# Patient Record
Sex: MALE | Race: WHITE | Employment: OTHER | URBAN - METROPOLITAN AREA
[De-identification: names, ages, dates, MRNs, and addresses within clinical notes are randomized per-mention and may not be internally consistent; named-entity substitution may affect disease eponyms.]

---

## 2017-08-07 PROBLEM — I77.9 BILATERAL CAROTID ARTERY DISEASE (HCC): Status: ACTIVE | Noted: 2017-08-07

## 2017-08-07 PROBLEM — I47.29 NSVT (NONSUSTAINED VENTRICULAR TACHYCARDIA): Status: ACTIVE | Noted: 2017-08-07

## 2018-02-07 PROBLEM — I42.0 DILATED CARDIOMYOPATHY (HCC): Status: ACTIVE | Noted: 2018-02-07

## 2018-04-19 PROBLEM — Z79.01 LONG TERM (CURRENT) USE OF ANTICOAGULANTS: Status: ACTIVE | Noted: 2018-04-19

## 2018-08-17 ENCOUNTER — HOSPITAL ENCOUNTER (OUTPATIENT)
Dept: LAB | Age: 73
Discharge: HOME | End: 2018-08-17
Payer: MEDICARE

## 2018-08-17 DIAGNOSIS — I50.22 CHRONIC SYSTOLIC HEART FAILURE (HCC): ICD-10-CM

## 2018-08-17 LAB
ALBUMIN SERPL-MCNC: 4 G/DL (ref 3.2–4.6)
ALBUMIN/GLOB SERPL: 1.2 {RATIO}
ALP SERPL-CCNC: 69 U/L (ref 50–136)
ALT SERPL-CCNC: 30 U/L (ref 12–65)
ANION GAP SERPL CALC-SCNC: 10 MMOL/L
AST SERPL-CCNC: 30 U/L (ref 15–37)
BILIRUB SERPL-MCNC: 1 MG/DL (ref 0.2–1.1)
BNP SERPL-MCNC: 74 PG/ML
BUN SERPL-MCNC: 23 MG/DL (ref 8–23)
CALCIUM SERPL-MCNC: 9.5 MG/DL (ref 8.3–10.4)
CHLORIDE SERPL-SCNC: 102 MMOL/L (ref 98–107)
CO2 SERPL-SCNC: 26 MMOL/L (ref 21–32)
CREAT SERPL-MCNC: 1.1 MG/DL (ref 0.8–1.5)
GLOBULIN SER CALC-MCNC: 3.4 G/DL
GLUCOSE SERPL-MCNC: 208 MG/DL (ref 65–100)
MAGNESIUM SERPL-MCNC: 2 MG/DL (ref 1.8–2.4)
POTASSIUM SERPL-SCNC: 4.1 MMOL/L (ref 3.5–5.1)
PROT SERPL-MCNC: 7.4 G/DL (ref 6.3–8.2)
SODIUM SERPL-SCNC: 138 MMOL/L (ref 136–145)
TSH SERPL DL<=0.005 MIU/L-ACNC: 0.83 UIU/ML (ref 0.36–3.74)

## 2018-08-17 PROCEDURE — 83880 ASSAY OF NATRIURETIC PEPTIDE: CPT

## 2018-08-17 PROCEDURE — 83735 ASSAY OF MAGNESIUM: CPT

## 2018-08-17 PROCEDURE — 84443 ASSAY THYROID STIM HORMONE: CPT

## 2018-08-17 PROCEDURE — 80053 COMPREHEN METABOLIC PANEL: CPT

## 2018-08-17 PROCEDURE — 36415 COLL VENOUS BLD VENIPUNCTURE: CPT

## 2018-11-28 ENCOUNTER — HOSPITAL ENCOUNTER (OUTPATIENT)
Dept: LAB | Age: 73
Discharge: HOME | End: 2018-11-28
Payer: MEDICARE

## 2018-11-28 DIAGNOSIS — I50.22 CHRONIC SYSTOLIC HEART FAILURE (HCC): ICD-10-CM

## 2018-11-28 LAB
ANION GAP SERPL CALC-SCNC: 7 MMOL/L
BNP SERPL-MCNC: 92 PG/ML
BUN SERPL-MCNC: 18 MG/DL (ref 8–23)
CALCIUM SERPL-MCNC: 9.3 MG/DL (ref 8.3–10.4)
CHLORIDE SERPL-SCNC: 101 MMOL/L (ref 98–107)
CO2 SERPL-SCNC: 29 MMOL/L (ref 21–32)
CREAT SERPL-MCNC: 1 MG/DL (ref 0.8–1.5)
GLUCOSE SERPL-MCNC: 110 MG/DL (ref 65–100)
MAGNESIUM SERPL-MCNC: 2.1 MG/DL (ref 1.8–2.4)
POTASSIUM SERPL-SCNC: 4.2 MMOL/L (ref 3.5–5.1)
SODIUM SERPL-SCNC: 137 MMOL/L (ref 136–145)

## 2018-11-28 PROCEDURE — 36415 COLL VENOUS BLD VENIPUNCTURE: CPT

## 2018-11-28 PROCEDURE — 80048 BASIC METABOLIC PNL TOTAL CA: CPT

## 2018-11-28 PROCEDURE — 83880 ASSAY OF NATRIURETIC PEPTIDE: CPT

## 2018-11-28 PROCEDURE — 83735 ASSAY OF MAGNESIUM: CPT

## 2019-01-01 ENCOUNTER — PATIENT OUTREACH (OUTPATIENT)
Dept: CASE MANAGEMENT | Age: 74
End: 2019-01-01

## 2019-01-01 ENCOUNTER — HOSPITAL ENCOUNTER (OUTPATIENT)
Age: 74
Setting detail: OBSERVATION
Discharge: HOME | End: 2019-11-05
Attending: INTERNAL MEDICINE | Admitting: INTERNAL MEDICINE
Payer: MEDICARE

## 2019-01-01 ENCOUNTER — HOSPITAL ENCOUNTER (OUTPATIENT)
Dept: LAB | Age: 74
Discharge: HOME | End: 2019-10-30
Payer: MEDICARE

## 2019-01-01 ENCOUNTER — ANESTHESIA (OUTPATIENT)
Dept: SURGERY | Age: 74
End: 2019-01-01
Payer: MEDICARE

## 2019-01-01 ENCOUNTER — APPOINTMENT (OUTPATIENT)
Dept: CARDIAC CATH/INVASIVE PROCEDURES | Age: 74
End: 2019-01-01
Payer: MEDICARE

## 2019-01-01 ENCOUNTER — ANESTHESIA EVENT (OUTPATIENT)
Dept: SURGERY | Age: 74
End: 2019-01-01
Payer: MEDICARE

## 2019-01-01 ENCOUNTER — HOSPITAL ENCOUNTER (INPATIENT)
Age: 74
LOS: 1 days | Discharge: HOME | DRG: 292 | End: 2019-09-05
Attending: EMERGENCY MEDICINE | Admitting: INTERNAL MEDICINE
Payer: MEDICARE

## 2019-01-01 ENCOUNTER — APPOINTMENT (OUTPATIENT)
Dept: GENERAL RADIOLOGY | Age: 74
DRG: 292 | End: 2019-01-01
Attending: EMERGENCY MEDICINE
Payer: MEDICARE

## 2019-01-01 ENCOUNTER — HOSPITAL ENCOUNTER (OUTPATIENT)
Dept: LAB | Age: 74
Discharge: HOME | End: 2019-08-26
Attending: INTERNAL MEDICINE
Payer: MEDICARE

## 2019-01-01 ENCOUNTER — HOSPITAL ENCOUNTER (OUTPATIENT)
Dept: CARDIAC CATH/INVASIVE PROCEDURES | Age: 74
Discharge: HOME | End: 2019-11-04
Payer: MEDICARE

## 2019-01-01 ENCOUNTER — APPOINTMENT (OUTPATIENT)
Dept: GENERAL RADIOLOGY | Age: 74
End: 2019-01-01
Attending: INTERNAL MEDICINE
Payer: MEDICARE

## 2019-01-01 VITALS
OXYGEN SATURATION: 93 % | TEMPERATURE: 97.4 F | WEIGHT: 250 LBS | SYSTOLIC BLOOD PRESSURE: 116 MMHG | RESPIRATION RATE: 14 BRPM | BODY MASS INDEX: 32.98 KG/M2 | HEART RATE: 62 BPM | DIASTOLIC BLOOD PRESSURE: 56 MMHG

## 2019-01-01 VITALS
BODY MASS INDEX: 35.17 KG/M2 | WEIGHT: 265.4 LBS | SYSTOLIC BLOOD PRESSURE: 112 MMHG | OXYGEN SATURATION: 90 % | DIASTOLIC BLOOD PRESSURE: 76 MMHG | HEART RATE: 59 BPM | HEIGHT: 73 IN | TEMPERATURE: 98.7 F | RESPIRATION RATE: 18 BRPM

## 2019-01-01 VITALS
OXYGEN SATURATION: 95 % | HEIGHT: 73 IN | SYSTOLIC BLOOD PRESSURE: 126 MMHG | TEMPERATURE: 97.2 F | WEIGHT: 247.7 LBS | BODY MASS INDEX: 32.83 KG/M2 | RESPIRATION RATE: 18 BRPM | HEART RATE: 63 BPM | DIASTOLIC BLOOD PRESSURE: 60 MMHG

## 2019-01-01 VITALS
HEART RATE: 61 BPM | DIASTOLIC BLOOD PRESSURE: 62 MMHG | TEMPERATURE: 98.6 F | BODY MASS INDEX: 32.59 KG/M2 | SYSTOLIC BLOOD PRESSURE: 118 MMHG | OXYGEN SATURATION: 95 % | WEIGHT: 247 LBS | RESPIRATION RATE: 14 BRPM

## 2019-01-01 VITALS
DIASTOLIC BLOOD PRESSURE: 62 MMHG | WEIGHT: 255 LBS | SYSTOLIC BLOOD PRESSURE: 118 MMHG | HEART RATE: 62 BPM | OXYGEN SATURATION: 96 % | TEMPERATURE: 97.8 F | BODY MASS INDEX: 33.64 KG/M2 | RESPIRATION RATE: 14 BRPM

## 2019-01-01 VITALS
OXYGEN SATURATION: 95 % | WEIGHT: 249 LBS | SYSTOLIC BLOOD PRESSURE: 134 MMHG | HEART RATE: 61 BPM | RESPIRATION RATE: 14 BRPM | BODY MASS INDEX: 32.85 KG/M2 | TEMPERATURE: 98.5 F | DIASTOLIC BLOOD PRESSURE: 60 MMHG

## 2019-01-01 VITALS
WEIGHT: 236 LBS | HEART RATE: 68 BPM | BODY MASS INDEX: 31.14 KG/M2 | OXYGEN SATURATION: 96 % | TEMPERATURE: 99 F | SYSTOLIC BLOOD PRESSURE: 96 MMHG | RESPIRATION RATE: 16 BRPM | DIASTOLIC BLOOD PRESSURE: 68 MMHG

## 2019-01-01 VITALS
TEMPERATURE: 97.7 F | DIASTOLIC BLOOD PRESSURE: 64 MMHG | OXYGEN SATURATION: 95 % | SYSTOLIC BLOOD PRESSURE: 128 MMHG | WEIGHT: 254 LBS | HEART RATE: 63 BPM | BODY MASS INDEX: 33.51 KG/M2 | RESPIRATION RATE: 14 BRPM

## 2019-01-01 VITALS
WEIGHT: 245.4 LBS | TEMPERATURE: 98.5 F | DIASTOLIC BLOOD PRESSURE: 62 MMHG | SYSTOLIC BLOOD PRESSURE: 124 MMHG | OXYGEN SATURATION: 96 % | BODY MASS INDEX: 32.38 KG/M2 | RESPIRATION RATE: 16 BRPM | HEART RATE: 82 BPM

## 2019-01-01 VITALS
RESPIRATION RATE: 16 BRPM | TEMPERATURE: 97.8 F | DIASTOLIC BLOOD PRESSURE: 64 MMHG | HEART RATE: 60 BPM | BODY MASS INDEX: 31.53 KG/M2 | WEIGHT: 239 LBS | OXYGEN SATURATION: 95 % | SYSTOLIC BLOOD PRESSURE: 108 MMHG

## 2019-01-01 VITALS
WEIGHT: 244 LBS | RESPIRATION RATE: 18 BRPM | SYSTOLIC BLOOD PRESSURE: 118 MMHG | OXYGEN SATURATION: 97 % | HEART RATE: 61 BPM | DIASTOLIC BLOOD PRESSURE: 56 MMHG | BODY MASS INDEX: 32.19 KG/M2 | TEMPERATURE: 98 F

## 2019-01-01 DIAGNOSIS — I50.9 CONGESTIVE HEART FAILURE, UNSPECIFIED HF CHRONICITY, UNSPECIFIED HEART FAILURE TYPE (HCC): ICD-10-CM

## 2019-01-01 DIAGNOSIS — I50.22 CHRONIC SYSTOLIC HEART FAILURE (HCC): ICD-10-CM

## 2019-01-01 DIAGNOSIS — R09.02 HYPOXIA: Primary | ICD-10-CM

## 2019-01-01 DIAGNOSIS — Z95.810 ICD (IMPLANTABLE CARDIOVERTER-DEFIBRILLATOR) IN PLACE: Primary | ICD-10-CM

## 2019-01-01 LAB
ALBUMIN SERPL-MCNC: 3.5 G/DL (ref 3.2–4.6)
ALBUMIN SERPL-MCNC: 3.6 G/DL (ref 3.2–4.6)
ALBUMIN SERPL-MCNC: 4.1 G/DL (ref 3.2–4.6)
ALBUMIN/GLOB SERPL: 1.2 {RATIO} (ref 1.2–3.5)
ALP SERPL-CCNC: 40 U/L (ref 50–136)
ALP SERPL-CCNC: 66 U/L (ref 50–136)
ALP SERPL-CCNC: 74 U/L (ref 50–136)
ALT SERPL-CCNC: 22 U/L (ref 12–65)
ALT SERPL-CCNC: 23 U/L (ref 12–65)
ALT SERPL-CCNC: 32 U/L (ref 12–65)
ANION GAP SERPL CALC-SCNC: 6 MMOL/L (ref 7–16)
ANION GAP SERPL CALC-SCNC: 6 MMOL/L (ref 7–16)
ANION GAP SERPL CALC-SCNC: 7 MMOL/L (ref 7–16)
ANION GAP SERPL CALC-SCNC: 8 MMOL/L (ref 7–16)
ANION GAP SERPL CALC-SCNC: 8 MMOL/L (ref 7–16)
AST SERPL-CCNC: 23 U/L (ref 15–37)
AST SERPL-CCNC: 25 U/L (ref 15–37)
AST SERPL-CCNC: 26 U/L (ref 15–37)
ATRIAL RATE: 60 BPM
ATRIAL RATE: 70 BPM
ATRIAL RATE: 87 BPM
BASOPHILS # BLD: 0 K/UL (ref 0–0.2)
BASOPHILS # BLD: 0 K/UL (ref 0–0.2)
BASOPHILS # BLD: 0.1 K/UL (ref 0–0.2)
BASOPHILS NFR BLD: 1 % (ref 0–2)
BILIRUB DIRECT SERPL-MCNC: 0.3 MG/DL
BILIRUB SERPL-MCNC: 0.7 MG/DL (ref 0.2–1.1)
BILIRUB SERPL-MCNC: 1 MG/DL (ref 0.2–1.1)
BILIRUB SERPL-MCNC: 1.1 MG/DL (ref 0.2–1.1)
BNP SERPL-MCNC: 103 PG/ML
BNP SERPL-MCNC: 262 PG/ML
BUN SERPL-MCNC: 20 MG/DL (ref 8–23)
BUN SERPL-MCNC: 22 MG/DL (ref 8–23)
BUN SERPL-MCNC: 23 MG/DL (ref 8–23)
BUN SERPL-MCNC: 24 MG/DL (ref 8–23)
BUN SERPL-MCNC: 25 MG/DL (ref 8–23)
BUN SERPL-MCNC: 26 MG/DL (ref 8–23)
BUN SERPL-MCNC: 28 MG/DL (ref 8–23)
CALCIUM SERPL-MCNC: 10 MG/DL (ref 8.3–10.4)
CALCIUM SERPL-MCNC: 9 MG/DL (ref 8.3–10.4)
CALCIUM SERPL-MCNC: 9.1 MG/DL (ref 8.3–10.4)
CALCIUM SERPL-MCNC: 9.1 MG/DL (ref 8.3–10.4)
CALCIUM SERPL-MCNC: 9.2 MG/DL (ref 8.3–10.4)
CALCIUM SERPL-MCNC: 9.5 MG/DL (ref 8.3–10.4)
CALCIUM SERPL-MCNC: 9.6 MG/DL (ref 8.3–10.4)
CALCULATED P AXIS, ECG09: 68 DEGREES
CALCULATED P AXIS, ECG09: 75 DEGREES
CALCULATED R AXIS, ECG10: -38 DEGREES
CALCULATED R AXIS, ECG10: -61 DEGREES
CALCULATED R AXIS, ECG10: -86 DEGREES
CALCULATED T AXIS, ECG11: 123 DEGREES
CALCULATED T AXIS, ECG11: 80 DEGREES
CALCULATED T AXIS, ECG11: 95 DEGREES
CHLORIDE SERPL-SCNC: 102 MMOL/L (ref 98–107)
CHLORIDE SERPL-SCNC: 103 MMOL/L (ref 98–107)
CHLORIDE SERPL-SCNC: 104 MMOL/L (ref 98–107)
CHLORIDE SERPL-SCNC: 106 MMOL/L (ref 98–107)
CHLORIDE SERPL-SCNC: 107 MMOL/L (ref 98–107)
CO2 SERPL-SCNC: 27 MMOL/L (ref 21–32)
CO2 SERPL-SCNC: 28 MMOL/L (ref 21–32)
CO2 SERPL-SCNC: 29 MMOL/L (ref 21–32)
CO2 SERPL-SCNC: 29 MMOL/L (ref 21–32)
CO2 SERPL-SCNC: 31 MMOL/L (ref 21–32)
CREAT SERPL-MCNC: 0.82 MG/DL (ref 0.8–1.5)
CREAT SERPL-MCNC: 0.87 MG/DL (ref 0.8–1.5)
CREAT SERPL-MCNC: 0.89 MG/DL (ref 0.8–1.5)
CREAT SERPL-MCNC: 0.92 MG/DL (ref 0.8–1.5)
CREAT SERPL-MCNC: 1.09 MG/DL (ref 0.8–1.5)
CREAT SERPL-MCNC: 1.3 MG/DL (ref 0.8–1.5)
CREAT SERPL-MCNC: 1.3 MG/DL (ref 0.8–1.5)
DIAGNOSIS, 93000: NORMAL
DIFFERENTIAL METHOD BLD: ABNORMAL
EOSINOPHIL # BLD: 0.1 K/UL (ref 0–0.8)
EOSINOPHIL NFR BLD: 1 % (ref 0.5–7.8)
ERYTHROCYTE [DISTWIDTH] IN BLOOD BY AUTOMATED COUNT: 13.6 % (ref 11.9–14.6)
ERYTHROCYTE [DISTWIDTH] IN BLOOD BY AUTOMATED COUNT: 13.8 % (ref 11.9–14.6)
ERYTHROCYTE [DISTWIDTH] IN BLOOD BY AUTOMATED COUNT: 13.8 % (ref 11.9–14.6)
ERYTHROCYTE [DISTWIDTH] IN BLOOD BY AUTOMATED COUNT: 14.1 % (ref 11.9–14.6)
ERYTHROCYTE [DISTWIDTH] IN BLOOD BY AUTOMATED COUNT: 14.1 % (ref 11.9–14.6)
EST. AVERAGE GLUCOSE BLD GHB EST-MCNC: 154 MG/DL
GLOBULIN SER CALC-MCNC: 2.9 G/DL (ref 2.3–3.5)
GLOBULIN SER CALC-MCNC: 3.1 G/DL (ref 2.3–3.5)
GLOBULIN SER CALC-MCNC: 3.5 G/DL (ref 2.3–3.5)
GLUCOSE BLD STRIP.AUTO-MCNC: 116 MG/DL (ref 65–100)
GLUCOSE BLD STRIP.AUTO-MCNC: 124 MG/DL (ref 65–100)
GLUCOSE BLD STRIP.AUTO-MCNC: 133 MG/DL (ref 65–100)
GLUCOSE BLD STRIP.AUTO-MCNC: 171 MG/DL (ref 65–100)
GLUCOSE SERPL-MCNC: 114 MG/DL (ref 65–100)
GLUCOSE SERPL-MCNC: 124 MG/DL (ref 65–100)
GLUCOSE SERPL-MCNC: 125 MG/DL (ref 65–100)
GLUCOSE SERPL-MCNC: 181 MG/DL (ref 65–100)
GLUCOSE SERPL-MCNC: 182 MG/DL (ref 65–100)
GLUCOSE SERPL-MCNC: 195 MG/DL (ref 65–100)
GLUCOSE SERPL-MCNC: 242 MG/DL (ref 65–100)
HBA1C MFR BLD: 7 % (ref 4.8–6)
HCT VFR BLD AUTO: 40.1 % (ref 41.1–50.3)
HCT VFR BLD AUTO: 41.6 % (ref 41.1–50.3)
HCT VFR BLD AUTO: 42.9 % (ref 41.1–50.3)
HCT VFR BLD AUTO: 44.7 % (ref 41.1–50.3)
HCT VFR BLD AUTO: 50.2 % (ref 41.1–50.3)
HGB BLD-MCNC: 13.1 G/DL (ref 13.6–17.2)
HGB BLD-MCNC: 13.2 G/DL (ref 13.6–17.2)
HGB BLD-MCNC: 14 G/DL (ref 13.6–17.2)
HGB BLD-MCNC: 14.5 G/DL (ref 13.6–17.2)
HGB BLD-MCNC: 16.5 G/DL (ref 13.6–17.2)
IMM GRANULOCYTES # BLD AUTO: 0 K/UL (ref 0–0.5)
IMM GRANULOCYTES # BLD AUTO: 0.1 K/UL (ref 0–0.5)
IMM GRANULOCYTES # BLD AUTO: 0.1 K/UL (ref 0–0.5)
IMM GRANULOCYTES NFR BLD AUTO: 1 % (ref 0–5)
INR PPP: 1.3
INR PPP: 1.4
INR PPP: 2.6
INR PPP: 2.7
INR PPP: 3
LYMPHOCYTES # BLD: 1.4 K/UL (ref 0.5–4.6)
LYMPHOCYTES # BLD: 1.5 K/UL (ref 0.5–4.6)
LYMPHOCYTES # BLD: 1.7 K/UL (ref 0.5–4.6)
LYMPHOCYTES NFR BLD: 14 % (ref 13–44)
LYMPHOCYTES NFR BLD: 19 % (ref 13–44)
LYMPHOCYTES NFR BLD: 22 % (ref 13–44)
MAGNESIUM SERPL-MCNC: 1.8 MG/DL (ref 1.8–2.4)
MAGNESIUM SERPL-MCNC: 1.9 MG/DL (ref 1.8–2.4)
MAGNESIUM SERPL-MCNC: 2.1 MG/DL (ref 1.8–2.4)
MCH RBC QN AUTO: 28.4 PG (ref 26.1–32.9)
MCH RBC QN AUTO: 28.9 PG (ref 26.1–32.9)
MCH RBC QN AUTO: 29 PG (ref 26.1–32.9)
MCH RBC QN AUTO: 29.1 PG (ref 26.1–32.9)
MCH RBC QN AUTO: 29.2 PG (ref 26.1–32.9)
MCHC RBC AUTO-ENTMCNC: 31.7 G/DL (ref 31.4–35)
MCHC RBC AUTO-ENTMCNC: 32.4 G/DL (ref 31.4–35)
MCHC RBC AUTO-ENTMCNC: 32.6 G/DL (ref 31.4–35)
MCHC RBC AUTO-ENTMCNC: 32.7 G/DL (ref 31.4–35)
MCHC RBC AUTO-ENTMCNC: 32.9 G/DL (ref 31.4–35)
MCV RBC AUTO: 87.9 FL (ref 79.6–97.8)
MCV RBC AUTO: 88.9 FL (ref 79.6–97.8)
MCV RBC AUTO: 89.6 FL (ref 79.6–97.8)
MCV RBC AUTO: 89.7 FL (ref 79.6–97.8)
MCV RBC AUTO: 89.8 FL (ref 79.6–97.8)
MONOCYTES # BLD: 0.5 K/UL (ref 0.1–1.3)
MONOCYTES # BLD: 0.7 K/UL (ref 0.1–1.3)
MONOCYTES # BLD: 0.7 K/UL (ref 0.1–1.3)
MONOCYTES NFR BLD: 10 % (ref 4–12)
MONOCYTES NFR BLD: 7 % (ref 4–12)
MONOCYTES NFR BLD: 7 % (ref 4–12)
NEUTS SEG # BLD: 4.8 K/UL (ref 1.7–8.2)
NEUTS SEG # BLD: 5.3 K/UL (ref 1.7–8.2)
NEUTS SEG # BLD: 8.1 K/UL (ref 1.7–8.2)
NEUTS SEG NFR BLD: 68 % (ref 43–78)
NEUTS SEG NFR BLD: 69 % (ref 43–78)
NEUTS SEG NFR BLD: 77 % (ref 43–78)
NRBC # BLD: 0 K/UL (ref 0–0.2)
P-R INTERVAL, ECG05: 146 MS
P-R INTERVAL, ECG05: 222 MS
P-R INTERVAL, ECG05: 262 MS
PLATELET # BLD AUTO: 140 K/UL (ref 150–450)
PLATELET # BLD AUTO: 155 K/UL (ref 150–450)
PLATELET # BLD AUTO: 162 K/UL (ref 150–450)
PLATELET # BLD AUTO: 166 K/UL (ref 150–450)
PLATELET # BLD AUTO: 191 K/UL (ref 150–450)
PMV BLD AUTO: 12.2 FL (ref 9.4–12.3)
PMV BLD AUTO: 12.6 FL (ref 9.4–12.3)
PMV BLD AUTO: 12.6 FL (ref 9.4–12.3)
PMV BLD AUTO: 12.7 FL (ref 9.4–12.3)
PMV BLD AUTO: 13 FL (ref 9.4–12.3)
POTASSIUM SERPL-SCNC: 3.2 MMOL/L (ref 3.5–5.1)
POTASSIUM SERPL-SCNC: 3.3 MMOL/L (ref 3.5–5.1)
POTASSIUM SERPL-SCNC: 3.5 MMOL/L (ref 3.5–5.1)
POTASSIUM SERPL-SCNC: 3.6 MMOL/L (ref 3.5–5.1)
POTASSIUM SERPL-SCNC: 4 MMOL/L (ref 3.5–5.1)
POTASSIUM SERPL-SCNC: 4.1 MMOL/L (ref 3.5–5.1)
POTASSIUM SERPL-SCNC: 4.7 MMOL/L (ref 3.5–5.1)
PROT SERPL-MCNC: 6.4 G/DL (ref 6.3–8.2)
PROT SERPL-MCNC: 6.7 G/DL (ref 6.3–8.2)
PROT SERPL-MCNC: 7.6 G/DL (ref 6.3–8.2)
PROTHROMBIN TIME: 16.6 SEC (ref 11.7–14.5)
PROTHROMBIN TIME: 17.1 SEC (ref 11.7–14.5)
PROTHROMBIN TIME: 28.9 SEC (ref 11.7–14.5)
PROTHROMBIN TIME: 29.4 SEC (ref 11.7–14.5)
PROTHROMBIN TIME: 31.7 SEC (ref 11.7–14.5)
Q-T INTERVAL, ECG07: 398 MS
Q-T INTERVAL, ECG07: 456 MS
Q-T INTERVAL, ECG07: 500 MS
QRS DURATION, ECG06: 142 MS
QRS DURATION, ECG06: 148 MS
QRS DURATION, ECG06: 162 MS
QTC CALCULATION (BEZET), ECG08: 456 MS
QTC CALCULATION (BEZET), ECG08: 478 MS
QTC CALCULATION (BEZET), ECG08: 540 MS
RBC # BLD AUTO: 4.51 M/UL (ref 4.23–5.6)
RBC # BLD AUTO: 4.64 M/UL (ref 4.23–5.6)
RBC # BLD AUTO: 4.79 M/UL (ref 4.23–5.6)
RBC # BLD AUTO: 4.98 M/UL (ref 4.23–5.6)
RBC # BLD AUTO: 5.71 M/UL (ref 4.23–5.6)
SODIUM SERPL-SCNC: 138 MMOL/L (ref 136–145)
SODIUM SERPL-SCNC: 139 MMOL/L (ref 136–145)
SODIUM SERPL-SCNC: 140 MMOL/L (ref 136–145)
SODIUM SERPL-SCNC: 141 MMOL/L (ref 136–145)
SODIUM SERPL-SCNC: 141 MMOL/L (ref 136–145)
T4 FREE SERPL-MCNC: 1.2 NG/DL (ref 0.78–1.46)
TROPONIN I SERPL-MCNC: 0.02 NG/ML (ref 0.02–0.05)
TSH SERPL DL<=0.005 MIU/L-ACNC: 1.2 UIU/ML (ref 0.36–3.74)
TSH SERPL DL<=0.005 MIU/L-ACNC: 1.31 UIU/ML (ref 0.36–3.74)
VENTRICULAR RATE, ECG03: 60 BPM
VENTRICULAR RATE, ECG03: 70 BPM
VENTRICULAR RATE, ECG03: 87 BPM
WBC # BLD AUTO: 10.6 K/UL (ref 4.3–11.1)
WBC # BLD AUTO: 5.5 K/UL (ref 4.3–11.1)
WBC # BLD AUTO: 7 K/UL (ref 4.3–11.1)
WBC # BLD AUTO: 7.4 K/UL (ref 4.3–11.1)
WBC # BLD AUTO: 7.7 K/UL (ref 4.3–11.1)

## 2019-01-01 PROCEDURE — 74011250637 HC RX REV CODE- 250/637: Performed by: INTERNAL MEDICINE

## 2019-01-01 PROCEDURE — 83036 HEMOGLOBIN GLYCOSYLATED A1C: CPT

## 2019-01-01 PROCEDURE — 85610 PROTHROMBIN TIME: CPT

## 2019-01-01 PROCEDURE — 94640 AIRWAY INHALATION TREATMENT: CPT

## 2019-01-01 PROCEDURE — 83880 ASSAY OF NATRIURETIC PEPTIDE: CPT

## 2019-01-01 PROCEDURE — C1769 GUIDE WIRE: HCPCS

## 2019-01-01 PROCEDURE — A4565 SLINGS: HCPCS

## 2019-01-01 PROCEDURE — 71045 X-RAY EXAM CHEST 1 VIEW: CPT

## 2019-01-01 PROCEDURE — 65660000000 HC RM CCU STEPDOWN

## 2019-01-01 PROCEDURE — 74011000250 HC RX REV CODE- 250: Performed by: INTERNAL MEDICINE

## 2019-01-01 PROCEDURE — 74011250637 HC RX REV CODE- 250/637: Performed by: NURSE PRACTITIONER

## 2019-01-01 PROCEDURE — 80048 BASIC METABOLIC PNL TOTAL CA: CPT

## 2019-01-01 PROCEDURE — 33225 L VENTRIC PACING LEAD ADD-ON: CPT

## 2019-01-01 PROCEDURE — 77030011747 HC SEAL HEMSTAT TELE -C

## 2019-01-01 PROCEDURE — 99218 HC RM OBSERVATION: CPT

## 2019-01-01 PROCEDURE — 84439 ASSAY OF FREE THYROXINE: CPT

## 2019-01-01 PROCEDURE — C1882 AICD, OTHER THAN SING/DUAL: HCPCS

## 2019-01-01 PROCEDURE — 82962 GLUCOSE BLOOD TEST: CPT

## 2019-01-01 PROCEDURE — 83735 ASSAY OF MAGNESIUM: CPT

## 2019-01-01 PROCEDURE — 84443 ASSAY THYROID STIM HORMONE: CPT

## 2019-01-01 PROCEDURE — 74011250636 HC RX REV CODE- 250/636: Performed by: ANESTHESIOLOGY

## 2019-01-01 PROCEDURE — 80053 COMPREHEN METABOLIC PANEL: CPT

## 2019-01-01 PROCEDURE — 36415 COLL VENOUS BLD VENIPUNCTURE: CPT

## 2019-01-01 PROCEDURE — 77030037400 HC ADH TISS HI VISC EXOFIN CHMP -B

## 2019-01-01 PROCEDURE — 76060000037 HC ANESTHESIA 3 TO 3.5 HR: Performed by: INTERNAL MEDICINE

## 2019-01-01 PROCEDURE — 74011250636 HC RX REV CODE- 250/636: Performed by: INTERNAL MEDICINE

## 2019-01-01 PROCEDURE — 74011000250 HC RX REV CODE- 250: Performed by: NURSE ANESTHETIST, CERTIFIED REGISTERED

## 2019-01-01 PROCEDURE — 74011000272 HC RX REV CODE- 272: Performed by: INTERNAL MEDICINE

## 2019-01-01 PROCEDURE — 77030003028 HC SUT VCRL J&J -A

## 2019-01-01 PROCEDURE — C1887 CATHETER, GUIDING: HCPCS

## 2019-01-01 PROCEDURE — 74011250636 HC RX REV CODE- 250/636: Performed by: NURSE PRACTITIONER

## 2019-01-01 PROCEDURE — 85027 COMPLETE CBC AUTOMATED: CPT

## 2019-01-01 PROCEDURE — 77030018547 HC SUT ETHBND1 J&J -B

## 2019-01-01 PROCEDURE — 74011636320 HC RX REV CODE- 636/320: Performed by: INTERNAL MEDICINE

## 2019-01-01 PROCEDURE — 77030012935 HC DRSG AQUACEL BMS -B

## 2019-01-01 PROCEDURE — 93005 ELECTROCARDIOGRAM TRACING: CPT | Performed by: EMERGENCY MEDICINE

## 2019-01-01 PROCEDURE — C1892 INTRO/SHEATH,FIXED,PEEL-AWAY: HCPCS

## 2019-01-01 PROCEDURE — 93005 ELECTROCARDIOGRAM TRACING: CPT | Performed by: INTERNAL MEDICINE

## 2019-01-01 PROCEDURE — 77030028698 HC BLD TISS PLSM MEDT -D

## 2019-01-01 PROCEDURE — C1751 CATH, INF, PER/CENT/MIDLINE: HCPCS

## 2019-01-01 PROCEDURE — 96374 THER/PROPH/DIAG INJ IV PUSH: CPT | Performed by: EMERGENCY MEDICINE

## 2019-01-01 PROCEDURE — 85025 COMPLETE CBC W/AUTO DIFF WBC: CPT

## 2019-01-01 PROCEDURE — 74011636637 HC RX REV CODE- 636/637: Performed by: INTERNAL MEDICINE

## 2019-01-01 PROCEDURE — 99285 EMERGENCY DEPT VISIT HI MDM: CPT | Performed by: EMERGENCY MEDICINE

## 2019-01-01 PROCEDURE — 74011250636 HC RX REV CODE- 250/636: Performed by: NURSE ANESTHETIST, CERTIFIED REGISTERED

## 2019-01-01 PROCEDURE — 33241 REMOVE PULSE GENERATOR: CPT

## 2019-01-01 PROCEDURE — 94760 N-INVAS EAR/PLS OXIMETRY 1: CPT

## 2019-01-01 PROCEDURE — 77010033678 HC OXYGEN DAILY

## 2019-01-01 PROCEDURE — 84484 ASSAY OF TROPONIN QUANT: CPT

## 2019-01-01 PROCEDURE — C1894 INTRO/SHEATH, NON-LASER: HCPCS

## 2019-01-01 PROCEDURE — 77030033067 HC SUT PDO STRATFX SPIR J&J -B

## 2019-01-01 PROCEDURE — 80076 HEPATIC FUNCTION PANEL: CPT

## 2019-01-01 PROCEDURE — 74011250636 HC RX REV CODE- 250/636

## 2019-01-01 PROCEDURE — 74011000250 HC RX REV CODE- 250: Performed by: EMERGENCY MEDICINE

## 2019-01-01 PROCEDURE — 33249 INSJ/RPLCMT DEFIB W/LEAD(S): CPT

## 2019-01-01 PROCEDURE — C1900 LEAD, CORONARY VENOUS: HCPCS

## 2019-01-01 RX ORDER — ACETAMINOPHEN 325 MG/1
650 TABLET ORAL
Status: DISCONTINUED | OUTPATIENT
Start: 2019-01-01 | End: 2019-01-01 | Stop reason: HOSPADM

## 2019-01-01 RX ORDER — HYDROCODONE BITARTRATE AND ACETAMINOPHEN 5; 325 MG/1; MG/1
1 TABLET ORAL
Qty: 10 TAB | Refills: 0 | Status: SHIPPED | OUTPATIENT
Start: 2019-01-01 | End: 2019-01-01

## 2019-01-01 RX ORDER — BUPIVACAINE HYDROCHLORIDE AND EPINEPHRINE 5; 5 MG/ML; UG/ML
60 INJECTION, SOLUTION PERINEURAL ONCE
Status: COMPLETED | OUTPATIENT
Start: 2019-01-01 | End: 2019-01-01

## 2019-01-01 RX ORDER — CARVEDILOL 12.5 MG/1
12.5 TABLET ORAL 2 TIMES DAILY WITH MEALS
Status: DISCONTINUED | OUTPATIENT
Start: 2019-01-01 | End: 2019-01-01 | Stop reason: HOSPADM

## 2019-01-01 RX ORDER — ASPIRIN 81 MG/1
81 TABLET ORAL DAILY
Status: DISCONTINUED | OUTPATIENT
Start: 2019-01-01 | End: 2019-01-01 | Stop reason: HOSPADM

## 2019-01-01 RX ORDER — POTASSIUM CHLORIDE 20 MEQ/1
40 TABLET, EXTENDED RELEASE ORAL
Status: COMPLETED | OUTPATIENT
Start: 2019-01-01 | End: 2019-01-01

## 2019-01-01 RX ORDER — PROPOFOL 10 MG/ML
INJECTION, EMULSION INTRAVENOUS AS NEEDED
Status: DISCONTINUED | OUTPATIENT
Start: 2019-01-01 | End: 2019-01-01 | Stop reason: HOSPADM

## 2019-01-01 RX ORDER — AMIODARONE HYDROCHLORIDE 200 MG/1
200 TABLET ORAL DAILY
Status: DISCONTINUED | OUTPATIENT
Start: 2019-01-01 | End: 2019-01-01 | Stop reason: HOSPADM

## 2019-01-01 RX ORDER — MESALAMINE 0.38 G/1
0.38 CAPSULE, EXTENDED RELEASE ORAL DAILY
Status: DISCONTINUED | OUTPATIENT
Start: 2019-01-01 | End: 2019-01-01 | Stop reason: HOSPADM

## 2019-01-01 RX ORDER — DOXYCYCLINE 100 MG/1
100 CAPSULE ORAL 2 TIMES DAILY
Qty: 10 CAP | Refills: 0 | Status: SHIPPED | OUTPATIENT
Start: 2019-01-01 | End: 2019-01-01 | Stop reason: SDUPTHER

## 2019-01-01 RX ORDER — HYDROCODONE BITARTRATE AND ACETAMINOPHEN 5; 325 MG/1; MG/1
1 TABLET ORAL
Status: DISCONTINUED | OUTPATIENT
Start: 2019-01-01 | End: 2019-01-01 | Stop reason: HOSPADM

## 2019-01-01 RX ORDER — EPHEDRINE SULFATE/0.9% NACL/PF 50 MG/5 ML
SYRINGE (ML) INTRAVENOUS AS NEEDED
Status: DISCONTINUED | OUTPATIENT
Start: 2019-01-01 | End: 2019-01-01 | Stop reason: HOSPADM

## 2019-01-01 RX ORDER — ATORVASTATIN CALCIUM 40 MG/1
40 TABLET, FILM COATED ORAL
Status: DISCONTINUED | OUTPATIENT
Start: 2019-01-01 | End: 2019-01-01 | Stop reason: HOSPADM

## 2019-01-01 RX ORDER — SODIUM CHLORIDE, SODIUM LACTATE, POTASSIUM CHLORIDE, CALCIUM CHLORIDE 600; 310; 30; 20 MG/100ML; MG/100ML; MG/100ML; MG/100ML
100 INJECTION, SOLUTION INTRAVENOUS CONTINUOUS
Status: DISCONTINUED | OUTPATIENT
Start: 2019-01-01 | End: 2019-01-01

## 2019-01-01 RX ORDER — FENOFIBRATE 160 MG/1
160 TABLET ORAL DAILY
Status: DISCONTINUED | OUTPATIENT
Start: 2019-01-01 | End: 2019-01-01 | Stop reason: HOSPADM

## 2019-01-01 RX ORDER — SPIRONOLACTONE 25 MG/1
25 TABLET ORAL DAILY
Status: DISCONTINUED | OUTPATIENT
Start: 2019-01-01 | End: 2019-01-01 | Stop reason: HOSPADM

## 2019-01-01 RX ORDER — FENTANYL CITRATE 50 UG/ML
INJECTION, SOLUTION INTRAMUSCULAR; INTRAVENOUS AS NEEDED
Status: DISCONTINUED | OUTPATIENT
Start: 2019-01-01 | End: 2019-01-01 | Stop reason: HOSPADM

## 2019-01-01 RX ORDER — IPRATROPIUM BROMIDE AND ALBUTEROL SULFATE 2.5; .5 MG/3ML; MG/3ML
3 SOLUTION RESPIRATORY (INHALATION)
Status: COMPLETED | OUTPATIENT
Start: 2019-01-01 | End: 2019-01-01

## 2019-01-01 RX ORDER — CEFAZOLIN SODIUM/WATER 2 G/20 ML
2 SYRINGE (ML) INTRAVENOUS EVERY 12 HOURS
Status: DISCONTINUED | OUTPATIENT
Start: 2019-01-01 | End: 2019-01-01

## 2019-01-01 RX ORDER — CEFAZOLIN SODIUM/WATER 2 G/20 ML
2 SYRINGE (ML) INTRAVENOUS EVERY 8 HOURS
Status: DISCONTINUED | OUTPATIENT
Start: 2019-01-01 | End: 2019-01-01 | Stop reason: HOSPADM

## 2019-01-01 RX ORDER — CEFAZOLIN SODIUM/WATER 2 G/20 ML
2 SYRINGE (ML) INTRAVENOUS ONCE
Status: COMPLETED | OUTPATIENT
Start: 2019-01-01 | End: 2019-01-01

## 2019-01-01 RX ORDER — WARFARIN 1 MG/1
1.5 TABLET ORAL EVERY EVENING
Status: DISCONTINUED | OUTPATIENT
Start: 2019-01-01 | End: 2019-01-01 | Stop reason: HOSPADM

## 2019-01-01 RX ORDER — FUROSEMIDE 10 MG/ML
40 INJECTION INTRAMUSCULAR; INTRAVENOUS EVERY 12 HOURS
Status: DISCONTINUED | OUTPATIENT
Start: 2019-01-01 | End: 2019-01-01

## 2019-01-01 RX ORDER — SODIUM CHLORIDE 0.9 % (FLUSH) 0.9 %
5 SYRINGE (ML) INJECTION AS NEEDED
Status: DISCONTINUED | OUTPATIENT
Start: 2019-01-01 | End: 2019-01-01 | Stop reason: HOSPADM

## 2019-01-01 RX ORDER — LISINOPRIL 5 MG/1
2.5 TABLET ORAL DAILY
Status: DISCONTINUED | OUTPATIENT
Start: 2019-01-01 | End: 2019-01-01 | Stop reason: HOSPADM

## 2019-01-01 RX ORDER — WARFARIN 2.5 MG/1
2.5 TABLET ORAL EVERY EVENING
Status: DISCONTINUED | OUTPATIENT
Start: 2019-01-01 | End: 2019-01-01 | Stop reason: HOSPADM

## 2019-01-01 RX ORDER — MESALAMINE 0.38 G/1
0.38 CAPSULE, EXTENDED RELEASE ORAL DAILY
COMMUNITY
End: 2020-01-01

## 2019-01-01 RX ORDER — LISINOPRIL 2.5 MG/1
2.5 TABLET ORAL DAILY
Qty: 30 TAB | Refills: 11 | Status: ON HOLD | OUTPATIENT
Start: 2019-01-01 | End: 2020-01-01 | Stop reason: CLARIF

## 2019-01-01 RX ORDER — FUROSEMIDE 40 MG/1
40 TABLET ORAL 2 TIMES DAILY
Status: DISCONTINUED | OUTPATIENT
Start: 2019-01-01 | End: 2019-01-01 | Stop reason: HOSPADM

## 2019-01-01 RX ORDER — SODIUM CHLORIDE 0.9 % (FLUSH) 0.9 %
5-40 SYRINGE (ML) INJECTION EVERY 8 HOURS
Status: DISCONTINUED | OUTPATIENT
Start: 2019-01-01 | End: 2019-01-01 | Stop reason: HOSPADM

## 2019-01-01 RX ORDER — WARFARIN SODIUM 5 MG/1
5 TABLET ORAL
Status: DISCONTINUED | OUTPATIENT
Start: 2019-01-01 | End: 2019-01-01 | Stop reason: HOSPADM

## 2019-01-01 RX ORDER — SODIUM CHLORIDE 0.9 % (FLUSH) 0.9 %
5-40 SYRINGE (ML) INJECTION AS NEEDED
Status: DISCONTINUED | OUTPATIENT
Start: 2019-01-01 | End: 2019-01-01 | Stop reason: HOSPADM

## 2019-01-01 RX ORDER — NITROGLYCERIN 0.4 MG/1
0.4 TABLET SUBLINGUAL
Status: DISCONTINUED | OUTPATIENT
Start: 2019-01-01 | End: 2019-01-01 | Stop reason: HOSPADM

## 2019-01-01 RX ORDER — SODIUM CHLORIDE, SODIUM LACTATE, POTASSIUM CHLORIDE, CALCIUM CHLORIDE 600; 310; 30; 20 MG/100ML; MG/100ML; MG/100ML; MG/100ML
100 INJECTION, SOLUTION INTRAVENOUS CONTINUOUS
Status: DISCONTINUED | OUTPATIENT
Start: 2019-01-01 | End: 2019-01-01 | Stop reason: HOSPADM

## 2019-01-01 RX ORDER — DEXTROSE 40 %
15 GEL (GRAM) ORAL AS NEEDED
Status: DISCONTINUED | OUTPATIENT
Start: 2019-01-01 | End: 2019-01-01 | Stop reason: HOSPADM

## 2019-01-01 RX ORDER — DEXTROSE 50 % IN WATER (D50W) INTRAVENOUS SYRINGE
25-50 AS NEEDED
Status: DISCONTINUED | OUTPATIENT
Start: 2019-01-01 | End: 2019-01-01 | Stop reason: HOSPADM

## 2019-01-01 RX ORDER — FUROSEMIDE 10 MG/ML
40 INJECTION INTRAMUSCULAR; INTRAVENOUS 2 TIMES DAILY
Status: DISCONTINUED | OUTPATIENT
Start: 2019-01-01 | End: 2019-01-01

## 2019-01-01 RX ORDER — ATORVASTATIN CALCIUM 40 MG/1
40 TABLET, FILM COATED ORAL DAILY
Status: DISCONTINUED | OUTPATIENT
Start: 2019-01-01 | End: 2019-01-01 | Stop reason: HOSPADM

## 2019-01-01 RX ORDER — GABAPENTIN 300 MG/1
300 CAPSULE ORAL 2 TIMES DAILY
Status: DISCONTINUED | OUTPATIENT
Start: 2019-01-01 | End: 2019-01-01 | Stop reason: HOSPADM

## 2019-01-01 RX ORDER — DOCUSATE SODIUM 100 MG/1
100 CAPSULE, LIQUID FILLED ORAL
Status: DISCONTINUED | OUTPATIENT
Start: 2019-01-01 | End: 2019-01-01 | Stop reason: HOSPADM

## 2019-01-01 RX ORDER — MORPHINE SULFATE 2 MG/ML
2 INJECTION, SOLUTION INTRAMUSCULAR; INTRAVENOUS
Status: DISCONTINUED | OUTPATIENT
Start: 2019-01-01 | End: 2019-01-01 | Stop reason: HOSPADM

## 2019-01-01 RX ORDER — PROPOFOL 10 MG/ML
INJECTION, EMULSION INTRAVENOUS
Status: DISCONTINUED | OUTPATIENT
Start: 2019-01-01 | End: 2019-01-01 | Stop reason: HOSPADM

## 2019-01-01 RX ORDER — FUROSEMIDE 40 MG/1
40 TABLET ORAL 2 TIMES DAILY
Qty: 60 TAB | Refills: 6 | Status: SHIPPED | OUTPATIENT
Start: 2019-01-01 | End: 2020-01-01

## 2019-01-01 RX ORDER — DOXYCYCLINE 100 MG/1
100 CAPSULE ORAL 2 TIMES DAILY
Qty: 10 CAP | Refills: 0 | Status: SHIPPED | OUTPATIENT
Start: 2019-01-01 | End: 2019-01-01

## 2019-01-01 RX ORDER — ATORVASTATIN CALCIUM 80 MG/1
40 TABLET, FILM COATED ORAL DAILY
Status: DISCONTINUED | OUTPATIENT
Start: 2019-01-01 | End: 2019-01-01

## 2019-01-01 RX ORDER — ONDANSETRON 2 MG/ML
4 INJECTION INTRAMUSCULAR; INTRAVENOUS
Status: DISCONTINUED | OUTPATIENT
Start: 2019-01-01 | End: 2019-01-01 | Stop reason: HOSPADM

## 2019-01-01 RX ORDER — AMIODARONE HYDROCHLORIDE 200 MG/1
200 TABLET ORAL DAILY
Qty: 30 TAB | Refills: 11 | Status: SHIPPED | OUTPATIENT
Start: 2019-01-01 | End: 2019-01-01

## 2019-01-01 RX ORDER — INSULIN LISPRO 100 [IU]/ML
INJECTION, SOLUTION INTRAVENOUS; SUBCUTANEOUS
Status: DISCONTINUED | OUTPATIENT
Start: 2019-01-01 | End: 2019-01-01 | Stop reason: HOSPADM

## 2019-01-01 RX ORDER — ATORVASTATIN CALCIUM 40 MG/1
40 TABLET, FILM COATED ORAL DAILY
Status: DISCONTINUED | OUTPATIENT
Start: 2019-01-01 | End: 2019-01-01 | Stop reason: SDUPTHER

## 2019-01-01 RX ORDER — WARFARIN SODIUM 5 MG/1
5 TABLET ORAL EVERY EVENING
Status: DISCONTINUED | OUTPATIENT
Start: 2019-01-01 | End: 2019-01-01

## 2019-01-01 RX ADMIN — LISINOPRIL 2.5 MG: 5 TABLET ORAL at 08:58

## 2019-01-01 RX ADMIN — GABAPENTIN 300 MG: 300 CAPSULE ORAL at 09:00

## 2019-01-01 RX ADMIN — LISINOPRIL 2.5 MG: 5 TABLET ORAL at 11:49

## 2019-01-01 RX ADMIN — SODIUM CHLORIDE, SODIUM LACTATE, POTASSIUM CHLORIDE, AND CALCIUM CHLORIDE: 600; 310; 30; 20 INJECTION, SOLUTION INTRAVENOUS at 07:56

## 2019-01-01 RX ADMIN — POTASSIUM CHLORIDE 40 MEQ: 20 TABLET, EXTENDED RELEASE ORAL at 09:53

## 2019-01-01 RX ADMIN — ASPIRIN 81 MG: 81 TABLET ORAL at 08:19

## 2019-01-01 RX ADMIN — ASPIRIN 81 MG: 81 TABLET ORAL at 08:10

## 2019-01-01 RX ADMIN — FENTANYL CITRATE 25 MCG: 50 INJECTION INTRAMUSCULAR; INTRAVENOUS at 08:45

## 2019-01-01 RX ADMIN — BUPIVACAINE HYDROCHLORIDE AND EPINEPHRINE BITARTRATE 250 MG: 5; .005 INJECTION, SOLUTION PERINEURAL at 10:00

## 2019-01-01 RX ADMIN — IOPAMIDOL 65 ML: 755 INJECTION, SOLUTION INTRAVENOUS at 10:01

## 2019-01-01 RX ADMIN — Medication 2 G: at 08:50

## 2019-01-01 RX ADMIN — ATORVASTATIN CALCIUM 40 MG: 40 TABLET, FILM COATED ORAL at 08:19

## 2019-01-01 RX ADMIN — Medication 10 ML: at 13:58

## 2019-01-01 RX ADMIN — FUROSEMIDE 40 MG: 40 TABLET ORAL at 17:19

## 2019-01-01 RX ADMIN — CARVEDILOL 12.5 MG: 12.5 TABLET, FILM COATED ORAL at 17:20

## 2019-01-01 RX ADMIN — POTASSIUM CHLORIDE 40 MEQ: 20 TABLET, EXTENDED RELEASE ORAL at 05:48

## 2019-01-01 RX ADMIN — SPIRONOLACTONE 25 MG: 25 TABLET ORAL at 09:48

## 2019-01-01 RX ADMIN — Medication 2 G: at 08:16

## 2019-01-01 RX ADMIN — ASPIRIN 81 MG: 81 TABLET ORAL at 09:45

## 2019-01-01 RX ADMIN — GABAPENTIN 300 MG: 300 CAPSULE ORAL at 17:34

## 2019-01-01 RX ADMIN — Medication 5 MG: at 08:40

## 2019-01-01 RX ADMIN — Medication 10 ML: at 09:52

## 2019-01-01 RX ADMIN — GABAPENTIN 300 MG: 300 CAPSULE ORAL at 09:43

## 2019-01-01 RX ADMIN — FUROSEMIDE 40 MG: 40 TABLET ORAL at 17:16

## 2019-01-01 RX ADMIN — Medication 5 MG: at 08:32

## 2019-01-01 RX ADMIN — SPIRONOLACTONE 25 MG: 25 TABLET ORAL at 08:19

## 2019-01-01 RX ADMIN — FENTANYL CITRATE 25 MCG: 50 INJECTION INTRAMUSCULAR; INTRAVENOUS at 08:10

## 2019-01-01 RX ADMIN — Medication 5 MG: at 08:16

## 2019-01-01 RX ADMIN — GABAPENTIN 300 MG: 300 CAPSULE ORAL at 17:17

## 2019-01-01 RX ADMIN — FENOFIBRATE 160 MG: 160 TABLET ORAL at 08:18

## 2019-01-01 RX ADMIN — GABAPENTIN 300 MG: 300 CAPSULE ORAL at 08:11

## 2019-01-01 RX ADMIN — CARVEDILOL 12.5 MG: 12.5 TABLET, FILM COATED ORAL at 17:36

## 2019-01-01 RX ADMIN — CARVEDILOL 12.5 MG: 12.5 TABLET, FILM COATED ORAL at 17:17

## 2019-01-01 RX ADMIN — LISINOPRIL 2.5 MG: 5 TABLET ORAL at 09:45

## 2019-01-01 RX ADMIN — FUROSEMIDE 40 MG: 40 TABLET ORAL at 08:18

## 2019-01-01 RX ADMIN — AMIODARONE HYDROCHLORIDE 200 MG: 200 TABLET ORAL at 11:52

## 2019-01-01 RX ADMIN — ATORVASTATIN CALCIUM 40 MG: 40 TABLET, FILM COATED ORAL at 21:40

## 2019-01-01 RX ADMIN — WARFARIN SODIUM 5 MG: 5 TABLET ORAL at 17:37

## 2019-01-01 RX ADMIN — CARVEDILOL 12.5 MG: 12.5 TABLET, FILM COATED ORAL at 09:48

## 2019-01-01 RX ADMIN — ATORVASTATIN CALCIUM 40 MG: 40 TABLET, FILM COATED ORAL at 08:11

## 2019-01-01 RX ADMIN — CARVEDILOL 12.5 MG: 12.5 TABLET, FILM COATED ORAL at 08:11

## 2019-01-01 RX ADMIN — FUROSEMIDE 40 MG: 10 INJECTION, SOLUTION INTRAMUSCULAR; INTRAVENOUS at 06:02

## 2019-01-01 RX ADMIN — INSULIN LISPRO 2 UNITS: 100 INJECTION, SOLUTION INTRAVENOUS; SUBCUTANEOUS at 20:13

## 2019-01-01 RX ADMIN — Medication 5 ML: at 06:32

## 2019-01-01 RX ADMIN — GABAPENTIN 300 MG: 300 CAPSULE ORAL at 08:19

## 2019-01-01 RX ADMIN — POTASSIUM CHLORIDE 40 MEQ: 20 TABLET, EXTENDED RELEASE ORAL at 08:19

## 2019-01-01 RX ADMIN — SPIRONOLACTONE 25 MG: 25 TABLET ORAL at 09:07

## 2019-01-01 RX ADMIN — CARVEDILOL 12.5 MG: 12.5 TABLET, FILM COATED ORAL at 08:19

## 2019-01-01 RX ADMIN — FUROSEMIDE 40 MG: 10 INJECTION, SOLUTION INTRAMUSCULAR; INTRAVENOUS at 17:35

## 2019-01-01 RX ADMIN — Medication 5 ML: at 05:41

## 2019-01-01 RX ADMIN — ASPIRIN 81 MG: 81 TABLET ORAL at 08:58

## 2019-01-01 RX ADMIN — SPIRONOLACTONE 25 MG: 25 TABLET ORAL at 08:10

## 2019-01-01 RX ADMIN — Medication 5 MG: at 09:46

## 2019-01-01 RX ADMIN — Medication 10 ML: at 20:11

## 2019-01-01 RX ADMIN — Medication 10 ML: at 05:52

## 2019-01-01 RX ADMIN — PROPOFOL 30 MG: 10 INJECTION, EMULSION INTRAVENOUS at 07:59

## 2019-01-01 RX ADMIN — GABAPENTIN 300 MG: 300 CAPSULE ORAL at 17:20

## 2019-01-01 RX ADMIN — Medication 5 MG: at 09:54

## 2019-01-01 RX ADMIN — Medication 5 MG: at 08:04

## 2019-01-01 RX ADMIN — WARFARIN SODIUM 2.5 MG: 2.5 TABLET ORAL at 17:17

## 2019-01-01 RX ADMIN — Medication 5 ML: at 22:37

## 2019-01-01 RX ADMIN — FUROSEMIDE 40 MG: 40 TABLET ORAL at 09:45

## 2019-01-01 RX ADMIN — Medication 10 MG: at 09:57

## 2019-01-01 RX ADMIN — PROPOFOL 120 MCG/KG/MIN: 10 INJECTION, EMULSION INTRAVENOUS at 07:59

## 2019-01-01 RX ADMIN — WATER: 1 IRRIGANT IRRIGATION at 10:00

## 2019-01-01 RX ADMIN — CARVEDILOL 12.5 MG: 12.5 TABLET, FILM COATED ORAL at 09:06

## 2019-01-01 RX ADMIN — IPRATROPIUM BROMIDE AND ALBUTEROL SULFATE 3 ML: .5; 3 SOLUTION RESPIRATORY (INHALATION) at 04:32

## 2019-01-01 RX ADMIN — LISINOPRIL 2.5 MG: 5 TABLET ORAL at 08:17

## 2019-01-01 RX ADMIN — Medication 2 G: at 20:11

## 2019-01-01 RX ADMIN — GABAPENTIN 300 MG: 300 CAPSULE ORAL at 13:57

## 2019-01-01 RX ADMIN — WARFARIN SODIUM 2.5 MG: 2 TABLET ORAL at 17:19

## 2019-01-01 RX ADMIN — Medication 5 MG: at 08:26

## 2019-01-01 RX ADMIN — Medication 5 ML: at 13:31

## 2019-01-01 RX ADMIN — FUROSEMIDE 40 MG: 40 TABLET ORAL at 08:59

## 2019-01-01 RX ADMIN — Medication 5 ML: at 21:39

## 2019-02-19 ENCOUNTER — HOSPITAL ENCOUNTER (OUTPATIENT)
Dept: LAB | Age: 74
Discharge: HOME | End: 2019-02-19
Payer: MEDICARE

## 2019-02-19 DIAGNOSIS — I47.29 NSVT (NONSUSTAINED VENTRICULAR TACHYCARDIA): ICD-10-CM

## 2019-02-19 DIAGNOSIS — I50.22 CHRONIC SYSTOLIC HEART FAILURE (HCC): ICD-10-CM

## 2019-02-19 LAB
ANION GAP SERPL CALC-SCNC: 3 MMOL/L
BNP SERPL-MCNC: 139 PG/ML
BUN SERPL-MCNC: 19 MG/DL (ref 8–23)
CALCIUM SERPL-MCNC: 9 MG/DL (ref 8.3–10.4)
CHLORIDE SERPL-SCNC: 108 MMOL/L (ref 98–107)
CO2 SERPL-SCNC: 29 MMOL/L (ref 21–32)
CREAT SERPL-MCNC: 0.9 MG/DL (ref 0.8–1.5)
GLUCOSE SERPL-MCNC: 188 MG/DL (ref 65–100)
MAGNESIUM SERPL-MCNC: 2 MG/DL (ref 1.8–2.4)
POTASSIUM SERPL-SCNC: 4.2 MMOL/L (ref 3.5–5.1)
SODIUM SERPL-SCNC: 140 MMOL/L (ref 136–145)

## 2019-02-19 PROCEDURE — 83880 ASSAY OF NATRIURETIC PEPTIDE: CPT

## 2019-02-19 PROCEDURE — 36415 COLL VENOUS BLD VENIPUNCTURE: CPT

## 2019-02-19 PROCEDURE — 80048 BASIC METABOLIC PNL TOTAL CA: CPT

## 2019-02-19 PROCEDURE — 83735 ASSAY OF MAGNESIUM: CPT

## 2019-02-21 PROBLEM — I48.3 TYPICAL ATRIAL FLUTTER (HCC): Status: ACTIVE | Noted: 2019-02-21

## 2019-02-26 NOTE — PROGRESS NOTES
Patient pre-assessment complete for [facility] poss with Dr Efrain Paul scheduled for 19 at 9:30am, arrival time 7:30am. Patient verified using . Patient instructed to bring all home medications in labeled bottles on the day of procedure. NPO status reinforced. Patient informed to take a full dose aspirin 325mg  or 81 mg x 4 on the day of procedure. Patient instructed to HOLD coumadin (last dose 19) & HOLD metformin today & HOLD lasix & spironolactone in am, . Instructed they can take all other medications excluding vitamins & supplements. Patient verbalizes understanding of all instructions & denies any questions at this time.

## 2019-02-27 ENCOUNTER — HOSPITAL ENCOUNTER (OUTPATIENT)
Dept: CARDIAC CATH/INVASIVE PROCEDURES | Age: 74
Discharge: HOME | End: 2019-02-27
Attending: INTERNAL MEDICINE | Admitting: INTERNAL MEDICINE
Payer: MEDICARE

## 2019-02-27 VITALS
BODY MASS INDEX: 35.12 KG/M2 | TEMPERATURE: 97.8 F | WEIGHT: 265 LBS | HEART RATE: 56 BPM | SYSTOLIC BLOOD PRESSURE: 140 MMHG | HEIGHT: 73 IN | DIASTOLIC BLOOD PRESSURE: 75 MMHG | RESPIRATION RATE: 16 BRPM | OXYGEN SATURATION: 96 %

## 2019-02-27 LAB
ANION GAP SERPL CALC-SCNC: 5 MMOL/L (ref 7–16)
ATRIAL RATE: 72 BPM
BUN SERPL-MCNC: 16 MG/DL (ref 8–23)
CALCIUM SERPL-MCNC: 9.1 MG/DL (ref 8.3–10.4)
CALCULATED R AXIS, ECG10: -54 DEGREES
CALCULATED T AXIS, ECG11: 107 DEGREES
CHLORIDE SERPL-SCNC: 108 MMOL/L (ref 98–107)
CO2 SERPL-SCNC: 28 MMOL/L (ref 21–32)
CREAT SERPL-MCNC: 0.86 MG/DL (ref 0.8–1.5)
DIAGNOSIS, 93000: NORMAL
ERYTHROCYTE [DISTWIDTH] IN BLOOD BY AUTOMATED COUNT: 13.6 % (ref 11.9–14.6)
GLUCOSE SERPL-MCNC: 139 MG/DL (ref 65–100)
HCT VFR BLD AUTO: 44.3 % (ref 41.1–50.3)
HGB BLD-MCNC: 14.4 G/DL (ref 13.6–17.2)
INR PPP: 1.2
MAGNESIUM SERPL-MCNC: 2 MG/DL (ref 1.8–2.4)
MCH RBC QN AUTO: 29.3 PG (ref 26.1–32.9)
MCHC RBC AUTO-ENTMCNC: 32.5 G/DL (ref 31.4–35)
MCV RBC AUTO: 90 FL (ref 79.6–97.8)
NRBC # BLD: 0 K/UL (ref 0–0.2)
P-R INTERVAL, ECG05: 270 MS
PLATELET # BLD AUTO: 158 K/UL (ref 150–450)
PMV BLD AUTO: 12.9 FL (ref 9.4–12.3)
POTASSIUM SERPL-SCNC: 4.2 MMOL/L (ref 3.5–5.1)
PROTHROMBIN TIME: 15.3 SEC (ref 11.7–14.5)
Q-T INTERVAL, ECG07: 432 MS
QRS DURATION, ECG06: 140 MS
QTC CALCULATION (BEZET), ECG08: 473 MS
RBC # BLD AUTO: 4.92 M/UL (ref 4.23–5.6)
SODIUM SERPL-SCNC: 141 MMOL/L (ref 136–145)
VENTRICULAR RATE, ECG03: 72 BPM
WBC # BLD AUTO: 5.6 K/UL (ref 4.3–11.1)

## 2019-02-27 PROCEDURE — 83735 ASSAY OF MAGNESIUM: CPT

## 2019-02-27 PROCEDURE — 85610 PROTHROMBIN TIME: CPT

## 2019-02-27 PROCEDURE — 77030015766

## 2019-02-27 PROCEDURE — 93458 L HRT ARTERY/VENTRICLE ANGIO: CPT

## 2019-02-27 PROCEDURE — 74011250636 HC RX REV CODE- 250/636: Performed by: INTERNAL MEDICINE

## 2019-02-27 PROCEDURE — 99152 MOD SED SAME PHYS/QHP 5/>YRS: CPT

## 2019-02-27 PROCEDURE — 74011636320 HC RX REV CODE- 636/320: Performed by: INTERNAL MEDICINE

## 2019-02-27 PROCEDURE — 80048 BASIC METABOLIC PNL TOTAL CA: CPT

## 2019-02-27 PROCEDURE — 85027 COMPLETE CBC AUTOMATED: CPT

## 2019-02-27 PROCEDURE — 93005 ELECTROCARDIOGRAM TRACING: CPT | Performed by: INTERNAL MEDICINE

## 2019-02-27 PROCEDURE — 74011250636 HC RX REV CODE- 250/636

## 2019-02-27 PROCEDURE — 74011000250 HC RX REV CODE- 250: Performed by: INTERNAL MEDICINE

## 2019-02-27 PROCEDURE — C1894 INTRO/SHEATH, NON-LASER: HCPCS

## 2019-02-27 PROCEDURE — 77030004534 HC CATH ANGI DX INFN CARD -A

## 2019-02-27 PROCEDURE — 77030019569 HC BND COMPR RAD TERU -B

## 2019-02-27 PROCEDURE — C1769 GUIDE WIRE: HCPCS

## 2019-02-27 PROCEDURE — 99153 MOD SED SAME PHYS/QHP EA: CPT

## 2019-02-27 RX ORDER — SODIUM CHLORIDE 0.9 % (FLUSH) 0.9 %
5-40 SYRINGE (ML) INJECTION EVERY 8 HOURS
Status: DISCONTINUED | OUTPATIENT
Start: 2019-02-27 | End: 2019-02-27 | Stop reason: HOSPADM

## 2019-02-27 RX ORDER — FENTANYL CITRATE 50 UG/ML
25-100 INJECTION, SOLUTION INTRAMUSCULAR; INTRAVENOUS
Status: DISCONTINUED | OUTPATIENT
Start: 2019-02-27 | End: 2019-02-27 | Stop reason: HOSPADM

## 2019-02-27 RX ORDER — ONDANSETRON 2 MG/ML
4 INJECTION INTRAMUSCULAR; INTRAVENOUS
Status: DISCONTINUED | OUTPATIENT
Start: 2019-02-27 | End: 2019-02-27 | Stop reason: HOSPADM

## 2019-02-27 RX ORDER — GUAIFENESIN 100 MG/5ML
81 LIQUID (ML) ORAL ONCE
Status: DISCONTINUED | OUTPATIENT
Start: 2019-02-27 | End: 2019-02-27 | Stop reason: HOSPADM

## 2019-02-27 RX ORDER — SODIUM CHLORIDE 9 MG/ML
75 INJECTION, SOLUTION INTRAVENOUS CONTINUOUS
Status: DISCONTINUED | OUTPATIENT
Start: 2019-02-27 | End: 2019-02-27 | Stop reason: HOSPADM

## 2019-02-27 RX ORDER — ACETAMINOPHEN 325 MG/1
650 TABLET ORAL
Status: DISCONTINUED | OUTPATIENT
Start: 2019-02-27 | End: 2019-02-27 | Stop reason: HOSPADM

## 2019-02-27 RX ORDER — SODIUM CHLORIDE 0.9 % (FLUSH) 0.9 %
5-40 SYRINGE (ML) INJECTION AS NEEDED
Status: DISCONTINUED | OUTPATIENT
Start: 2019-02-27 | End: 2019-02-27 | Stop reason: HOSPADM

## 2019-02-27 RX ORDER — HYDROCODONE BITARTRATE AND ACETAMINOPHEN 5; 325 MG/1; MG/1
1 TABLET ORAL
Status: DISCONTINUED | OUTPATIENT
Start: 2019-02-27 | End: 2019-02-27 | Stop reason: HOSPADM

## 2019-02-27 RX ORDER — DIAZEPAM 5 MG/1
5 TABLET ORAL AS NEEDED
Status: DISCONTINUED | OUTPATIENT
Start: 2019-02-27 | End: 2019-02-27 | Stop reason: HOSPADM

## 2019-02-27 RX ORDER — LIDOCAINE HYDROCHLORIDE 10 MG/ML
1-20 INJECTION INFILTRATION; PERINEURAL ONCE
Status: COMPLETED | OUTPATIENT
Start: 2019-02-27 | End: 2019-02-27

## 2019-02-27 RX ORDER — HEPARIN SODIUM 200 [USP'U]/100ML
3 INJECTION, SOLUTION INTRAVENOUS CONTINUOUS
Status: DISCONTINUED | OUTPATIENT
Start: 2019-02-27 | End: 2019-02-27 | Stop reason: HOSPADM

## 2019-02-27 RX ORDER — MIDAZOLAM HYDROCHLORIDE 1 MG/ML
.5-5 INJECTION, SOLUTION INTRAMUSCULAR; INTRAVENOUS
Status: DISCONTINUED | OUTPATIENT
Start: 2019-02-27 | End: 2019-02-27 | Stop reason: HOSPADM

## 2019-02-27 RX ADMIN — HEPARIN SODIUM 3 ML/HR: 5000 INJECTION, SOLUTION INTRAVENOUS; SUBCUTANEOUS at 10:02

## 2019-02-27 RX ADMIN — LIDOCAINE HYDROCHLORIDE 3 ML: 10 INJECTION, SOLUTION INFILTRATION; PERINEURAL at 10:09

## 2019-02-27 RX ADMIN — IOPAMIDOL 86 ML: 755 INJECTION, SOLUTION INTRAVENOUS at 10:32

## 2019-02-27 RX ADMIN — FENTANYL CITRATE 25 MCG: 50 INJECTION, SOLUTION INTRAMUSCULAR; INTRAVENOUS at 10:08

## 2019-02-27 RX ADMIN — HEPARIN SODIUM 2 ML: 10000 INJECTION, SOLUTION INTRAVENOUS; SUBCUTANEOUS at 10:32

## 2019-02-27 RX ADMIN — MIDAZOLAM HYDROCHLORIDE 1 MG: 1 INJECTION, SOLUTION INTRAMUSCULAR; INTRAVENOUS at 10:07

## 2019-02-27 RX ADMIN — SODIUM CHLORIDE 75 ML/HR: 900 INJECTION, SOLUTION INTRAVENOUS at 08:25

## 2019-02-27 NOTE — PROGRESS NOTES
Terumo band completely deflated. 1255 Terumo band removed from right wrist using sterile technique. Sterile dressing applied. No signs and symptoms of bleeding, oozing or hematoma.

## 2019-02-27 NOTE — PROGRESS NOTES
Patient received to [address] [place] [address] room # 7  Ambulatory from [facility]. Patient scheduled for C today with Dr Phylis Dakins. Procedure reviewed & questions answered, voiced good understanding consent obtained & placed on chart. All medications and medical history reviewed. Will prep patient per orders. Patient & family updated on plan of care. The patient has a fraility score of 3-MANAGING WELL, based on reports recent SOB

## 2019-02-27 NOTE — PROGRESS NOTES
TRANSFER - OUT REPORT: 
 
Verbal report given to jeff rn(name) on [facility].  being transferred to cpru(unit) for routine progression of care Report consisted of patients Situation, Background, Assessment and  
Recommendations(SBAR). Information from the following report(s) SBAR was reviewed with the receiving nurse. Lines:    
 
Opportunity for questions and clarification was provided. Patient transported with: 
 [facility] Dr Crane Pall No intervention Right wrist tr band 13ml No bleeding or hematoma Versed 1mg Fentanyl 25mcg

## 2019-02-27 NOTE — PROGRESS NOTES
Patient up to bedside, vital signs and site stable. Patient ambulated to bathroom without difficulty. Patient voided without difficulty. Vascular site stable.  Discharge instructions and home medications reviewed with patient. Time allowed for questions and answers. 1310 Patient ambulated second time without difficulty. Site stable after ambulation. Peripheral IV sites dc'd without difficulty with tips intact. 1315 Patient discharged to home with family.

## 2019-02-27 NOTE — PROCEDURES
[address] [facility] 
CARDIAC CATH Name:  Adolfo Friedman 
MR#:  [id number] :  1945 ACCOUNT #:  [de-identified] DATE OF SERVICE:  2019 PROCEDURES PERFORMED:  Left heart catheterization, selective coronary arteriography, and left ventriculogram via the right radial approach. INDICATION:  A patient with known ischemic cardiomyopathy and known coronary artery disease. Recurrent episodes of nonsustained ventricular tachycardia, two events requiring paced termination. Concern of ischemic etiology. Cardiac catheterization recommended by Dr. Dave Euceda. SURGEON:  Garima Wooten MD 
 
TECHNIQUE AND FINDINGS:  After informed consent was obtained, the patient was brought to the cardiac catheterization lab. The right radial artery was prepped and draped in the usual sterile fashion. Utilizing modified Seldinger technique and micropuncture needle, the right radial artery was entered. A 6-Iraqi Terumo Slender sheath was placed without difficulty. A radial cocktail consisting of 2000 units of heparin, 2 mg of verapamil, and 200 mcg of nitroglycerin was administered. A 5-Iraqi Tiger 4.0 catheter was used to select and engage the ostium of the right coronary artery. The patient has a single coronary artery coming off of a common trunk of the right coronary cusp. Selective injection with visualization of the LAD, circumflex, and RCA was performed. A pigtail catheter was then advanced and used to cross the aortic valve into the left ventricle. Hemodynamic measurements and left ventriculogram were obtained. Left ventricular aortic pressure gradient was obtained by pullback technique. At the conclusion of the diagnostic procedure, the radial sheath was removed and a pneumatic band was placed with excellent hemostasis. No complications were encountered. CONTRAST:  Isovue 86 mL 
 
SEDATION:  The patient received 1 mg of Versed and 25 mcg of fentanyl. Sedation was administered by Preston Jacobson RN, under my supervision. Sedation start time was 10:07 with a procedure completion time of 10:32. HEMODYNAMICS: 
1. Aortic pressure 103/61. 2.  Left ventricular end-diastolic pressure 17. 
3.  There was no significant gradient across the aortic valve. ANGIOGRAPHIC RESULTS: 
1. There is a single coronary artery arising from the right coronary cusp. The main trunk is patent. It then trifurcates into an LAD, left circumflex, and RCA. 2.  LAD:  It is a medium-caliber vessel proximally. It contains a 20% stenosis. It then becomes small caliber in the mid to distal segment approximately 2 mm and is patent with 20% luminal irregularities. 3.  Left circumflex:  A medium-caliber vessel. It contains a stent in the proximal segment which contains 30% to 40% in-stent restenosis. Distal vessel is patent and gives rise to three small obtuse marginals which are all patent. 4.  Right coronary artery is a large-caliber dominant vessel. It contains 20% proximal stenosis and 30% mid-stenosis. .  There is a stent in the distal segment which is widely patent without significant in-stent restenosis. 5.  Right posterolateral branch: It is a medium-caliber vessel. It gives rise to two branches that supply the posterolateral wall. This artery and its branches are patent. 6.  Right PDA:  A large-caliber vessel. Angiographically normal.  It supplies the apex of the heart. 7.  Left ventriculogram performed in the BECKER projection shows a severely dilated left ventricle with severely reduced LV systolic function. EF appears to be 10% to 15%. Global hypokinesis. Mitral regurgitation is noted but difficult to assess the degree on LV gram.  I would recommend further evaluation with echo if indicated. CONCLUSIONS: 
1. A single coronary artery arising from the right coronary cusp as outlined above. 2.  Patent left circumflex and RCA stents. 3.  Nonobstructive disease noted in myocardial segments. 4.  Severely reduced LV systolic function with severely dilated left ventricle. PLAN:  Ongoing medical therapy. Irma Harding MD 
 
 
[id number]/ 
D:  02/27/2019 10:36 
T:  02/27/2019 11:16 
JOB #:  [id number] CC:  Kanwal Wilcox MD 
     [address] Cardiology

## 2019-02-27 NOTE — PROCEDURES
Brief Cardiac Procedure Note Patient: Genoveva Fisher MRN: [id number]  SSN: [id number] YOB: 1945  Age: 75 y.o. Sex: male Date of Procedure: 2/27/2019 Pre-procedure Diagnosis: NSVT Post-procedure Diagnosis: Coronary Artery Disease Procedure: Left Heart Catheterization Brief Description of Procedure: As above Performed By: Clay Flores MD  
 
Assistants: None Anesthesia: Moderate Sedation Estimated Blood Loss: Less than 10 mL Specimens: None Implants: None Findings: Stable CAD. Severe LV function Complications: None Recommendations: Continue medical therapy. Signed By: Clay Flores MD   
 February 27, 2019

## 2019-02-27 NOTE — DISCHARGE INSTRUCTIONS
DO NOT TAKE METFORMIN (GLUCOPHAGE) UNTIL Friday AFTERNOON. Resume Coumadin tonight. Have level checked in one week. HEART CATHETERIZATION/ANGIOGRAPHY DISCHARGE INSTRUCTIONS    1. Check puncture site frequently for swelling or bleeding. If there is any bleeding, lie down and apply pressure over the area with a clean towel or washcloth and call 911. Notify your doctor for any redness, swelling, drainage, or oozing from the puncture site. Notify your doctor for any fever or chills. 2. If the extremity becomes cold, numb, or painful call Dr. Trice Branham at [phone].  3. Activity should be limited for the next 48 hours. Avoid pushing, pulling and bending of affected wrist for 48 hours. No heavy lifting (anything over 5 pounds) for 3 days. No driving for 48 hours. 4. You may resume your usual diet. Drink more fluids than usual.  5. Have a responsible person drive you home and stay with you for at least 24 hours after your heart catheterization/angiography. 6. You may remove bandage from your right arm  in 24 hours. You may shower in 24 hours. No tub baths, hot tubs, or swimming for 1 week. Do not place any lotions, creams, powders, or ointments over puncture site for 1 week. You may place a clean band-aid over the puncture site each day for 5 days. Change daily. I have read the above instructions and have had the opportunity to ask questions.

## 2019-03-13 PROBLEM — E66.01 SEVERE OBESITY (HCC): Status: ACTIVE | Noted: 2019-03-13

## 2019-09-03 PROBLEM — R09.02 HYPOXIA: Status: ACTIVE | Noted: 2019-01-01

## 2019-09-03 NOTE — PROGRESS NOTES
Warfarin dosing per pharmacist    Bettina Arreguin. is a 75 y.o. male. Height: 6' 1\" (185.4 cm)    Weight: 122.5 kg (270 lb 1.6 oz)    Indication:  H/o CVA    Goal INR:  2-3    Home dose:  5 mg T and 2.5 mg AODs    Risk factors or significant drug interactions:  Atorvastatin    Other anticoagulants:  N/A    Daily Monitoring  Date  INR     Warfarin dose HGB              Notes  9/3  2.6  5 mg  14.5      Pharmacy consulted to continue warfarin dosing from home. INR 2.6 today. No major DDIs. Continue home regimen. Will follow daily INRs.       Thank you,    Renzo Choe, PharmD, [facility]  Clinical Pharmacist

## 2019-09-03 NOTE — PROGRESS NOTES
Bedside and Verbal shift change report given to Dario West RN (oncoming nurse) [facility] Report included the following information SBAR, Kardex, MAR and Recent Results.

## 2019-09-03 NOTE — ED PROVIDER NOTES
[address] Reid Ramires. is a 75 y.o. male seen on 9/3/2019 at 3:54 AM in the [facility] EMERGENCY DEPT in room ER02/02. Chief Complaint   Patient presents with    Chest Pain     HPI: 67-year-old male with a history of CAD, ICM, SHF, aortic aneurysm, ICD, CVA, history of PCI presenting to the emergency department for chest pain shortness of breath. He states that he has been having increasing shortness of breath for the last 5 days. His shortness of breath is particularly worse with exertion when walking upstairs. Tonight he was asleep and woke up with pain in his chest.  It is located sternally in his shoulder blades. He took a nitro and aspirin and his chest pain resolved. He denies any increased swelling in his legs. He does note that his shortness of breath is worse if he were to lay flat. He is taking Lasix 20 mg 5 days a week. Historian: Patient    REVIEW OF SYSTEMS     Review of Systems   Constitutional: Negative for fatigue and fever. HENT: Negative. Eyes: Negative. Respiratory: Positive for shortness of breath. Negative for cough, chest tightness and wheezing. Cardiovascular: Positive for chest pain. Gastrointestinal: Negative for abdominal distention, abdominal pain, diarrhea, nausea and vomiting. Genitourinary: Negative for dysuria, flank pain, frequency, genital sores and urgency. Musculoskeletal: Negative. Skin: Negative for rash. Neurological: Negative for dizziness, syncope and headaches. All other systems reviewed and are negative.       PAST MEDICAL HISTORY     Past Medical History:   Diagnosis Date    Acute myocardial infarction [facility]) 2000    MI PCI x 2    Aortic Aneurysm/Dilation of the Aorta 2/22/2016    Aortic ectasia, abdominal ([facility] [address] 5/9/2014    Arrhythmia     ASCAD-of the Native Vessel 2/22/2016    CAD (coronary artery disease) 2008    PCI with stents to RCA    Cerebrovascular accident (stroke) ([facility] [address] 1/30/2016  Chest pain 2016    Chronic pain     Chronic systolic heart failure (HCC) 2016    Congestive heart failure, unspecified     Coronary atherosclerosis of unspecified type of vessel, native or graft 2007    MI PCI x 1    GERD (gastroesophageal reflux disease)     History of agent Orange exposure     Hx of AICD-Implanted Cardiac Defibrillator 2016    Hypercholesterolemia     Hyperlipidemia 2016    Hypertension     Ill-defined condition     HLD    Ill-defined disease     ectatic aorta    Liver disease     partial liver resection.  Morbid obesity (HCC)     Myocardial infarct/Anterolateral age unspe. 2016    Stroke ([address] 2011     Past Surgical History:   Procedure Laterality Date    CARDIAC SURG PROCEDURE UNLIST      MULTIPLE HEART ANGIOPLASTY/STENT    HX GI      Liver resection    HX OTHER SURGICAL      partial liver resection.  HX PACEMAKER      ICD/Pacemaker     Social History     Socioeconomic History    Marital status:      Spouse name: Not on file    Number of children: Not on file    Years of education: Not on file    Highest education level: Not on file   Tobacco Use    Smoking status: Former Smoker     Packs/day: 1.00     Years: 28.00     Pack years: 28.00     Last attempt to quit: 1999     Years since quittin.6    Smokeless tobacco: Never Used   Substance and Sexual Activity    Alcohol use: No    Drug use: No     Prior to Admission Medications   Prescriptions Last Dose Informant Patient Reported? Taking? Aspirin, Buffered 81 mg tab   Yes No   Sig: Take 81 mg by mouth daily. Mesalamine (LIALDA) 1.2 gram delayed release tablet   Yes No   Sig: Take 2.4 g by mouth two (2) times a day. atorvastatin (LIPITOR) 40 mg tablet   Yes No   Sig: Take 40 mg by mouth daily. carvedilol (COREG) 12.5 mg tablet   Yes No   Sig: Take 12.5 mg by mouth two (2) times daily (with meals).    fenofibric acid (TRILIPIX ER) 135 mg capsule   Yes No   Sig: Take 135 mg by mouth daily. furosemide (LASIX) 40 mg tablet   No No   Sig: Take 1 Tab by mouth daily. Patient taking differently: Take 20 mg by mouth daily. m-f   gabapentin (NEURONTIN) 300 mg capsule   Yes No   Sig: Take 300 mg by mouth two (2) times a day. loratadine (CLARITIN) 10 mg tablet   Yes No   Sig: Take 10 mg by mouth daily. metFORMIN (GLUCOPHAGE) 500 mg tablet   Yes No   Sig: Take 500 mg by mouth daily. niacin (NIASPAN) 1,000 mg Tb24 tab   Yes No   Sig: Take 1,000 mg by mouth daily. nitroglycerin (NITROSTAT) 0.4 mg SL tablet   No No   Si Tab by SubLINGual route every five (5) minutes as needed for Chest Pain. spironolactone (ALDACTONE) 25 mg tablet   Yes No   Sig: Take 25 mg by mouth daily. warfarin (COUMADIN) 5 mg tablet   No No   Sig: Take 0.5-1 tab every day or as directed   Patient taking differently: Take 5 mg by mouth daily. Take 0.5-1 tab every day or as directed      Facility-Administered Medications: None     Allergies   Allergen Reactions    Sulfur Other (comments)     BREAK OUT        PHYSICAL EXAM       Vitals:    19 0321   BP: 142/78   Pulse: 86   Resp: 17   Temp: 97.9 °F (36.6 °C)   SpO2: 95%    Vital signs were reviewed. Physical Exam   Constitutional: He is oriented to person, place, and time. He appears well-developed and well-nourished. No distress. HENT:   Head: Normocephalic and atraumatic. Eyes: Pupils are equal, round, and reactive to light. EOM are normal.   Neck: Normal range of motion. Neck supple. No JVD present. Cardiovascular: Normal rate, regular rhythm, normal heart sounds and intact distal pulses. Exam reveals no gallop and no friction rub. No murmur heard. Pulmonary/Chest: Effort normal. No respiratory distress. He has wheezes (faint, expiratory). Diminished breath sounds throughout particularly at the bases bilaterally, faint crackles noted bilaterally   Abdominal: Soft.  Bowel sounds are normal. He exhibits no distension and no mass. There is no tenderness. There is no rebound and no guarding. Musculoskeletal: Normal range of motion. He exhibits no edema, tenderness or deformity. Neurological: He is alert and oriented to person, place, and time. No sensory deficit. Coordination normal.   No focal deficits   Skin: Skin is warm. Capillary refill takes less than 2 seconds. No rash noted. He is not diaphoretic. No erythema. Psychiatric: He has a normal mood and affect. His behavior is normal. Thought content normal.   Vitals reviewed. MEDICAL DECISION MAKING     Differential Diagnosis: CHF, COPD, ACS, pneumonia, bronchitis,    MDM  Number of Diagnoses or Management Options     Amount and/or Complexity of Data Reviewed  Clinical lab tests: ordered and reviewed  Tests in the radiology section of CPT®: ordered and reviewed  Review and summarize past medical records: yes (Reviewed previous records including cardiology visit. Of note his last EF cath was 20%)    Risk of Complications, Morbidity, and/or Mortality  Presenting problems: high  Diagnostic procedures: high  Management options: high      Procedures    ED Course: On arrival to the emergency department the patient was noted to be hypoxic. He was also hypoxic with EMS. 87% on room air. He is satting 95% on 5 L nasal cannula. Does not wear oxygen at home. 3:59 AM  CXR appears consistent with CHF exacerbation. 5:12 AM  Discussed we will admit to the hospital for further treatment and care. Disposition: Admission to the hospital  Diagnosis: hypoxia, congestive heart failure  ____________________________________________________________________  A portion of this note was generated using voice recognition dictation software. While the note has been reviewed for accuracy, please note certain words and phrases may not be transcribed as intended and some grammatical and/or typographical errors may be present.

## 2019-09-03 NOTE — H&P
[address] Cardiology History & Physical      Date of  Admission: 9/3/2019  3:15 AM     Primary Care Physician: Dr. Todd Workman  Primary Cardiologist: Dr. Haven Cuellar  Admitting Physician: Dr. Rosa M Bhatti    CC: chest pain, shortness of breath    HPI:  Linda Guallpa is a 75 y.o. male with past medical history of CAD with remote PCI in left circumflex and RCA, remote CVA, chronic systolic heart failure with ICD in place, dyslipidemia, and HTN who presented to the ER with complaints of chest pain and shortness of breath. Patient reports that he woke up early this morning with left shoulder pain that radiated to his left chest. He describes his pain as tightness in nature. He reports taking ASA 324mg and SL nitro x 2 with resolution of his pain. He has also be experiencing exertional shortness of breath for the pas several weeks along with orthopnea. He was seen in the office on 8/30, but it is not clear if any of this was told to Dr. Haven Cuellar. He reports that he has been taking 20mg Lasix Monday thru Friday. He does not weigh himself daily and does not track his oral liquid intake or sodium consumption daily. Due to his severity of shortness of breath and chest pain, his wife called EMS. Upon their arrival, patient was found to be hypoxic with O2 sats in the mid to upper 80s. He was placed on [facility] enroute to the facility. Uopn arrival to the ER, his O2 sat was 95% on 5 LNC. CXR shows mild pulmonary vascular congestion. He remains chest pain free at this time. While in the ER, he was given a duo-neb treatment with some improvement in his breathing as well.       Past Medical History:   Diagnosis Date    Acute myocardial infarction [facility]) 2000    MI PCI x 2    Aortic Aneurysm/Dilation of the Aorta 2/22/2016    Aortic ectasia, abdominal ([facility] [address] 5/9/2014    Arrhythmia     ASCAD-of the Native Vessel 2/22/2016    CAD (coronary artery disease) 2008    PCI with stents to RCA    Cerebrovascular accident (stroke) ([facility] [address] 1/30/2016  Chest pain 2016    Chronic pain     Chronic systolic heart failure (HCC) 2016    Congestive heart failure, unspecified     Coronary atherosclerosis of unspecified type of vessel, native or graft 2007    MI PCI x 1    GERD (gastroesophageal reflux disease)     History of agent Orange exposure     Hx of AICD-Implanted Cardiac Defibrillator 2016    Hypercholesterolemia     Hyperlipidemia 2016    Hypertension     Ill-defined condition     HLD    Ill-defined disease     ectatic aorta    Liver disease     partial liver resection.  Morbid obesity (HCC)     Myocardial infarct/Anterolateral age unspe. 2016    Stroke ([address] 2011      Past Surgical History:   Procedure Laterality Date    CARDIAC SURG PROCEDURE UNLIST      MULTIPLE HEART ANGIOPLASTY/STENT    HX GI      Liver resection    HX OTHER SURGICAL      partial liver resection.     HX PACEMAKER      ICD/Pacemaker       Allergies   Allergen Reactions    Sulfur Other (comments)     BREAK OUT      Social History     Socioeconomic History    Marital status:      Spouse name: Not on file    Number of children: Not on file    Years of education: Not on file    Highest education level: Not on file   Occupational History    Not on file   Social Needs    Financial resource strain: Not on file    Food insecurity:     Worry: Not on file     Inability: Not on file    Transportation needs:     Medical: Not on file     Non-medical: Not on file   Tobacco Use    Smoking status: Former Smoker     Packs/day: 1.00     Years: 28.00     Pack years: 28.00     Last attempt to quit: 1999     Years since quittin.6    Smokeless tobacco: Never Used   Substance and Sexual Activity    Alcohol use: No    Drug use: No    Sexual activity: Not on file   Lifestyle    Physical activity:     Days per week: Not on file     Minutes per session: Not on file    Stress: Not on file   Relationships    Social connections: Talks on phone: Not on file     Gets together: Not on file     Attends Buddhist service: Not on file     Active member of club or organization: Not on file     Attends meetings of clubs or organizations: Not on file     Relationship status: Not on file    Intimate partner violence:     Fear of current or ex partner: Not on file     Emotionally abused: Not on file     Physically abused: Not on file     Forced sexual activity: Not on file   Other Topics Concern    Not on file   Social History Narrative    Not on file     Family History   Problem Relation Age of Onset    Cancer Mother     Other Father         brain hemorrage    Heart Disease Maternal Grandfather     Heart Disease Paternal Grandfather         Current Facility-Administered Medications   Medication Dose Route Frequency    furosemide (LASIX) injection 40 mg  40 mg IntraVENous Q12H     Current Outpatient Medications   Medication Sig    warfarin (COUMADIN) 5 mg tablet Take 0.5-1 tab every day or as directed (Patient taking differently: Take 5 mg by mouth daily. Take 0.5-1 tab every day or as directed)    metFORMIN (GLUCOPHAGE) 500 mg tablet Take 500 mg by mouth daily.  furosemide (LASIX) 40 mg tablet Take 1 Tab by mouth daily. (Patient taking differently: Take 20 mg by mouth daily. m-f)    atorvastatin (LIPITOR) 40 mg tablet Take 40 mg by mouth daily.  gabapentin (NEURONTIN) 300 mg capsule Take 300 mg by mouth two (2) times a day.  fenofibric acid (TRILIPIX ER) 135 mg capsule Take 135 mg by mouth daily.  Mesalamine (LIALDA) 1.2 gram delayed release tablet Take 2.4 g by mouth two (2) times a day.  nitroglycerin (NITROSTAT) 0.4 mg SL tablet 1 Tab by SubLINGual route every five (5) minutes as needed for Chest Pain.  loratadine (CLARITIN) 10 mg tablet Take 10 mg by mouth daily.  niacin (NIASPAN) 1,000 mg Tb24 tab Take 1,000 mg by mouth daily.  spironolactone (ALDACTONE) 25 mg tablet Take 25 mg by mouth daily.     carvedilol (COREG) 12.5 mg tablet Take 12.5 mg by mouth two (2) times daily (with meals).  Aspirin, Buffered 81 mg tab Take 81 mg by mouth daily. Review of Systems    Review of Systems   Constitutional: Negative. HENT: Negative. Eyes: Negative. Respiratory: Positive for shortness of breath. Cardiovascular: Positive for chest pain and orthopnea. Gastrointestinal: Negative. Genitourinary: Negative. Musculoskeletal: Negative. Skin: Negative. Neurological: Negative. Endo/Heme/Allergies: Negative. Psychiatric/Behavioral: Negative. Subjective:     Visit Vitals  /71   Pulse 77   Temp 97.9 °F (36.6 °C)   Resp 21   Ht 6' 1\" (1.854 m)   Wt 122.5 kg (270 lb)   SpO2 95%   BMI 35.62 kg/m²     Physical Exam   Constitutional: He is oriented to person, place, and time and well-developed, well-nourished, and in no distress. Eyes: Pupils are equal, round, and reactive to light. Neck: JVD present. Cardiovascular: Normal rate and regular rhythm. Pulmonary/Chest: Effort normal.   Diminished posteriorly with faint bilateral crackles   Abdominal: Soft. Bowel sounds are normal.   Musculoskeletal: He exhibits no edema. Neurological: He is alert and oriented to person, place, and time. Skin: Skin is warm and dry.    Psychiatric: Affect normal.       Cardiographics  Telemetry: normal sinus rhythm  ECG: normal EKG, normal sinus rhythm  Echocardiogram: see echo report from 3/15/19 done in the office    Labs:   Recent Results (from the past 24 hour(s))   CBC WITH AUTOMATED DIFF    Collection Time: 09/03/19  3:25 AM   Result Value Ref Range    WBC 10.6 4.3 - 11.1 K/uL    RBC 4.98 4.23 - 5.6 M/uL    HGB 14.5 13.6 - 17.2 g/dL    HCT 44.7 41.1 - 50.3 %    MCV 89.8 79.6 - 97.8 FL    MCH 29.1 26.1 - 32.9 PG    MCHC 32.4 31.4 - 35.0 g/dL    RDW 14.1 11.9 - 14.6 %    PLATELET 031 950 - 279 K/uL    MPV 12.6 (H) 9.4 - 12.3 FL    ABSOLUTE NRBC 0.00 0.0 - 0.2 K/uL    DF AUTOMATED      NEUTROPHILS 77 43 - 78 %    LYMPHOCYTES 14 13 - 44 %    MONOCYTES 7 4.0 - 12.0 %    EOSINOPHILS 1 0.5 - 7.8 %    BASOPHILS 1 0.0 - 2.0 %    IMMATURE GRANULOCYTES 1 0.0 - 5.0 %    ABS. NEUTROPHILS 8.1 1.7 - 8.2 K/UL    ABS. LYMPHOCYTES 1.5 0.5 - 4.6 K/UL    ABS. MONOCYTES 0.7 0.1 - 1.3 K/UL    ABS. EOSINOPHILS 0.1 0.0 - 0.8 K/UL    ABS. BASOPHILS 0.1 0.0 - 0.2 K/UL    ABS. IMM. GRANS. 0.1 0.0 - 0.5 K/UL   METABOLIC PANEL, COMPREHENSIVE    Collection Time: 09/03/19  3:25 AM   Result Value Ref Range    Sodium 141 136 - 145 mmol/L    Potassium 4.1 3.5 - 5.1 mmol/L    Chloride 107 98 - 107 mmol/L    CO2 27 21 - 32 mmol/L    Anion gap 7 7 - 16 mmol/L    Glucose 195 (H) 65 - 100 mg/dL    BUN 20 8 - 23 MG/DL    Creatinine 0.92 0.8 - 1.5 MG/DL    GFR est AA >60 >60 ml/min/1.73m2    GFR est non-AA >60 >60 ml/min/1.73m2    Calcium 9.1 8.3 - 10.4 MG/DL    Bilirubin, total 1.1 0.2 - 1.1 MG/DL    ALT (SGPT) 23 12 - 65 U/L    AST (SGOT) 26 15 - 37 U/L    Alk. phosphatase 66 50 - 136 U/L    Protein, total 6.7 6.3 - 8.2 g/dL    Albumin 3.6 3.2 - 4.6 g/dL    Globulin 3.1 2.3 - 3.5 g/dL    A-G Ratio 1.2 1.2 - 3.5     TROPONIN I    Collection Time: 09/03/19  3:25 AM   Result Value Ref Range    Troponin-I, Qt. 0.02 0.02 - 0.05 NG/ML   BNP    Collection Time: 09/03/19  3:25 AM   Result Value Ref Range     (H) 0 pg/mL       Patient has been seen and examined by Dr. Sam Briggs and he agrees with the following assessment and plan:     Assessment/Plan:        Acute on chronic systolic heart failure -- admit to telemetry. EF by last echocardiogram in March was 26% with moderate MR. Start 40mg IV lasix q12. Monitor strict I/Os and daily weights. Continue Coreg. Not on ACEi/ARB due to low BPs in the past. Importance of daily weights and sodium/liquid monitoring at home discussed with patient and spouse. Hx of AICD-Implanted Cardiac Defibrillator -- being followed by EP for NSVT that was terminated with ATP.  Possible plan to upgrade ICD to BiV device. ASCAD-of the Native Vessel -- last C was in 2/2019 that showed patent previous placed stents in the left circumflex and RCA. Medical therapy was recommended. Continue ASA, BB, and statin. No ACEi/ARB due to low BPs in the past.       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 Hyperlipidemia -- continue statin therapy. Chest pain -- relieved after SL nitro. See above. Dilated cardiomyopathy -- see above. Hypoxia -- continue supplemental O2 for now. Wean as tolerated. Kristin Carmona NP  9/3/2019 5:57 AM    I have personally seen and examined patient and agree with above assessment. I agree and confirm with findings with additional details/exceptions as listed below:    Prior history of coronary disease (noted patent stents to LCx/RCA on last cath from 2/2019). Also severe LV dysfunction (EF ~25% from 3/2109). Has been seen by EP prior for consideration of BiV upgrade (underlying LBBB; QRS ~140ms; NYHA II-IIIC) but appears he deferred further procedures at that visit. Presented with worsening dyspnea/chest discomfort; also appears weight gain. Noted hypoxic on presentation. Appears weight gain of around 10 pounds per wife. States chest discomfort was transient lasting 3-4 minutes. Received lasix with good response. Improved dyspnea; continue lasix. Negative troponins. Recent cath from 2/2019 with no intervenable options. Add on ACEI with LV dysfunction (not an entresto candidate with low BP issues) and assess tolerability. Appears prior limited by lower BPs per records and reassess with low dose ACEI. On aldactone/coreg. Discussed BiV upgrade in the future with noted presentation; appears prior wanted to defer and agreeable to consider (mostly A- paced and no significant V-pacing). Discuss with EP once more euvolemic and at baseline. On coumadin with prior history of atrial flutter.  Further recommendations pending clinical course.       Louis Arizmendi MD  9/3/2019

## 2019-09-03 NOTE — PROGRESS NOTES
Care Management Interventions  PCP Verified by CM: Yes  Last Visit to PCP: 04/15/19  Mode of Transport at Discharge: Other (see comment)(Maria A Perdomo Spouse [phone] )  Transition of Care Consult (CM Consult): Discharge Planning  Discharge Durable Medical Equipment: No  Physical Therapy Consult: No  Occupational Therapy Consult: No  Speech Therapy Consult: No  Current Support Network: Lives with Spouse  Confirm Follow Up Transport: Family  Plan discussed with Pt/Family/Caregiver: Yes  Freedom of Choice Offered: Yes  Discharge Location  Discharge Placement: Home      Pt admitted to 3rd floor [facility] for HF. CM met with pt to discuss CM needs & DCP. Pt is A&Ox4. Pt is indep at home with all ADLS. Pt lives with spouse. Pt has no DME needs. Pt has no difficulty with obtaining medications or transport. DCP home with spouse. No further needs noted. CM to continue to monitor.

## 2019-09-03 NOTE — ED TRIAGE NOTES
Patient arrives via EMS from home with chest pain, exertional shortness of breath. EMS states patient was 88 on room air placed on 5L NC with improvement. Patient does not wear oxygen at home. EMS states chest pain started around 2 hours ago.

## 2019-09-03 NOTE — PROGRESS NOTES
TRANSFER - IN REPORT:    Verbal report received from [address] on [employer] being received from ER or routine progression of care. Report consisted of patients Situation, Background, Assessment and Recommendations(SBAR). Information from the following report(s) SBAR, Kardex and ED Summary was reviewed. Opportunity for questions and clarification was provided.

## 2019-09-03 NOTE — PROGRESS NOTES
Pt has EMS line. Per our policy line should be replaced in 24 hr of admission. Pt states he is a \"hard stick\" and does not want have new IV inserted if his line is working. Line is patent and flushed. Will monitor.

## 2019-09-03 NOTE — PROGRESS NOTES
Pt had a 6 beat run VT at 1134. Was not notified until 1430. Pt was not symptomatic. Notified Dianne Durbin NP. Pt with last measured EF of 26% in March. Will check Mag.  Labs will be rechecked in the AM.

## 2019-09-03 NOTE — PROGRESS NOTES
Bedside and Verbal shift change report given to self (oncoming nurse) by Juan Luis Flower RN (offgoing nurse). Report included the following information SBAR, Kardex, MAR, Recent Results and Cardiac Rhythm NSR.

## 2019-09-03 NOTE — PROGRESS NOTES
Problem: Heart Failure: Day 1  Goal: Off Pathway (Use only if patient is Off Pathway)  9/3/2019 1500 by Yun Parkinson  Outcome: Progressing Towards Goal  9/3/2019 1459 by Yun Parkinson  Outcome: Progressing Towards Goal  Goal: Activity/Safety  9/3/2019 1500 by Yun Parkinson  Outcome: Progressing Towards Goal  9/3/2019 1459 by Emogene Vences  Outcome: Progressing Towards Goal  Goal: Consults, if ordered  9/3/2019 1500 by Emogene Vences  Outcome: Progressing Towards Goal  9/3/2019 1459 by Emogene Vences  Outcome: Progressing Towards Goal  Goal: Diagnostic Test/Procedures  9/3/2019 1500 by Emogene Vences  Outcome: Progressing Towards Goal  9/3/2019 1459 by Emogene Vences  Outcome: Progressing Towards Goal  Goal: Nutrition/Diet  9/3/2019 1500 by Emogene Vences  Outcome: Progressing Towards Goal  9/3/2019 1459 by Emogene Vences  Outcome: Progressing Towards Goal  Goal: Discharge Planning  9/3/2019 1500 by Yun Parkinson  Outcome: Progressing Towards Goal  9/3/2019 1459 by Emogene Vences  Outcome: Progressing Towards Goal  Goal: Medications  9/3/2019 1500 by Emogene Vences  Outcome: Progressing Towards Goal  9/3/2019 1459 by Emogene Vences  Outcome: Progressing Towards Goal  Goal: Respiratory  9/3/2019 1500 by Emogene Vences  Outcome: Progressing Towards Goal  9/3/2019 1459 by Emogene Vences  Outcome: Progressing Towards Goal  Goal: Treatments/Interventions/Procedures  9/3/2019 1500 by Emogene Vences  Outcome: Progressing Towards Goal  9/3/2019 1459 by Emogene Vences  Outcome: Progressing Towards Goal  Goal: Psychosocial  9/3/2019 1500 by Emogene Vences  Outcome: Progressing Towards Goal  9/3/2019 1459 by Yun Parkinson  Outcome: Progressing Towards Goal  Goal: *Oxygen saturation within defined limits  9/3/2019 1500 by Yun Parkinson  Outcome: Progressing Towards Goal  9/3/2019 1459 by Morales Weber Yun  Outcome: Progressing Towards Goal  Goal: *Hemodynamically stable  9/3/2019 1500 by Yun Gutierrez  Outcome: Progressing Towards Goal  9/3/2019 1459 by Yun Gutierrez  Outcome: Progressing Towards Goal  Goal: *Optimal pain control at patient's stated goal  9/3/2019 1500 by Yun Gutierrez  Outcome: Progressing Towards Goal  9/3/2019 1459 by Yun Gutierrez  Outcome: Progressing Towards Goal  Goal: *Anxiety reduced or absent  9/3/2019 1500 by Yun Gutierrez  Outcome: Progressing Towards Goal  9/3/2019 1459 by Yun Gutierrez  Outcome: Progressing Towards Goal

## 2019-09-03 NOTE — PROGRESS NOTES
Pt's wife brought home medications. Pt admitted as observation status so medications taken to pharmacy to be labeled    Pt takes controlled substance gabapentin 300 mg BID. Counted 4 tablets with pt's wife, 2nd RN and pharmacist. Labeled and double locked in nurses station.

## 2019-09-03 NOTE — PROGRESS NOTES
Patient arrived to the floor via stretcher to room 328 . Patient oriented to the room and use of the call light. Assessment completed. Telemetry box placed and verified with monitor tech. Call light within reach. Dual admission skin assessment is as follows: Skin warm, dry, and intact. BLE trace edema and abrasions/scabs present. Posterior site covered by a bandage post cyst removal. Healed scar mid abdominal. Heels soft and intact. Sacrum pink and blanchable. No skin breakdown noted.

## 2019-09-03 NOTE — ED NOTES
TRANSFER - OUT REPORT:    Verbal report given to [facility] AT [facility] RN on [employer]  being transferred to  for routine progression of care       Report consisted of patients Situation, Background, Assessment and   Recommendations(SBAR). Information from the following report(s) SBAR, ED Summary, [facility] and Recent Results was reviewed with the receiving nurse. Lines:   Peripheral IV 09/03/19 Left Antecubital (Active)   Site Assessment Clean, dry, & intact 9/3/2019  6:22 AM   Phlebitis Assessment 0 9/3/2019  6:22 AM   Infiltration Assessment 0 9/3/2019  6:22 AM   Dressing Status Clean, dry, & intact 9/3/2019  6:22 AM   Dressing Type 4 X 4 9/3/2019  6:22 AM   Hub Color/Line Status Pink 9/3/2019  6:05 AM        Opportunity for questions and clarification was provided.       Patient transported with:   Monitor  O2 @ 4 liters  Registered Nurse

## 2019-09-03 NOTE — ROUTINE PROCESS
CHf teaching started post quick introduction. Planner @ BS and will follow. Emphasis to report any worsening dyspnea.  , will follow: 5mins      Cardiac diet/ FR  Palliative care: OBS

## 2019-09-04 NOTE — PROGRESS NOTES
Verbal bedside report given to ramesh Heath RN. Patient's situation, background, assessment and recommendations provided. Kardex, Mar, and recent results also reviewed. Opportunity for questions provided. Oncoming RN assumed care of patient.

## 2019-09-04 NOTE — PROGRESS NOTES
Warfarin dosing per pharmacist    Coral Ortega. is a 75 y.o. male. Height: 6' 1\" (185.4 cm)    Weight: 120.4 kg (265 lb 6.4 oz)    Indication:  H/o CVA    Goal INR:  2-3    Home dose:  5 mg T and 2.5 mg AODs    Risk factors or significant drug interactions:  Atorvastatin    Other anticoagulants:  N/A    Daily Monitoring  Date  INR     Warfarin dose HGB              Notes  9/3  2.6  5 mg  14.5   9/4  2.7  2.5 mg  13.2     Pharmacy consulted to continue warfarin dosing from home. INR 2.7 today. Note ~0.5 L diuresis overnight per cardiology. Continue home regimen. Will follow daily INRs.       Thank you,    Veronica Owen, PharmD, BCPS  Clinical Pharmacist

## 2019-09-04 NOTE — PROGRESS NOTES
Patient resting quietly in bed. Slept at long intervals throughout the shift, no distress noted during hourly bedside checks. No request or needs at present. Shift change, bedside report given to oncoming Primary Nurse Deborah Hinds RN.

## 2019-09-04 NOTE — ROUTINE PROCESS
CHF teaching completed, verbalize emphasis on monitoring self and report to MD:   If you gain 2 lbs in one day or 5 lbs in a week, and short of breath.  If you can not lay flat without developing short of breath or rapid breathing at night; or if it wakes you up. Develop a cough or wheezing.  If you notice swollen hands/feet/ankles or stomach with a bloated/ full feeling.  If you are  more confused or mentally fuzzy or dizzy.  If you notice a rapid or change in your heart rate.  If you become more exhausted all the time and unable to do the same level of activity without stopping to catch your breath. Drink no more than 8 cups a day in 8 oz. cups. Limit Cola Drinks. Your Heart can not handle any more. Stay away from salt (limit anything with salt or sodium in it). Limit to 250mg per serving. Exercise needs to be started with your Doctors approval.  Reduce stress; Call myself or Provider if assistance is needed. Pass post test via teach back, will make self available post DC ,if an questions arise. Diabetic teaching completed.  Planner/scale @ BS:  60 mins total      OBS

## 2019-09-04 NOTE — PROGRESS NOTES
[facility] CARDIOLOGY PROGRESS NOTE           9/4/2019 7:33 AM    Admit Date: 9/3/2019      Subjective:   Feeling much better with ~0.5 liter diuresis overnight, weight down 5 lbs overnight. Lying flat. Tolerating low dose lisinopril    ROS:  Cardiovascular:  As noted above    Objective:      Vitals:    09/03/19 2025 09/04/19 0058 09/04/19 0100 09/04/19 0610   BP: 90/52 (!) 86/55 94/59 100/60   Pulse: 70 69 60 64   Resp: 18 20 19 20   Temp: 98.7 °F (37.1 °C) 99.4 °F (37.4 °C)  97.9 °F (36.6 °C)   SpO2: 92% 94% 94% 95%   Weight:    265 lb 6.4 oz (120.4 kg)   Height:           Physical Exam:  General-No Acute Distress  Neck- supple, no JVD  CV- irregular, 2/6 systolic murmur llsb and apex  Lung- clear bilaterally  Abd- soft, nontender, nondistended  Ext- no edema bilaterally. Skin- warm and dry    Data Review:   Recent Labs     09/04/19  0444 09/03/19  1005 09/03/19  1004 09/03/19  0325     --   --  141   K 3.5  --   --  4.1   MG  --   --  1.8  --    BUN 22  --   --  20   CREA 0.89  --   --  0.92   *  --   --  195*   WBC 7.4  --   --  10.6   HGB 13.2*  --   --  14.5   HCT 41.6  --   --  44.7   *  --   --  155   INR 2.7 2.6  --   --    TROIQ  --   --   --  0.02       Assessment/Plan:     Principal Problem:    Acute on chronic systolic heart failure (HCC) (2/22/2016)        Active Problems:    Hx of AICD-Implanted Cardiac Defibrillator (2/22/2016)          ASCAD-of the Native Vessel (2/22/2016)          Hyperlipidemia (2/22/2016)          Chest pain (2/22/2016)          Dilated cardiomyopathy ([address] (2/7/2018)          Hypoxia (9/3/2019)        Improved overnight with IV lasix. Transition to PO, anticipate possible discharge tomorrow. Patient has decided that he does wish to pursue possible biventricular device upgrade, classic LBBB, QRS ~140 ms, with first degree AV block and Class III CHF. Will arrange outpatient EP evaluation.     Tolerating addition of low dose ACEi, BP will not allow more aggressive therapy at this time.            Raulito Maza MD  9/4/2019 7:33 AM

## 2019-09-04 NOTE — PROGRESS NOTES
Verbal bedside report received from Macho Phillips, offgoing RN. Patient's situation, background, assessment and recommendations were provided. Kardex, Mar, and recent results also reviewed. Opportunity for questions provided. Assumed care of patient.

## 2019-09-05 NOTE — DISCHARGE SUMMARY
[address] [facility] Cardiology Discharge Summary     Patient ID:  Bernice Andino  [id number]  75 y.o.  1945    Admit date: 9/3/2019    Discharge date:  09/05/2019    Admitting Physician: Chitra Diaz MD     Discharge Physician: Shakira Wyatt NP/Dr. Osvaldo Colbert    Admission Diagnoses: Acute on chronic systolic heart failure ([address] [I50.23]  Acute on chronic systolic heart failure ([address] [I50.23]    Discharge Diagnoses:    Diagnosis    Hypoxia    Severe obesity ([address]    Typical atrial flutter ([address]    Long term (current) use of anticoagulants    Dilated cardiomyopathy ([address]    Bilateral carotid artery disease (HCC)    NSVT [facility]nonsustained ventricular tachycardia) ([facility])    Hx of AICD-Implanted Cardiac Defibrillator    Aortic Aneurysm/Dilation of the Aorta    ASCAD-of the Native Vessel    Acute on chronic systolic heart failure (HCC)    Hyperlipidemia    Chest pain    C[facility]brovascular accident (stroke) [facility]    Aortic ectasia, abdominal [facility])       Cardiology Procedures this admission:  None  Consults: None    Hospital Course: Patient presented to the emergency department of [facility] with complaints of progressive shortness of breath and chest pain. The chest pain was described as tightness that started in left shoulder and radiated to left chest. He also noted shortness of breath and orthopnea for several weeks. He took ASA and SL nitro x 2 with resolution of chest pain. Upon arrival of EMS he was found to be hypoxic with sats in the 80s and was placed on [address] [facility] In the ED, CXR showed mild pulmonary congestion. Patient was evaluated and subsequently admitted for further cardiac evaluation and treatment. Patient was treated with IV lasix with improvement in symptoms and he was transitioned to PO. Patient will follow up with Dr. Orlando Yang as OP to discuss upgrade to [facility]. At time of discharge, patient was up feeling well without any complaints shortness of breath.   LE edema was much improved. Patient's labs were stable with creatinine of 0.89. Patient was seen and examined by Dr. Lela Antunez and determined stable and ready for discharge. Patient was instructed on the importance of medication compliance, low sodium diet, 2 liter per day fluid restriction and daily weights. For maximized medical therapy of congestive heart failure, patient will continue use of BB and ACE-I. The patient has been scheduled for 7-10 day transitional care follow up with [facility] Cardiology -- Dr. Carissa Flood. Patient will have BMP prior to follow up. DISPOSITION: The patient is being discharged home in stable condition on a low saturated fat, low cholesterol and low salt diet. The patient is instructed to advance activities as tolerated to the limit of fatigue or shortness of breath. The patient is informed to monitor daily weights and maintain a 2 liter per day fluid restriction. The patient is instructed to call the office for any shortness of breath, weight gain, or increased peripheral edema.         Discharge Exam:   Visit Vitals  /76   Pulse (!) 59   Temp 98.7 °F (37.1 °C)   Resp 18   Ht 6' 1\" (1.854 m)   Wt 120.4 kg (265 lb 6.4 oz)   SpO2 90%   BMI 35.02 kg/m²       Recent Results (from the past 24 hour(s))   METABOLIC PANEL, BASIC    Collection Time: 09/05/19  3:52 AM   Result Value Ref Range    Sodium 139 136 - 145 mmol/L    Potassium 3.3 (L) 3.5 - 5.1 mmol/L    Chloride 103 98 - 107 mmol/L    CO2 29 21 - 32 mmol/L    Anion gap 7 7 - 16 mmol/L    Glucose 182 (H) 65 - 100 mg/dL    BUN 25 (H) 8 - 23 MG/DL    Creatinine 0.82 0.8 - 1.5 MG/DL    GFR est AA >60 >60 ml/min/1.73m2    GFR est non-AA >60 >60 ml/min/1.73m2    Calcium 9.1 8.3 - 10.4 MG/DL   PROTHROMBIN TIME + INR    Collection Time: 09/05/19  3:52 AM   Result Value Ref Range    Prothrombin time 31.7 (H) 11.7 - 14.5 sec    INR 3.0           Patient Instructions:     Current Discharge Medication List      START taking these medications    Details lisinopril (PRINIVIL, ZESTRIL) 2.5 mg tablet Take 1 Tab by mouth daily. Qty: 30 Tab, Refills: 11         CONTINUE these medications which have CHANGED    Details   furosemide (LASIX) 40 mg tablet Take 1 Tab by mouth two (2) times a day. Qty: 60 Tab, Refills: 6         CONTINUE these medications which have NOT CHANGED    Details   mesalamine ER (APRISO) 0.375 gram 24 hour capsule Take 0.375 g by mouth daily. Indications: ulcerated colon      warfarin (COUMADIN) 5 mg tablet Take 0.5-1 tab every day or as directed  Qty: 30 Tab, Refills: 11      metFORMIN (GLUCOPHAGE) 500 mg tablet Take 500 mg by mouth daily. atorvastatin (LIPITOR) 40 mg tablet Take 40 mg by mouth daily. gabapentin (NEURONTIN) 300 mg capsule Take 300 mg by mouth two (2) times a day. fenofibric acid (TRILIPIX ER) 135 mg capsule Take 135 mg by mouth daily. nitroglycerin (NITROSTAT) 0.4 mg SL tablet 1 Tab by SubLINGual route every five (5) minutes as needed for Chest Pain. Qty: 25 Tab, Refills: 6      loratadine (CLARITIN) 10 mg tablet Take 10 mg by mouth daily. niacin (NIASPAN) 1,000 mg Tb24 tab Take 1,000 mg by mouth daily. spironolactone (ALDACTONE) 25 mg tablet Take 25 mg by mouth daily. carvedilol (COREG) 12.5 mg tablet Take 12.5 mg by mouth two (2) times daily (with meals). Aspirin, Buffered 81 mg tab Take 81 mg by mouth daily. Signed:  Mary Rudolph NP  9/5/2019 9:48 AM    I have personally seen and examined patient and agree with above assessment. Please see my progress note for more details.     Chandu Dale MD

## 2019-09-05 NOTE — PROGRESS NOTES
[facility] CARDIOLOGY PROGRESS NOTE           9/5/2019 10:01 AM    Admit Date: 9/3/2019      Subjective:     Improved symptoms. Wants to go home    ROS:  Cardiovascular:  As noted above    Objective:      Vitals:    09/04/19 2053 09/05/19 0124 09/05/19 0547 09/05/19 0835   BP: 104/50 119/55 99/63 112/76   Pulse: 65 64 62 (!) 59   Resp: 18 18 18 18   Temp: 97.4 °F (36.3 °C) 97.4 °F (36.3 °C) 97.5 °F (36.4 °C) 98.7 °F (37.1 °C)   SpO2: 90% 94% 92% 90%   Weight:   120.4 kg (265 lb 6.4 oz)    Height:           Physical Exam:  General-No Acute Distress  Neck- supple, no JVD  CV- regular rate and rhythm no MRG  Lung- clear bilaterally  Abd- soft, nontender, nondistended  Ext- no edema bilaterally. Skin- warm and dry    Data Review:   Recent Labs     09/05/19  0352 09/04/19  0444  09/03/19  1004 09/03/19  0325    140  --   --  141   K 3.3* 3.5  --   --  4.1   MG  --   --   --  1.8  --    BUN 25* 22  --   --  20   CREA 0.82 0.89  --   --  0.92   * 181*  --   --  195*   WBC  --  7.4  --   --  10.6   HGB  --  13.2*  --   --  14.5   HCT  --  41.6  --   --  44.7   PLT  --  140*  --   --  155   INR 3.0 2.7   < >  --   --    TROIQ  --   --   --   --  0.02    < > = values in this interval not displayed. Assessment/Plan:     Principal Problem:    Acute on chronic systolic heart failure ([facility] (2/22/2016)    Active Problems:    Hx of AICD-Implanted Cardiac Defibrillator (2/22/2016)      ASCAD-of the Native Vessel (2/22/2016)      Hyperlipidemia (2/22/2016)      Chest pain (2/22/2016)      Dilated cardiomyopathy ([address] (2/7/2018)      Hypoxia (9/3/2019)    Prior history of coronary disease (noted patent stents to LCx/RCA on last cath from 2/2019). Also severe LV dysfunction (EF ~25% from 3/2109). Has been seen by EP prior for consideration of BiV upgrade (underlying LBBB; QRS ~140ms; NYHA II-IIIC) but appears he deferred further procedures at that visit.  Presented with worsening dyspnea/chest discomfort; also appears weight gain. Noted hypoxic on presentation. Appears weight gain of around 10 pounds per wife. States chest discomfort was transient lasting 3-4 minutes. Received lasix with good response. Improved dyspnea; prior on lasix daily and for now, plan bid lasix and reassess dosing as outpt. BMP/Mg in a week and plan f/u in 1-2 weeks. Negative troponins. Recent cath from 2/2019 with no intervenable options. Added on low dose ACEI with LV dysfunction (not an entresto candidate with low BP issues); low BPs limit titration. Appears prior also limited by lower BPs per records. On aldactone/coreg; could consider changing coreg to toprol for more BP room in the future. Discussed BiV upgrade with noted presentation; appears prior wanted to defer and agreeable to procedure (mostly A- paced and no significant V-pacing). Plan follow up with EP on d/c (sees Dr Liseth Qureshi as outpt). On coumadin with prior history of atrial flutter.  [place] home today       Arlet Sands MD  9/5/2019 10:01 AM

## 2019-09-05 NOTE — PROGRESS NOTES
Problem: Falls - Risk of  Goal: *Absence of Falls  Description  Document Esaw Mildred Fall Risk and appropriate interventions in the flowsheet. Outcome: Progressing Towards Goal  Note:   Fall Risk Interventions:  Mobility Interventions: Patient to call before getting OOB    Medication Interventions: Patient to call before getting OOB, Teach patient to arise slowly      Problem: Heart Failure: Day 2  Goal: Off Pathway (Use only if patient is Off Pathway)  Outcome: Resolved/Met  Goal: Activity/Safety  Outcome: Resolved/Met  Goal: Consults, if ordered  Outcome: Resolved/Met  Goal: Diagnostic Test/Procedures  Outcome: Resolved/Met  Goal: Nutrition/Diet  Outcome: Resolved/Met  Goal: Discharge Planning  Outcome: Resolved/Met  Goal: Medications  Outcome: Resolved/Met  Goal: Respiratory  Outcome: Resolved/Met  Goal: Treatments/Interventions/Procedures  Outcome: Resolved/Met  Goal: Psychosocial  Outcome: Resolved/Met  Goal: *Oxygen saturation within defined limits  Outcome: Resolved/Met  Note:   Patient on room air. Goal: *Hemodynamically stable  Outcome: Resolved/Met  Note:   Visit Vitals  /50   Pulse 65   Temp 97.4 °F (36.3 °C)   Resp 18   Ht 6' 1\" (1.854 m)   Wt 120.4 kg (265 lb 6.4 oz)   SpO2 90%   BMI 35.02 kg/m²       Goal: *Optimal pain control at patient's stated goal  Outcome: Resolved/Met  Note:   Patient free of pain on a scale of 0-10.   Goal: *Anxiety reduced or absent  Outcome: Resolved/Met  Goal: *Demonstrates progressive activity  Outcome: Resolved/Met

## 2019-09-05 NOTE — PROGRESS NOTES
Bedside and Verbal shift change report given to Ginna Hernandez and Efren Payne RN (oncoming nurse) by Sarai Jose RN (offgoing nurse). Report included the following information SBAR, Kardex, Accordion and Recent Results.

## 2019-09-05 NOTE — PROGRESS NOTES
Discharge instructions reviewed with patient and wife. Prescriptions given for lisinopril and lasix and med info sheets provided for all new medications. Opportunity for questions provided. Patient and wife voiced understanding of all discharge instructions. IV and tele box removed by primary RN.     Home meds returned by primary RN

## 2019-09-05 NOTE — DISCHARGE INSTRUCTIONS
Patient Education        Heart-Healthy Diet: Care Instructions  Your Care Instructions    A heart-healthy diet has lots of vegetables, fruits, nuts, beans, and whole grains, and is low in salt. It limits foods that are high in saturated fat, such as meats, cheeses, and fried foods. It may be hard to change your diet, but even small changes can lower your risk of heart attack and heart disease. Follow-up care is a key part of your treatment and safety. Be sure to make and go to all appointments, and call your doctor if you are having problems. It's also a good idea to know your test results and keep a list of the medicines you take. How can you care for yourself at home? Watch your portions  · Learn what a serving is. A \"serving\" and a \"portion\" are not always the same thing. Make sure that you are not eating larger portions than are recommended. For example, a serving of pasta is ½ cup. A serving size of meat is 2 to 3 ounces. A 3-ounce serving is about the size of a deck of cards. Measure serving sizes until you are good at [place]" them. Keep in mind that restaurants often serve portions that are 2 or 3 times the size of one serving. · To keep your energy level up and keep you from feeling hungry, eat often but in smaller portions. · Eat only the number of calories you need to stay at a healthy weight. If you need to lose weight, eat fewer calories than your body burns (through exercise and other physical activity). Eat more fruits and vegetables  · Eat a variety of fruit and vegetables every day. Dark green, deep orange, red, or yellow fruits and vegetables are especially good for you. Examples include spinach, carrots, peaches, and berries. · Keep carrots, celery, and other veggies handy for snacks. Buy fruit that is in season and store it where you can see it so that you will be tempted to eat it. · Cook dishes that have a lot of veggies in them, such as stir-fries and soups.   Limit saturated and trans fat  · Read food labels, and try to avoid saturated and trans fats. They increase your risk of heart disease. Trans fat is found in many processed foods such as cookies and crackers. · Use olive or canola oil when you cook. Try cholesterol-lowering spreads, such as Benecol or Take Control. · Bake, broil, grill, or steam foods instead of frying them. · Choose lean meats instead of high-fat meats such as hot dogs and sausages. Cut off all visible fat when you prepare meat. · Eat fish, skinless poultry, and meat alternatives such as soy products instead of high-fat meats. Soy products, such as tofu, may be especially good for your heart. · Choose low-fat or fat-free milk and dairy products. Eat fish  · Eat at least two servings of fish a week. Certain fish, such as salmon and tuna, contain omega-3 fatty acids, which may help reduce your risk of heart attack. Eat foods high in fiber  · Eat a variety of grain products every day. Include whole-grain foods that have lots of fiber and nutrients. Examples of whole-grain foods include oats, whole wheat bread, and brown rice. · Buy whole-grain breads and cereals, instead of white bread or pastries. Limit salt and sodium  · Limit how much salt and sodium you eat to help lower your blood pressure. · Taste food before you salt it. Add only a little salt when you think you need it. With time, your taste buds will adjust to less salt. · Eat fewer snack items, fast foods, and other high-salt, processed foods. Check food labels for the amount of sodium in packaged foods. · Choose low-sodium versions of canned goods (such as soups, vegetables, and beans). Limit sugar  · Limit drinks and foods with added sugar. These include candy, desserts, and soda pop. Limit alcohol  · Limit alcohol to no more than 2 drinks a day for men and 1 drink a day for women. Too much alcohol can cause health problems. When should you call for help?   Watch closely for changes in your health, and be sure to contact your doctor if:    · You would like help planning heart-healthy meals. Where can you learn more? Go to http://daiana-lety.info/. Enter V137 in the search box to learn more about \"Heart-Healthy Diet: Care Instructions. \"  Current as of: July 22, 2018  Content Version: 12.1  © 0792-8805 [employer]. Care instructions adapted under license by [employer] (which disclaims liability or warranty for this information). If you have questions about a medical condition or this instruction, always ask your healthcare professional. [address] any warranty or liability for your use of this information. Patient Education        Avoiding Triggers With Heart Failure: Care Instructions  Your Care Instructions    Triggers are anything that make your heart failure flare up. A flare-up is also called \"sudden heart failure\" or \"acute heart failure. \" When you have a flare-up, fluid builds up in your lungs, and you have problems breathing. You might need to go to the hospital. By watching for changes in your condition and avoiding triggers, you can prevent heart failure flare-ups. Follow-up care is a key part of your treatment and safety. Be sure to make and go to all appointments, and call your doctor if you are having problems. It's also a good idea to know your test results and keep a list of the medicines you take. How can you care for yourself at home? Watch for changes in your weight and condition  · Weigh yourself without clothing at the same time each day. Record your weight. Call your doctor if you have sudden weight gain, such as more than 2 to 3 pounds in a day or 5 pounds in a week. (Your doctor may suggest a different range of weight gain.) A sudden weight gain may mean that your heart failure is getting worse. · Keep a daily record of your symptoms.  Write down any changes in how you feel, such as new shortness of breath, cough, or problems eating. Also record if your ankles are more swollen than usual and if you feel more tired than usual. Note anything that you ate or did that could have triggered these changes. Limit sodium  Sodium causes your body to hold on to extra water. This may cause your heart failure symptoms to get worse. People get most of their sodium from processed foods. Fast food and restaurant meals also tend to be very high in sodium. · Your doctor may suggest that you limit sodium to 2,000 milligrams (mg) a day or less. That is less than 1 teaspoon of salt a day, including all the salt you eat in cooking or in packaged foods. · Read food labels on cans and food packages. They tell you how much sodium you get in one serving. Check the serving size. If you eat more than one serving, you are getting more sodium. · Be aware that sodium can come in forms other than salt, including monosodium glutamate (MSG), sodium citrate, and sodium bicarbonate (baking soda). MSG is often added to Asian food. You can sometimes ask for food without MSG or salt. · Slowly reducing salt will help you adjust to the taste. Take the salt shaker off the table. · Flavor your food with garlic, lemon juice, onion, vinegar, herbs, and spices instead of salt. Do not use soy sauce, steak sauce, onion salt, garlic salt, mustard, or ketchup on your food, unless it is labeled \"low-sodium\" or \"low-salt. \"  · Make your own salad dressings, sauces, and ketchup without adding salt. · Use fresh or frozen ingredients, instead of canned ones, whenever you can. Choose low-sodium canned goods. · Eat less processed food and food from restaurants, including fast food. Exercise as directed  Moderate, regular exercise is very good for your heart. It improves your blood flow and helps control your weight. But too much exercise can stress your heart and cause a heart failure flare-up.   · Check with your doctor before you start an exercise program.  · Walking is an easy way to get exercise. Start out slowly. Gradually increase the length and pace of your walk. Swimming, riding a bike, and using a treadmill are also good forms of exercise. · When you exercise, watch for signs that your heart is working too hard. You are pushing yourself too hard if you cannot talk while you are exercising. If you become short of breath or dizzy or have chest pain, stop, sit down, and rest.  · Do not exercise when you do not feel well. Take medicines correctly  · Take your medicines exactly as prescribed. Call your doctor if you think you are having a problem with your medicine. · Make a list of all the medicines you take. Include those prescribed to you by other doctors and any over-the-counter medicines, vitamins, or supplements you take. Take this list with you when you go to any doctor. · Take your medicines at the same time every day. It may help you to post a list of all the medicines you take every day and what time of day you take them. · Make taking your medicine as simple as you can. Plan times to take your medicines when you are doing other things, such as eating a meal or getting ready for bed. This will make it easier to remember to take your medicines. · Get organized. Use helpful tools, such as daily or weekly pill containers. When should you call for help? Call 911 if you have symptoms of sudden heart failure such as:    · You have severe trouble breathing.     · You cough up pink, foamy mucus.     · You have a new irregular or rapid heartbeat.    Call your doctor now or seek immediate medical care if:    · You have new or increased shortness of breath.     · You are dizzy or lightheaded, or you feel like you may faint.     · You have sudden weight gain, such as more than 2 to 3 pounds in a day or 5 pounds in a week.  (Your doctor may suggest a different range of weight gain.)     · You have increased swelling in your legs, ankles, or feet.     · You are suddenly so tired or weak that you cannot do your usual activities.    Watch closely for changes in your health, and be sure to contact your doctor if you develop new symptoms. Where can you learn more? Go to http://daiana-lety.info/. Enter S301 in the search box to learn more about \"Avoiding Triggers With Heart Failure: Care Instructions. \"  Current as of: July 22, 2018  Content Version: 12.1  © 9008-6741 [employer]. Care instructions adapted under license by [employer] (which disclaims liability or warranty for this information). If you have questions about a medical condition or this instruction, always ask your healthcare professional. [address] any warranty or liability for your use of this information. Patient Education        Learning About Heart Failure  What is heart failure? Heart failure means that your heart muscle does not pump as much blood as your body needs. Failure does not mean that your heart has stopped. It means that your heart is not pumping as well as it should. Your body has an amazing ability to make up for heart failure. It may do such a good job that you don't know you have a disease. But at some point, your heart and body will no longer be able to keep up. Then fluid starts to build up in your lungs and other parts of your body. What can you expect when you have heart failure? Heart failure is a lifelong (chronic) disease. Treatment may be able to slow the disease and help you feel better. But heart failure tends to get worse over time. Despite this, there are many steps you can take to feel better and stay healthy longer. Early on, your symptoms may not be too bad. As heart failure gets worse, symptoms typically get worse, and you may need to limit your activities. Heart failure can also get worse suddenly. If this happens, you need emergency care.  Then, after treatment, your symptoms may go back to being stable (which means they stay the same) for a long time. Heart failure can lead to other health problems, such as heart rhythm problems. Over time, your treatment options may change, especially as your symptoms get worse. As heart failure gets worse, palliative care can help improve the quality of your life. You can do advance care planning to decide what kind of care you want at the end of your life. What are the symptoms? Symptoms of heart failure start to happen when your heart can't pump enough blood to the rest of your body. In the early stages of heart failure, you may:  · Feel tired easily. · Be short of breath when you exert yourself. · Feel like your heart is pounding or racing (palpitations). · Feel weak or dizzy. As heart failure gets worse, fluid starts to build up in your lungs and other parts of your body. This may cause you to:  · Feel short of breath even at rest.  · Have swelling (edema), especially in your legs, ankles, and feet. · Gain weight. This may happen over just a day or two, or more slowly. · Cough or wheeze, especially when you lie down. How is heart failure treated? · You'll probably take several medicines. · You might attend cardiac rehabilitation (rehab) to get education and support that help you make lifestyle changes and stay as healthy as possible. · You may get a heart device. A pacemaker helps your heart pump blood. An ICD can stop abnormal heart rhythms. How can you care for yourself? There are many steps you can take to feel better and stay healthy longer. These steps are an important part of treatment. They can help you stay active and enjoy life. · Take your medicine the right way. Avoid medicines that can make your symptoms worse. · Check your weight and symptoms every day. Know what to do if your symptoms get worse. · Limit sodium. This helps keep fluid from building up. It may help you feel better. · Be active.  Exercise regularly, but don't exercise too hard. · Be heart-healthy. Eat healthy foods, stay at a healthy weight, limit alcohol, and don't smoke. · Stay as healthy as possible. Avoid colds and flu, get help for depression and anxiety, and manage stress. Follow-up care is a key part of your treatment and safety. Be sure to make and go to all appointments, and call your doctor if you are having problems. It's also a good idea to know your test results and keep a list of the medicines you take. Where can you learn more? Go to http://daiana-lety.info/. Enter G428 in the search box to learn more about \"Learning About Heart Failure. \"  Current as of: July 22, 2018  Content Version: 12.1  © 4987-4889 [employer]. Care instructions adapted under license by [email or web address] (which disclaims liability or warranty for this information). If you have questions about a medical condition or this instruction, always ask your healthcare professional. [place] [address] any warranty or liability for your use of this information. Patient Education   Lisinopril (By mouth)   Lisinopril (lye-SIN-oh-pril)  Treats high blood pressure and heart failure. Also given to reduce the risk of death after a heart attack. This medicine is an ACE inhibitor. Brand Name(s): Prinivil, Qbrelis, Zestril   There may be other brand names for this medicine. When This Medicine Should Not Be Used: This medicine is not right for everyone. Do not use it if you had an allergic reaction to lisinopril or another ACE inhibitor, or if you are pregnant. How to Use This Medicine:   Liquid, Tablet  · Take your medicine as directed. Your dose may need to be changed several times to find what works best for you. · Oral liquid: Measure the oral liquid medicine with a marked measuring spoon, oral syringe, or medicine cup. · Missed dose: Take a dose as soon as you remember.  If it is almost time for your next dose, wait until then and take a regular dose. Do not take extra medicine to make up for a missed dose. · Store the medicine in a closed container at room temperature, away from heat, moisture, and direct light. Drugs and Foods to Avoid:   Ask your doctor or pharmacist before using any other medicine, including over-the-counter medicines, vitamins, and herbal products. · Do not use this medicine together with aliskiren if you have diabetes. · Some foods and medicines may affect how lisinopril works. Tell your doctor if you are using any of the following:   ¨ Aliskiren, everolimus, lithium, sirolimus, temsirolimus  ¨ Another blood pressure medicine, including an angiotensin receptor blocker (ARB)  ¨ Diuretic (water pill, including amiloride, spironolactone, triamterene)  ¨ Insulin or diabetes medicine  ¨ NSAID pain or arthritis medicine (including aspirin, celecoxib, diclofenac, ibuprofen, naproxen)  · Ask your doctor before you use any medicine, supplement, or salt substitute that contains potassium. Warnings While Using This Medicine:   · It is not safe to take this medicine during pregnancy. It could harm an unborn baby. Tell your doctor right away if you become pregnant. · Tell your doctor if you are breastfeeding, or if you have kidney disease, liver disease, diabetes, or heart or blood vessel disease. · This medicine may cause the following problems:  ¨ Angioedema (severe swelling)  ¨ Kidney problems  ¨ Serious liver problems  · This medicine could lower your blood pressure too much, especially when you first use it or if you are dehydrated. Stand or sit up slowly if you feel lightheaded or dizzy. · Do not stop using this medicine without asking your doctor, even if you feel well. This medicine will not cure your high blood pressure, but it will help keep it in a normal range. You may have to take blood pressure medicine for the rest of your life.   · Tell any doctor or dentist who treats you that you are using this medicine. · Your doctor will do lab tests at regular visits to check on the effects of this medicine. Keep all appointments. · Keep all medicine out of the reach of children. Never share your medicine with anyone. Possible Side Effects While Using This Medicine:   Call your doctor right away if you notice any of these side effects:  · Allergic reaction: Itching or hives, swelling in your face or hands, swelling or tingling in your mouth or throat, chest tightness, trouble breathing  · Blistering, peeling, or red skin rash  · Change in how much or how often you urinate  · Confusion, weakness, uneven heartbeat, trouble breathing, numbness or tingling in your hands, feet, or lips  · Dark urine or pale stools, nausea, vomiting, loss of appetite, stomach pain, yellow skin or eyes  · Fever, chills, sore throat, body aches  · Lightheadedness, dizziness, fainting  · Severe stomach pain (with or without nausea or vomiting)  If you notice these less serious side effects, talk with your doctor:   · Dry cough  If you notice other side effects that you think are caused by this medicine, tell your doctor. Call your doctor for medical advice about side effects. You may report side effects to FDA at 4-378-FDA-4836  © 2017 [facility] Information is for End User's use only and may not be sold, redistributed or otherwise used for commercial purposes. The above information is an  only. It is not intended as medical advice for individual conditions or treatments. Talk to your doctor, nurse or pharmacist before following any medical regimen to see if it is safe and effective for you.

## 2019-09-05 NOTE — PROGRESS NOTES
Warfarin dosing per pharmacist    Karolynarsh Veronica. is a 75 y.o. male. Height: 6' 1\" (185.4 cm)    Weight: 120.4 kg (265 lb 6.4 oz)    Indication:  H/o CVA    Goal INR:  2-3    Home dose:  5 mg T and 2.5 mg AODs    Risk factors or significant drug interactions:  Atorvastatin    Other anticoagulants:  N/A    Daily Monitoring  Date  INR     Warfarin dose HGB              Notes  9/3  2.6  5 mg  14.5   9/4  2.7  2.5 mg  13.2   9/5  3.0  1.5 mg  ---     Pharmacy consulted to continue warfarin dosing from home. INR 3.0 today. Transitioned from IV to PO furosemide yesterday following 0.5 L diuresis overnight. No edema documented per RN assessment. Reducing dose to 1.5 mg this evening. Anticipate stabilization of INR thereafter. Will follow daily INR.     Thank you,    Sandra Villasenor, PharmD, BCPS  Clinical Pharmacist

## 2019-09-05 NOTE — PROGRESS NOTES
Care Management Interventions  PCP Verified by CM: Yes  Last Visit to PCP: 04/15/19  Palliative Care Criteria Met (RRAT>21 & CHF Dx)?: No(RRAT- 12, CHF)  Mode of Transport at Discharge: Other (see comment)(Maria A Perdomo Spouse [phone] )  Transition of Care Consult (CM Consult): Discharge Planning  Discharge Durable Medical Equipment: No  Physical Therapy Consult: No  Occupational Therapy Consult: No  Speech Therapy Consult: No  Current Support Network: Lives with Spouse  Confirm Follow Up Transport: Family  Plan discussed with Pt/Family/Caregiver: Yes  Freedom of Choice Offered: Yes  Discharge Location  Discharge Placement: Home      Health  to follow.  [place] home

## 2019-09-06 NOTE — PROGRESS NOTES
Met with patient and spouse prior to discharge to introduce [address] . Patient agreed to the program. Will contact patient once discharged to schedule home visit.

## 2019-09-12 NOTE — PROGRESS NOTES
Home Visit # 1 Initial Visit  Health  arrived at residence to find the patient alert and oriented per the patients norms, in no acute distress and without complaint. The patient denies SOB, or increased DAVENPORT. Medicine reconciliation done, safety assessment, physical assessment, and education completed. Patient is eager to to make changes to avoid future problems. HC will follow with in home visit next week. Education:Reviewed via teach back the CHF action plan to include low sodium diet and fluid restrictions, medicine compliance, daily weights and early interventions. CHF Assessment:  Shortness of Breath 2  Edema   1  Abdomen  0  Stress   3  Daily activities  2  Sleeping  1  Energy    2  Weight trends  0  TOTAL= 11    BP: 118/62  lb    Weight: 255 lb    Medications: All medicines are on hand and the patient remains compliant. Edema: No notable edema to bilateral lower extremities and the abdomen is soft and non-tender. Lung Sounds:Clear and equal bilaterally. Follow Up Appointments:  9/10   1100    Dr Shasta Ruffin Cardiology  9/12   1130    Dr Nichelle Petty      Dermatology    Transportation: Self    DME: None    Safety Concerns: No Issues noted    Patient/Family Concerns: Diet    Tasks: Provided diet helps    Physical Assessment completed and noted on CHF assessment Form. Will follow up with pt in about one week. End of Visit.  Caryn Baker Paramedic Health

## 2019-09-24 NOTE — PROGRESS NOTES
This note will not be viewable in [address] Home Visit #2 Health  arrived at [facility] find the patient alert and oriented per the patients norms, in no acute distress and without complaint. Weight fluctuates 1-2 lb daily but is declining overall. Managing changes to diet and fluid restrictions well. Has been measuring his urine output and asks how long should this continue. Encouraged to seek advise from cardiology or nephrology. Denies any SOB and only mild DAVENPORT. Energy is improving. HC will visit in home next week. Education:Reviewed via teach back the CHF action plan to include low sodium diet and fluid restrictions, medicine compliance, daily weights and early interventions. CHF Assessment: 
Shortness of Breath 0 Edema   0 Abdomen  0 Stress   3 Daily activities  2 Sleeping  0 Energy    3 Weight trends  0 
TOTAL= 8 
 
BP: 116/56  LA Sitting Weight: 250 lb Medications: All medicines are on hand and the patient remains compliant. Edema: No notable edema to bilateral lower extremities and the abdomen is soft and non-tender. Lung Sounds:Clear and equal bilaterally. Follow Up Appointments: 
9/26  0800   [facility] 9/26  1030   Dermatology Transportation: Self DME: No Issues Safety Concerns: None Patient/Family Concerns: Urine collection Tasks: None Physical Assessment completed and noted on CHF assessment Form. Will follow up with pt in about one week. End of Visit. Pati Marsh, Paramedic Health

## 2019-09-30 NOTE — PROGRESS NOTES
This note will not be viewable in [address] Home Visit # 3 Health  arrived at residence to find the patient alert and oriented per the patients norms, in no acute distress and without complaint. Stopped measuring urine without speaking to cardiology but will ask at next appointment. Weight has been up and down by 3-4 pounds several days. Patient did not contact cardiology for advice. Discussed following the CHF action plan. Patient thought it may be related to his new scale. States he understands now and will follow. Patient denies all pertinent negatives. Is feeling better overall. Will visit either by phone or in home next week as availability allows. Education: Reviewed via teach back the CHF action plan to include low sodium diet and fluid restrictions, medicine compliance, daily weights and early interventions. CHF Assessment: 
Shortness of Breath 0 Edema   0 Abdomen  0 Stress   1 Daily activities  1 Sleeping  0 Energy    2 Weight trends  2 
TOTAL= 6 
 
BP: 128/64  RA Sitting Weight: 254 lb Medications: All medicines are on hand and the patient remains compliant. Edema: No notable edema to bilateral lower extremities and the abdomen is soft and non-tender. Lung Sounds:Clear and equal bilaterally. Follow Up Appointments: None Transportation: Self DME: No Issues Safety Concerns: None Patient/Family Concerns: None Tasks: None Physical Assessment completed and noted on CHF assessment Form. Will follow up with pt in about one week. End of Visit. Desire Sidhu, Paramedic Health

## 2019-10-03 NOTE — PROGRESS NOTES
Contacted the patient by phone for follow up. He states that he is doing well with his weight down to 251 lb. He states less SOB, DAVENPORT and edema. States that he is doing more around the house and yard and has improved energy. Advised the patient to continue to follow the CHF action plan and to contact Cardiology for any problems.

## 2019-10-07 PROBLEM — I44.7 LBBB (LEFT BUNDLE BRANCH BLOCK): Status: ACTIVE | Noted: 2019-01-01

## 2019-10-11 NOTE — PROGRESS NOTES
Home Visit # 4 Health  arrived at residence to find the patient alert and oriented per the patients norms, in no acute distress and without complaint. Weight is down. Patient happy to have met goal of less than 250 lb. Continues to be compliant in all areas. He denies all pertinent negatives to include SOB DAVENPORT or edema. Will be out of town next week and [facility] will follow via phone. Education:Reviewed via teach back the CHF action plan to include low sodium diet and fluid restrictions, medicine compliance, daily weights and early interventions. CHF Assessment: 
Shortness of Breath 0 Edema   0 Abdomen  0 Stress   1 Daily activities  1 Sleeping  0 Energy    1 Weight trends  0 
TOTAL= 3 
 
BP: 134/60 LA Sitting Weight: 249 lb Medications: All medicines are on hand and the patient remains compliant. Edema: No notable edema to bilateral lower extremities and the abdomen is soft and non-tender. Lung Sounds:Clear and equal bilaterally. Follow Up Appointments: 
 
Transportation: Self DME: No Issues Safety Concerns: None Patient/Family Concerns: None Tasks: None Physical Assessment completed and noted on CHF assessment Form. Will follow up with pt in about one week. End of Visit. Karen Pacheco, Paramedic Health

## 2019-10-17 NOTE — PROGRESS NOTES
Patient and wife are out of town for a few days missing this weeks visit. Spoke with Mrs. Anjali Iraheta who advised that Mr. Anjali Iraheta was doing well. She stated that he had no SOB, DAVENPORT or Edema and his weight remained constant. He is being very careful with diet and fluids. Will visit in home again next week.

## 2019-10-28 NOTE — PROGRESS NOTES
This note will not be viewable in [address] Home Visit # 5 Health  arrived at residence to find the patient alert and oriented per the patients norms, in no acute distress and without complaint. Having issue with elevated BGL. Monitoring. To have ICD lead revision on 11/4. No issues otherwise. Doing most everything he was prior to exacerbation. Will visit in home again next week. Education:Reviewed via teach back the CHF action plan to include low sodium diet and fluid restrictions, medicine compliance, daily weights and early interventions. CHF Assessment: 
Shortness of Breath 0 Edema   0 Abdomen  0 Stress   1 Daily activities  0 Sleeping  0 Energy    1 Weight trends  0 
TOTAL=  2 
 
BP: 118/62 LA Sitting Weight: 247 lb Medications: All medicines are on hand and the patient remains compliant. Edema: No notable edema to bilateral lower extremities and the abdomen is soft and non-tender. Lung Sounds:Clear and equal bilaterally. Follow Up Appointments: 
11/04   0800   [facility]  ICD Lead Revision Transportation: Self DME: No Issues Safety Concerns: None Patient/Family Concerns: None Tasks: None Physical Assessment completed and noted on CHF assessment Form. Will follow up with pt in about one week. End of Visit. Yifan Vincent Paramedic Health

## 2019-11-01 NOTE — PROGRESS NOTES
This note will not be viewable in [address] Home Visit # 6 Health  arrived at residence to find the patient alert and oriented per the patients norms, in no acute distress and without complaint. Weight continues to decline patient is doing more. Cautioned not to over exert. Denies SOB, has only mild DAVENPORT and daily edema resolves at night. Encouraged to elevate LE more often. BGL's are stable. To have ICD lead revision on 11/04. HC will visit in home again next week. Education:Reviewed via teach back the CHF action plan to include low sodium diet and fluid restrictions, medicine compliance, daily weights and early interventions. CHF Assessment: 
Shortness of Breath  0 Edema   0 Abdomen  0 Stress   1 Daily activities  0 Sleeping  0 Energy    1 Weight trends  0 
TOTAL=  2 
 
BP: 124/62  RA Sitting Weight: 245.4 lb Medications: All medicines are on hand and the patient remains compliant. Edema: No notable edema to bilateral lower extremities and the abdomen is soft and non-tender. Lung Sounds:Clear and equal bilaterally. Follow Up Appointments:  
11/01   Dr Nancy Lawler   To call and set time 11/04   [facility] Cath Lab   Dr Sondra Gotti   ICD lead Revision Transportation: Self DME: None Safety Concerns: None Patient/Family Concerns: None Tasks: None Physical Assessment completed and noted on CHF assessment Form. Will follow up with pt in about one week. End of Visit. Joycelyn Diaz, Paramedic Health

## 2019-11-01 NOTE — PROGRESS NOTES
Patient pre-assessment complete for BiV ICD upgrade with Dr Sun Brownlee  scheduled for 19 at 7:30am, arrival time 6am. Patient verified using . Patient instructed to bring all home medications in labeled bottles on the day of procedure. NPO status reinforced. Patient instructed to HOLD coumadin x 5 days (last dose 10/30/19. Instructed they can take all other medications excluding vitamins & supplements. Patient verbalizes understanding of all instructions & denies any questions at this time.

## 2019-11-04 PROBLEM — I42.9 CARDIOMYOPATHY (HCC): Status: ACTIVE | Noted: 2019-01-01

## 2019-11-04 NOTE — PROGRESS NOTES
Warfarin dosing per pharmacist 
 
Adrian Garcia. is a 75 y.o. male. Height: 6' 1\" (185.4 cm)    Weight: 110.7 kg (244 lb) Indication:  CVA prevention Goal INR:  2-3 Home dose:   
2.5 mg daily Total of 17.5 mg weekly dose Risk factors or significant drug interactions:  Surgical procedure on 11/4/19, Fenofibrate (was on prior to admission in addition to warfarin) Other anticoagulants:  N/A Daily Monitoring Date  INR     Warfarin dose HGB              Notes 11/4  1.3  2.5 mg  14.0 Pharmacy consulted to assist dosing warfarin in patient who presented with INR of 1.3 due to holding for 5 days before planned surgical procedure. Most recently recommended dose of warfarin per [address] [facility] [facility] on 10/30/19 was 2.5 mg daily. Will continue regular home dose of 2.5 mg this evening. Daily INR for now. Thank you, 
Jerald Avalos, PharmD Clinical Pharmacist 
[phone]

## 2019-11-04 NOTE — PROGRESS NOTES
Patient received to [address] [place] [address] room # 9  Ambulatory from [facility]. Patient scheduled for Bi V upgrade today with Dr Robert Crowley. Procedure reviewed & questions answered, voiced good understanding consent obtained & placed on chart. All medications and medical history reviewed. Will prep patient per orders. Patient & family updated on plan of care. The patient has a fraility score of 3-MANAGING WELL, based on ability to perform ADLs by self.

## 2019-11-04 NOTE — ANESTHESIA PREPROCEDURE EVALUATION
Relevant Problems No relevant active problems Anesthetic History No history of anesthetic complications Review of Systems / Medical History Pertinent labs reviewed Pulmonary Within defined limits Neuro/Psych  
 
 
CVA (2011): no residual symptoms Cardiovascular Hypertension CHF (EF 25%, well compensated) Dysrhythmias (NSVT) : atrial flutter Pacemaker (ICD), past MI (2000, 2007), CAD, PAD (bilateral carotid dz) and cardiac stents (last 2008) Exercise tolerance: >4 METS: Ambulates well without assistance GI/Hepatic/Renal 
  
GERD: well controlled Liver disease (s/p liver resection for liver infection) Endo/Other Diabetes: type 2 Obesity Other Findings Physical Exam 
 
Airway Mallampati: II 
TM Distance: < 4 cm Neck ROM: normal range of motion Mouth opening: Normal 
 
 Cardiovascular Regular rate and rhythm,  S1 and S2 normal,  no murmur, click, rub, or gallop Dental 
No notable dental hx Pulmonary Breath sounds clear to auscultation Abdominal 
GI exam deferred Other Findings Anesthetic Plan ASA: 4 Anesthesia type: total IV anesthesia Induction: Intravenous Anesthetic plan and risks discussed with: Patient

## 2019-11-04 NOTE — PROGRESS NOTES
TRANSFER - IN REPORT: 
 
Verbal report received from Ana Hernandez(name) on [employer]  being received from Cath Lab(unit) for routine post - op Report consisted of patients Situation, Background, Assessment and  
Recommendations(SBAR). Information from the following report(s) SBAR, Kardex, Procedure Summary, MAR, Recent Results, Med Rec Status and Cardiac Rhythm Paced was reviewed with the receiving nurse. Opportunity for questions and clarification was provided. Assessment completed upon patients arrival to unit and care assumed. Dual skin assessment completed with RN. Skin inspected, some blanchable redness to sacrum, no breakdown noted. Left subclavian site with pressure dressing and aquacel c/d/i. Left arm in sling to limit movement.

## 2019-11-04 NOTE — PROGRESS NOTES
Care Management Interventions PCP Verified by CM: Alexandrea Mckeon Mode of Transport at Discharge: Other (see comment)(spouse) Transition of Care Consult (CM Consult): Discharge Planning Discharge Durable Medical Equipment: No 
Physical Therapy Consult: No 
Occupational Therapy Consult: No 
Speech Therapy Consult: No 
Current Support Network: Own Home, Lives with Spouse Confirm Follow Up Transport: Family Plan discussed with Pt/Family/Caregiver: Yes Freedom of Choice Offered: Yes Discharge Location Discharge Placement: Home This CM met with pt and spouse at bedside this day to complete assessment. Pt verified his PCP, insurance, emergency contact, and home address. He reports no difficulty obtaining his medications in the community. He lives at home spouse with steps to enter. He reports no home DME. He confirms that at baseline prior to admission he is independent with his ADLs including bathing, dressing, cooking, and driving. No voiced discharge or CM needs during assessment. Anticipate pt to return home with spouse when stable. CM to continue to continue to follow.

## 2019-11-04 NOTE — ANESTHESIA POSTPROCEDURE EVALUATION
Procedure(s): BIV ICD UPGRADE. total IV anesthesia Anesthesia Post Evaluation Patient location during evaluation: PACU Patient participation: complete - patient participated Level of consciousness: awake Pain management: satisfactory to patient Airway patency: patent Anesthetic complications: no 
Cardiovascular status: hemodynamically stable Respiratory status: spontaneous ventilation Hydration status: euvolemic Post anesthesia nausea and vomiting:  none Vitals Value Taken Time /60 11/4/2019 11:39 AM  
Temp Pulse 71 11/4/2019 11:41 AM  
Resp 15 11/4/2019 11:41 AM  
SpO2 96 % 11/4/2019 11:41 AM  
Vitals shown include unvalidated device data.

## 2019-11-04 NOTE — PROCEDURES
: Augusta Espinal. Maynor Kaur MD 
  
REFERRING: Maureen Marin DO 
  
Pre-Procedure Diagnosis: 
1. Ischemic cardiomyopathy 2. NYHA class II Symptoms 3. EF 25-30% 4. Primary Prevention 5. LBBB of QRS duration 148 msec Procedure Performed: 1. Biventricular ICD upgrade. 2. Pocket revision. Anesthesia: MAC Estimated Blood Loss: Less than 10 mL Specimens: * No specimens in log * Complications: None Fluoroscopy Time: 17.2 minutes The procedure, indications, risks, benefits, and alternatives were discussed with the patient and family members, who desired to proceed after questions were answered and informed consent was documented. Procedure: After informed consent was obtained, the patient was brought to the Electrophysiology Laboratory in a fasting state and was prepped and draped in sterile fashion. Prophylactic antibiotic was administered 10 minutes prior to skin incision: refer to anesthesia procedural documentation for details. MAC was administered by the anesthesia team with continuous oxygen saturation measurement and blood pressure measurement. A venogram was performed of the LUE and demonstrated a patent axillary and subclavian vein system with mild to moderate obstructions proximally and distally. Local anesthetic (sensorcaine) was delivered to the left pectoral region and an incision was made parallel to the deltopectoral groove over the chronic scar. The chronic ICD was carefully dissected and hemostasis was achieved with [employer]ie [employer]rocaut[employer] (PlasmaBlade, Medtronic). The ICD was removed from the pocket. A partial capsulotomy was performed and the pocket was enlarged/revised in order to fit a larger biventricular ICD pulse generator.  Access x 1 was obtained under ultrasound guidance using a modified Seldinger technique with placement of a long wire down into the RA and advanced below the diaphragm followed by placement of a peel-away sheath over the guidewire. A [place] sheath was advanced over a J wire and dilator with use of a Micropuncture kit. The dilator was removed and the braided core was advanced into the RA and then RV. The CS was cannulated using the [place] system. An occlusive coronary sinus venogram was then performed and demonstrated a posterolateral target; although poor venous filling with the obtained views. An Amplatz wire was then advanced into the distal CS for added support. The renal and inner Merit sheaths were then advanced. The target vein was cannulated with the use of a Straight Trak wire. A venogram was performed within the target vessel demonstrating diminutive CS branches everywhere else. The left ventricular lead was then advanced with into the posterolatearl branch over a Straight Trak wire. Adequate thresholds were obtained with an adequate margin between phrenic stim and loss of capture. The delivery sheaths were then slit or peeled with fluoroscopy demonstrating stable position. The lead was then sutured to the underlying pectoralis fascia using 0-Ethibond sutures around the lead collar. The old device was disconnected and removed from the pocket and existing leads tested via the pacing system analyzer (PSA) demonstrating stable sensing, impedance and pacing thresholds. The lead pins were then cleaned with antibiotic soaked gauze, dried gently, and attached to a new biventricular ICD generator. Pins were directly observed to pass the tip electrode, and the ring hex wrench screws were secured, and leads tug tested. The device and leads were gently positioned within the pocket after the pocket was irrigated copiously with a saline antimicrobial solution and D-STAT solution was placed in the pocket to promote hemostasis. The wound was closed with multiple layers of 2-0 Quill suture followed by skin closure with 4-0 Vicryl and a retention suture was placed.  Exofin solution was placed over the wound, allowed to dry, and then a sterile Aquacel dressing was placed over the wound. Fluoroscopic images were interpreted by me, in multiple projections. Final device position was confirmed by stored fluoroscopic image from device pocket to lead tips in AP projection. DFT testing was not performed. Device and Lead Information Pulse Generator Model #  Serial # Location Implant/explant [id number] Tresa GRIJALVA [id number] Left Pectoral  11-04-19 [id number] Archie GRIJALVA [id number] Left Pectoral        11-19-07 Lead Model Number  Serial Number Lead position Implant RA [id number] [id number] RA 11-19-07 RV     [id number]        VALENTINE [id number] RV [place] 11-19-07 LV 
     [id number]       MDT [id number] Post lateral 11-04-19 Lead Sensitivity and Threshold Lead R or P sensing (mv) Threshold (V) Threshold PW (msec) Impedance (ohms) Final output Voltage (V) Final PW (msec) RA 3.1 .75 .4 342 1.5 .4 RV 9.3 .5 .4 589 
60/59 2.0 .4 LV 
LV3-LV4 - .5 .4 1007 3.5 .4 Bradycardia Settings Davon Mode LRL URL Pace AVD (ms) Sense AVD (ms) Rate Response Mode Switching Mode SW Rate DDDR 60 120 130 100 ON     ON  171 Tachycardia Settings Zone Type VT-1 VF  
ON/OFF/ 
MONITOR           On ON Zone Rate          182             222 ATP during charge 1st Therapy Type    Burst x 3 Shock Energy (J)               35  
2nd Therapy Type          shock Shock Energy (J)            35j 35J  
3rd Therapy Type           shock Shock Energy (J) 35J 35J  
4th Therapy Type shock Shock Energy (J) 35J 35J  
5th Therapy Type shock Shock Energy (J) 35J 35J  
6th Therapy Type shock Shock Energy (J) 35J 35J VT monitor  150bpm   
 
Defibrillation Threshold Testing - Not Peformed DFT# How induced Successful test? Shock Imped (ohms) Energy (J) Charge time (sec) Rescue needed?  Defib threshold (J)  
n/a         
         
 
 
 IMPRESSION: Successful upgrade of a DC ICD to a biventricular ICD. This device is NOT MRI conditional (different company leads). PLAN: 
-Overnight observation. 
-Routine CIED instructions. 
-Device clinic follow up in 1-2 weeks. -Abx at discharge. 
-Routine cardiac care per Dr. Keaton Foster. Rudy Cuevas. Flex Roa MD, MS Clinical Cardiac Electrophysiology [facility]

## 2019-11-04 NOTE — PROGRESS NOTES
TRANSFER - OUT REPORT: 
 
Verbal report given to [address] on Igor García.  [facility] Report consisted of patients Situation, Background, Assessment and  
Recommendations(SBAR). Information from the following report(s) SBAR was reviewed with the receiving nurse. Lines:  
Peripheral IV 11/04/19 Distal;Left (Active) Peripheral IV 11/04/19 Right Antecubital (Active) Opportunity for questions and clarification was provided.

## 2019-11-05 NOTE — PROGRESS NOTES
Bedside shift report received from Tara Sneed RN (offgoing nurse). Report given to Marilyn Em RN. Vital signs stable. No complaints present. Patient in stable condition.

## 2019-11-05 NOTE — PROGRESS NOTES
Pt to discharge home with spouse this day. No CM needs voiced at discharge. Milestones met. Care Management Interventions PCP Verified by CM: Faizan Jackson) Mode of Transport at Discharge: Other (see comment)(spouse) Transition of Care Consult (CM Consult): Discharge Planning Discharge Durable Medical Equipment: No 
Physical Therapy Consult: No 
Occupational Therapy Consult: No 
Speech Therapy Consult: No 
Current Support Network: Own Home, Lives with Spouse Confirm Follow Up Transport: Family Plan discussed with Pt/Family/Caregiver: Yes Freedom of Choice Offered: Yes Discharge Location Discharge Placement: Home

## 2019-11-05 NOTE — PROGRESS NOTES
Warfarin dosing per pharmacist 
 
Freedom Bryant. is a 75 y.o. male. Height: 6' 1\" (185.4 cm)    Weight: 112.4 kg (247 lb 11.2 oz) Indication:  CVA prevention Goal INR:  2-3 Home dose:  2.5 mg qhs 
 
Risk factors or significant drug interactions: none Other anticoagulants:  N/A Daily Monitoring Date  INR     Warfarin dose HGB              Notes 11/4  1.3  2.5 mg  14.0 
11/5  1.4  2.5 mg  13.1 Pharmacy consulted to assist dosing warfarin in patient who presented with INR of 1.3 due to holding for 5 days before planned surgical procedure. INR 1.4. Continue warfarin 2.5 mg qhs. Daily INR. Pharmacy will continue to follow. Please call with any questions. Thank you, Nimo Post, PharmD Clinical Pharmacist 
[phone]

## 2019-11-05 NOTE — PROGRESS NOTES
Bedside shift report given to Alisia Celis RN (oncoming nurse) by Joseline Weathers RN (offgoing nurse). Bedside shift report included the following information: SBAR, Kardex, ED Summary, MAR, and Recent Results.

## 2019-11-05 NOTE — PROGRESS NOTES
[facility] CARDIOLOGY PROGRESS NOTE 
      
 
11/5/2019 7:23 AM 
 
Admit Date: 11/4/2019 Admit Diagnosis: Chronic systolic congestive heart failure, NYHA class 4 ([facility] [I50.22]; Cardiomyopathy ([facility] [I42.9] Assessment:  
Active Problems: 
  Cardiomyopathy ([facility] (11/4/2019) S/p CRT-D upgrade Plan: 1. Cardiomyopathy - s/p CRT-D upgrade. Stable site, CXR and device interrogation. 2. PVCs - limiting effective CRT pacing, consider addition of amiodarone. 3. AFL - stable, on warfarin for stroke ppx. 4. Dispo - likely today. Thank you for allowing me to participate in the electrophysiologic care of this most pleasant patient. Please feel free to contact me if there are any questions or concerns. Zena Feng. Jessika Chavez MD, MS Clinical Cardiac Electrophysiology [facility] Subjective: No complaints this AM, no chest pain or shortness of breath. PVCs ~15-20%. Interval History: (History of pertinent interval events obtained from nursing staff) ROS: 
GEN:  No fever or chills Cardiovascular:  As noted above Pulmonary:  As noted above Neuro:  No new focal motor or sensory loss Objective:  
 
Vitals:  
 11/04/19 2225 11/05/19 0345 11/05/19 0354 11/05/19 6792 BP: 127/60 118/63  109/58 Pulse: 60 64  62 Resp: 18 18  18 Temp: 97.9 °F (36.6 °C) 97.2 °F (36.2 °C)  97.2 °F (36.2 °C) SpO2: 94% 94%  95% Weight:   247 lb 11.2 oz (112.4 kg) Height:   6' 1\" (1.854 m) Physical Exam: 
[employer], Well Nourished, No Acute Distress, Alert & Oriented x 3, appropriate mood. Neck- supple, no JVD 
CV- regular rate and rhythm no MRG, left sided CIED C/D/I. Lung- clear bilaterally Abd- soft, nontender, nondistended Ext- no edema bilaterally. Skin- warm and dry Current Facility-Administered Medications Medication Dose Route Frequency  potassium chloride (K-DUR, KLOR-CON) SR tablet 40 mEq  40 mEq Oral NOW  
  lactated Ringers infusion  100 mL/hr IntraVENous CONTINUOUS  
 saline peripheral flush soln 5 mL  5 mL InterCATHeter PRN  
 aspirin delayed-release tablet 81 mg  81 mg Oral DAILY  atorvastatin (LIPITOR) tablet 40 mg  40 mg Oral DAILY  carvedilol (COREG) tablet 12.5 mg  12.5 mg Oral BID WITH MEALS  fenofibrate (LOFIBRA) tablet 160 mg  160 mg Oral DAILY  furosemide (LASIX) tablet 40 mg  40 mg Oral BID  
 gabapentin (NEURONTIN) capsule 300 mg  300 mg Oral BID  lisinopril (PRINIVIL, ZESTRIL) tablet 2.5 mg  2.5 mg Oral DAILY  mesalamine ER (APRISO) 24 hour capsule 0.375 g (Patient Supplied)  0.375 g Oral DAILY  spironolactone (ALDACTONE) tablet 25 mg  25 mg Oral DAILY  warfarin (COUMADIN) tablet 2.5 mg  2.5 mg Oral QPM  
 sodium chloride (NS) flush 5-40 mL  5-40 mL IntraVENous Q8H  
 sodium chloride (NS) flush 5-40 mL  5-40 mL IntraVENous PRN  
 acetaminophen (TYLENOL) tablet 650 mg  650 mg Oral Q4H PRN  
 HYDROcodone-acetaminophen (NORCO) 5-325 mg per tablet 1 Tab  1 Tab Oral Q4H PRN  
 ondansetron (ZOFRAN) injection 4 mg  4 mg IntraVENous Q4H PRN  
 docusate sodium (COLACE) capsule 100 mg  100 mg Oral BID PRN  
 ceFAZolin (ANCEF) 2 g/20 mL in sterile water IV syringe  2 g IntraVENous Q12H  
 dextrose 40% (GLUTOSE) oral gel 1 Tube  15 g Oral PRN  
 glucagon (GLUCAGEN) injection 1 mg  1 mg IntraMUSCular PRN  
 dextrose (D50W) injection syrg 12.5-25 g  25-50 mL IntraVENous PRN  
 insulin lispro (HUMALOG) injection   SubCUTAneous AC&HS Data Review:  
Recent Results (from the past 24 hour(s)) EKG, 12 LEAD, INITIAL Collection Time: 11/04/19  7:32 AM  
Result Value Ref Range Ventricular Rate 60 BPM  
 Atrial Rate 60 BPM  
 P-R Interval 262 ms QRS Duration 148 ms Q-T Interval 456 ms  
 QTC Calculation (Bezet) 456 ms Calculated R Axis -38 degrees Calculated T Axis 123 degrees Diagnosis   Atrial-paced rhythm with prolonged AV conduction with occasional Premature  
ventricular complexes Left axis deviation Left bundle branch block Abnormal ECG When compared with ECG of 03-SEP-2019 03:23, 
Electronic atrial pacemaker has replaced Sinus rhythm Confirmed by Ben Cobb MD (), CHILANGO JOHNSON ([id number]) on 11/4/2019 8:51:19 AM 
  
EKG, 12 LEAD, INITIAL Collection Time: 11/04/19 11:43 AM  
Result Value Ref Range Ventricular Rate 70 BPM  
 Atrial Rate 70 BPM  
 P-R Interval 146 ms  
 QRS Duration 162 ms Q-T Interval 500 ms QTC Calculation (Bezet) 540 ms Calculated P Axis 68 degrees Calculated R Axis -86 degrees Calculated T Axis 80 degrees Diagnosis Atrial-sensed ventricular-paced rhythm Biventricular pacemaker detected Abnormal ECG When compared with ECG of 04-NOV-2019 07:32, Electronic ventricular pacemaker has replaced Electronic atrial pacemaker Confirmed by ST NOEL ZAMUDIO MD (), CASH SETH ([id number]) on 11/4/2019 12:18:06 PM 
  
GLUCOSE, POC Collection Time: 11/04/19  4:18 PM  
Result Value Ref Range Glucose (POC) 133 (H) 65 - 100 mg/dL GLUCOSE, POC Collection Time: 11/04/19  8:12 PM  
Result Value Ref Range Glucose (POC) 171 (H) 65 - 100 mg/dL CBC WITH AUTOMATED DIFF Collection Time: 11/05/19  3:46 AM  
Result Value Ref Range WBC 7.0 4.3 - 11.1 K/uL  
 RBC 4.51 4.23 - 5.6 M/uL  
 HGB 13.1 (L) 13.6 - 17.2 g/dL HCT 40.1 (L) 41.1 - 50.3 % MCV 88.9 79.6 - 97.8 FL  
 MCH 29.0 26.1 - 32.9 PG  
 MCHC 32.7 31.4 - 35.0 g/dL  
 RDW 13.8 11.9 - 14.6 % PLATELET 905 477 - 562 K/uL MPV 12.7 (H) 9.4 - 12.3 FL ABSOLUTE NRBC 0.00 0.0 - 0.2 K/uL  
 DF AUTOMATED NEUTROPHILS 69 43 - 78 % LYMPHOCYTES 19 13 - 44 % MONOCYTES 10 4.0 - 12.0 % EOSINOPHILS 1 0.5 - 7.8 % BASOPHILS 1 0.0 - 2.0 % IMMATURE GRANULOCYTES 1 0.0 - 5.0 %  
 ABS. NEUTROPHILS 4.8 1.7 - 8.2 K/UL  
 ABS. LYMPHOCYTES 1.4 0.5 - 4.6 K/UL  
 ABS. MONOCYTES 0.7 0.1 - 1.3 K/UL  
 ABS. EOSINOPHILS 0.1 0.0 - 0.8 K/UL ABS. BASOPHILS 0.0 0.0 - 0.2 K/UL  
 ABS. IMM. GRANS. 0.0 0.0 - 0.5 K/UL METABOLIC PANEL, BASIC Collection Time: 11/05/19  3:46 AM  
Result Value Ref Range Sodium 141 136 - 145 mmol/L Potassium 3.2 (L) 3.5 - 5.1 mmol/L Chloride 106 98 - 107 mmol/L  
 CO2 28 21 - 32 mmol/L Anion gap 7 7 - 16 mmol/L Glucose 114 (H) 65 - 100 mg/dL BUN 23 8 - 23 MG/DL Creatinine 0.87 0.8 - 1.5 MG/DL  
 GFR est AA >60 >60 ml/min/1.73m2 GFR est non-AA >60 >60 ml/min/1.73m2 Calcium 9.2 8.3 - 10.4 MG/DL MAGNESIUM Collection Time: 11/05/19  3:46 AM  
Result Value Ref Range Magnesium 2.1 1.8 - 2.4 mg/dL PROTHROMBIN TIME + INR Collection Time: 11/05/19  3:46 AM  
Result Value Ref Range Prothrombin time 17.1 (H) 11.7 - 14.5 sec INR 1.4 GLUCOSE, POC Collection Time: 11/05/19  5:50 AM  
Result Value Ref Range Glucose (POC) 124 (H) 65 - 100 mg/dL EKG:  (EKG has been independently visualized by me with interpretation below): Biventricular pacing.

## 2019-11-05 NOTE — DISCHARGE SUMMARY
[address] [place] [address] Cardiology Discharge Summary Patient ID: Ayse Salazar 
[id number] 
75 y.o. 
1945 Admit date: 11/4/2019 Discharge date:  11/5/2019 Admitting Physician: Roslyn Aragon MD  
 
Discharge Physician: Ericka Sweeney NP/Dr. Jayden Deleon Admission Diagnoses: Chronic systolic congestive heart failure, NYHA class 4 ([facility]) [I50.22] Cardiomyopathy ([facility] [I42.9] Discharge Diagnoses:  
 Diagnosis  Cardiomyopathy ([facility]  LBBB (left bundle branch block)  Hx of AICD-Implanted Cardiac Defibrillator  Aortic Aneurysm/Dilation of the Aorta  ASCAD-of the Native Vessel  Hyperlipidemia Cardiology Procedures this admission:  biventricular ICD upgrade, CXR Consults: None Hospital Course: Patient was seen at the office of [address] [place] [address] Cardiology by Dr. Jayden Deleon for management of chronic systolic heart failure with LBBB and was subsequently scheduled for an AM Admission ICD upgrade at [facility] on 11/4/19. Patient was taken to the EP lab and underwent successful implantation of [employer] biventricular ICD by Dr. Jayden Deleon. Patient tolerated the procedure well and was taken to the telemetry floor for recovery. Follow up chest xray showed no pneumothorax. The following morning patient was up feeling well without any complaints of chest pain, shortness of breath, or palpitations. Patient's left subclavian cath site was clean, dry and intact without hematoma. Patient's labs were WNL. Patient was seen and examined by Dr. Jayden Deleon and determined stable and ready for discharge. Patient was instructed on the importance of medication compliance and outpatient follow up. The patient had PVCs limiting effective CRT pacing so amiodarone 200 mg daily was started prior to d/c. The patient had TSH/FT4 and LFTs labs prior to d/c. The patient will resume coumadin for his A. Flutter stroke prevention. He has INR later this week. He was also given doxycycline x5 days with upgrade ICD. Patient has been scheduled for follow-up with [facility] Cardiology -- device clinic on 11/18 19 at 2pm in [place] office for device/site check. DISPOSITION: Patient has been instructed to keep affected arm below shoulder level for the next 4 weeks or until cleared by doctor. The arm sling should be worn while sleeping. The dressing will be removed at follow-up. The incision site must be kept clean and dry. The patient may shower in a few days. Lotions, powders, or creams should be avoided as these can increase the risk of infection. The affected arm should not be used for any pushing, pulling, or lifting until cleared by doctor. Driving is prohibited until cleared by doctor in a follow up appointment. Any signs of infection including increased redness, suspicious drainage, or unexplained fever should be reported to the doctor immediately by calling [phone]. Discharge Exam:  Patient has been seen by Dr. Jessika Chavez: see his progress note for exam details. Visit Vitals /60 (BP 1 Location: Right arm, BP Patient Position: Head of bed elevated (Comment degrees)) Pulse 60 Temp 97.9 °F (36.6 °C) Resp 18 Ht 6' 1\" (1.854 m) Wt 110.7 kg (244 lb) SpO2 94% BMI 32.19 kg/m² Recent Results (from the past 24 hour(s)) PROTHROMBIN TIME + INR Collection Time: 11/04/19  6:41 AM  
Result Value Ref Range Prothrombin time 16.6 (H) 11.7 - 14.5 sec INR 1.3    
CBC W/O DIFF Collection Time: 11/04/19  6:41 AM  
Result Value Ref Range WBC 5.5 4.3 - 11.1 K/uL  
 RBC 4.79 4.23 - 5.6 M/uL  
 HGB 14.0 13.6 - 17.2 g/dL HCT 42.9 41.1 - 50.3 % MCV 89.6 79.6 - 97.8 FL  
 MCH 29.2 26.1 - 32.9 PG  
 MCHC 32.6 31.4 - 35.0 g/dL  
 RDW 13.6 11.9 - 14.6 % PLATELET 482 670 - 421 K/uL MPV 13.0 (H) 9.4 - 12.3 FL ABSOLUTE NRBC 0.00 0.0 - 0.2 K/uL METABOLIC PANEL, BASIC Collection Time: 11/04/19  6:41 AM  
Result Value Ref Range  Sodium 138 136 - 145 mmol/L  
 Potassium 3.6 3.5 - 5.1 mmol/L Chloride 104 98 - 107 mmol/L  
 CO2 28 21 - 32 mmol/L Anion gap 6 (L) 7 - 16 mmol/L Glucose 124 (H) 65 - 100 mg/dL BUN 28 (H) 8 - 23 MG/DL Creatinine 1.09 0.8 - 1.5 MG/DL  
 GFR est AA >60 >60 ml/min/1.73m2 GFR est non-AA >60 >60 ml/min/1.73m2 Calcium 9.5 8.3 - 10.4 MG/DL MAGNESIUM Collection Time: 11/04/19  6:41 AM  
Result Value Ref Range Magnesium 1.9 1.8 - 2.4 mg/dL HEMOGLOBIN A1C WITH EAG Collection Time: 11/04/19  6:41 AM  
Result Value Ref Range Hemoglobin A1c 7.0 (H) 4.8 - 6.0 % Est. average glucose 154 mg/dL GLUCOSE, POC Collection Time: 11/04/19  7:15 AM  
Result Value Ref Range Glucose (POC) 116 (H) 65 - 100 mg/dL EKG, 12 LEAD, INITIAL Collection Time: 11/04/19  7:32 AM  
Result Value Ref Range Ventricular Rate 60 BPM  
 Atrial Rate 60 BPM  
 P-R Interval 262 ms QRS Duration 148 ms Q-T Interval 456 ms  
 QTC Calculation (Bezet) 456 ms Calculated R Axis -38 degrees Calculated T Axis 123 degrees Diagnosis Atrial-paced rhythm with prolonged AV conduction with occasional Premature  
ventricular complexes Left axis deviation Left bundle branch block Abnormal ECG When compared with ECG of 03-SEP-2019 03:23, 
Electronic atrial pacemaker has replaced Sinus rhythm Confirmed by Vicky Silva MD (), CHILANGO JOHNSON [id number]) on 11/4/2019 8:51:19 AM 
  
EKG, 12 LEAD, INITIAL Collection Time: 11/04/19 11:43 AM  
Result Value Ref Range Ventricular Rate 70 BPM  
 Atrial Rate 70 BPM  
 P-R Interval 146 ms  
 QRS Duration 162 ms Q-T Interval 500 ms QTC Calculation (Bezet) 540 ms Calculated P Axis 68 degrees Calculated R Axis -86 degrees Calculated T Axis 80 degrees Diagnosis Atrial-sensed ventricular-paced rhythm Biventricular pacemaker detected Abnormal ECG When compared with ECG of 04-NOV-2019 07:32, Electronic ventricular pacemaker has replaced Electronic atrial pacemaker Confirmed by ST NOEL ZAMUDIO MD (), CASH SETH ([id number]) on 11/4/2019 12:18:06 PM 
  
GLUCOSE, POC Collection Time: 11/04/19  4:18 PM  
Result Value Ref Range Glucose (POC) 133 (H) 65 - 100 mg/dL GLUCOSE, POC Collection Time: 11/04/19  8:12 PM  
Result Value Ref Range Glucose (POC) 171 (H) 65 - 100 mg/dL Patient Instructions:  
Current Discharge Medication List  
  
START taking these medications Details HYDROcodone-acetaminophen (NORCO) 5-325 mg per tablet Take 1 Tab by mouth every four (4) hours as needed for Pain for up to 3 days. Max Daily Amount: 6 Tabs. Qty: 10 Tab, Refills: 0 Associated Diagnoses: ICD (implantable cardioverter-defibrillator) in place  
  
doxycycline (VIBRAMYCIN) 100 mg capsule Take 1 Cap by mouth two (2) times a day for 5 days. Qty: 10 Cap, Refills: 0 CONTINUE these medications which have NOT CHANGED Details  
lisinopril (PRINIVIL, ZESTRIL) 2.5 mg tablet Take 1 Tab by mouth daily. Qty: 30 Tab, Refills: 11  
  
furosemide (LASIX) 40 mg tablet Take 1 Tab by mouth two (2) times a day. Qty: 60 Tab, Refills: 6  
  
mesalamine ER (APRISO) 0.375 gram 24 hour capsule Take 0.375 g by mouth daily. Indications: ulcerated colon  
  
warfarin (COUMADIN) 5 mg tablet Take 0.5-1 tab every day or as directed Qty: 30 Tab, Refills: 11  
  
metFORMIN (GLUCOPHAGE) 500 mg tablet Take 500 mg by mouth daily. atorvastatin (LIPITOR) 40 mg tablet Take 40 mg by mouth daily. gabapentin (NEURONTIN) 300 mg capsule Take 300 mg by mouth two (2) times a day. fenofibric acid (TRILIPIX ER) 135 mg capsule Take 135 mg by mouth daily. niacin (NIASPAN) 1,000 mg Tb24 tab Take 1,000 mg by mouth daily. Currently not taking 9/10/19  
  
spironolactone (ALDACTONE) 25 mg tablet Take 25 mg by mouth daily. carvedilol (COREG) 12.5 mg tablet Take 12.5 mg by mouth two (2) times daily (with meals). Aspirin, Buffered 81 mg tab Take 81 mg by mouth daily. nitroglycerin (NITROSTAT) 0.4 mg SL tablet 1 Tab by SubLINGual route every five (5) minutes as needed for Chest Pain. Qty: 25 Tab, Refills: 6 Signed: 
Garo Mathias NP 
11/5/2019 1:47 AM

## 2019-11-05 NOTE — PROGRESS NOTES
Discharge instructions, follow up appointments and prescriptions reviewed with patient and family. Both verbalize understanding. All personal belongings taken with patient. Family member will drive patient home. Patient escorted to discharge area via wheelchair. Patient is stable at discharge. 2 IVs and monitor removed from patient. Aquacel dressing is clean, dry, and intact with mild unchanged bruising in periwound area.

## 2019-11-05 NOTE — DISCHARGE INSTRUCTIONS
Patient Education   Patient Education   Patient Education        Secondhand Smoke: Care Instructions  Your Care Instructions    Secondhand smoke comes from the burning end of a cigarette, cigar, or pipe and the smoke that a smoker exhales. The smoke contains nicotine and many other harmful chemicals. Breathing secondhand smoke can cause or worsen health problems including cancer, asthma, coronary artery disease, and respiratory infections. It can make your eyes and nose burn and cause a sore throat. Secondhand smoke is especially bad for babies and young children whose lungs are still developing. Babies whose parents smoke are more likely to have ear infections, pneumonia, and bronchitis in the first few years of their lives. Secondhand smoke can make asthma symptoms worse in children. If you are pregnant, it is important that you not smoke and that you avoid secondhand smoke. You are more likely to give birth to a baby who weighs less than expected (low birth weight) if you smoke. And your baby may have a greater risk for sudden infant death syndrome (SIDS). Babies whose mothers are exposed to secondhand smoke during pregnancy have a higher risk for health problems. Follow-up care is a key part of your treatment and safety. Be sure to make and go to all appointments, and call your doctor if you are having problems. It's also a good idea to know your test results and keep a list of the medicines you take. How can you care for yourself at home? · Do not smoke or let anyone else smoke in your home. If people must smoke, ask them to go outside. · If people do smoke in your home, choose a room where you can open a window or use a fan to get the smoke outside. · Do not let anyone smoke in your car. If someone must smoke, pull over in a safe place and let him or her smoke away from the car. · Ask your employer to make sure that you have a smoke-free work area.   · Make sure that your children are not exposed to secondhand smoke at day care, school, and after-school programs. · Try to choose nonsmoking bars, restaurants, and other public places when you go out. · Help your family and friends who smoke to quit by encouraging them to try. Tell them about treatment resources. Having support from others often helps. · If you smoke, quit. Quitting is hard, but there are ways to boost your chance of quitting tobacco for good. ? Use nicotine gum, patches, or lozenges. Call a quitline. Ask your doctor about stop-smoking programs and medicines. ? Keep trying. When should you call for help? Watch closely for changes in your health, and be sure to contact your doctor if you have any problems. Where can you learn more? Go to http://daiana-lety.info/. Enter L004 in the search box to learn more about \"Secondhand Smoke: Care Instructions. \"  Current as of: September 26, 2018  Content Version: 12.2  © 9105-7095 [employer]. Care instructions adapted under license by [employer] (which disclaims liability or warranty for this information). If you have questions about a medical condition or this instruction, always ask your healthcare professional. [place] [address] any warranty or liability for your use of this information. DISCHARGE SUMMARY from Nurse    PATIENT INSTRUCTIONS:    After general anesthesia or intravenous sedation, for 24 hours or while taking prescription Narcotics:  · Limit your activities  · Do not drive and operate hazardous machinery  · Do not make important personal or business decisions  · Do  not drink alcoholic beverages  · If you have not urinated within 8 hours after discharge, please contact your surgeon on call.     Report the following to your surgeon:  · Excessive pain, swelling, redness or odor of or around the surgical area  · Temperature over 100.5  · Nausea and vomiting lasting longer than 4 hours or if unable to take medications  · Any signs of decreased circulation or nerve impairment to extremity: change in color, persistent  numbness, tingling, coldness or increase pain  · Any questions    What to do at Home:    If you experience any of the following symptoms chest pain, palpitations, shortness of breath, please follow up with cardiology. *  Please give a list of your current medications to your Primary Care Provider. *  Please update this list whenever your medications are discontinued, doses are      changed, or new medications (including over-the-counter products) are added. *  Please carry medication information at all times in case of emergency situations. These are general instructions for a healthy lifestyle:    No smoking/ No tobacco products/ Avoid exposure to second hand smoke  Surgeon General's Warning:  Quitting smoking now greatly reduces serious risk to your health. Obesity, smoking, and sedentary lifestyle greatly increases your risk for illness    A healthy diet, regular physical exercise & weight monitoring are important for maintaining a healthy lifestyle    You may be retaining fluid if you have a history of heart failure or if you experience any of the following symptoms:  Weight gain of 3 pounds or more overnight or 5 pounds in a week, increased swelling in our hands or feet or shortness of breath while lying flat in bed. Please call your doctor as soon as you notice any of these symptoms; do not wait until your next office visit. The discharge information has been reviewed with the patient and spouse. The patient and spouse verbalized understanding. Discharge medications reviewed with the patient and spouse and appropriate educational materials and side effects teaching were provided.   ___________________________________________________________________________________________________________________________________     Implantable Cardioverter-Defibrillator Placement: What to Expect at [address] cardioverter-defibrillator (ICD) placement is surgery to put an ICD in your chest. An ICD is a small, battery-powered device that fixes life-threatening changes in your heartbeat. If the ICD detects a life-threatening rapid heart rhythm, it tries to slow the rhythm back to normal using electrical pulses. If the dangerous rhythm does not stop, the ICD sends an electric shock to the heart to restore a normal rhythm. The device then goes back to its watchful mode. Your chest may be sore where the doctor made the cut (incision) and put in the ICD. You also may have a bruise and mild swelling. These symptoms usually get better in 1 to 2 weeks. You may feel a hard ridge along the incision. This usually gets softer in the months after surgery. You probably will be able to see and feel the outline of the ICD under your skin. You will probably be able to go back to work or your usual routine 1 to 2 weeks after surgery. Your doctor will talk to you about how often you will need to have the ICD checked. You'll need to take steps to safely use electric devices. Some of these devices can stop your ICD from working right for a short time. Check with your doctor about what to avoid and what to keep a short distance away from your ICD. For example, you will need to stay away from things with strong magnetic and electrical fields. An example is an MRI machine (unless your ICD is safe for an MRI). You can use a cell phone and other wireless devices but keep them at least 6 inches away from your ICD. Many household and office electronics do not affect an ICD. These include kitchen appliances and computers. This care sheet gives you a general idea about how long it will take for you to recover. But each person recovers at a different pace. Follow the steps below to get better as quickly as possible. How can you care for yourself at home? Activity    · Rest when you feel tired.  Getting enough sleep will help you recover.     · Try to walk each day. Start by walking a little more than you did the day before. Bit by bit, increase the amount you walk. Walking boosts blood flow and helps prevent pneumonia and constipation.     · For 4 to 6 weeks:  ? Avoid activities that strain your chest or upper arm muscles. This includes pushing a  or vacuum, mopping floors, swimming, or swinging a golf club or tennis racquet. ? Do not raise your arm, on the side of your body where the ICD is located, above shoulder level. ? Avoid strenuous activities, such as bicycle riding, jogging, weight lifting, or heavy aerobic exercise. ? Avoid lifting anything that would make you strain. This may include heavy grocery bags and milk containers, a heavy briefcase or backpack, cat litter or dog food bags, or a child.     · Ask your doctor when you can drive again.     · You will probably need to take about 1 to 2 weeks off from work. It depends on the type of work you do and how you feel.     · Ask your doctor when it is okay for you to have sex. Diet    · You can eat your normal diet. If your stomach is upset, try bland, low-fat foods like plain rice, broiled chicken, toast, and yogurt.     · Drink plenty of fluids (unless your doctor tells you not to). Medicines    · Your doctor will tell you if and when you can restart your medicines. He or she will also give you instructions about taking any new medicines.     · If you take blood thinners, such as warfarin (Coumadin), clopidogrel (Plavix), or aspirin, be sure to talk to your doctor. He or she will tell you if and when to start taking those medicines again. Make sure that you understand exactly what your doctor wants you to do.     · Take pain medicines exactly as directed. ? If the doctor gave you a prescription medicine for pain, take it as prescribed.   ? If you are not taking a prescription pain medicine, ask your doctor if you can take an over-the-counter medicine. ? Do not take aspirin, ibuprofen (Advil, Motrin), naproxen (Aleve), or other nonsteroidal anti-inflammatory drugs (NSAIDs) unless your doctor says it is okay.     · If you think your pain medicine is making you sick to your stomach:  ? Take your medicine after meals (unless your doctor has told you not to). ? Ask your doctor for a different pain medicine.     · If your doctor prescribed antibiotics, take them as directed. Do not stop taking them just because you feel better. You need to take the full course of antibiotics. Incision care    · Keep the area clean and dry.     · Ask your doctor when you can shower or take a bath.     · If you have strips of tape on the incision, leave the tape on for a week or until it falls off.     · Wash the area daily with warm, soapy water, and pat it dry. Don't use hydrogen peroxide or alcohol, which can slow healing. You may cover the area with a gauze bandage if it weeps or rubs against clothing. Exercise    · Check with your doctor before you start an exercise program. Your doctor may recommend that you work with a physical therapist to learn how to exercise safely with an ICD. Other instructions    · Carry your ICD identification card with you at all times. The card should include the ICD  and model information.     · Wear medical alert jewelry that states you have an ICD. You can buy this at most [employer].     · Have your ICD checked as often as your doctor recommends. In some cases, this may be done over the phone or the Internet. your doctor will give you instructions about how to do this.     · Talk with your doctor about the possibility of turning off the ICD at the end of life. You can put your wishes about the ICD in an advance directive. Follow-up care is a key part of your treatment and safety. Be sure to make and go to all appointments, and call your doctor if you are having problems.  It's also a good idea to know your test results and keep a list of the medicines you take. When should you call for help? Call 911 anytime you think you may need emergency care. For example, call if:    · You passed out (lost consciousness).     · You have trouble breathing.    Call your doctor now or seek immediate medical care if:    · You receive a shock from your ICD.     · You are dizzy or lightheaded, or you feel like you may faint.     · You have pain that does not get better after you take pain medicine.     · You hear an alarm or feel a vibration from your ICD.     · You have loose stitches, or your incision comes open.     · Bright red blood has soaked through the bandage over your incision.     · You have signs of infection, such as:  ? Increased pain, swelling, warmth, or redness. ? Red streaks leading from the incision. ? Pus draining from the incision. ? A fever.    Watch closely for changes in your health, and be sure to contact your doctor if you have any problems. Where can you learn more? Go to http://daiana-lety.info/. Enter K184 in the search box to learn more about \"Implantable Cardioverter-Defibrillator Placement: What to Expect at Home. \"  Current as of: April 9, 2019  Content Version: 12.2  © 6332-7480 [employer]. Care instructions adapted under license by [employer] (which disclaims liability or warranty for this information). If you have questions about a medical condition or this instruction, always ask your healthcare professional. [place] [address] any warranty or liability for your use of this information. Pacemaker Placement for Heart Failure: What to Expect at [address] pacemaker for heart failure is a biventricular pacemaker (say \"by-regino-TRICK-yuh-ler\"). Treatment that uses this type of pacemaker is called cardiac resynchronization therapy (CRT).   When you have heart failure, the lower chambers of your heart may not pump at the same time. The pacemaker sends painless electrical signals to your heart. These signals make the chambers pump at the same time. This can help your heart pump blood better and help you feel better. Your pacemaker may be combined with an ICD, or implantable cardioverter-defibrillator. It can control abnormal heart rhythms. This can prevent sudden death. Your chest may be sore where the doctor made the cut (incision) and put in the pacemaker. You also may have a bruise and mild swelling. These symptoms usually get better in 1 to 2 weeks. You may feel a hard ridge along the incision. This usually gets softer in the months after surgery. You may be able to see or feel the outline of the pacemaker under your skin. You will probably be able to go back to work or your usual routine 1 to 2 weeks after surgery. Pacemaker batteries usually last 5 to 15 years. Your doctor will talk to you about how often you will need to have your pacemaker checked. You'll need to take steps to safely use electric devices. Some of these devices can stop your pacemaker from working right for a short time. Check with your doctor about what to avoid and what to keep a short distance away from your pacemaker. For example, you will need to stay away from things with strong magnetic and electrical fields. An example is an MRI machine (unless your pacemaker is safe for an MRI). You can use a cell phone and other wireless devices but keep them at least 6 inches away from your pacemaker. Many household and office electronics do not affect a pacemaker. These include kitchen appliances and computers. This care sheet gives you a general idea about how long it will take for you to recover. But each person recovers at a different pace. Follow the steps below to get better as quickly as possible. How can you care for yourself at home? Activity    · Rest when you feel tired.     · Be active.  Walking is a good choice.     · For 4 to 6 weeks:  ? Avoid activities that strain your chest or upper arm muscles. This includes pushing a  or vacuum, mopping floors, swimming, or swinging a golf club or tennis racquet. ? Do not raise your arm (the one on the side of your body where the pacemaker is located) above your shoulder. ? Allow your body to heal. Don't move quickly or lift anything heavy until you are feeling better.     · Many people are able to return to work within 1 to 2 weeks after surgery.     · Ask your doctor when it is okay for you to have sex. Diet    · You can eat your normal diet. If your stomach is upset, try bland, low-fat foods like plain rice, broiled chicken, toast, and yogurt. Medicines    · Your doctor will tell you if and when you can restart your medicines. He or she will also give you instructions about taking any new medicines.     · If you take aspirin or some other blood thinner, be sure to talk to your doctor. He or she will tell you if and when to start taking this medicine again. Make sure that you understand exactly what your doctor wants you to do.     · Be safe with medicines. Read and follow all instructions on the label. ? If the doctor gave you a prescription medicine for pain, take it as prescribed. ? If you are not taking a prescription pain medicine, ask your doctor if you can take an over-the-counter medicine. ? Do not take aspirin, ibuprofen (Advil, Motrin), naproxen (Aleve), or other nonsteroidal anti-inflammatory drugs (NSAIDs) unless your doctor says it is okay.     · If your doctor prescribed antibiotics, take them as directed. Do not stop taking them just because you feel better. You need to take the full course of antibiotics. Incision care    · If you have strips of tape on the incision, leave the tape on for a week or until it falls off.     · Keep the incision dry while it heals. Your doctor may recommend sponge baths for about 7 days, but do not get the incision wet.  Your doctor will let you know when you may take showers. After a shower, pat the incision dry.     · Don't use hydrogen peroxide or alcohol on the incision, which can slow healing. You may cover the area with a gauze bandage if it oozes fluid or rubs against clothing. Change the bandage every day.     · Do not take a bath or get into a hot tub for the first 2 weeks, or until your doctor tells you it is okay. Other instructions    · Keep a medical ID card with you at all times that says you have a pacemaker. The card should include the  and model information.     · Wear medical alert jewelry stating that you have a pacemaker. You can buy this at most [employer].     · Check your pulse as directed by your doctor.     · Have your pacemaker checked as often as your doctor recommends. In some cases, this may be done over the phone or the Internet. Your doctor will give you instructions about how to do this. Follow-up care is a key part of your treatment and safety. Be sure to make and go to all appointments, and call your doctor if you are having problems. It's also a good idea to know your test results and keep a list of the medicines you take. When should you call for help? Call 911 anytime you think you may need emergency care. For example, call if:    · You passed out (lost consciousness).     · You have trouble breathing.    Call your doctor now or seek immediate medical care if:    · You are dizzy or light-headed, or you feel like you may faint.     · You have pain that does not get better after you take pain medicine.     · You hear an alarm or feel a vibration from your pacemaker.     · You have loose stitches, or your incision comes open.     · Bright red blood has soaked through the bandage over your incision.     · You have signs of infection, such as:  ? Increased pain, swelling, warmth, or redness. ? Red streaks leading from the incision. ? Pus draining from the incision. ?  A fever.    Watch closely for changes in your health, and be sure to contact your doctor if:    · You have any problems with your pacemaker. Where can you learn more? Go to http://daiana-lety.info/. Enter T984 in the search box to learn more about \"Pacemaker Placement for Heart Failure: What to Expect at Home. \"  Current as of: April 9, 2019  Content Version: 12.2  © 8990-5245 [employer]. Care instructions adapted under license by [employer] (which disclaims liability or warranty for this information). If you have questions about a medical condition or this instruction, always ask your healthcare professional. [address] any warranty or liability for your use of this information.

## 2019-11-08 NOTE — PROGRESS NOTES
This note will not be viewable in [address] Home Visit # 7 Health  arrived at residence to find the patient alert and oriented per the patients norms, in no acute distress and without complaint. Patient states there is no pain today. Recovering well and the incision site is bandaged and without negative finding. Cautioned patient to restart slowly and not go too fast. Patient verbalized understanding. Patient will continue to main diet and fluid restrictions and will call for problems. HC will visit in home again next week. Education:Reviewed via teach back the CHF action plan to include low sodium diet and fluid restrictions, medicine compliance, daily weights and early interventions. CHF Assessment: 
Shortness of Breath 0 Edema   0 Abdomen  0 Stress   0 Daily activities  2 Sleeping  1 Energy    3 Weight trends  0 
TOTAL= 6 
 
BP: 118/56 RA Sitting Weight: 244 lb Medications: All medicines are on hand and the patient remains compliant. Edema: Clear and equal bilaterally. Lung Sounds:No notable edema to bilateral lower extremities and the abdomen is soft and non-tender. Follow Up Appointments: 
11/08   1100   Dr Belinda Childers Card[facility] Transportation: Self DME: None Safety Concerns: None Patient/Family Concerns: None Tasks: None Physical Assessment completed and noted on CHF assessment Form. Will follow up with pt in about one week. End of Visit. [facility]

## 2019-11-15 NOTE — PROGRESS NOTES
Patient called for weekly visit stating that he preferred phone visit for this week,. He denies weight gain, SOB or [place]. Stated that he has had no issues from OPS to revised ICD leads. He continues to follow CHF action plan and diet as well as post op regimen. Will call again next week for follow up. Encouraged the patient to continue his course and to call for any issues.

## 2019-11-25 NOTE — PROGRESS NOTES
This note will not be viewable in [address] Home Visit # 8 Health  arrived at residence to find the patient alert and oriented per the patients norms, in no acute distress. Has been having episodic dizziness worse upon sudden standing. Saw Cardiology s/p pacemaker lead revision and discussed dizziness there. Dose of Amioderone decreased by half and pateint feels better but continues to have some symptoms. Will continue to monitor himself and contact cardiology if no improvement in 3-4 days. There are no issues related to CHF currently. HC will follow by phone next week. Education: Reviewed via teach back the CHF action plan to include low sodium diet and fluid restrictions, medicine compliance, daily weights and early interventions. CHF Assessment: 
Shortness of Breath 0 Edema   0 Abdomen  0 Stress   0 Daily activities  2 Sleeping  1 Energy    2 Weight trends  0 
TOTAL= 5 
 
BP: 108/64 RA Sitting Weight: 239 lbs Medications: All medicines are on hand and the patient remains compliant. Edema: No notable edema to bilateral lower extremities and the abdomen is soft and non-tender. Lung Sounds:Clear and equal bilaterally. Follow Up Appointments: None Transportation: Self DME: No Issues Safety Concerns: None Patient/Family Concerns: ? Hypotension. Dizziness upon standing suddenly. Tasks: None Physical Assessment completed and noted on CHF assessment Form. Will follow up with pt in about one week. End of Visit. [facility], [facility]

## 2019-12-09 NOTE — PROGRESS NOTES
This note will not be viewable in [address] Home Visit # 9 Last in home visit Health  arrived at residence to find the patient alert and oriented per the patients norms, in no acute distress and without complaint. Patient continues to experience some dizziness and nausea at times. He reports one episode of vision disturbance last week. All events are in the afternoon. Suggested the patient monitor his BP and BGL's in the afternoon and call ASAP for worsening symptoms. Voiced understanding. Reviewed CHF action plan and assured the patient of continued access to the help line for questions. . Voiced his understanding. HC will monitor via chart review going forward. Education:Reviewed via teach back the CHF action plan to include low sodium diet and fluid restrictions, medicine compliance, daily weights and early interventions. CHF Assessment: 
Shortness of Breath 1 Edema   0 Abdomen  0 Stress   1 Daily activities  1 Sleeping  2 Energy    1 Weight trends  0 
TOTAL= 6 
 
BP: 96/98 RA Sitting Weight: 236 lb Medications: All medicines are on hand and the patient remains compliant. Edema: No notable edema to bilateral lower extremities and the abdomen is soft and non-tender. Lung Sounds:Clear and equal bilaterally. Follow Up Appointments: none this week Transportation: Self DME: None Safety Concerns: None Patient/Family Concerns: None Tasks: None Physical Assessment completed and noted on CHF assessment Form. Will follow patient going forward via chart review. End of Visit. Gerardo Mercado [facility]

## 2020-01-01 ENCOUNTER — APPOINTMENT (OUTPATIENT)
Dept: GENERAL RADIOLOGY | Age: 75
DRG: 208 | End: 2020-01-01
Attending: INTERNAL MEDICINE
Payer: MEDICARE

## 2020-01-01 ENCOUNTER — PATIENT OUTREACH (OUTPATIENT)
Dept: CASE MANAGEMENT | Age: 75
End: 2020-01-01

## 2020-01-01 ENCOUNTER — HOSPITAL ENCOUNTER (OUTPATIENT)
Dept: LAB | Age: 75
Discharge: HOME | End: 2020-02-05
Attending: INTERNAL MEDICINE
Payer: MEDICARE

## 2020-01-01 ENCOUNTER — APPOINTMENT (OUTPATIENT)
Dept: GENERAL RADIOLOGY | Age: 75
DRG: 189 | End: 2020-01-01
Attending: EMERGENCY MEDICINE
Payer: MEDICARE

## 2020-01-01 ENCOUNTER — HOME CARE VISIT (OUTPATIENT)
Dept: SCHEDULING | Facility: HOME HEALTH | Age: 75
End: 2020-01-01
Payer: MEDICARE

## 2020-01-01 ENCOUNTER — ANESTHESIA EVENT (OUTPATIENT)
Dept: SURGERY | Age: 75
End: 2020-01-01
Payer: MEDICARE

## 2020-01-01 ENCOUNTER — HOSPITAL ENCOUNTER (OUTPATIENT)
Age: 75
Setting detail: OUTPATIENT SURGERY
Discharge: HOME | End: 2020-02-11
Attending: INTERNAL MEDICINE | Admitting: INTERNAL MEDICINE
Payer: MEDICARE

## 2020-01-01 ENCOUNTER — HOSPICE ADMISSION (OUTPATIENT)
Dept: HOSPICE | Facility: HOSPICE | Age: 75
End: 2020-01-01

## 2020-01-01 ENCOUNTER — HOSPITAL ENCOUNTER (INPATIENT)
Age: 75
LOS: 1 days | Discharge: HOSPICE | DRG: 189 | End: 2020-03-14
Attending: EMERGENCY MEDICINE | Admitting: INTERNAL MEDICINE
Payer: MEDICARE

## 2020-01-01 ENCOUNTER — APPOINTMENT (OUTPATIENT)
Dept: GENERAL RADIOLOGY | Age: 75
DRG: 208 | End: 2020-01-01
Attending: EMERGENCY MEDICINE
Payer: MEDICARE

## 2020-01-01 ENCOUNTER — HOSPITAL ENCOUNTER (INPATIENT)
Age: 75
LOS: 14 days | Discharge: HOSPICE | End: 2020-03-28
Attending: INTERNAL MEDICINE | Admitting: INTERNAL MEDICINE

## 2020-01-01 ENCOUNTER — HOSPITAL ENCOUNTER (OUTPATIENT)
Dept: CARDIAC CATH/INVASIVE PROCEDURES | Age: 75
Discharge: HOME | End: 2020-02-11

## 2020-01-01 ENCOUNTER — HOSPITAL ENCOUNTER (OUTPATIENT)
Dept: LAB | Age: 75
Discharge: HOME | End: 2020-01-06
Payer: MEDICARE

## 2020-01-01 ENCOUNTER — ANESTHESIA (OUTPATIENT)
Dept: SURGERY | Age: 75
End: 2020-01-01
Payer: MEDICARE

## 2020-01-01 ENCOUNTER — HOSPICE ADMISSION (OUTPATIENT)
Dept: HOSPICE | Facility: HOSPICE | Age: 75
End: 2020-01-01
Payer: MEDICARE

## 2020-01-01 ENCOUNTER — HOSPITAL ENCOUNTER (INPATIENT)
Age: 75
LOS: 13 days | Discharge: NURSING, REHAB OR LONG-TERM CARE | DRG: 208 | End: 2020-03-04
Attending: EMERGENCY MEDICINE | Admitting: INTERNAL MEDICINE
Payer: MEDICARE

## 2020-01-01 VITALS
SYSTOLIC BLOOD PRESSURE: 76 MMHG | RESPIRATION RATE: 20 BRPM | DIASTOLIC BLOOD PRESSURE: 53 MMHG | HEART RATE: 82 BPM | TEMPERATURE: 98.4 F

## 2020-01-01 VITALS
HEART RATE: 86 BPM | BODY MASS INDEX: 30.09 KG/M2 | RESPIRATION RATE: 15 BRPM | HEIGHT: 73 IN | WEIGHT: 227 LBS | SYSTOLIC BLOOD PRESSURE: 102 MMHG | OXYGEN SATURATION: 92 % | TEMPERATURE: 98.4 F | DIASTOLIC BLOOD PRESSURE: 69 MMHG

## 2020-01-01 VITALS
WEIGHT: 218 LBS | HEIGHT: 73 IN | OXYGEN SATURATION: 82 % | TEMPERATURE: 97.6 F | DIASTOLIC BLOOD PRESSURE: 50 MMHG | SYSTOLIC BLOOD PRESSURE: 100 MMHG | HEART RATE: 80 BPM | BODY MASS INDEX: 28.89 KG/M2 | RESPIRATION RATE: 28 BRPM

## 2020-01-01 VITALS
BODY MASS INDEX: 28.48 KG/M2 | DIASTOLIC BLOOD PRESSURE: 47 MMHG | TEMPERATURE: 97.9 F | RESPIRATION RATE: 18 BRPM | SYSTOLIC BLOOD PRESSURE: 94 MMHG | OXYGEN SATURATION: 95 % | HEART RATE: 81 BPM | WEIGHT: 214.9 LBS | HEIGHT: 73 IN

## 2020-01-01 VITALS
SYSTOLIC BLOOD PRESSURE: 80 MMHG | RESPIRATION RATE: 22 BRPM | HEART RATE: 80 BPM | HEART RATE: 60 BPM | DIASTOLIC BLOOD PRESSURE: 50 MMHG | TEMPERATURE: 99 F | SYSTOLIC BLOOD PRESSURE: 80 MMHG | RESPIRATION RATE: 22 BRPM | DIASTOLIC BLOOD PRESSURE: 50 MMHG | TEMPERATURE: 99.2 F

## 2020-01-01 DIAGNOSIS — R13.10 DYSPHAGIA, UNSPECIFIED TYPE: ICD-10-CM

## 2020-01-01 DIAGNOSIS — I50.22 SYSTOLIC CHF, CHRONIC (HCC): ICD-10-CM

## 2020-01-01 DIAGNOSIS — Z51.5 ENCOUNTER FOR PALLIATIVE CARE: ICD-10-CM

## 2020-01-01 DIAGNOSIS — Z95.810 HISTORY OF IMPLANTABLE CARDIOVERTER-DEFIBRILLATOR (ICD) PLACEMENT: ICD-10-CM

## 2020-01-01 DIAGNOSIS — R53.83 OTHER FATIGUE: ICD-10-CM

## 2020-01-01 DIAGNOSIS — R06.03 RESPIRATORY DISTRESS: Primary | ICD-10-CM

## 2020-01-01 DIAGNOSIS — I47.29 NSVT (NONSUSTAINED VENTRICULAR TACHYCARDIA): ICD-10-CM

## 2020-01-01 DIAGNOSIS — E87.20 LACTIC ACIDOSIS: ICD-10-CM

## 2020-01-01 DIAGNOSIS — I71.40 ABDOMINAL AORTIC ANEURYSM (AAA) WITHOUT RUPTURE: ICD-10-CM

## 2020-01-01 DIAGNOSIS — I65.23 BILATERAL CAROTID ARTERY STENOSIS: ICD-10-CM

## 2020-01-01 DIAGNOSIS — I77.811 AORTIC ECTASIA, ABDOMINAL (HCC): ICD-10-CM

## 2020-01-01 DIAGNOSIS — Z66 DNR (DO NOT RESUSCITATE): ICD-10-CM

## 2020-01-01 DIAGNOSIS — R53.81 DEBILITY: ICD-10-CM

## 2020-01-01 DIAGNOSIS — Z71.89 ADVANCED CARE PLANNING/COUNSELING DISCUSSION: ICD-10-CM

## 2020-01-01 DIAGNOSIS — I42.0 DILATED CARDIOMYOPATHY (HCC): ICD-10-CM

## 2020-01-01 DIAGNOSIS — R06.02 SHORTNESS OF BREATH: ICD-10-CM

## 2020-01-01 DIAGNOSIS — I50.9 ACUTE ON CHRONIC CONGESTIVE HEART FAILURE, UNSPECIFIED HEART FAILURE TYPE (HCC): Primary | ICD-10-CM

## 2020-01-01 DIAGNOSIS — I34.0 NONRHEUMATIC MITRAL VALVE REGURGITATION: ICD-10-CM

## 2020-01-01 DIAGNOSIS — J96.01 ACUTE RESPIRATORY FAILURE WITH HYPOXIA (HCC): ICD-10-CM

## 2020-01-01 DIAGNOSIS — I50.23 ACUTE ON CHRONIC SYSTOLIC HEART FAILURE (HCC): ICD-10-CM

## 2020-01-01 DIAGNOSIS — I71.20 THORACIC AORTIC ANEURYSM WITHOUT RUPTURE: ICD-10-CM

## 2020-01-01 DIAGNOSIS — R30.1 PAINFUL BLADDER SPASM: ICD-10-CM

## 2020-01-01 DIAGNOSIS — J96.21 ACUTE ON CHRONIC RESPIRATORY FAILURE WITH HYPOXIA (HCC): ICD-10-CM

## 2020-01-01 DIAGNOSIS — I48.3 TYPICAL ATRIAL FLUTTER (HCC): ICD-10-CM

## 2020-01-01 DIAGNOSIS — J81.0 ACUTE PULMONARY EDEMA (HCC): ICD-10-CM

## 2020-01-01 DIAGNOSIS — Z51.5 HOSPICE CARE: Primary | ICD-10-CM

## 2020-01-01 DIAGNOSIS — R91.8 PULMONARY INFILTRATE: ICD-10-CM

## 2020-01-01 LAB
ALBUMIN SERPL-MCNC: 2.8 G/DL (ref 3.2–4.6)
ALBUMIN SERPL-MCNC: 3.6 G/DL (ref 3.2–4.6)
ALBUMIN/GLOB SERPL: 0.7 {RATIO} (ref 1.2–3.5)
ALBUMIN/GLOB SERPL: 1 {RATIO} (ref 1.2–3.5)
ALP SERPL-CCNC: 63 U/L (ref 50–136)
ALP SERPL-CCNC: 86 U/L (ref 50–136)
ALT SERPL-CCNC: 15 U/L (ref 12–65)
ALT SERPL-CCNC: 29 U/L (ref 12–65)
AMORPH CRY URNS QL MICRO: ABNORMAL
ANION GAP SERPL CALC-SCNC: 10 MMOL/L (ref 7–16)
ANION GAP SERPL CALC-SCNC: 14 MMOL/L (ref 7–16)
ANION GAP SERPL CALC-SCNC: 14 MMOL/L (ref 7–16)
ANION GAP SERPL CALC-SCNC: 4 MMOL/L (ref 7–16)
ANION GAP SERPL CALC-SCNC: 5 MMOL/L (ref 7–16)
ANION GAP SERPL CALC-SCNC: 5 MMOL/L (ref 7–16)
ANION GAP SERPL CALC-SCNC: 6 MMOL/L (ref 7–16)
ANION GAP SERPL CALC-SCNC: 7 MMOL/L (ref 7–16)
ANION GAP SERPL CALC-SCNC: 8 MMOL/L (ref 7–16)
ANION GAP SERPL CALC-SCNC: 9 MMOL/L (ref 7–16)
APPEARANCE UR: ABNORMAL
APPEARANCE UR: CLEAR
APTT PPP: 33.5 SEC (ref 24.3–35.4)
ARTERIAL PATENCY WRIST A: ABNORMAL
ARTERIAL PATENCY WRIST A: YES
AST SERPL-CCNC: 20 U/L (ref 15–37)
AST SERPL-CCNC: 22 U/L (ref 15–37)
ATRIAL RATE: 147 BPM
ATRIAL RATE: 71 BPM
ATRIAL RATE: 94 BPM
BACTERIA SPEC CULT: NORMAL
BACTERIA URNS QL MICRO: 0 /HPF
BACTERIA URNS QL MICRO: ABNORMAL /HPF
BASE DEFICIT BLD-SCNC: 3 MMOL/L
BASE DEFICIT BLD-SCNC: 7 MMOL/L
BASE EXCESS BLD CALC-SCNC: 3 MMOL/L
BASOPHILS # BLD: 0.1 K/UL (ref 0–0.2)
BASOPHILS # BLD: 0.1 K/UL (ref 0–0.2)
BASOPHILS NFR BLD: 0 % (ref 0–2)
BASOPHILS NFR BLD: 1 % (ref 0–2)
BDY SITE: ABNORMAL
BILIRUB SERPL-MCNC: 1 MG/DL (ref 0.2–1.1)
BILIRUB SERPL-MCNC: 1.6 MG/DL (ref 0.2–1.1)
BILIRUB UR QL: NEGATIVE
BILIRUB UR QL: NEGATIVE
BNP SERPL-MCNC: 3887 PG/ML
BNP SERPL-MCNC: 5206 PG/ML
BNP SERPL-MCNC: 690 PG/ML
BUN SERPL-MCNC: 25 MG/DL (ref 8–23)
BUN SERPL-MCNC: 25 MG/DL (ref 8–23)
BUN SERPL-MCNC: 27 MG/DL (ref 8–23)
BUN SERPL-MCNC: 27 MG/DL (ref 8–23)
BUN SERPL-MCNC: 28 MG/DL (ref 8–23)
BUN SERPL-MCNC: 29 MG/DL (ref 8–23)
BUN SERPL-MCNC: 29 MG/DL (ref 8–23)
BUN SERPL-MCNC: 30 MG/DL (ref 8–23)
BUN SERPL-MCNC: 30 MG/DL (ref 8–23)
BUN SERPL-MCNC: 31 MG/DL (ref 8–23)
BUN SERPL-MCNC: 32 MG/DL (ref 8–23)
BUN SERPL-MCNC: 34 MG/DL (ref 8–23)
BUN SERPL-MCNC: 36 MG/DL (ref 8–23)
BUN SERPL-MCNC: 36 MG/DL (ref 8–23)
BUN SERPL-MCNC: 39 MG/DL (ref 8–23)
BUN SERPL-MCNC: 40 MG/DL (ref 8–23)
CALCIUM SERPL-MCNC: 10 MG/DL (ref 8.3–10.4)
CALCIUM SERPL-MCNC: 10.2 MG/DL (ref 8.3–10.4)
CALCIUM SERPL-MCNC: 10.6 MG/DL (ref 8.3–10.4)
CALCIUM SERPL-MCNC: 11.3 MG/DL (ref 8.3–10.4)
CALCIUM SERPL-MCNC: 11.7 MG/DL (ref 8.3–10.4)
CALCIUM SERPL-MCNC: 8.7 MG/DL (ref 8.3–10.4)
CALCIUM SERPL-MCNC: 8.8 MG/DL (ref 8.3–10.4)
CALCIUM SERPL-MCNC: 8.9 MG/DL (ref 8.3–10.4)
CALCIUM SERPL-MCNC: 8.9 MG/DL (ref 8.3–10.4)
CALCIUM SERPL-MCNC: 9 MG/DL (ref 8.3–10.4)
CALCIUM SERPL-MCNC: 9 MG/DL (ref 8.3–10.4)
CALCIUM SERPL-MCNC: 9.4 MG/DL (ref 8.3–10.4)
CALCIUM SERPL-MCNC: 9.5 MG/DL (ref 8.3–10.4)
CALCIUM SERPL-MCNC: 9.5 MG/DL (ref 8.3–10.4)
CALCIUM SERPL-MCNC: 9.9 MG/DL (ref 8.3–10.4)
CALCULATED P AXIS, ECG09: -128 DEGREES
CALCULATED P AXIS, ECG09: 132 DEGREES
CALCULATED P AXIS, ECG09: 88 DEGREES
CALCULATED R AXIS, ECG10: -115 DEGREES
CALCULATED R AXIS, ECG10: -80 DEGREES
CALCULATED R AXIS, ECG10: -82 DEGREES
CALCULATED T AXIS, ECG11: 45 DEGREES
CALCULATED T AXIS, ECG11: 73 DEGREES
CALCULATED T AXIS, ECG11: 95 DEGREES
CHLORIDE SERPL-SCNC: 102 MMOL/L (ref 98–107)
CHLORIDE SERPL-SCNC: 102 MMOL/L (ref 98–107)
CHLORIDE SERPL-SCNC: 103 MMOL/L (ref 98–107)
CHLORIDE SERPL-SCNC: 104 MMOL/L (ref 98–107)
CHLORIDE SERPL-SCNC: 105 MMOL/L (ref 98–107)
CHLORIDE SERPL-SCNC: 106 MMOL/L (ref 98–107)
CHLORIDE SERPL-SCNC: 107 MMOL/L (ref 98–107)
CHLORIDE SERPL-SCNC: 108 MMOL/L (ref 98–107)
CHLORIDE SERPL-SCNC: 109 MMOL/L (ref 98–107)
CHLORIDE SERPL-SCNC: 110 MMOL/L (ref 98–107)
CHLORIDE SERPL-SCNC: 110 MMOL/L (ref 98–107)
CO2 BLD-SCNC: 22 MMOL/L
CO2 BLD-SCNC: 22 MMOL/L
CO2 BLD-SCNC: 27 MMOL/L
CO2 BLD-SCNC: 28 MMOL/L
CO2 SERPL-SCNC: 20 MMOL/L (ref 21–32)
CO2 SERPL-SCNC: 21 MMOL/L (ref 21–32)
CO2 SERPL-SCNC: 22 MMOL/L (ref 21–32)
CO2 SERPL-SCNC: 24 MMOL/L (ref 21–32)
CO2 SERPL-SCNC: 26 MMOL/L (ref 21–32)
CO2 SERPL-SCNC: 26 MMOL/L (ref 21–32)
CO2 SERPL-SCNC: 27 MMOL/L (ref 21–32)
CO2 SERPL-SCNC: 28 MMOL/L (ref 21–32)
CO2 SERPL-SCNC: 29 MMOL/L (ref 21–32)
CO2 SERPL-SCNC: 30 MMOL/L (ref 21–32)
CO2 SERPL-SCNC: 31 MMOL/L (ref 21–32)
COLLECT TIME,HTIME: 1600
COLLECT TIME,HTIME: 1643
COLLECT TIME,HTIME: 2150
COLLECT TIME,HTIME: 305
COLLECT TIME,HTIME: 445
COLLECT TIME,HTIME: 815
COLOR UR: ABNORMAL
COLOR UR: YELLOW
CREAT SERPL-MCNC: 0.97 MG/DL (ref 0.8–1.5)
CREAT SERPL-MCNC: 0.98 MG/DL (ref 0.8–1.5)
CREAT SERPL-MCNC: 1.02 MG/DL (ref 0.8–1.5)
CREAT SERPL-MCNC: 1.09 MG/DL (ref 0.8–1.5)
CREAT SERPL-MCNC: 1.1 MG/DL (ref 0.8–1.5)
CREAT SERPL-MCNC: 1.11 MG/DL (ref 0.8–1.5)
CREAT SERPL-MCNC: 1.13 MG/DL (ref 0.8–1.5)
CREAT SERPL-MCNC: 1.14 MG/DL (ref 0.8–1.5)
CREAT SERPL-MCNC: 1.15 MG/DL (ref 0.8–1.5)
CREAT SERPL-MCNC: 1.2 MG/DL (ref 0.8–1.5)
CREAT SERPL-MCNC: 1.2 MG/DL (ref 0.8–1.5)
CREAT SERPL-MCNC: 1.22 MG/DL (ref 0.8–1.5)
CREAT SERPL-MCNC: 1.25 MG/DL (ref 0.8–1.5)
CREAT SERPL-MCNC: 1.3 MG/DL (ref 0.8–1.5)
CREAT SERPL-MCNC: 1.32 MG/DL (ref 0.8–1.5)
CREAT SERPL-MCNC: 1.34 MG/DL (ref 0.8–1.5)
CREAT SERPL-MCNC: 1.45 MG/DL (ref 0.8–1.5)
CREAT SERPL-MCNC: 1.52 MG/DL (ref 0.8–1.5)
DIAGNOSIS, 93000: NORMAL
DIFFERENTIAL METHOD BLD: ABNORMAL
DIFFERENTIAL METHOD BLD: ABNORMAL
EOSINOPHIL # BLD: 0 K/UL (ref 0–0.8)
EOSINOPHIL # BLD: 0.4 K/UL (ref 0–0.8)
EOSINOPHIL NFR BLD: 0 % (ref 0.5–7.8)
EOSINOPHIL NFR BLD: 3 % (ref 0.5–7.8)
EPI CELLS #/AREA URNS HPF: ABNORMAL /HPF
ERYTHROCYTE [DISTWIDTH] IN BLOOD BY AUTOMATED COUNT: 13.6 % (ref 11.9–14.6)
ERYTHROCYTE [DISTWIDTH] IN BLOOD BY AUTOMATED COUNT: 13.6 % (ref 11.9–14.6)
ERYTHROCYTE [DISTWIDTH] IN BLOOD BY AUTOMATED COUNT: 13.8 % (ref 11.9–14.6)
ERYTHROCYTE [DISTWIDTH] IN BLOOD BY AUTOMATED COUNT: 13.9 % (ref 11.9–14.6)
ERYTHROCYTE [DISTWIDTH] IN BLOOD BY AUTOMATED COUNT: 14.1 % (ref 11.9–14.6)
ERYTHROCYTE [DISTWIDTH] IN BLOOD BY AUTOMATED COUNT: 14.1 % (ref 11.9–14.6)
ERYTHROCYTE [DISTWIDTH] IN BLOOD BY AUTOMATED COUNT: 14.2 % (ref 11.9–14.6)
ERYTHROCYTE [DISTWIDTH] IN BLOOD BY AUTOMATED COUNT: 14.5 % (ref 11.9–14.6)
ERYTHROCYTE [DISTWIDTH] IN BLOOD BY AUTOMATED COUNT: 14.6 % (ref 11.9–14.6)
ERYTHROCYTE [DISTWIDTH] IN BLOOD BY AUTOMATED COUNT: 14.6 % (ref 11.9–14.6)
ERYTHROCYTE [DISTWIDTH] IN BLOOD BY AUTOMATED COUNT: 15.6 % (ref 11.9–14.6)
ERYTHROCYTE [DISTWIDTH] IN BLOOD BY AUTOMATED COUNT: 15.6 % (ref 11.9–14.6)
ERYTHROCYTE [DISTWIDTH] IN BLOOD BY AUTOMATED COUNT: 15.7 % (ref 11.9–14.6)
ERYTHROCYTE [DISTWIDTH] IN BLOOD BY AUTOMATED COUNT: 16.4 % (ref 11.9–14.6)
ERYTHROCYTE [DISTWIDTH] IN BLOOD BY AUTOMATED COUNT: 16.6 % (ref 11.9–14.6)
ERYTHROCYTE [DISTWIDTH] IN BLOOD BY AUTOMATED COUNT: 16.9 % (ref 11.9–14.6)
EXHALED MINUTE VOLUME, VE: 12.1 L/MIN
EXHALED MINUTE VOLUME, VE: 13 L/MIN
EXHALED MINUTE VOLUME, VE: 13.4 L/MIN
EXHALED MINUTE VOLUME, VE: 15.7 L/MIN
FLUAV AG NPH QL IA: NEGATIVE
FLUBV AG NPH QL IA: NEGATIVE
GAS FLOW.O2 O2 DELIVERY SYS: ABNORMAL L/MIN
GAS FLOW.O2 SETTING OXYMISER: 16 BPM
GAS FLOW.O2 SETTING OXYMISER: 18 BPM
GAS FLOW.O2 SETTING OXYMISER: 24 BPM
GLOBULIN SER CALC-MCNC: 3.7 G/DL (ref 2.3–3.5)
GLOBULIN SER CALC-MCNC: 3.8 G/DL (ref 2.3–3.5)
GLUCOSE BLD STRIP.AUTO-MCNC: 117 MG/DL (ref 65–100)
GLUCOSE BLD STRIP.AUTO-MCNC: 129 MG/DL (ref 65–100)
GLUCOSE BLD STRIP.AUTO-MCNC: 131 MG/DL (ref 65–100)
GLUCOSE BLD STRIP.AUTO-MCNC: 132 MG/DL (ref 65–100)
GLUCOSE BLD STRIP.AUTO-MCNC: 133 MG/DL (ref 65–100)
GLUCOSE BLD STRIP.AUTO-MCNC: 136 MG/DL (ref 65–100)
GLUCOSE BLD STRIP.AUTO-MCNC: 139 MG/DL (ref 65–100)
GLUCOSE BLD STRIP.AUTO-MCNC: 140 MG/DL (ref 65–100)
GLUCOSE BLD STRIP.AUTO-MCNC: 141 MG/DL (ref 65–100)
GLUCOSE BLD STRIP.AUTO-MCNC: 142 MG/DL (ref 65–100)
GLUCOSE BLD STRIP.AUTO-MCNC: 145 MG/DL (ref 65–100)
GLUCOSE BLD STRIP.AUTO-MCNC: 147 MG/DL (ref 65–100)
GLUCOSE BLD STRIP.AUTO-MCNC: 148 MG/DL (ref 65–100)
GLUCOSE BLD STRIP.AUTO-MCNC: 149 MG/DL (ref 65–100)
GLUCOSE BLD STRIP.AUTO-MCNC: 149 MG/DL (ref 65–100)
GLUCOSE BLD STRIP.AUTO-MCNC: 150 MG/DL (ref 65–100)
GLUCOSE BLD STRIP.AUTO-MCNC: 150 MG/DL (ref 65–100)
GLUCOSE BLD STRIP.AUTO-MCNC: 151 MG/DL (ref 65–100)
GLUCOSE BLD STRIP.AUTO-MCNC: 152 MG/DL (ref 65–100)
GLUCOSE BLD STRIP.AUTO-MCNC: 154 MG/DL (ref 65–100)
GLUCOSE BLD STRIP.AUTO-MCNC: 155 MG/DL (ref 65–100)
GLUCOSE BLD STRIP.AUTO-MCNC: 156 MG/DL (ref 65–100)
GLUCOSE BLD STRIP.AUTO-MCNC: 156 MG/DL (ref 65–100)
GLUCOSE BLD STRIP.AUTO-MCNC: 157 MG/DL (ref 65–100)
GLUCOSE BLD STRIP.AUTO-MCNC: 158 MG/DL (ref 65–100)
GLUCOSE BLD STRIP.AUTO-MCNC: 158 MG/DL (ref 65–100)
GLUCOSE BLD STRIP.AUTO-MCNC: 159 MG/DL (ref 65–100)
GLUCOSE BLD STRIP.AUTO-MCNC: 160 MG/DL (ref 65–100)
GLUCOSE BLD STRIP.AUTO-MCNC: 161 MG/DL (ref 65–100)
GLUCOSE BLD STRIP.AUTO-MCNC: 162 MG/DL (ref 65–100)
GLUCOSE BLD STRIP.AUTO-MCNC: 163 MG/DL (ref 65–100)
GLUCOSE BLD STRIP.AUTO-MCNC: 164 MG/DL (ref 65–100)
GLUCOSE BLD STRIP.AUTO-MCNC: 164 MG/DL (ref 65–100)
GLUCOSE BLD STRIP.AUTO-MCNC: 170 MG/DL (ref 65–100)
GLUCOSE BLD STRIP.AUTO-MCNC: 171 MG/DL (ref 65–100)
GLUCOSE BLD STRIP.AUTO-MCNC: 174 MG/DL (ref 65–100)
GLUCOSE BLD STRIP.AUTO-MCNC: 177 MG/DL (ref 65–100)
GLUCOSE BLD STRIP.AUTO-MCNC: 180 MG/DL (ref 65–100)
GLUCOSE BLD STRIP.AUTO-MCNC: 180 MG/DL (ref 65–100)
GLUCOSE BLD STRIP.AUTO-MCNC: 181 MG/DL (ref 65–100)
GLUCOSE BLD STRIP.AUTO-MCNC: 182 MG/DL (ref 65–100)
GLUCOSE BLD STRIP.AUTO-MCNC: 183 MG/DL (ref 65–100)
GLUCOSE BLD STRIP.AUTO-MCNC: 184 MG/DL (ref 65–100)
GLUCOSE BLD STRIP.AUTO-MCNC: 184 MG/DL (ref 65–100)
GLUCOSE BLD STRIP.AUTO-MCNC: 185 MG/DL (ref 65–100)
GLUCOSE BLD STRIP.AUTO-MCNC: 186 MG/DL (ref 65–100)
GLUCOSE BLD STRIP.AUTO-MCNC: 188 MG/DL (ref 65–100)
GLUCOSE BLD STRIP.AUTO-MCNC: 197 MG/DL (ref 65–100)
GLUCOSE BLD STRIP.AUTO-MCNC: 211 MG/DL (ref 65–100)
GLUCOSE BLD STRIP.AUTO-MCNC: 218 MG/DL (ref 65–100)
GLUCOSE SERPL-MCNC: 121 MG/DL (ref 65–100)
GLUCOSE SERPL-MCNC: 122 MG/DL (ref 65–100)
GLUCOSE SERPL-MCNC: 123 MG/DL (ref 65–100)
GLUCOSE SERPL-MCNC: 134 MG/DL (ref 65–100)
GLUCOSE SERPL-MCNC: 135 MG/DL (ref 65–100)
GLUCOSE SERPL-MCNC: 137 MG/DL (ref 65–100)
GLUCOSE SERPL-MCNC: 138 MG/DL (ref 65–100)
GLUCOSE SERPL-MCNC: 141 MG/DL (ref 65–100)
GLUCOSE SERPL-MCNC: 144 MG/DL (ref 65–100)
GLUCOSE SERPL-MCNC: 152 MG/DL (ref 65–100)
GLUCOSE SERPL-MCNC: 153 MG/DL (ref 65–100)
GLUCOSE SERPL-MCNC: 166 MG/DL (ref 65–100)
GLUCOSE SERPL-MCNC: 177 MG/DL (ref 65–100)
GLUCOSE SERPL-MCNC: 242 MG/DL (ref 65–100)
GLUCOSE SERPL-MCNC: 253 MG/DL (ref 65–100)
GLUCOSE SERPL-MCNC: 97 MG/DL (ref 65–100)
GLUCOSE UR STRIP.AUTO-MCNC: NEGATIVE MG/DL
GLUCOSE UR STRIP.AUTO-MCNC: NEGATIVE MG/DL
HCO3 BLD-SCNC: 20.4 MMOL/L (ref 22–26)
HCO3 BLD-SCNC: 20.7 MMOL/L (ref 22–26)
HCO3 BLD-SCNC: 25.6 MMOL/L (ref 22–26)
HCO3 BLD-SCNC: 26.1 MMOL/L (ref 22–26)
HCO3 BLD-SCNC: 26.3 MMOL/L (ref 22–26)
HCO3 BLD-SCNC: 26.7 MMOL/L (ref 22–26)
HCT VFR BLD AUTO: 26.6 % (ref 41.1–50.3)
HCT VFR BLD AUTO: 28.1 % (ref 41.1–50.3)
HCT VFR BLD AUTO: 28.2 % (ref 41.1–50.3)
HCT VFR BLD AUTO: 28.6 % (ref 41.1–50.3)
HCT VFR BLD AUTO: 29.8 % (ref 41.1–50.3)
HCT VFR BLD AUTO: 31.2 % (ref 41.1–50.3)
HCT VFR BLD AUTO: 32.9 % (ref 41.1–50.3)
HCT VFR BLD AUTO: 33.5 % (ref 41.1–50.3)
HCT VFR BLD AUTO: 34.2 % (ref 41.1–50.3)
HCT VFR BLD AUTO: 36.5 % (ref 41.1–50.3)
HCT VFR BLD AUTO: 37.6 % (ref 41.1–50.3)
HCT VFR BLD AUTO: 37.9 % (ref 41.1–50.3)
HCT VFR BLD AUTO: 38.6 % (ref 41.1–50.3)
HCT VFR BLD AUTO: 40.2 % (ref 41.1–50.3)
HCT VFR BLD AUTO: 42.8 % (ref 41.1–50.3)
HCT VFR BLD AUTO: 47.2 % (ref 41.1–50.3)
HEMOCCULT STL QL: POSITIVE
HGB BLD-MCNC: 10.1 G/DL (ref 13.6–17.2)
HGB BLD-MCNC: 10.2 G/DL (ref 13.6–17.2)
HGB BLD-MCNC: 10.5 G/DL (ref 13.6–17.2)
HGB BLD-MCNC: 11 G/DL (ref 13.6–17.2)
HGB BLD-MCNC: 12.1 G/DL (ref 13.6–17.2)
HGB BLD-MCNC: 12.2 G/DL (ref 13.6–17.2)
HGB BLD-MCNC: 12.2 G/DL (ref 13.6–17.2)
HGB BLD-MCNC: 12.4 G/DL (ref 13.6–17.2)
HGB BLD-MCNC: 13.6 G/DL (ref 13.6–17.2)
HGB BLD-MCNC: 14.4 G/DL (ref 13.6–17.2)
HGB BLD-MCNC: 8.6 G/DL (ref 13.6–17.2)
HGB BLD-MCNC: 8.7 G/DL (ref 13.6–17.2)
HGB BLD-MCNC: 8.8 G/DL (ref 13.6–17.2)
HGB BLD-MCNC: 8.8 G/DL (ref 13.6–17.2)
HGB BLD-MCNC: 9.2 G/DL (ref 13.6–17.2)
HGB BLD-MCNC: 9.9 G/DL (ref 13.6–17.2)
HGB UR QL STRIP: ABNORMAL
HGB UR QL STRIP: NEGATIVE
IMM GRANULOCYTES # BLD AUTO: 0.2 K/UL (ref 0–0.5)
IMM GRANULOCYTES # BLD AUTO: 0.3 K/UL (ref 0–0.5)
IMM GRANULOCYTES NFR BLD AUTO: 1 % (ref 0–5)
IMM GRANULOCYTES NFR BLD AUTO: 2 % (ref 0–5)
INR PPP: 1.6
INR PPP: 1.7
INR PPP: 1.9
INR PPP: 1.9
INR PPP: 2.1
INR PPP: 2.2
INR PPP: 2.2
INR PPP: 2.3
INR PPP: 2.4
INR PPP: 2.4
INR PPP: 2.5
INR PPP: 2.5
INR PPP: 3
INR PPP: 3.8
INR PPP: 4.1
INR PPP: 4.4
INSPIRATION.DURATION SETTING TIME VENT: 0.9 SEC
INSPIRATION.DURATION SETTING TIME VENT: 0.99 SEC
KETONES UR QL STRIP.AUTO: NEGATIVE MG/DL
KETONES UR QL STRIP.AUTO: NEGATIVE MG/DL
LACTATE SERPL-SCNC: 1.4 MMOL/L (ref 0.4–2)
LACTATE SERPL-SCNC: 1.7 MMOL/L (ref 0.4–2)
LACTATE SERPL-SCNC: 4.9 MMOL/L (ref 0.4–2)
LACTATE SERPL-SCNC: 5.6 MMOL/L (ref 0.4–2)
LEUKOCYTE ESTERASE UR QL STRIP.AUTO: NEGATIVE
LEUKOCYTE ESTERASE UR QL STRIP.AUTO: NEGATIVE
LYMPHOCYTES # BLD: 1.2 K/UL (ref 0.5–4.6)
LYMPHOCYTES # BLD: 5.9 K/UL (ref 0.5–4.6)
LYMPHOCYTES NFR BLD: 42 % (ref 13–44)
LYMPHOCYTES NFR BLD: 8 % (ref 13–44)
MAGNESIUM SERPL-MCNC: 2 MG/DL (ref 1.8–2.4)
MAGNESIUM SERPL-MCNC: 2.1 MG/DL (ref 1.8–2.4)
MAGNESIUM SERPL-MCNC: 2.2 MG/DL (ref 1.8–2.4)
MAGNESIUM SERPL-MCNC: 2.3 MG/DL (ref 1.8–2.4)
MAGNESIUM SERPL-MCNC: 2.4 MG/DL (ref 1.8–2.4)
MAGNESIUM SERPL-MCNC: 2.6 MG/DL (ref 1.8–2.4)
MAGNESIUM SERPL-MCNC: 2.6 MG/DL (ref 1.8–2.4)
MCH RBC QN AUTO: 27.1 PG (ref 26.1–32.9)
MCH RBC QN AUTO: 27.6 PG (ref 26.1–32.9)
MCH RBC QN AUTO: 27.9 PG (ref 26.1–32.9)
MCH RBC QN AUTO: 28.1 PG (ref 26.1–32.9)
MCH RBC QN AUTO: 28.3 PG (ref 26.1–32.9)
MCH RBC QN AUTO: 28.3 PG (ref 26.1–32.9)
MCH RBC QN AUTO: 28.5 PG (ref 26.1–32.9)
MCH RBC QN AUTO: 28.6 PG (ref 26.1–32.9)
MCH RBC QN AUTO: 28.7 PG (ref 26.1–32.9)
MCH RBC QN AUTO: 28.7 PG (ref 26.1–32.9)
MCH RBC QN AUTO: 28.8 PG (ref 26.1–32.9)
MCH RBC QN AUTO: 28.8 PG (ref 26.1–32.9)
MCH RBC QN AUTO: 29 PG (ref 26.1–32.9)
MCH RBC QN AUTO: 29.1 PG (ref 26.1–32.9)
MCHC RBC AUTO-ENTMCNC: 30.1 G/DL (ref 31.4–35)
MCHC RBC AUTO-ENTMCNC: 30.1 G/DL (ref 31.4–35)
MCHC RBC AUTO-ENTMCNC: 30.4 G/DL (ref 31.4–35)
MCHC RBC AUTO-ENTMCNC: 30.5 G/DL (ref 31.4–35)
MCHC RBC AUTO-ENTMCNC: 30.7 G/DL (ref 31.4–35)
MCHC RBC AUTO-ENTMCNC: 30.7 G/DL (ref 31.4–35)
MCHC RBC AUTO-ENTMCNC: 30.8 G/DL (ref 31.4–35)
MCHC RBC AUTO-ENTMCNC: 30.9 G/DL (ref 31.4–35)
MCHC RBC AUTO-ENTMCNC: 31 G/DL (ref 31.4–35)
MCHC RBC AUTO-ENTMCNC: 31.2 G/DL (ref 31.4–35)
MCHC RBC AUTO-ENTMCNC: 31.7 G/DL (ref 31.4–35)
MCHC RBC AUTO-ENTMCNC: 31.8 G/DL (ref 31.4–35)
MCHC RBC AUTO-ENTMCNC: 32.1 G/DL (ref 31.4–35)
MCHC RBC AUTO-ENTMCNC: 32.2 G/DL (ref 31.4–35)
MCHC RBC AUTO-ENTMCNC: 32.3 G/DL (ref 31.4–35)
MCHC RBC AUTO-ENTMCNC: 32.4 G/DL (ref 31.4–35)
MCV RBC AUTO: 89.4 FL (ref 79.6–97.8)
MCV RBC AUTO: 89.5 FL (ref 79.6–97.8)
MCV RBC AUTO: 89.6 FL (ref 79.6–97.8)
MCV RBC AUTO: 89.9 FL (ref 79.6–97.8)
MCV RBC AUTO: 90.1 FL (ref 79.6–97.8)
MCV RBC AUTO: 90.2 FL (ref 79.6–97.8)
MCV RBC AUTO: 90.3 FL (ref 79.6–97.8)
MCV RBC AUTO: 90.3 FL (ref 79.6–97.8)
MCV RBC AUTO: 91.4 FL (ref 79.6–97.8)
MCV RBC AUTO: 91.6 FL (ref 79.6–97.8)
MCV RBC AUTO: 91.7 FL (ref 79.6–97.8)
MCV RBC AUTO: 92.1 FL (ref 79.6–97.8)
MCV RBC AUTO: 92.2 FL (ref 79.6–97.8)
MCV RBC AUTO: 92.8 FL (ref 79.6–97.8)
MCV RBC AUTO: 93.5 FL (ref 79.6–97.8)
MCV RBC AUTO: 93.8 FL (ref 79.6–97.8)
MM INDURATION POC: 0 MM (ref 0–5)
MONOCYTES # BLD: 0.5 K/UL (ref 0.1–1.3)
MONOCYTES # BLD: 0.8 K/UL (ref 0.1–1.3)
MONOCYTES NFR BLD: 3 % (ref 4–12)
MONOCYTES NFR BLD: 5 % (ref 4–12)
NEUTS SEG # BLD: 12.5 K/UL (ref 1.7–8.2)
NEUTS SEG # BLD: 6.8 K/UL (ref 1.7–8.2)
NEUTS SEG NFR BLD: 48 % (ref 43–78)
NEUTS SEG NFR BLD: 87 % (ref 43–78)
NITRITE UR QL STRIP.AUTO: NEGATIVE
NITRITE UR QL STRIP.AUTO: NEGATIVE
NRBC # BLD: 0 K/UL (ref 0–0.2)
NRBC # BLD: 0.02 K/UL (ref 0–0.2)
NRBC # BLD: 0.05 K/UL (ref 0–0.2)
NRBC # BLD: 0.05 K/UL (ref 0–0.2)
NRBC # BLD: 0.13 K/UL (ref 0–0.2)
NRBC # BLD: 0.13 K/UL (ref 0–0.2)
NRBC # BLD: 0.15 K/UL (ref 0–0.2)
NRBC # BLD: 0.15 K/UL (ref 0–0.2)
NRBC # BLD: 0.16 K/UL (ref 0–0.2)
NRBC # BLD: 0.18 K/UL (ref 0–0.2)
O2/TOTAL GAS SETTING VFR VENT: 100 %
O2/TOTAL GAS SETTING VFR VENT: 40 %
O2/TOTAL GAS SETTING VFR VENT: 40 %
O2/TOTAL GAS SETTING VFR VENT: 50 %
O2/TOTAL GAS SETTING VFR VENT: 60 %
O2/TOTAL GAS SETTING VFR VENT: 70 %
P-R INTERVAL, ECG05: 160 MS
P-R INTERVAL, ECG05: 208 MS
PCO2 BLD: 30.6 MMHG (ref 35–45)
PCO2 BLD: 31.9 MMHG (ref 35–45)
PCO2 BLD: 33.4 MMHG (ref 35–45)
PCO2 BLD: 34.8 MMHG (ref 35–45)
PCO2 BLD: 38.6 MMHG (ref 35–45)
PCO2 BLD: 45.3 MMHG (ref 35–45)
PEEP RESPIRATORY: 10 CMH2O
PEEP RESPIRATORY: 12 CMH2O
PEEP RESPIRATORY: 8 CMH2O
PH BLD: 7.26 [PH] (ref 7.35–7.45)
PH BLD: 7.42 [PH] (ref 7.35–7.45)
PH BLD: 7.45 [PH] (ref 7.35–7.45)
PH BLD: 7.48 [PH] (ref 7.35–7.45)
PH BLD: 7.5 [PH] (ref 7.35–7.45)
PH BLD: 7.53 [PH] (ref 7.35–7.45)
PH UR STRIP: 5 [PH] (ref 5–9)
PH UR STRIP: 6.5 [PH] (ref 5–9)
PIP ISTAT,IPIP: 17
PLATELET # BLD AUTO: 162 K/UL (ref 150–450)
PLATELET # BLD AUTO: 181 K/UL (ref 150–450)
PLATELET # BLD AUTO: 183 K/UL (ref 150–450)
PLATELET # BLD AUTO: 196 K/UL (ref 150–450)
PLATELET # BLD AUTO: 197 K/UL (ref 150–450)
PLATELET # BLD AUTO: 212 K/UL (ref 150–450)
PLATELET # BLD AUTO: 227 K/UL (ref 150–450)
PLATELET # BLD AUTO: 242 K/UL (ref 150–450)
PLATELET # BLD AUTO: 249 K/UL (ref 150–450)
PLATELET # BLD AUTO: 264 K/UL (ref 150–450)
PLATELET # BLD AUTO: 269 K/UL (ref 150–450)
PLATELET # BLD AUTO: 282 K/UL (ref 150–450)
PLATELET # BLD AUTO: 286 K/UL (ref 150–450)
PLATELET # BLD AUTO: 307 K/UL (ref 150–450)
PLATELET # BLD AUTO: 324 K/UL (ref 150–450)
PLATELET # BLD AUTO: 352 K/UL (ref 150–450)
PMV BLD AUTO: 12 FL (ref 9.4–12.3)
PMV BLD AUTO: 12.1 FL (ref 9.4–12.3)
PMV BLD AUTO: 12.2 FL (ref 9.4–12.3)
PMV BLD AUTO: 12.4 FL (ref 9.4–12.3)
PMV BLD AUTO: 12.4 FL (ref 9.4–12.3)
PMV BLD AUTO: 12.5 FL (ref 9.4–12.3)
PMV BLD AUTO: 12.5 FL (ref 9.4–12.3)
PMV BLD AUTO: 12.6 FL (ref 9.4–12.3)
PMV BLD AUTO: 12.7 FL (ref 9.4–12.3)
PMV BLD AUTO: 12.8 FL (ref 9.4–12.3)
PMV BLD AUTO: 12.8 FL (ref 9.4–12.3)
PMV BLD AUTO: 13.1 FL (ref 9.4–12.3)
PMV BLD AUTO: 13.2 FL (ref 9.4–12.3)
PMV BLD AUTO: 13.3 FL (ref 9.4–12.3)
PO2 BLD: 114 MMHG (ref 75–100)
PO2 BLD: 134 MMHG (ref 75–100)
PO2 BLD: 143 MMHG (ref 75–100)
PO2 BLD: 180 MMHG (ref 75–100)
PO2 BLD: 279 MMHG (ref 75–100)
PO2 BLD: 61 MMHG (ref 75–100)
POTASSIUM SERPL-SCNC: 3.1 MMOL/L (ref 3.5–5.1)
POTASSIUM SERPL-SCNC: 3.1 MMOL/L (ref 3.5–5.1)
POTASSIUM SERPL-SCNC: 3.3 MMOL/L (ref 3.5–5.1)
POTASSIUM SERPL-SCNC: 3.3 MMOL/L (ref 3.5–5.1)
POTASSIUM SERPL-SCNC: 3.4 MMOL/L (ref 3.5–5.1)
POTASSIUM SERPL-SCNC: 3.7 MMOL/L (ref 3.5–5.1)
POTASSIUM SERPL-SCNC: 3.8 MMOL/L (ref 3.5–5.1)
POTASSIUM SERPL-SCNC: 3.9 MMOL/L (ref 3.5–5.1)
POTASSIUM SERPL-SCNC: 3.9 MMOL/L (ref 3.5–5.1)
POTASSIUM SERPL-SCNC: 4 MMOL/L (ref 3.5–5.1)
POTASSIUM SERPL-SCNC: 4 MMOL/L (ref 3.5–5.1)
POTASSIUM SERPL-SCNC: 4.1 MMOL/L (ref 3.5–5.1)
POTASSIUM SERPL-SCNC: 4.1 MMOL/L (ref 3.5–5.1)
POTASSIUM SERPL-SCNC: 4.3 MMOL/L (ref 3.5–5.1)
POTASSIUM SERPL-SCNC: 4.3 MMOL/L (ref 3.5–5.1)
POTASSIUM SERPL-SCNC: 4.4 MMOL/L (ref 3.5–5.1)
POTASSIUM SERPL-SCNC: 4.4 MMOL/L (ref 3.5–5.1)
POTASSIUM SERPL-SCNC: 4.8 MMOL/L (ref 3.5–5.1)
PPD POC: NEGATIVE NEGATIVE
PRESSURE CONTROL, IPC: 12
PRESSURE CONTROL, IPC: 15
PROCALCITONIN SERPL-MCNC: 0.06 NG/ML
PROCALCITONIN SERPL-MCNC: 0.06 NG/ML
PROT SERPL-MCNC: 6.6 G/DL (ref 6.3–8.2)
PROT SERPL-MCNC: 7.3 G/DL (ref 6.3–8.2)
PROT UR STRIP-MCNC: NEGATIVE MG/DL
PROT UR STRIP-MCNC: NEGATIVE MG/DL
PROTHROMBIN TIME: 19.8 SEC (ref 12–14.7)
PROTHROMBIN TIME: 20 SEC (ref 12–14.7)
PROTHROMBIN TIME: 22.1 SEC (ref 12–14.7)
PROTHROMBIN TIME: 22.3 SEC (ref 12–14.7)
PROTHROMBIN TIME: 24.2 SEC (ref 12–14.7)
PROTHROMBIN TIME: 25.1 SEC (ref 12–14.7)
PROTHROMBIN TIME: 25.5 SEC (ref 12–14.7)
PROTHROMBIN TIME: 25.6 SEC (ref 12–14.7)
PROTHROMBIN TIME: 26.5 SEC (ref 12–14.7)
PROTHROMBIN TIME: 26.6 SEC (ref 12–14.7)
PROTHROMBIN TIME: 27.8 SEC (ref 12–14.7)
PROTHROMBIN TIME: 27.9 SEC (ref 12–14.7)
PROTHROMBIN TIME: 31.7 SEC (ref 12–14.7)
PROTHROMBIN TIME: 38.4 SEC (ref 12–14.7)
PROTHROMBIN TIME: 41.1 SEC (ref 12–14.7)
PROTHROMBIN TIME: 43 SEC (ref 12–14.7)
Q-T INTERVAL, ECG07: 426 MS
Q-T INTERVAL, ECG07: 462 MS
Q-T INTERVAL, ECG07: 470 MS
QRS DURATION, ECG06: 146 MS
QRS DURATION, ECG06: 156 MS
QRS DURATION, ECG06: 162 MS
QTC CALCULATION (BEZET), ECG08: 529 MS
QTC CALCULATION (BEZET), ECG08: 532 MS
QTC CALCULATION (BEZET), ECG08: 535 MS
RBC # BLD AUTO: 2.96 M/UL (ref 4.23–5.6)
RBC # BLD AUTO: 3.05 M/UL (ref 4.23–5.6)
RBC # BLD AUTO: 3.08 M/UL (ref 4.23–5.6)
RBC # BLD AUTO: 3.13 M/UL (ref 4.23–5.6)
RBC # BLD AUTO: 3.3 M/UL (ref 4.23–5.6)
RBC # BLD AUTO: 3.46 M/UL (ref 4.23–5.6)
RBC # BLD AUTO: 3.52 M/UL (ref 4.23–5.6)
RBC # BLD AUTO: 3.61 M/UL (ref 4.23–5.6)
RBC # BLD AUTO: 3.71 M/UL (ref 4.23–5.6)
RBC # BLD AUTO: 3.98 M/UL (ref 4.23–5.6)
RBC # BLD AUTO: 4.2 M/UL (ref 4.23–5.6)
RBC # BLD AUTO: 4.24 M/UL (ref 4.23–5.6)
RBC # BLD AUTO: 4.31 M/UL (ref 4.23–5.6)
RBC # BLD AUTO: 4.46 M/UL (ref 4.23–5.6)
RBC # BLD AUTO: 4.74 M/UL (ref 4.23–5.6)
RBC # BLD AUTO: 5.03 M/UL (ref 4.23–5.6)
RBC #/AREA URNS HPF: ABNORMAL /HPF
SAO2 % BLD: 100 % (ref 95–98)
SAO2 % BLD: 93 % (ref 95–98)
SAO2 % BLD: 99 % (ref 95–98)
SERVICE CMNT-IMP: ABNORMAL
SERVICE CMNT-IMP: NORMAL
SODIUM SERPL-SCNC: 136 MMOL/L (ref 136–145)
SODIUM SERPL-SCNC: 138 MMOL/L (ref 136–145)
SODIUM SERPL-SCNC: 139 MMOL/L (ref 136–145)
SODIUM SERPL-SCNC: 139 MMOL/L (ref 136–145)
SODIUM SERPL-SCNC: 140 MMOL/L (ref 136–145)
SODIUM SERPL-SCNC: 141 MMOL/L (ref 136–145)
SODIUM SERPL-SCNC: 142 MMOL/L (ref 136–145)
SODIUM SERPL-SCNC: 143 MMOL/L (ref 136–145)
SODIUM SERPL-SCNC: 143 MMOL/L (ref 136–145)
SODIUM SERPL-SCNC: 145 MMOL/L (ref 136–145)
SODIUM SERPL-SCNC: 145 MMOL/L (ref 136–145)
SP GR UR REFRACTOMETRY: 1.01 (ref 1–1.02)
SP GR UR REFRACTOMETRY: 1.02 (ref 1–1.02)
SPECIMEN SOURCE: NORMAL
SPECIMEN TYPE: ABNORMAL
TROPONIN I BLD-MCNC: 0.04 NG/ML (ref 0.02–0.05)
TROPONIN I SERPL-MCNC: 0.02 NG/ML (ref 0.02–0.05)
TROPONIN I SERPL-MCNC: 0.06 NG/ML (ref 0.02–0.05)
TSH SERPL DL<=0.005 MIU/L-ACNC: 3.39 UIU/ML (ref 0.36–3.74)
UROBILINOGEN UR QL STRIP.AUTO: 0.2 EU/DL (ref 0.2–1)
UROBILINOGEN UR QL STRIP.AUTO: 0.2 EU/DL (ref 0.2–1)
VANCOMYCIN TROUGH SERPL-MCNC: 8.3 UG/ML (ref 5–20)
VENTILATION MODE VENT: ABNORMAL
VENTILATION MODE VENT: ABNORMAL
VENTRICULAR RATE, ECG03: 78 BPM
VENTRICULAR RATE, ECG03: 79 BPM
VENTRICULAR RATE, ECG03: 94 BPM
VT SETTING VENT: 541 ML
VT SETTING VENT: 645 ML
WBC # BLD AUTO: 10.7 K/UL (ref 4.3–11.1)
WBC # BLD AUTO: 11 K/UL (ref 4.3–11.1)
WBC # BLD AUTO: 11.2 K/UL (ref 4.3–11.1)
WBC # BLD AUTO: 11.2 K/UL (ref 4.3–11.1)
WBC # BLD AUTO: 11.5 K/UL (ref 4.3–11.1)
WBC # BLD AUTO: 11.5 K/UL (ref 4.3–11.1)
WBC # BLD AUTO: 12.3 K/UL (ref 4.3–11.1)
WBC # BLD AUTO: 12.6 K/UL (ref 4.3–11.1)
WBC # BLD AUTO: 12.7 K/UL (ref 4.3–11.1)
WBC # BLD AUTO: 12.7 K/UL (ref 4.3–11.1)
WBC # BLD AUTO: 13.3 K/UL (ref 4.3–11.1)
WBC # BLD AUTO: 13.5 K/UL (ref 4.3–11.1)
WBC # BLD AUTO: 14.3 K/UL (ref 4.3–11.1)
WBC # BLD AUTO: 14.4 K/UL (ref 4.3–11.1)
WBC # BLD AUTO: 18.3 K/UL (ref 4.3–11.1)
WBC # BLD AUTO: 7.6 K/UL (ref 4.3–11.1)
WBC URNS QL MICRO: ABNORMAL /HPF

## 2020-01-01 PROCEDURE — 77030003629 HC NDL PERC VASC COOK -A

## 2020-01-01 PROCEDURE — 99232 SBSQ HOSP IP/OBS MODERATE 35: CPT | Performed by: INTERNAL MEDICINE

## 2020-01-01 PROCEDURE — 94640 AIRWAY INHALATION TREATMENT: CPT

## 2020-01-01 PROCEDURE — G0156 HHCP-SVS OF AIDE,EA 15 MIN: HCPCS

## 2020-01-01 PROCEDURE — 77030040393 HC DRSG OPTIFOAM GENT MDII -B

## 2020-01-01 PROCEDURE — 80048 BASIC METABOLIC PNL TOTAL CA: CPT

## 2020-01-01 PROCEDURE — 74011250637 HC RX REV CODE- 250/637: Performed by: INTERNAL MEDICINE

## 2020-01-01 PROCEDURE — 74018 RADEX ABDOMEN 1 VIEW: CPT

## 2020-01-01 PROCEDURE — 85025 COMPLETE CBC W/AUTO DIFF WBC: CPT

## 2020-01-01 PROCEDURE — 83880 ASSAY OF NATRIURETIC PEPTIDE: CPT

## 2020-01-01 PROCEDURE — 84484 ASSAY OF TROPONIN QUANT: CPT

## 2020-01-01 PROCEDURE — 99223 1ST HOSP IP/OBS HIGH 75: CPT | Performed by: NURSE PRACTITIONER

## 2020-01-01 PROCEDURE — 85027 COMPLETE CBC AUTOMATED: CPT

## 2020-01-01 PROCEDURE — 74011250636 HC RX REV CODE- 250/636: Performed by: INTERNAL MEDICINE

## 2020-01-01 PROCEDURE — 94003 VENT MGMT INPAT SUBQ DAY: CPT

## 2020-01-01 PROCEDURE — 83735 ASSAY OF MAGNESIUM: CPT

## 2020-01-01 PROCEDURE — 74011000250 HC RX REV CODE- 250: Performed by: INTERNAL MEDICINE

## 2020-01-01 PROCEDURE — 83605 ASSAY OF LACTIC ACID: CPT

## 2020-01-01 PROCEDURE — 74011250637 HC RX REV CODE- 250/637: Performed by: NURSE PRACTITIONER

## 2020-01-01 PROCEDURE — 74011000258 HC RX REV CODE- 258: Performed by: PHYSICIAN ASSISTANT

## 2020-01-01 PROCEDURE — 77010033678 HC OXYGEN DAILY

## 2020-01-01 PROCEDURE — BT40ZZZ ULTRASONOGRAPHY OF BLADDER: ICD-10-PCS | Performed by: INTERNAL MEDICINE

## 2020-01-01 PROCEDURE — 93005 ELECTROCARDIOGRAM TRACING: CPT | Performed by: EMERGENCY MEDICINE

## 2020-01-01 PROCEDURE — 74011250636 HC RX REV CODE- 250/636: Performed by: PHYSICIAN ASSISTANT

## 2020-01-01 PROCEDURE — 71045 X-RAY EXAM CHEST 1 VIEW: CPT

## 2020-01-01 PROCEDURE — 92526 ORAL FUNCTION THERAPY: CPT

## 2020-01-01 PROCEDURE — 97535 SELF CARE MNGMENT TRAINING: CPT

## 2020-01-01 PROCEDURE — 74011250637 HC RX REV CODE- 250/637: Performed by: FAMILY MEDICINE

## 2020-01-01 PROCEDURE — 93284 PRGRMG EVAL IMPLANTABLE DFB: CPT

## 2020-01-01 PROCEDURE — 74011250636 HC RX REV CODE- 250/636: Performed by: NURSE PRACTITIONER

## 2020-01-01 PROCEDURE — 82803 BLOOD GASES ANY COMBINATION: CPT

## 2020-01-01 PROCEDURE — 82962 GLUCOSE BLOOD TEST: CPT

## 2020-01-01 PROCEDURE — 36600 WITHDRAWAL OF ARTERIAL BLOOD: CPT

## 2020-01-01 PROCEDURE — 77010033711 HC HIGH FLOW OXYGEN

## 2020-01-01 PROCEDURE — C8929 TTE W OR WO FOL WCON,DOPPLER: HCPCS

## 2020-01-01 PROCEDURE — 65270000029 HC RM PRIVATE

## 2020-01-01 PROCEDURE — 0656 HSPC GENERAL INPATIENT

## 2020-01-01 PROCEDURE — G0299 HHS/HOSPICE OF RN EA 15 MIN: HCPCS

## 2020-01-01 PROCEDURE — 36415 COLL VENOUS BLD VENIPUNCTURE: CPT

## 2020-01-01 PROCEDURE — 84132 ASSAY OF SERUM POTASSIUM: CPT

## 2020-01-01 PROCEDURE — 81003 URINALYSIS AUTO W/O SCOPE: CPT

## 2020-01-01 PROCEDURE — 74011000258 HC RX REV CODE- 258: Performed by: INTERNAL MEDICINE

## 2020-01-01 PROCEDURE — 99233 SBSQ HOSP IP/OBS HIGH 50: CPT | Performed by: INTERNAL MEDICINE

## 2020-01-01 PROCEDURE — 87086 URINE CULTURE/COLONY COUNT: CPT

## 2020-01-01 PROCEDURE — 81001 URINALYSIS AUTO W/SCOPE: CPT

## 2020-01-01 PROCEDURE — 85610 PROTHROMBIN TIME: CPT

## 2020-01-01 PROCEDURE — 99232 SBSQ HOSP IP/OBS MODERATE 35: CPT | Performed by: NURSE PRACTITIONER

## 2020-01-01 PROCEDURE — 77030021668 HC NEB PREFIL KT VYRM -A

## 2020-01-01 PROCEDURE — C1732 CATH, EP, DIAG/ABL, 3D/VECT: HCPCS

## 2020-01-01 PROCEDURE — G0155 HHCP-SVS OF CSW,EA 15 MIN: HCPCS

## 2020-01-01 PROCEDURE — 77030040830 HC CATH URETH FOL MDII -A

## 2020-01-01 PROCEDURE — 99223 1ST HOSP IP/OBS HIGH 75: CPT | Performed by: INTERNAL MEDICINE

## 2020-01-01 PROCEDURE — 76450000000

## 2020-01-01 PROCEDURE — 74011636637 HC RX REV CODE- 636/637: Performed by: INTERNAL MEDICINE

## 2020-01-01 PROCEDURE — 74011000250 HC RX REV CODE- 250: Performed by: NURSE ANESTHETIST, CERTIFIED REGISTERED

## 2020-01-01 PROCEDURE — 96365 THER/PROPH/DIAG IV INF INIT: CPT

## 2020-01-01 PROCEDURE — 94760 N-INVAS EAR/PLS OXIMETRY 1: CPT

## 2020-01-01 PROCEDURE — 96376 TX/PRO/DX INJ SAME DRUG ADON: CPT

## 2020-01-01 PROCEDURE — 97161 PT EVAL LOW COMPLEX 20 MIN: CPT

## 2020-01-01 PROCEDURE — 97116 GAIT TRAINING THERAPY: CPT

## 2020-01-01 PROCEDURE — 93005 ELECTROCARDIOGRAM TRACING: CPT | Performed by: INTERNAL MEDICINE

## 2020-01-01 PROCEDURE — 77030041247 HC PROTECTOR HEEL HEELMEDIX MDII -B

## 2020-01-01 PROCEDURE — 85730 THROMBOPLASTIN TIME PARTIAL: CPT

## 2020-01-01 PROCEDURE — 84443 ASSAY THYROID STIM HORMONE: CPT

## 2020-01-01 PROCEDURE — 65660000000 HC RM CCU STEPDOWN

## 2020-01-01 PROCEDURE — 77030013032 HC MSK BPAP/CPAP FISP -B

## 2020-01-01 PROCEDURE — 74011250636 HC RX REV CODE- 250/636: Performed by: NURSE ANESTHETIST, CERTIFIED REGISTERED

## 2020-01-01 PROCEDURE — 99285 EMERGENCY DEPT VISIT HI MDM: CPT

## 2020-01-01 PROCEDURE — 97530 THERAPEUTIC ACTIVITIES: CPT

## 2020-01-01 PROCEDURE — 94762 N-INVAS EAR/PLS OXIMTRY CONT: CPT

## 2020-01-01 PROCEDURE — 87040 BLOOD CULTURE FOR BACTERIA: CPT

## 2020-01-01 PROCEDURE — 80202 ASSAY OF VANCOMYCIN: CPT

## 2020-01-01 PROCEDURE — 86580 TB INTRADERMAL TEST: CPT | Performed by: INTERNAL MEDICINE

## 2020-01-01 PROCEDURE — 93650 ICAR CATH ABLTJ AV NODE FUNC: CPT

## 2020-01-01 PROCEDURE — 65610000001 HC ROOM ICU GENERAL

## 2020-01-01 PROCEDURE — 92610 EVALUATE SWALLOWING FUNCTION: CPT

## 2020-01-01 PROCEDURE — 99231 SBSQ HOSP IP/OBS SF/LOW 25: CPT | Performed by: NURSE PRACTITIONER

## 2020-01-01 PROCEDURE — 96374 THER/PROPH/DIAG INJ IV PUSH: CPT

## 2020-01-01 PROCEDURE — 96375 TX/PRO/DX INJ NEW DRUG ADDON: CPT

## 2020-01-01 PROCEDURE — 77030035291 HC TBNG PMP SMARTABLATE J&J -B

## 2020-01-01 PROCEDURE — 94669 MECHANICAL CHEST WALL OSCILL: CPT

## 2020-01-01 PROCEDURE — 5A1935Z RESPIRATORY VENTILATION, LESS THAN 24 CONSECUTIVE HOURS: ICD-10-PCS | Performed by: EMERGENCY MEDICINE

## 2020-01-01 PROCEDURE — 84145 PROCALCITONIN (PCT): CPT

## 2020-01-01 PROCEDURE — 74011250637 HC RX REV CODE- 250/637: Performed by: PHYSICIAN ASSISTANT

## 2020-01-01 PROCEDURE — 94660 CPAP INITIATION&MGMT: CPT

## 2020-01-01 PROCEDURE — 97166 OT EVAL MOD COMPLEX 45 MIN: CPT

## 2020-01-01 PROCEDURE — 99222 1ST HOSP IP/OBS MODERATE 55: CPT | Performed by: INTERNAL MEDICINE

## 2020-01-01 PROCEDURE — 77030013687 HC GD NDL BARD -B

## 2020-01-01 PROCEDURE — 77030027107 HC PTCH EXT REF CARTO3 J&J -F

## 2020-01-01 PROCEDURE — 3336500001 HSPC ELECTION

## 2020-01-01 PROCEDURE — 80053 COMPREHEN METABOLIC PANEL: CPT

## 2020-01-01 PROCEDURE — 94002 VENT MGMT INPAT INIT DAY: CPT

## 2020-01-01 PROCEDURE — C1894 INTRO/SHEATH, NON-LASER: HCPCS

## 2020-01-01 PROCEDURE — 74011000302 HC RX REV CODE- 302: Performed by: INTERNAL MEDICINE

## 2020-01-01 PROCEDURE — 74011250636 HC RX REV CODE- 250/636

## 2020-01-01 PROCEDURE — 99231 SBSQ HOSP IP/OBS SF/LOW 25: CPT | Performed by: INTERNAL MEDICINE

## 2020-01-01 PROCEDURE — 74011250636 HC RX REV CODE- 250/636: Performed by: EMERGENCY MEDICINE

## 2020-01-01 PROCEDURE — 96366 THER/PROPH/DIAG IV INF ADDON: CPT

## 2020-01-01 PROCEDURE — 51798 US URINE CAPACITY MEASURE: CPT

## 2020-01-01 PROCEDURE — 0BH17EZ INSERTION OF ENDOTRACHEAL AIRWAY INTO TRACHEA, VIA NATURAL OR ARTIFICIAL OPENING: ICD-10-PCS | Performed by: EMERGENCY MEDICINE

## 2020-01-01 PROCEDURE — 82272 OCCULT BLD FECES 1-3 TESTS: CPT

## 2020-01-01 PROCEDURE — 77030012793 HC CIRC VNTLTR FISP -B

## 2020-01-01 PROCEDURE — 74011000250 HC RX REV CODE- 250: Performed by: EMERGENCY MEDICINE

## 2020-01-01 PROCEDURE — 74011000258 HC RX REV CODE- 258: Performed by: EMERGENCY MEDICINE

## 2020-01-01 PROCEDURE — 31500 INSERT EMERGENCY AIRWAY: CPT

## 2020-01-01 PROCEDURE — 76060000033 HC ANESTHESIA 1 TO 1.5 HR: Performed by: INTERNAL MEDICINE

## 2020-01-01 PROCEDURE — 87804 INFLUENZA ASSAY W/OPTIC: CPT

## 2020-01-01 RX ORDER — SODIUM CHLORIDE 0.9 % (FLUSH) 0.9 %
5-40 SYRINGE (ML) INJECTION EVERY 8 HOURS
Status: DISCONTINUED | OUTPATIENT
Start: 2020-01-01 | End: 2020-01-01 | Stop reason: HOSPADM

## 2020-01-01 RX ORDER — MORPHINE SULFATE 100 MG/5ML
10 SOLUTION ORAL
Status: DISCONTINUED | OUTPATIENT
Start: 2020-01-01 | End: 2020-01-01 | Stop reason: HOSPADM

## 2020-01-01 RX ORDER — MORPHINE SULFATE 4 MG/ML
4 INJECTION INTRAVENOUS EVERY 6 HOURS
Status: DISCONTINUED | OUTPATIENT
Start: 2020-01-01 | End: 2020-01-01

## 2020-01-01 RX ORDER — ACETAMINOPHEN 325 MG/1
650 TABLET ORAL
Status: DISCONTINUED | OUTPATIENT
Start: 2020-01-01 | End: 2020-01-01

## 2020-01-01 RX ORDER — FUROSEMIDE 10 MG/ML
40 INJECTION INTRAMUSCULAR; INTRAVENOUS 2 TIMES DAILY
Status: DISCONTINUED | OUTPATIENT
Start: 2020-01-01 | End: 2020-01-01

## 2020-01-01 RX ORDER — LIDOCAINE HYDROCHLORIDE 10 MG/ML
10 INJECTION INFILTRATION; PERINEURAL
Status: DISCONTINUED | OUTPATIENT
Start: 2020-01-01 | End: 2020-01-01 | Stop reason: HOSPADM

## 2020-01-01 RX ORDER — FENOFIBRATE 160 MG/1
160 TABLET ORAL DAILY
Status: DISCONTINUED | OUTPATIENT
Start: 2020-01-01 | End: 2020-01-01

## 2020-01-01 RX ORDER — POTASSIUM CHLORIDE 20 MEQ/1
20 TABLET, EXTENDED RELEASE ORAL 2 TIMES DAILY
Status: DISCONTINUED | OUTPATIENT
Start: 2020-01-01 | End: 2020-01-01

## 2020-01-01 RX ORDER — FUROSEMIDE 40 MG/1
20 TABLET ORAL
Status: DISCONTINUED | OUTPATIENT
Start: 2020-01-01 | End: 2020-01-01

## 2020-01-01 RX ORDER — SPIRONOLACTONE 25 MG/1
12.5 TABLET ORAL DAILY
Status: DISCONTINUED | OUTPATIENT
Start: 2020-01-01 | End: 2020-01-01 | Stop reason: HOSPADM

## 2020-01-01 RX ORDER — SPIRONOLACTONE 25 MG/1
12.5 TABLET ORAL DAILY
Qty: 30 TAB | Refills: 0 | Status: SHIPPED
Start: 2020-01-01 | End: 2020-01-01

## 2020-01-01 RX ORDER — POTASSIUM CHLORIDE 14.9 MG/ML
20 INJECTION INTRAVENOUS ONCE
Status: COMPLETED | OUTPATIENT
Start: 2020-01-01 | End: 2020-01-01

## 2020-01-01 RX ORDER — NITROGLYCERIN 0.4 MG/1
0.4 TABLET SUBLINGUAL
Qty: 25 TAB | Refills: 0 | Status: SHIPPED | OUTPATIENT
Start: 2020-01-01

## 2020-01-01 RX ORDER — LORAZEPAM 2 MG/ML
1 INJECTION INTRAMUSCULAR
Status: DISCONTINUED | OUTPATIENT
Start: 2020-01-01 | End: 2020-01-01 | Stop reason: HOSPADM

## 2020-01-01 RX ORDER — POTASSIUM CHLORIDE 14.9 MG/ML
20 INJECTION INTRAVENOUS
Status: COMPLETED | OUTPATIENT
Start: 2020-01-01 | End: 2020-01-01

## 2020-01-01 RX ORDER — POTASSIUM CHLORIDE 29.8 MG/ML
40 INJECTION INTRAVENOUS EVERY 6 HOURS
Status: DISCONTINUED | OUTPATIENT
Start: 2020-01-01 | End: 2020-01-01 | Stop reason: CLARIF

## 2020-01-01 RX ORDER — PROPOFOL 10 MG/ML
INJECTION, EMULSION INTRAVENOUS
Status: COMPLETED
Start: 2020-01-01 | End: 2020-01-01

## 2020-01-01 RX ORDER — POTASSIUM CHLORIDE 20 MEQ/1
20 TABLET, EXTENDED RELEASE ORAL DAILY
Status: DISCONTINUED | OUTPATIENT
Start: 2020-01-01 | End: 2020-01-01

## 2020-01-01 RX ORDER — VANCOMYCIN/0.9 % SOD CHLORIDE 1.5G/250ML
1500 PLASTIC BAG, INJECTION (ML) INTRAVENOUS
Status: COMPLETED | OUTPATIENT
Start: 2020-01-01 | End: 2020-01-01

## 2020-01-01 RX ORDER — VANCOMYCIN 2 GRAM/500 ML IN 0.9 % SODIUM CHLORIDE INTRAVENOUS
2000 ONCE
Status: COMPLETED | OUTPATIENT
Start: 2020-01-01 | End: 2020-01-01

## 2020-01-01 RX ORDER — MORPHINE SULFATE 100 MG/5ML
10 SOLUTION ORAL
Status: DISCONTINUED | OUTPATIENT
Start: 2020-01-01 | End: 2020-01-01

## 2020-01-01 RX ORDER — METOPROLOL SUCCINATE 25 MG/1
25 TABLET, EXTENDED RELEASE ORAL DAILY
Status: DISCONTINUED | OUTPATIENT
Start: 2020-01-01 | End: 2020-01-01

## 2020-01-01 RX ORDER — VANCOMYCIN/0.9 % SOD CHLORIDE 1.5G/250ML
1500 PLASTIC BAG, INJECTION (ML) INTRAVENOUS EVERY 24 HOURS
Status: DISCONTINUED | OUTPATIENT
Start: 2020-01-01 | End: 2020-01-01

## 2020-01-01 RX ORDER — PHENAZOPYRIDINE HYDROCHLORIDE 95 MG/1
95 TABLET ORAL
Status: DISCONTINUED | OUTPATIENT
Start: 2020-01-01 | End: 2020-01-01 | Stop reason: HOSPADM

## 2020-01-01 RX ORDER — SPIRONOLACTONE 25 MG/1
25 TABLET ORAL DAILY
Status: DISCONTINUED | OUTPATIENT
Start: 2020-01-01 | End: 2020-01-01

## 2020-01-01 RX ORDER — ASPIRIN 81 MG/1
81 TABLET ORAL DAILY
Status: DISCONTINUED | OUTPATIENT
Start: 2020-01-01 | End: 2020-01-01

## 2020-01-01 RX ORDER — NALOXONE HYDROCHLORIDE 0.4 MG/ML
0.1 INJECTION, SOLUTION INTRAMUSCULAR; INTRAVENOUS; SUBCUTANEOUS AS NEEDED
Status: CANCELLED | OUTPATIENT
Start: 2020-01-01

## 2020-01-01 RX ORDER — GABAPENTIN 100 MG/1
100 CAPSULE ORAL 3 TIMES DAILY
Status: DISCONTINUED | OUTPATIENT
Start: 2020-01-01 | End: 2020-01-01

## 2020-01-01 RX ORDER — LISINOPRIL 5 MG/1
5 TABLET ORAL DAILY
Status: DISCONTINUED | OUTPATIENT
Start: 2020-01-01 | End: 2020-01-01

## 2020-01-01 RX ORDER — OXYBUTYNIN CHLORIDE 5 MG/1
5 TABLET ORAL 3 TIMES DAILY
Qty: 30 TAB | Refills: 0 | Status: SHIPPED
Start: 2020-01-01 | End: 2020-01-01

## 2020-01-01 RX ORDER — WARFARIN 1 MG/1
1 TABLET ORAL EVERY EVENING
Status: DISCONTINUED | OUTPATIENT
Start: 2020-01-01 | End: 2020-01-01

## 2020-01-01 RX ORDER — GUAIFENESIN 100 MG/5ML
81 LIQUID (ML) ORAL DAILY
Status: DISCONTINUED | OUTPATIENT
Start: 2020-01-01 | End: 2020-01-01

## 2020-01-01 RX ORDER — FUROSEMIDE 10 MG/ML
40 INJECTION INTRAMUSCULAR; INTRAVENOUS
Status: COMPLETED | OUTPATIENT
Start: 2020-01-01 | End: 2020-01-01

## 2020-01-01 RX ORDER — METOPROLOL SUCCINATE 25 MG/1
12.5 TABLET, EXTENDED RELEASE ORAL DAILY
Qty: 30 TAB | Refills: 0 | Status: SHIPPED
Start: 2020-01-01 | End: 2020-01-01

## 2020-01-01 RX ORDER — HALOPERIDOL 5 MG/ML
2 INJECTION INTRAMUSCULAR
Status: DISCONTINUED | OUTPATIENT
Start: 2020-01-01 | End: 2020-01-01 | Stop reason: HOSPADM

## 2020-01-01 RX ORDER — OXYBUTYNIN CHLORIDE 5 MG/1
5 TABLET ORAL 3 TIMES DAILY
Status: DISCONTINUED | OUTPATIENT
Start: 2020-01-01 | End: 2020-01-01

## 2020-01-01 RX ORDER — HYOSCYAMINE SULFATE 0.125 MG
125 TABLET ORAL
Qty: 10 TAB | Refills: 0 | Status: SHIPPED | OUTPATIENT
Start: 2020-01-01

## 2020-01-01 RX ORDER — INSULIN LISPRO 100 [IU]/ML
INJECTION, SOLUTION INTRAVENOUS; SUBCUTANEOUS EVERY 6 HOURS
Status: DISCONTINUED | OUTPATIENT
Start: 2020-01-01 | End: 2020-01-01 | Stop reason: HOSPADM

## 2020-01-01 RX ORDER — LIDOCAINE HYDROCHLORIDE 20 MG/ML
INJECTION, SOLUTION EPIDURAL; INFILTRATION; INTRACAUDAL; PERINEURAL AS NEEDED
Status: DISCONTINUED | OUTPATIENT
Start: 2020-01-01 | End: 2020-01-01 | Stop reason: HOSPADM

## 2020-01-01 RX ORDER — VANCOMYCIN/0.9 % SOD CHLORIDE 1.5G/250ML
1500 PLASTIC BAG, INJECTION (ML) INTRAVENOUS
Status: DISCONTINUED | OUTPATIENT
Start: 2020-01-01 | End: 2020-01-01

## 2020-01-01 RX ORDER — FUROSEMIDE 10 MG/ML
40 INJECTION INTRAMUSCULAR; INTRAVENOUS ONCE
Status: COMPLETED | OUTPATIENT
Start: 2020-01-01 | End: 2020-01-01

## 2020-01-01 RX ORDER — WARFARIN 2.5 MG/1
2.5 TABLET ORAL EVERY EVENING
Status: DISCONTINUED | OUTPATIENT
Start: 2020-01-01 | End: 2020-01-01

## 2020-01-01 RX ORDER — MORPHINE SULFATE 4 MG/ML
4 INJECTION INTRAVENOUS EVERY 4 HOURS
Status: DISCONTINUED | OUTPATIENT
Start: 2020-01-01 | End: 2020-01-01

## 2020-01-01 RX ORDER — RISPERIDONE 0.5 MG/1
0.5 TABLET, FILM COATED ORAL
Status: DISCONTINUED | OUTPATIENT
Start: 2020-01-01 | End: 2020-01-01 | Stop reason: HOSPADM

## 2020-01-01 RX ORDER — LORATADINE 10 MG/1
10 TABLET ORAL DAILY
Status: DISCONTINUED | OUTPATIENT
Start: 2020-01-01 | End: 2020-01-01

## 2020-01-01 RX ORDER — NYSTATIN 100000 [USP'U]/ML
500000 SUSPENSION ORAL 4 TIMES DAILY
Status: DISCONTINUED | OUTPATIENT
Start: 2020-01-01 | End: 2020-01-01

## 2020-01-01 RX ORDER — METOPROLOL SUCCINATE 25 MG/1
12.5 TABLET, EXTENDED RELEASE ORAL DAILY
Status: DISCONTINUED | OUTPATIENT
Start: 2020-01-01 | End: 2020-01-01

## 2020-01-01 RX ORDER — VANCOMYCIN HYDROCHLORIDE 1 G/20ML
INJECTION, POWDER, LYOPHILIZED, FOR SOLUTION INTRAVENOUS EVERY 12 HOURS
Status: DISCONTINUED | OUTPATIENT
Start: 2020-01-01 | End: 2020-01-01 | Stop reason: SDUPTHER

## 2020-01-01 RX ORDER — FLUMAZENIL 0.1 MG/ML
0.2 INJECTION INTRAVENOUS
Status: CANCELLED | OUTPATIENT
Start: 2020-01-01

## 2020-01-01 RX ORDER — HEPARIN SODIUM 200 [USP'U]/100ML
4000 INJECTION, SOLUTION INTRAVENOUS CONTINUOUS
Status: DISCONTINUED | OUTPATIENT
Start: 2020-01-01 | End: 2020-01-01 | Stop reason: HOSPADM

## 2020-01-01 RX ORDER — MIDAZOLAM HYDROCHLORIDE 1 MG/ML
2 INJECTION, SOLUTION INTRAMUSCULAR; INTRAVENOUS
Status: CANCELLED | OUTPATIENT
Start: 2020-01-01 | End: 2020-01-01

## 2020-01-01 RX ORDER — PHYTONADIONE 5 MG/1
2.5 TABLET ORAL
Status: COMPLETED | OUTPATIENT
Start: 2020-01-01 | End: 2020-01-01

## 2020-01-01 RX ORDER — HYDROCODONE BITARTRATE AND ACETAMINOPHEN 7.5; 325 MG/1; MG/1
1 TABLET ORAL
Status: DISCONTINUED | OUTPATIENT
Start: 2020-01-01 | End: 2020-01-01 | Stop reason: HOSPADM

## 2020-01-01 RX ORDER — ALBUTEROL SULFATE 0.83 MG/ML
1.25 SOLUTION RESPIRATORY (INHALATION)
Status: DISCONTINUED | OUTPATIENT
Start: 2020-01-01 | End: 2020-01-01 | Stop reason: HOSPADM

## 2020-01-01 RX ORDER — ATORVASTATIN CALCIUM 40 MG/1
40 TABLET, FILM COATED ORAL DAILY
Status: DISCONTINUED | OUTPATIENT
Start: 2020-01-01 | End: 2020-01-01 | Stop reason: HOSPADM

## 2020-01-01 RX ORDER — ACETAMINOPHEN 650 MG/1
650 SUPPOSITORY RECTAL
Qty: 5 SUPPOSITORY | Refills: 0 | Status: SHIPPED | OUTPATIENT
Start: 2020-01-01

## 2020-01-01 RX ORDER — LIDOCAINE HYDROCHLORIDE 10 MG/ML
0.1 INJECTION INFILTRATION; PERINEURAL AS NEEDED
Status: CANCELLED | OUTPATIENT
Start: 2020-01-01

## 2020-01-01 RX ORDER — MESALAMINE 0.38 G/1
0.38 CAPSULE, EXTENDED RELEASE ORAL DAILY
Status: DISCONTINUED | OUTPATIENT
Start: 2020-01-01 | End: 2020-01-01

## 2020-01-01 RX ORDER — SODIUM CHLORIDE, SODIUM LACTATE, POTASSIUM CHLORIDE, CALCIUM CHLORIDE 600; 310; 30; 20 MG/100ML; MG/100ML; MG/100ML; MG/100ML
75 INJECTION, SOLUTION INTRAVENOUS CONTINUOUS
Status: CANCELLED | OUTPATIENT
Start: 2020-01-01

## 2020-01-01 RX ORDER — GLYCOPYRROLATE 0.2 MG/ML
0.2 INJECTION INTRAMUSCULAR; INTRAVENOUS
Status: DISCONTINUED | OUTPATIENT
Start: 2020-01-01 | End: 2020-01-01 | Stop reason: HOSPADM

## 2020-01-01 RX ORDER — SENNOSIDES 8.6 MG/1
1 TABLET ORAL 2 TIMES DAILY
Status: DISCONTINUED | OUTPATIENT
Start: 2020-01-01 | End: 2020-01-01 | Stop reason: HOSPADM

## 2020-01-01 RX ORDER — GABAPENTIN 100 MG/1
100 CAPSULE ORAL 2 TIMES DAILY
Status: DISCONTINUED | OUTPATIENT
Start: 2020-01-01 | End: 2020-01-01 | Stop reason: HOSPADM

## 2020-01-01 RX ORDER — NIACIN 500 MG/1
1000 TABLET, EXTENDED RELEASE ORAL DAILY
Status: DISCONTINUED | OUTPATIENT
Start: 2020-01-01 | End: 2020-01-01

## 2020-01-01 RX ORDER — PROPOFOL 10 MG/ML
0-50 VIAL (ML) INTRAVENOUS
Status: DISCONTINUED | OUTPATIENT
Start: 2020-01-01 | End: 2020-01-01

## 2020-01-01 RX ORDER — LORAZEPAM 2 MG/ML
2 INJECTION INTRAMUSCULAR
Status: DISCONTINUED | OUTPATIENT
Start: 2020-01-01 | End: 2020-01-01 | Stop reason: HOSPADM

## 2020-01-01 RX ORDER — IPRATROPIUM BROMIDE AND ALBUTEROL SULFATE 2.5; .5 MG/3ML; MG/3ML
3 SOLUTION RESPIRATORY (INHALATION)
Status: DISCONTINUED | OUTPATIENT
Start: 2020-01-01 | End: 2020-01-01 | Stop reason: HOSPADM

## 2020-01-01 RX ORDER — FUROSEMIDE 40 MG/1
20 TABLET ORAL
Status: DISCONTINUED | OUTPATIENT
Start: 2020-01-01 | End: 2020-01-01 | Stop reason: HOSPADM

## 2020-01-01 RX ORDER — LORAZEPAM 1 MG/1
1 TABLET ORAL
Status: DISCONTINUED | OUTPATIENT
Start: 2020-01-01 | End: 2020-01-01

## 2020-01-01 RX ORDER — AMIODARONE HYDROCHLORIDE 200 MG/1
200 TABLET ORAL 2 TIMES DAILY
Qty: 30 TAB | Refills: 0 | Status: SHIPPED
Start: 2020-01-01 | End: 2020-01-01

## 2020-01-01 RX ORDER — MORPHINE SULFATE 20 MG/ML
10 SOLUTION ORAL
Qty: 30 ML | Refills: 0 | Status: SHIPPED | OUTPATIENT
Start: 2020-01-01 | End: 2020-04-26

## 2020-01-01 RX ORDER — LISINOPRIL 5 MG/1
5 TABLET ORAL DAILY
Status: DISCONTINUED | OUTPATIENT
Start: 2020-01-01 | End: 2020-01-01 | Stop reason: HOSPADM

## 2020-01-01 RX ORDER — FENTANYL CITRATE-0.9 % NACL/PF 25 MCG/ML
25-200 PLASTIC BAG, INJECTION (ML) INJECTION
Status: DISCONTINUED | OUTPATIENT
Start: 2020-01-01 | End: 2020-01-01

## 2020-01-01 RX ORDER — LORAZEPAM 2 MG/ML
1 INJECTION INTRAMUSCULAR
Status: DISCONTINUED | OUTPATIENT
Start: 2020-01-01 | End: 2020-01-01

## 2020-01-01 RX ORDER — ACETAMINOPHEN 650 MG/1
650 SUPPOSITORY RECTAL
Status: DISCONTINUED | OUTPATIENT
Start: 2020-01-01 | End: 2020-01-01 | Stop reason: HOSPADM

## 2020-01-01 RX ORDER — ATORVASTATIN CALCIUM 40 MG/1
40 TABLET, FILM COATED ORAL
Status: DISCONTINUED | OUTPATIENT
Start: 2020-01-01 | End: 2020-01-01

## 2020-01-01 RX ORDER — OXYBUTYNIN CHLORIDE 5 MG/1
5 TABLET ORAL 3 TIMES DAILY
Status: DISCONTINUED | OUTPATIENT
Start: 2020-01-01 | End: 2020-01-01 | Stop reason: HOSPADM

## 2020-01-01 RX ORDER — OXYBUTYNIN CHLORIDE 5 MG/1
5 TABLET ORAL
Status: DISCONTINUED | OUTPATIENT
Start: 2020-01-01 | End: 2020-01-01 | Stop reason: HOSPADM

## 2020-01-01 RX ORDER — DIPHENHYDRAMINE HYDROCHLORIDE 50 MG/ML
12.5 INJECTION, SOLUTION INTRAMUSCULAR; INTRAVENOUS
Status: CANCELLED | OUTPATIENT
Start: 2020-01-01

## 2020-01-01 RX ORDER — MORPHINE SULFATE 4 MG/ML
4 INJECTION INTRAVENOUS
Status: DISCONTINUED | OUTPATIENT
Start: 2020-01-01 | End: 2020-01-01 | Stop reason: HOSPADM

## 2020-01-01 RX ORDER — WARFARIN 1 MG/1
0.5 TABLET ORAL EVERY EVENING
Status: DISCONTINUED | OUTPATIENT
Start: 2020-01-01 | End: 2020-01-01

## 2020-01-01 RX ORDER — SENNOSIDES 8.6 MG/1
1 TABLET ORAL 2 TIMES DAILY
Status: DISCONTINUED | OUTPATIENT
Start: 2020-01-01 | End: 2020-01-01

## 2020-01-01 RX ORDER — OXYCODONE HYDROCHLORIDE 5 MG/1
5 TABLET ORAL
Status: CANCELLED | OUTPATIENT
Start: 2020-01-01 | End: 2020-01-01

## 2020-01-01 RX ORDER — MORPHINE SULFATE 2 MG/ML
2 INJECTION, SOLUTION INTRAMUSCULAR; INTRAVENOUS
Status: DISCONTINUED | OUTPATIENT
Start: 2020-01-01 | End: 2020-01-01

## 2020-01-01 RX ORDER — LISINOPRIL 5 MG/1
5 TABLET ORAL DAILY
Qty: 30 TAB | Refills: 0 | Status: SHIPPED
Start: 2020-01-01 | End: 2020-01-01

## 2020-01-01 RX ORDER — SPIRONOLACTONE 25 MG/1
12.5 TABLET ORAL DAILY
Status: DISCONTINUED | OUTPATIENT
Start: 2020-01-01 | End: 2020-01-01

## 2020-01-01 RX ORDER — NITROGLYCERIN 0.4 MG/1
0.4 TABLET SUBLINGUAL
Status: DISCONTINUED | OUTPATIENT
Start: 2020-01-01 | End: 2020-01-01 | Stop reason: HOSPADM

## 2020-01-01 RX ORDER — PROPOFOL 10 MG/ML
INJECTION, EMULSION INTRAVENOUS
Status: DISCONTINUED | OUTPATIENT
Start: 2020-01-01 | End: 2020-01-01 | Stop reason: HOSPADM

## 2020-01-01 RX ORDER — POTASSIUM CHLORIDE 20 MEQ/1
40 TABLET, EXTENDED RELEASE ORAL 2 TIMES DAILY
Status: DISCONTINUED | OUTPATIENT
Start: 2020-01-01 | End: 2020-01-01 | Stop reason: HOSPADM

## 2020-01-01 RX ORDER — AMIODARONE HYDROCHLORIDE 200 MG/1
200 TABLET ORAL 2 TIMES DAILY
Status: DISCONTINUED | OUTPATIENT
Start: 2020-01-01 | End: 2020-01-01

## 2020-01-01 RX ORDER — ATROPA BELLADONNA AND OPIUM 16.2; 6 MG/1; MG/1
1 SUPPOSITORY RECTAL
Status: DISCONTINUED | OUTPATIENT
Start: 2020-01-01 | End: 2020-01-01 | Stop reason: HOSPADM

## 2020-01-01 RX ORDER — AMIODARONE HYDROCHLORIDE 200 MG/1
200 TABLET ORAL 2 TIMES DAILY
Status: DISCONTINUED | OUTPATIENT
Start: 2020-01-01 | End: 2020-01-01 | Stop reason: HOSPADM

## 2020-01-01 RX ORDER — FACIAL-BODY WIPES
10 EACH TOPICAL
Qty: 5 SUPPOSITORY | Refills: 0 | Status: SHIPPED | OUTPATIENT
Start: 2020-01-01

## 2020-01-01 RX ORDER — WARFARIN 2.5 MG/1
2.5 TABLET ORAL
Status: DISCONTINUED | OUTPATIENT
Start: 2020-01-01 | End: 2020-01-01

## 2020-01-01 RX ORDER — SUCCINYLCHOLINE CHLORIDE 20 MG/ML
150 INJECTION INTRAMUSCULAR; INTRAVENOUS
Status: COMPLETED | OUTPATIENT
Start: 2020-01-01 | End: 2020-01-01

## 2020-01-01 RX ORDER — FENTANYL CITRATE 50 UG/ML
50 INJECTION, SOLUTION INTRAMUSCULAR; INTRAVENOUS
Status: COMPLETED | OUTPATIENT
Start: 2020-01-01 | End: 2020-01-01

## 2020-01-01 RX ORDER — ETOMIDATE 2 MG/ML
30 INJECTION INTRAVENOUS ONCE
Status: COMPLETED | OUTPATIENT
Start: 2020-01-01 | End: 2020-01-01

## 2020-01-01 RX ORDER — LORAZEPAM 1 MG/1
1 TABLET ORAL
Status: DISCONTINUED | OUTPATIENT
Start: 2020-01-01 | End: 2020-01-01 | Stop reason: HOSPADM

## 2020-01-01 RX ORDER — HALOPERIDOL 5 MG/ML
4 INJECTION INTRAMUSCULAR EVERY 6 HOURS
Status: DISCONTINUED | OUTPATIENT
Start: 2020-01-01 | End: 2020-01-01

## 2020-01-01 RX ORDER — FUROSEMIDE 10 MG/ML
60 INJECTION INTRAMUSCULAR; INTRAVENOUS
Status: COMPLETED | OUTPATIENT
Start: 2020-01-01 | End: 2020-01-01

## 2020-01-01 RX ORDER — LORAZEPAM 1 MG/1
1 TABLET ORAL
Qty: 20 TAB | Refills: 0 | Status: SHIPPED | OUTPATIENT
Start: 2020-01-01

## 2020-01-01 RX ORDER — HALOPERIDOL 5 MG/ML
4 INJECTION INTRAMUSCULAR EVERY 4 HOURS
Status: DISCONTINUED | OUTPATIENT
Start: 2020-01-01 | End: 2020-01-01

## 2020-01-01 RX ORDER — METOPROLOL SUCCINATE 25 MG/1
12.5 TABLET, EXTENDED RELEASE ORAL DAILY
Status: DISCONTINUED | OUTPATIENT
Start: 2020-01-01 | End: 2020-01-01 | Stop reason: HOSPADM

## 2020-01-01 RX ORDER — ATORVASTATIN CALCIUM 40 MG/1
40 TABLET, FILM COATED ORAL DAILY
Status: DISCONTINUED | OUTPATIENT
Start: 2020-01-01 | End: 2020-01-01

## 2020-01-01 RX ORDER — HYOSCYAMINE SULFATE 0.12 MG/1
0.12 TABLET SUBLINGUAL
Status: DISCONTINUED | OUTPATIENT
Start: 2020-01-01 | End: 2020-01-01

## 2020-01-01 RX ORDER — SODIUM CHLORIDE 0.9 % (FLUSH) 0.9 %
5-40 SYRINGE (ML) INJECTION AS NEEDED
Status: DISCONTINUED | OUTPATIENT
Start: 2020-01-01 | End: 2020-01-01 | Stop reason: HOSPADM

## 2020-01-01 RX ORDER — WARFARIN 1 MG/1
1.5 TABLET ORAL EVERY EVENING
Status: DISCONTINUED | OUTPATIENT
Start: 2020-01-01 | End: 2020-01-01

## 2020-01-01 RX ORDER — SODIUM CHLORIDE, SODIUM LACTATE, POTASSIUM CHLORIDE, CALCIUM CHLORIDE 600; 310; 30; 20 MG/100ML; MG/100ML; MG/100ML; MG/100ML
75 INJECTION, SOLUTION INTRAVENOUS CONTINUOUS
Status: DISCONTINUED | OUTPATIENT
Start: 2020-01-01 | End: 2020-01-01 | Stop reason: HOSPADM

## 2020-01-01 RX ORDER — ACETAMINOPHEN 650 MG/1
650 SUPPOSITORY RECTAL
Qty: 5 SUPPOSITORY | Refills: 0 | Status: SHIPPED | OUTPATIENT
Start: 2020-01-01 | End: 2020-01-01

## 2020-01-01 RX ORDER — SODIUM CHLORIDE 0.9 % (FLUSH) 0.9 %
3 SYRINGE (ML) INJECTION EVERY 12 HOURS
Status: DISCONTINUED | OUTPATIENT
Start: 2020-01-01 | End: 2020-01-01

## 2020-01-01 RX ORDER — WARFARIN 2.5 MG/1
2.5 TABLET ORAL EVERY EVENING
Status: DISCONTINUED | OUTPATIENT
Start: 2020-01-01 | End: 2020-01-01 | Stop reason: HOSPADM

## 2020-01-01 RX ORDER — METOPROLOL TARTRATE 25 MG/1
12.5 TABLET, FILM COATED ORAL EVERY 12 HOURS
Status: DISCONTINUED | OUTPATIENT
Start: 2020-01-01 | End: 2020-01-01 | Stop reason: HOSPADM

## 2020-01-01 RX ORDER — FENTANYL CITRATE 50 UG/ML
INJECTION, SOLUTION INTRAMUSCULAR; INTRAVENOUS
Status: COMPLETED
Start: 2020-01-01 | End: 2020-01-01

## 2020-01-01 RX ORDER — MESALAMINE 0.38 G/1
0.38 CAPSULE, EXTENDED RELEASE ORAL DAILY
Status: DISCONTINUED | OUTPATIENT
Start: 2020-01-01 | End: 2020-01-01 | Stop reason: HOSPADM

## 2020-01-01 RX ORDER — WARFARIN 1 MG/1
2 TABLET ORAL EVERY EVENING
Status: DISCONTINUED | OUTPATIENT
Start: 2020-01-01 | End: 2020-01-01

## 2020-01-01 RX ORDER — LOPERAMIDE HYDROCHLORIDE 2 MG/1
4 CAPSULE ORAL AS NEEDED
Status: DISCONTINUED | OUTPATIENT
Start: 2020-01-01 | End: 2020-01-01

## 2020-01-01 RX ORDER — HYDROMORPHONE HYDROCHLORIDE 2 MG/ML
0.2 INJECTION, SOLUTION INTRAMUSCULAR; INTRAVENOUS; SUBCUTANEOUS
Status: CANCELLED | OUTPATIENT
Start: 2020-01-01

## 2020-01-01 RX ORDER — PROPOFOL 10 MG/ML
50 INJECTION, EMULSION INTRAVENOUS
Status: COMPLETED | OUTPATIENT
Start: 2020-01-01 | End: 2020-01-01

## 2020-01-01 RX ORDER — FUROSEMIDE 40 MG/1
40 TABLET ORAL EVERY 12 HOURS
Status: DISCONTINUED | OUTPATIENT
Start: 2020-01-01 | End: 2020-01-01 | Stop reason: HOSPADM

## 2020-01-01 RX ORDER — GABAPENTIN 300 MG/1
300 CAPSULE ORAL 2 TIMES DAILY
Status: DISCONTINUED | OUTPATIENT
Start: 2020-01-01 | End: 2020-01-01

## 2020-01-01 RX ORDER — SODIUM CHLORIDE, SODIUM LACTATE, POTASSIUM CHLORIDE, CALCIUM CHLORIDE 600; 310; 30; 20 MG/100ML; MG/100ML; MG/100ML; MG/100ML
100 INJECTION, SOLUTION INTRAVENOUS CONTINUOUS
Status: CANCELLED | OUTPATIENT
Start: 2020-01-01 | End: 2020-01-01

## 2020-01-01 RX ORDER — MORPHINE SULFATE 2 MG/ML
2 INJECTION, SOLUTION INTRAMUSCULAR; INTRAVENOUS
Status: DISCONTINUED | OUTPATIENT
Start: 2020-01-01 | End: 2020-01-01 | Stop reason: HOSPADM

## 2020-01-01 RX ORDER — FUROSEMIDE 10 MG/ML
40 INJECTION INTRAMUSCULAR; INTRAVENOUS EVERY 12 HOURS
Status: DISCONTINUED | OUTPATIENT
Start: 2020-01-01 | End: 2020-01-01

## 2020-01-01 RX ORDER — FUROSEMIDE 10 MG/ML
80 INJECTION INTRAMUSCULAR; INTRAVENOUS 2 TIMES DAILY
Status: DISCONTINUED | OUTPATIENT
Start: 2020-01-01 | End: 2020-01-01 | Stop reason: HOSPADM

## 2020-01-01 RX ORDER — HALOPERIDOL 2 MG/ML
2 SOLUTION ORAL
Qty: 30 ML | Refills: 0 | Status: SHIPPED | OUTPATIENT
Start: 2020-01-01

## 2020-01-01 RX ORDER — ACETAMINOPHEN 325 MG/1
650 TABLET ORAL
Status: DISCONTINUED | OUTPATIENT
Start: 2020-01-01 | End: 2020-01-01 | Stop reason: HOSPADM

## 2020-01-01 RX ORDER — VANCOMYCIN/0.9 % SOD CHLORIDE 1.5G/250ML
1500 PLASTIC BAG, INJECTION (ML) INTRAVENOUS
Status: DISCONTINUED | OUTPATIENT
Start: 2020-01-01 | End: 2020-01-01 | Stop reason: DRUGHIGH

## 2020-01-01 RX ORDER — SODIUM CHLORIDE 0.9 % (FLUSH) 0.9 %
3 SYRINGE (ML) INJECTION AS NEEDED
Status: DISCONTINUED | OUTPATIENT
Start: 2020-01-01 | End: 2020-01-01

## 2020-01-01 RX ORDER — GUAIFENESIN 100 MG/5ML
81 LIQUID (ML) ORAL DAILY
Status: DISCONTINUED | OUTPATIENT
Start: 2020-01-01 | End: 2020-01-01 | Stop reason: HOSPADM

## 2020-01-01 RX ORDER — CEFAZOLIN SODIUM/WATER 2 G/20 ML
2 SYRINGE (ML) INTRAVENOUS ONCE
Status: COMPLETED | OUTPATIENT
Start: 2020-01-01 | End: 2020-01-01

## 2020-01-01 RX ORDER — HALOPERIDOL 2 MG/ML
2 SOLUTION ORAL
Status: DISCONTINUED | OUTPATIENT
Start: 2020-01-01 | End: 2020-01-01 | Stop reason: HOSPADM

## 2020-01-01 RX ADMIN — Medication 10 ML: at 15:51

## 2020-01-01 RX ADMIN — OXYBUTYNIN CHLORIDE 5 MG: 5 TABLET ORAL at 17:44

## 2020-01-01 RX ADMIN — Medication 10 ML: at 14:00

## 2020-01-01 RX ADMIN — Medication 3 ML: at 21:39

## 2020-01-01 RX ADMIN — POTASSIUM BICARBONATE 40 MEQ: 782 TABLET, EFFERVESCENT ORAL at 05:00

## 2020-01-01 RX ADMIN — SPIRONOLACTONE 25 MG: 25 TABLET ORAL at 08:15

## 2020-01-01 RX ADMIN — AMIODARONE HYDROCHLORIDE 200 MG: 200 TABLET ORAL at 08:45

## 2020-01-01 RX ADMIN — Medication 10 ML: at 21:27

## 2020-01-01 RX ADMIN — PIPERACILLIN AND TAZOBACTAM 4.5 G: 4; .5 INJECTION, POWDER, FOR SOLUTION INTRAVENOUS at 09:37

## 2020-01-01 RX ADMIN — ATORVASTATIN CALCIUM 40 MG: 40 TABLET, FILM COATED ORAL at 10:22

## 2020-01-01 RX ADMIN — PIPERACILLIN AND TAZOBACTAM 4.5 G: 4; .5 INJECTION, POWDER, FOR SOLUTION INTRAVENOUS at 09:06

## 2020-01-01 RX ADMIN — Medication 10 ML: at 05:17

## 2020-01-01 RX ADMIN — AMIODARONE HYDROCHLORIDE 200 MG: 200 TABLET ORAL at 17:20

## 2020-01-01 RX ADMIN — SPIRONOLACTONE 12.5 MG: 25 TABLET ORAL at 09:09

## 2020-01-01 RX ADMIN — HALOPERIDOL LACTATE 4 MG: 5 INJECTION INTRAMUSCULAR at 04:15

## 2020-01-01 RX ADMIN — MORPHINE SULFATE 4 MG: 4 INJECTION INTRAVENOUS at 17:03

## 2020-01-01 RX ADMIN — LORAZEPAM 1 MG: 2 INJECTION, SOLUTION INTRAMUSCULAR; INTRAVENOUS at 00:00

## 2020-01-01 RX ADMIN — MORPHINE SULFATE 4 MG: 4 INJECTION INTRAVENOUS at 08:01

## 2020-01-01 RX ADMIN — HALOPERIDOL LACTATE 2 MG: 5 INJECTION INTRAMUSCULAR at 10:12

## 2020-01-01 RX ADMIN — INSULIN LISPRO 1 UNITS: 100 INJECTION, SOLUTION INTRAVENOUS; SUBCUTANEOUS at 18:25

## 2020-01-01 RX ADMIN — METOPROLOL TARTRATE 12.5 MG: 25 TABLET ORAL at 21:09

## 2020-01-01 RX ADMIN — HALOPERIDOL LACTATE 4 MG: 5 INJECTION INTRAMUSCULAR at 16:21

## 2020-01-01 RX ADMIN — GABAPENTIN 100 MG: 100 CAPSULE ORAL at 16:35

## 2020-01-01 RX ADMIN — INSULIN LISPRO 1 UNITS: 100 INJECTION, SOLUTION INTRAVENOUS; SUBCUTANEOUS at 00:10

## 2020-01-01 RX ADMIN — POTASSIUM CHLORIDE 20 MEQ: 20 TABLET, EXTENDED RELEASE ORAL at 09:40

## 2020-01-01 RX ADMIN — Medication 10 ML: at 05:00

## 2020-01-01 RX ADMIN — PIPERACILLIN AND TAZOBACTAM 4.5 G: 4; .5 INJECTION, POWDER, FOR SOLUTION INTRAVENOUS at 18:26

## 2020-01-01 RX ADMIN — INSULIN LISPRO 1 UNITS: 100 INJECTION, SOLUTION INTRAVENOUS; SUBCUTANEOUS at 17:44

## 2020-01-01 RX ADMIN — PROPOFOL 50 MG: 10 INJECTION, EMULSION INTRAVENOUS at 21:21

## 2020-01-01 RX ADMIN — MORPHINE SULFATE 4 MG: 4 INJECTION INTRAVENOUS at 04:15

## 2020-01-01 RX ADMIN — MORPHINE SULFATE 4 MG: 4 INJECTION INTRAVENOUS at 08:40

## 2020-01-01 RX ADMIN — INSULIN LISPRO 1 UNITS: 100 INJECTION, SOLUTION INTRAVENOUS; SUBCUTANEOUS at 05:53

## 2020-01-01 RX ADMIN — PROPOFOL 25 MCG/KG/MIN: 10 INJECTION, EMULSION INTRAVENOUS at 11:15

## 2020-01-01 RX ADMIN — HALOPERIDOL LACTATE 4 MG: 5 INJECTION INTRAMUSCULAR at 08:49

## 2020-01-01 RX ADMIN — METOPROLOL TARTRATE 12.5 MG: 25 TABLET ORAL at 21:10

## 2020-01-01 RX ADMIN — ALBUTEROL SULFATE 2.5 MG: 2.5 SOLUTION RESPIRATORY (INHALATION) at 12:27

## 2020-01-01 RX ADMIN — Medication 10 ML: at 06:00

## 2020-01-01 RX ADMIN — HALOPERIDOL LACTATE 4 MG: 5 INJECTION INTRAMUSCULAR at 03:34

## 2020-01-01 RX ADMIN — MORPHINE SULFATE 4 MG: 4 INJECTION INTRAVENOUS at 23:58

## 2020-01-01 RX ADMIN — FAMOTIDINE 20 MG: 10 INJECTION INTRAVENOUS at 08:16

## 2020-01-01 RX ADMIN — FUROSEMIDE 10 MG/HR: 10 INJECTION, SOLUTION INTRAMUSCULAR; INTRAVENOUS at 16:26

## 2020-01-01 RX ADMIN — OXYBUTYNIN CHLORIDE 5 MG: 5 TABLET ORAL at 08:47

## 2020-01-01 RX ADMIN — RISPERIDONE 0.5 MG: 1 TABLET ORAL at 21:46

## 2020-01-01 RX ADMIN — ALBUTEROL SULFATE 1.25 MG: 2.5 SOLUTION RESPIRATORY (INHALATION) at 19:30

## 2020-01-01 RX ADMIN — INSULIN LISPRO 1 UNITS: 100 INJECTION, SOLUTION INTRAVENOUS; SUBCUTANEOUS at 23:58

## 2020-01-01 RX ADMIN — FAMOTIDINE 20 MG: 10 INJECTION INTRAVENOUS at 10:22

## 2020-01-01 RX ADMIN — MORPHINE SULFATE 4 MG: 4 INJECTION INTRAVENOUS at 14:54

## 2020-01-01 RX ADMIN — FAMOTIDINE 20 MG: 10 INJECTION INTRAVENOUS at 09:10

## 2020-01-01 RX ADMIN — RISPERIDONE 0.5 MG: 1 TABLET ORAL at 21:01

## 2020-01-01 RX ADMIN — MORPHINE SULFATE 10 MG: 100 SOLUTION ORAL at 17:34

## 2020-01-01 RX ADMIN — INSULIN LISPRO 1 UNITS: 100 INJECTION, SOLUTION INTRAVENOUS; SUBCUTANEOUS at 17:53

## 2020-01-01 RX ADMIN — GABAPENTIN 100 MG: 100 CAPSULE ORAL at 08:00

## 2020-01-01 RX ADMIN — Medication 3 ML: at 09:55

## 2020-01-01 RX ADMIN — OXYBUTYNIN CHLORIDE 5 MG: 5 TABLET ORAL at 16:00

## 2020-01-01 RX ADMIN — MORPHINE SULFATE 4 MG: 4 INJECTION INTRAVENOUS at 05:19

## 2020-01-01 RX ADMIN — MORPHINE SULFATE 4 MG: 4 INJECTION INTRAVENOUS at 16:21

## 2020-01-01 RX ADMIN — Medication 10 ML: at 05:23

## 2020-01-01 RX ADMIN — HALOPERIDOL LACTATE 4 MG: 5 INJECTION INTRAMUSCULAR at 03:35

## 2020-01-01 RX ADMIN — FUROSEMIDE 20 MG: 40 TABLET ORAL at 08:13

## 2020-01-01 RX ADMIN — Medication 10 ML: at 21:46

## 2020-01-01 RX ADMIN — MORPHINE SULFATE 4 MG: 4 INJECTION INTRAVENOUS at 20:57

## 2020-01-01 RX ADMIN — Medication 10 ML: at 12:00

## 2020-01-01 RX ADMIN — INSULIN LISPRO 2 UNITS: 100 INJECTION, SOLUTION INTRAVENOUS; SUBCUTANEOUS at 23:09

## 2020-01-01 RX ADMIN — AMIODARONE HYDROCHLORIDE 200 MG: 200 TABLET ORAL at 17:24

## 2020-01-01 RX ADMIN — Medication 10 ML: at 17:01

## 2020-01-01 RX ADMIN — LISINOPRIL 5 MG: 5 TABLET ORAL at 09:08

## 2020-01-01 RX ADMIN — ALBUTEROL SULFATE 1.25 MG: 2.5 SOLUTION RESPIRATORY (INHALATION) at 11:10

## 2020-01-01 RX ADMIN — AMIODARONE HYDROCHLORIDE 200 MG: 200 TABLET ORAL at 08:47

## 2020-01-01 RX ADMIN — ALBUTEROL SULFATE 1.25 MG: 2.5 SOLUTION RESPIRATORY (INHALATION) at 19:28

## 2020-01-01 RX ADMIN — INSULIN LISPRO 2 UNITS: 100 INJECTION, SOLUTION INTRAVENOUS; SUBCUTANEOUS at 12:23

## 2020-01-01 RX ADMIN — INSULIN LISPRO 1 UNITS: 100 INJECTION, SOLUTION INTRAVENOUS; SUBCUTANEOUS at 12:50

## 2020-01-01 RX ADMIN — GABAPENTIN 100 MG: 100 CAPSULE ORAL at 21:19

## 2020-01-01 RX ADMIN — MORPHINE SULFATE 2 MG: 2 INJECTION, SOLUTION INTRAMUSCULAR; INTRAVENOUS at 20:52

## 2020-01-01 RX ADMIN — VANCOMYCIN HYDROCHLORIDE 2000 MG: 10 INJECTION, POWDER, LYOPHILIZED, FOR SOLUTION INTRAVENOUS at 10:06

## 2020-01-01 RX ADMIN — FUROSEMIDE 20 MG: 40 TABLET ORAL at 16:34

## 2020-01-01 RX ADMIN — OXYBUTYNIN CHLORIDE 5 MG: 5 TABLET ORAL at 08:45

## 2020-01-01 RX ADMIN — POTASSIUM CHLORIDE 20 MEQ: 14.9 INJECTION, SOLUTION INTRAVENOUS at 16:28

## 2020-01-01 RX ADMIN — INSULIN LISPRO 1 UNITS: 100 INJECTION, SOLUTION INTRAVENOUS; SUBCUTANEOUS at 12:00

## 2020-01-01 RX ADMIN — POTASSIUM CHLORIDE 20 MEQ: 20 TABLET, EXTENDED RELEASE ORAL at 09:38

## 2020-01-01 RX ADMIN — ATORVASTATIN CALCIUM 40 MG: 40 TABLET, FILM COATED ORAL at 08:27

## 2020-01-01 RX ADMIN — FUROSEMIDE 40 MG: 10 INJECTION, SOLUTION INTRAMUSCULAR; INTRAVENOUS at 18:14

## 2020-01-01 RX ADMIN — SENNOSIDES 8.6 MG: 8.6 TABLET, FILM COATED ORAL at 20:32

## 2020-01-01 RX ADMIN — SODIUM CHLORIDE, PRESERVATIVE FREE 3 ML: 5 INJECTION INTRAVENOUS at 23:56

## 2020-01-01 RX ADMIN — AMIODARONE HYDROCHLORIDE 200 MG: 200 TABLET ORAL at 17:51

## 2020-01-01 RX ADMIN — FUROSEMIDE 20 MG: 40 TABLET ORAL at 16:56

## 2020-01-01 RX ADMIN — POTASSIUM CHLORIDE 20 MEQ: 200 INJECTION, SOLUTION INTRAVENOUS at 00:37

## 2020-01-01 RX ADMIN — SODIUM CHLORIDE 4.5 G: 900 INJECTION, SOLUTION INTRAVENOUS at 00:12

## 2020-01-01 RX ADMIN — SPIRONOLACTONE 25 MG: 25 TABLET ORAL at 08:26

## 2020-01-01 RX ADMIN — ALBUTEROL SULFATE 1.25 MG: 2.5 SOLUTION RESPIRATORY (INHALATION) at 21:41

## 2020-01-01 RX ADMIN — MORPHINE SULFATE 4 MG: 4 INJECTION INTRAVENOUS at 23:55

## 2020-01-01 RX ADMIN — OXYBUTYNIN CHLORIDE 5 MG: 5 TABLET ORAL at 21:21

## 2020-01-01 RX ADMIN — HALOPERIDOL LACTATE 2 MG: 5 INJECTION INTRAMUSCULAR at 10:57

## 2020-01-01 RX ADMIN — FAMOTIDINE 20 MG: 10 INJECTION INTRAVENOUS at 20:05

## 2020-01-01 RX ADMIN — Medication 10 ML: at 13:15

## 2020-01-01 RX ADMIN — ATORVASTATIN CALCIUM 40 MG: 40 TABLET, FILM COATED ORAL at 08:15

## 2020-01-01 RX ADMIN — PIPERACILLIN AND TAZOBACTAM 4.5 G: 4; .5 INJECTION, POWDER, FOR SOLUTION INTRAVENOUS at 09:40

## 2020-01-01 RX ADMIN — ALBUTEROL SULFATE 1.25 MG: 2.5 SOLUTION RESPIRATORY (INHALATION) at 11:05

## 2020-01-01 RX ADMIN — FUROSEMIDE 40 MG: 10 INJECTION, SOLUTION INTRAMUSCULAR; INTRAVENOUS at 10:22

## 2020-01-01 RX ADMIN — FUROSEMIDE 40 MG: 40 INJECTION, SOLUTION INTRAMUSCULAR; INTRAVENOUS at 04:45

## 2020-01-01 RX ADMIN — LORAZEPAM 1 MG: 2 INJECTION INTRAMUSCULAR; INTRAVENOUS at 06:03

## 2020-01-01 RX ADMIN — FAMOTIDINE 20 MG: 10 INJECTION INTRAVENOUS at 22:29

## 2020-01-01 RX ADMIN — HALOPERIDOL LACTATE 4 MG: 5 INJECTION INTRAMUSCULAR at 20:57

## 2020-01-01 RX ADMIN — MORPHINE SULFATE 2 MG: 2 INJECTION, SOLUTION INTRAMUSCULAR; INTRAVENOUS at 14:50

## 2020-01-01 RX ADMIN — INSULIN LISPRO 1 UNITS: 100 INJECTION, SOLUTION INTRAVENOUS; SUBCUTANEOUS at 12:46

## 2020-01-01 RX ADMIN — PIPERACILLIN AND TAZOBACTAM 4.5 G: 4; .5 INJECTION, POWDER, FOR SOLUTION INTRAVENOUS at 09:59

## 2020-01-01 RX ADMIN — SPIRONOLACTONE 25 MG: 25 TABLET ORAL at 09:10

## 2020-01-01 RX ADMIN — MORPHINE SULFATE 2 MG: 2 INJECTION, SOLUTION INTRAMUSCULAR; INTRAVENOUS at 02:50

## 2020-01-01 RX ADMIN — ATORVASTATIN CALCIUM 40 MG: 40 TABLET, FILM COATED ORAL at 09:08

## 2020-01-01 RX ADMIN — AMIODARONE HYDROCHLORIDE 200 MG: 200 TABLET ORAL at 09:06

## 2020-01-01 RX ADMIN — OXYBUTYNIN CHLORIDE 5 MG: 5 TABLET ORAL at 15:52

## 2020-01-01 RX ADMIN — FUROSEMIDE 10 MG/HR: 10 INJECTION, SOLUTION INTRAMUSCULAR; INTRAVENOUS at 14:05

## 2020-01-01 RX ADMIN — METOPROLOL SUCCINATE 25 MG: 50 TABLET, EXTENDED RELEASE ORAL at 09:05

## 2020-01-01 RX ADMIN — Medication 10 ML: at 22:31

## 2020-01-01 RX ADMIN — INSULIN LISPRO 1 UNITS: 100 INJECTION, SOLUTION INTRAVENOUS; SUBCUTANEOUS at 06:15

## 2020-01-01 RX ADMIN — RISPERIDONE 0.5 MG: 1 TABLET ORAL at 21:16

## 2020-01-01 RX ADMIN — FUROSEMIDE 40 MG: 10 INJECTION, SOLUTION INTRAMUSCULAR; INTRAVENOUS at 21:21

## 2020-01-01 RX ADMIN — INSULIN LISPRO 1 UNITS: 100 INJECTION, SOLUTION INTRAVENOUS; SUBCUTANEOUS at 05:50

## 2020-01-01 RX ADMIN — ATROPA BELLADONNA AND OPIUM 1 SUPPOSITORY: 16.2; 6 SUPPOSITORY RECTAL at 09:05

## 2020-01-01 RX ADMIN — POTASSIUM CHLORIDE 20 MEQ: 14.9 INJECTION, SOLUTION INTRAVENOUS at 14:18

## 2020-01-01 RX ADMIN — Medication 10 ML: at 05:37

## 2020-01-01 RX ADMIN — HALOPERIDOL LACTATE 2 MG: 5 INJECTION INTRAMUSCULAR at 22:50

## 2020-01-01 RX ADMIN — POTASSIUM CHLORIDE 40 MEQ: 20 TABLET, EXTENDED RELEASE ORAL at 08:10

## 2020-01-01 RX ADMIN — TUBERCULIN PURIFIED PROTEIN DERIVATIVE 5 UNITS: 5 INJECTION, SOLUTION INTRADERMAL at 16:52

## 2020-01-01 RX ADMIN — OXYBUTYNIN CHLORIDE 5 MG: 5 TABLET ORAL at 21:09

## 2020-01-01 RX ADMIN — POTASSIUM CHLORIDE 40 MEQ: 20 TABLET, EXTENDED RELEASE ORAL at 17:32

## 2020-01-01 RX ADMIN — FUROSEMIDE 40 MG: 10 INJECTION, SOLUTION INTRAMUSCULAR; INTRAVENOUS at 08:27

## 2020-01-01 RX ADMIN — AMIODARONE HYDROCHLORIDE 200 MG: 200 TABLET ORAL at 09:08

## 2020-01-01 RX ADMIN — OXYBUTYNIN CHLORIDE 5 MG: 5 TABLET ORAL at 12:27

## 2020-01-01 RX ADMIN — METOPROLOL TARTRATE 12.5 MG: 25 TABLET ORAL at 21:19

## 2020-01-01 RX ADMIN — ATORVASTATIN CALCIUM 40 MG: 40 TABLET, FILM COATED ORAL at 08:04

## 2020-01-01 RX ADMIN — LORAZEPAM 1 MG: 2 INJECTION INTRAMUSCULAR; INTRAVENOUS at 02:38

## 2020-01-01 RX ADMIN — MORPHINE SULFATE 4 MG: 4 INJECTION INTRAVENOUS at 07:01

## 2020-01-01 RX ADMIN — FUROSEMIDE 40 MG: 10 INJECTION, SOLUTION INTRAMUSCULAR; INTRAVENOUS at 17:15

## 2020-01-01 RX ADMIN — MORPHINE SULFATE 4 MG: 4 INJECTION INTRAVENOUS at 03:34

## 2020-01-01 RX ADMIN — SODIUM CHLORIDE, PRESERVATIVE FREE 3 ML: 5 INJECTION INTRAVENOUS at 13:42

## 2020-01-01 RX ADMIN — MORPHINE SULFATE 4 MG: 4 INJECTION INTRAVENOUS at 22:49

## 2020-01-01 RX ADMIN — ASPIRIN 81 MG 81 MG: 81 TABLET ORAL at 10:22

## 2020-01-01 RX ADMIN — AMIODARONE HYDROCHLORIDE 0.5 MG/MIN: 50 INJECTION, SOLUTION INTRAVENOUS at 01:37

## 2020-01-01 RX ADMIN — FUROSEMIDE 40 MG: 10 INJECTION, SOLUTION INTRAMUSCULAR; INTRAVENOUS at 09:37

## 2020-01-01 RX ADMIN — LORAZEPAM 1 MG: 2 INJECTION, SOLUTION INTRAMUSCULAR; INTRAVENOUS at 07:01

## 2020-01-01 RX ADMIN — WARFARIN SODIUM 1 MG: 1 TABLET ORAL at 17:44

## 2020-01-01 RX ADMIN — RISPERIDONE 0.5 MG: 1 TABLET ORAL at 22:44

## 2020-01-01 RX ADMIN — POTASSIUM CHLORIDE 20 MEQ: 200 INJECTION, SOLUTION INTRAVENOUS at 07:29

## 2020-01-01 RX ADMIN — METOPROLOL SUCCINATE 25 MG: 50 TABLET, EXTENDED RELEASE ORAL at 08:16

## 2020-01-01 RX ADMIN — POTASSIUM CHLORIDE 40 MEQ: 20 TABLET, EXTENDED RELEASE ORAL at 08:48

## 2020-01-01 RX ADMIN — MORPHINE SULFATE 4 MG: 4 INJECTION INTRAVENOUS at 04:46

## 2020-01-01 RX ADMIN — Medication 10 ML: at 05:53

## 2020-01-01 RX ADMIN — HALOPERIDOL LACTATE 4 MG: 5 INJECTION INTRAMUSCULAR at 08:15

## 2020-01-01 RX ADMIN — FUROSEMIDE 40 MG: 40 TABLET ORAL at 08:11

## 2020-01-01 RX ADMIN — PIPERACILLIN AND TAZOBACTAM 4.5 G: 4; .5 INJECTION, POWDER, FOR SOLUTION INTRAVENOUS at 02:20

## 2020-01-01 RX ADMIN — ASPIRIN 81 MG 81 MG: 81 TABLET ORAL at 08:26

## 2020-01-01 RX ADMIN — Medication 10 ML: at 06:36

## 2020-01-01 RX ADMIN — INSULIN LISPRO 1 UNITS: 100 INJECTION, SOLUTION INTRAVENOUS; SUBCUTANEOUS at 18:12

## 2020-01-01 RX ADMIN — HALOPERIDOL LACTATE 4 MG: 5 INJECTION INTRAMUSCULAR at 16:24

## 2020-01-01 RX ADMIN — OXYBUTYNIN CHLORIDE 5 MG: 5 TABLET ORAL at 21:16

## 2020-01-01 RX ADMIN — INSULIN LISPRO 1 UNITS: 100 INJECTION, SOLUTION INTRAVENOUS; SUBCUTANEOUS at 17:20

## 2020-01-01 RX ADMIN — LORAZEPAM 2 MG: 2 INJECTION INTRAMUSCULAR; INTRAVENOUS at 13:44

## 2020-01-01 RX ADMIN — POTASSIUM CHLORIDE 40 MEQ: 20 TABLET, EXTENDED RELEASE ORAL at 08:46

## 2020-01-01 RX ADMIN — MORPHINE SULFATE 4 MG: 4 INJECTION INTRAVENOUS at 08:51

## 2020-01-01 RX ADMIN — PERFLUTREN 1 ML: 6.52 INJECTION, SUSPENSION INTRAVENOUS at 07:45

## 2020-01-01 RX ADMIN — Medication 10 ML: at 05:27

## 2020-01-01 RX ADMIN — ALBUTEROL SULFATE 1.25 MG: 2.5 SOLUTION RESPIRATORY (INHALATION) at 15:17

## 2020-01-01 RX ADMIN — MORPHINE SULFATE 4 MG: 4 INJECTION INTRAVENOUS at 12:53

## 2020-01-01 RX ADMIN — ALBUTEROL SULFATE 1.25 MG: 2.5 SOLUTION RESPIRATORY (INHALATION) at 19:38

## 2020-01-01 RX ADMIN — FUROSEMIDE 40 MG: 10 INJECTION, SOLUTION INTRAMUSCULAR; INTRAVENOUS at 21:14

## 2020-01-01 RX ADMIN — AMIODARONE HYDROCHLORIDE 1 MG/MIN: 50 INJECTION, SOLUTION INTRAVENOUS at 11:25

## 2020-01-01 RX ADMIN — MORPHINE SULFATE 4 MG: 4 INJECTION INTRAVENOUS at 12:49

## 2020-01-01 RX ADMIN — MORPHINE SULFATE 4 MG: 4 INJECTION INTRAVENOUS at 11:49

## 2020-01-01 RX ADMIN — AMIODARONE HYDROCHLORIDE 0.5 MG/MIN: 50 INJECTION, SOLUTION INTRAVENOUS at 13:22

## 2020-01-01 RX ADMIN — INSULIN LISPRO 1 UNITS: 100 INJECTION, SOLUTION INTRAVENOUS; SUBCUTANEOUS at 00:04

## 2020-01-01 RX ADMIN — MORPHINE SULFATE 2 MG: 2 INJECTION, SOLUTION INTRAMUSCULAR; INTRAVENOUS at 04:45

## 2020-01-01 RX ADMIN — MORPHINE SULFATE 10 MG: 100 SOLUTION ORAL at 18:28

## 2020-01-01 RX ADMIN — VANCOMYCIN HYDROCHLORIDE 1500 MG: 10 INJECTION, POWDER, LYOPHILIZED, FOR SOLUTION INTRAVENOUS at 09:54

## 2020-01-01 RX ADMIN — HALOPERIDOL 2 MG: 2 SOLUTION ORAL at 21:09

## 2020-01-01 RX ADMIN — ATROPA BELLADONNA AND OPIUM 1 SUPPOSITORY: 16.2; 6 SUPPOSITORY RECTAL at 20:03

## 2020-01-01 RX ADMIN — ETOMIDATE 30 MG: 2 INJECTION, SOLUTION INTRAVENOUS at 21:16

## 2020-01-01 RX ADMIN — ASPIRIN 81 MG 81 MG: 81 TABLET ORAL at 08:16

## 2020-01-01 RX ADMIN — TUBERCULIN PURIFIED PROTEIN DERIVATIVE 5 UNITS: 5 INJECTION, SOLUTION INTRADERMAL at 20:00

## 2020-01-01 RX ADMIN — SODIUM CHLORIDE 2 MG/HR: 900 INJECTION, SOLUTION INTRAVENOUS at 23:45

## 2020-01-01 RX ADMIN — RISPERIDONE 0.5 MG: 1 TABLET ORAL at 21:26

## 2020-01-01 RX ADMIN — LIDOCAINE HYDROCHLORIDE 80 MG: 20 INJECTION, SOLUTION EPIDURAL; INFILTRATION; INTRACAUDAL; PERINEURAL at 11:11

## 2020-01-01 RX ADMIN — INSULIN LISPRO 1 UNITS: 100 INJECTION, SOLUTION INTRAVENOUS; SUBCUTANEOUS at 00:25

## 2020-01-01 RX ADMIN — PIPERACILLIN AND TAZOBACTAM 4.5 G: 4; .5 INJECTION, POWDER, FOR SOLUTION INTRAVENOUS at 17:18

## 2020-01-01 RX ADMIN — Medication 10 ML: at 21:16

## 2020-01-01 RX ADMIN — INSULIN LISPRO 1 UNITS: 100 INJECTION, SOLUTION INTRAVENOUS; SUBCUTANEOUS at 18:13

## 2020-01-01 RX ADMIN — Medication 10 ML: at 13:23

## 2020-01-01 RX ADMIN — VANCOMYCIN HYDROCHLORIDE 1500 MG: 10 INJECTION, POWDER, LYOPHILIZED, FOR SOLUTION INTRAVENOUS at 15:38

## 2020-01-01 RX ADMIN — Medication 10 ML: at 14:14

## 2020-01-01 RX ADMIN — VANCOMYCIN HYDROCHLORIDE 2500 MG: 10 INJECTION, POWDER, LYOPHILIZED, FOR SOLUTION INTRAVENOUS at 10:32

## 2020-01-01 RX ADMIN — PIPERACILLIN AND TAZOBACTAM 4.5 G: 4; .5 INJECTION, POWDER, FOR SOLUTION INTRAVENOUS at 11:07

## 2020-01-01 RX ADMIN — FUROSEMIDE 40 MG: 10 INJECTION, SOLUTION INTRAMUSCULAR; INTRAVENOUS at 09:10

## 2020-01-01 RX ADMIN — PIPERACILLIN AND TAZOBACTAM 4.5 G: 4; .5 INJECTION, POWDER, FOR SOLUTION INTRAVENOUS at 17:31

## 2020-01-01 RX ADMIN — ALBUTEROL SULFATE 1.25 MG: 2.5 SOLUTION RESPIRATORY (INHALATION) at 07:04

## 2020-01-01 RX ADMIN — POTASSIUM CHLORIDE 20 MEQ: 200 INJECTION, SOLUTION INTRAVENOUS at 07:49

## 2020-01-01 RX ADMIN — HALOPERIDOL LACTATE 4 MG: 5 INJECTION INTRAMUSCULAR at 23:37

## 2020-01-01 RX ADMIN — HALOPERIDOL LACTATE 4 MG: 5 INJECTION INTRAMUSCULAR at 04:04

## 2020-01-01 RX ADMIN — OXYBUTYNIN CHLORIDE 5 MG: 5 TABLET ORAL at 17:12

## 2020-01-01 RX ADMIN — AMIODARONE HYDROCHLORIDE 200 MG: 200 TABLET ORAL at 16:54

## 2020-01-01 RX ADMIN — FUROSEMIDE 10 MG/HR: 10 INJECTION, SOLUTION INTRAMUSCULAR; INTRAVENOUS at 00:32

## 2020-01-01 RX ADMIN — POTASSIUM CHLORIDE 40 MEQ: 20 TABLET, EXTENDED RELEASE ORAL at 08:26

## 2020-01-01 RX ADMIN — RISPERIDONE 0.5 MG: 1 TABLET ORAL at 19:46

## 2020-01-01 RX ADMIN — HALOPERIDOL LACTATE 4 MG: 5 INJECTION INTRAMUSCULAR at 23:59

## 2020-01-01 RX ADMIN — HALOPERIDOL 2 MG: 2 SOLUTION ORAL at 07:56

## 2020-01-01 RX ADMIN — SPIRONOLACTONE 25 MG: 25 TABLET ORAL at 09:05

## 2020-01-01 RX ADMIN — SODIUM CHLORIDE 500 ML: 900 INJECTION, SOLUTION INTRAVENOUS at 23:03

## 2020-01-01 RX ADMIN — HALOPERIDOL LACTATE 2 MG: 5 INJECTION INTRAMUSCULAR at 09:21

## 2020-01-01 RX ADMIN — FUROSEMIDE 60 MG: 10 INJECTION, SOLUTION INTRAMUSCULAR; INTRAVENOUS at 21:55

## 2020-01-01 RX ADMIN — POTASSIUM CHLORIDE 40 MEQ: 20 TABLET, EXTENDED RELEASE ORAL at 16:59

## 2020-01-01 RX ADMIN — MORPHINE SULFATE 4 MG: 4 INJECTION INTRAVENOUS at 20:46

## 2020-01-01 RX ADMIN — FAMOTIDINE 20 MG: 10 INJECTION INTRAVENOUS at 21:12

## 2020-01-01 RX ADMIN — OXYBUTYNIN CHLORIDE 5 MG: 5 TABLET ORAL at 08:11

## 2020-01-01 RX ADMIN — INSULIN LISPRO 1 UNITS: 100 INJECTION, SOLUTION INTRAVENOUS; SUBCUTANEOUS at 23:43

## 2020-01-01 RX ADMIN — PIPERACILLIN AND TAZOBACTAM 4.5 G: 4; .5 INJECTION, POWDER, FOR SOLUTION INTRAVENOUS at 02:16

## 2020-01-01 RX ADMIN — VANCOMYCIN HYDROCHLORIDE 1500 MG: 10 INJECTION, POWDER, LYOPHILIZED, FOR SOLUTION INTRAVENOUS at 21:40

## 2020-01-01 RX ADMIN — METOPROLOL SUCCINATE 12.5 MG: 25 TABLET, EXTENDED RELEASE ORAL at 08:46

## 2020-01-01 RX ADMIN — ALBUTEROL SULFATE 1.25 MG: 2.5 SOLUTION RESPIRATORY (INHALATION) at 11:16

## 2020-01-01 RX ADMIN — FENTANYL CITRATE 50 MCG: 50 INJECTION INTRAMUSCULAR; INTRAVENOUS at 21:24

## 2020-01-01 RX ADMIN — Medication 5 ML: at 13:28

## 2020-01-01 RX ADMIN — OXYBUTYNIN CHLORIDE 5 MG: 5 TABLET ORAL at 09:09

## 2020-01-01 RX ADMIN — GABAPENTIN 100 MG: 100 CAPSULE ORAL at 21:09

## 2020-01-01 RX ADMIN — PHYTONADIONE 2.5 MG: 5 TABLET ORAL at 04:45

## 2020-01-01 RX ADMIN — GLYCOPYRROLATE 0.2 MG: 0.2 INJECTION, SOLUTION INTRAMUSCULAR; INTRAVENOUS at 04:15

## 2020-01-01 RX ADMIN — ATORVASTATIN CALCIUM 40 MG: 40 TABLET, FILM COATED ORAL at 08:48

## 2020-01-01 RX ADMIN — ASPIRIN 81 MG 81 MG: 81 TABLET ORAL at 08:45

## 2020-01-01 RX ADMIN — HALOPERIDOL LACTATE 4 MG: 5 INJECTION INTRAMUSCULAR at 17:19

## 2020-01-01 RX ADMIN — METOPROLOL SUCCINATE 25 MG: 50 TABLET, EXTENDED RELEASE ORAL at 08:48

## 2020-01-01 RX ADMIN — METOPROLOL TARTRATE 12.5 MG: 25 TABLET ORAL at 20:32

## 2020-01-01 RX ADMIN — HALOPERIDOL LACTATE 4 MG: 5 INJECTION INTRAMUSCULAR at 23:53

## 2020-01-01 RX ADMIN — AMIODARONE HYDROCHLORIDE 150 MG: 50 INJECTION, SOLUTION INTRAVENOUS at 11:14

## 2020-01-01 RX ADMIN — ATORVASTATIN CALCIUM 40 MG: 40 TABLET, FILM COATED ORAL at 08:11

## 2020-01-01 RX ADMIN — ASPIRIN 81 MG 81 MG: 81 TABLET ORAL at 08:04

## 2020-01-01 RX ADMIN — POTASSIUM CHLORIDE 20 MEQ: 200 INJECTION, SOLUTION INTRAVENOUS at 05:26

## 2020-01-01 RX ADMIN — RISPERIDONE 0.5 MG: 1 TABLET ORAL at 21:21

## 2020-01-01 RX ADMIN — SODIUM CHLORIDE, PRESERVATIVE FREE 3 ML: 5 INJECTION INTRAVENOUS at 22:50

## 2020-01-01 RX ADMIN — MORPHINE SULFATE 4 MG: 4 INJECTION INTRAVENOUS at 11:45

## 2020-01-01 RX ADMIN — MORPHINE SULFATE 4 MG: 4 INJECTION INTRAVENOUS at 08:15

## 2020-01-01 RX ADMIN — NYSTATIN 500000 UNITS: 100000 SUSPENSION ORAL at 03:14

## 2020-01-01 RX ADMIN — POTASSIUM CHLORIDE 20 MEQ: 20 TABLET, EXTENDED RELEASE ORAL at 08:15

## 2020-01-01 RX ADMIN — WARFARIN SODIUM 0.5 MG: 1 TABLET ORAL at 17:51

## 2020-01-01 RX ADMIN — FUROSEMIDE 20 MG: 40 TABLET ORAL at 08:54

## 2020-01-01 RX ADMIN — INSULIN LISPRO 1 UNITS: 100 INJECTION, SOLUTION INTRAVENOUS; SUBCUTANEOUS at 12:51

## 2020-01-01 RX ADMIN — FAMOTIDINE 20 MG: 10 INJECTION INTRAVENOUS at 21:45

## 2020-01-01 RX ADMIN — PIPERACILLIN AND TAZOBACTAM 4.5 G: 4; .5 INJECTION, POWDER, FOR SOLUTION INTRAVENOUS at 01:37

## 2020-01-01 RX ADMIN — Medication 10 ML: at 19:58

## 2020-01-01 RX ADMIN — MORPHINE SULFATE 4 MG: 4 INJECTION INTRAVENOUS at 15:00

## 2020-01-01 RX ADMIN — POTASSIUM CHLORIDE 40 MEQ: 20 TABLET, EXTENDED RELEASE ORAL at 16:54

## 2020-01-01 RX ADMIN — ASPIRIN 81 MG 81 MG: 81 TABLET ORAL at 09:06

## 2020-01-01 RX ADMIN — AMIODARONE HYDROCHLORIDE 200 MG: 200 TABLET ORAL at 09:38

## 2020-01-01 RX ADMIN — MORPHINE SULFATE 4 MG: 4 INJECTION INTRAVENOUS at 16:24

## 2020-01-01 RX ADMIN — FUROSEMIDE 40 MG: 10 INJECTION, SOLUTION INTRAMUSCULAR; INTRAVENOUS at 08:16

## 2020-01-01 RX ADMIN — AMIODARONE HYDROCHLORIDE 200 MG: 200 TABLET ORAL at 09:05

## 2020-01-01 RX ADMIN — HALOPERIDOL LACTATE 4 MG: 5 INJECTION INTRAMUSCULAR at 23:46

## 2020-01-01 RX ADMIN — AMIODARONE HYDROCHLORIDE 200 MG: 200 TABLET ORAL at 08:15

## 2020-01-01 RX ADMIN — PIPERACILLIN AND TAZOBACTAM 4.5 G: 4; .5 INJECTION, POWDER, FOR SOLUTION INTRAVENOUS at 02:01

## 2020-01-01 RX ADMIN — SUCCINYLCHOLINE CHLORIDE 150 MG: 20 INJECTION, SOLUTION INTRAMUSCULAR; INTRAVENOUS at 21:16

## 2020-01-01 RX ADMIN — RISPERIDONE 0.5 MG: 1 TABLET ORAL at 21:00

## 2020-01-01 RX ADMIN — ASPIRIN 81 MG 81 MG: 81 TABLET ORAL at 08:15

## 2020-01-01 RX ADMIN — INSULIN LISPRO 1 UNITS: 100 INJECTION, SOLUTION INTRAVENOUS; SUBCUTANEOUS at 00:20

## 2020-01-01 RX ADMIN — HALOPERIDOL LACTATE 4 MG: 5 INJECTION INTRAMUSCULAR at 20:22

## 2020-01-01 RX ADMIN — MORPHINE SULFATE 4 MG: 4 INJECTION INTRAVENOUS at 23:29

## 2020-01-01 RX ADMIN — PIPERACILLIN AND TAZOBACTAM 3.38 G: 3; .375 INJECTION, POWDER, FOR SOLUTION INTRAVENOUS at 01:00

## 2020-01-01 RX ADMIN — PIPERACILLIN SODIUM,TAZOBACTAM SODIUM 4.5 G: 4; .5 INJECTION, POWDER, FOR SOLUTION INTRAVENOUS at 09:30

## 2020-01-01 RX ADMIN — GABAPENTIN 100 MG: 100 CAPSULE ORAL at 08:54

## 2020-01-01 RX ADMIN — FENTANYL CITRATE 50 MCG: 50 INJECTION, SOLUTION INTRAMUSCULAR; INTRAVENOUS at 21:24

## 2020-01-01 RX ADMIN — MORPHINE SULFATE 4 MG: 4 INJECTION INTRAVENOUS at 02:06

## 2020-01-01 RX ADMIN — POTASSIUM CHLORIDE 20 MEQ: 20 TABLET, EXTENDED RELEASE ORAL at 09:10

## 2020-01-01 RX ADMIN — MORPHINE SULFATE 4 MG: 4 INJECTION INTRAVENOUS at 00:28

## 2020-01-01 RX ADMIN — AMIODARONE HYDROCHLORIDE 200 MG: 200 TABLET ORAL at 17:02

## 2020-01-01 RX ADMIN — POTASSIUM CHLORIDE 20 MEQ: 20 TABLET, EXTENDED RELEASE ORAL at 17:28

## 2020-01-01 RX ADMIN — ATORVASTATIN CALCIUM 40 MG: 40 TABLET, FILM COATED ORAL at 09:38

## 2020-01-01 RX ADMIN — MORPHINE SULFATE 2 MG: 2 INJECTION, SOLUTION INTRAMUSCULAR; INTRAVENOUS at 11:03

## 2020-01-01 RX ADMIN — Medication 10 ML: at 06:02

## 2020-01-01 RX ADMIN — HALOPERIDOL LACTATE 2 MG: 5 INJECTION INTRAMUSCULAR at 09:54

## 2020-01-01 RX ADMIN — Medication 10 ML: at 06:38

## 2020-01-01 RX ADMIN — MORPHINE SULFATE 2 MG: 2 INJECTION, SOLUTION INTRAMUSCULAR; INTRAVENOUS at 20:01

## 2020-01-01 RX ADMIN — OXYBUTYNIN CHLORIDE 5 MG: 5 TABLET ORAL at 19:47

## 2020-01-01 RX ADMIN — SODIUM CHLORIDE, PRESERVATIVE FREE 3 ML: 5 INJECTION INTRAVENOUS at 03:43

## 2020-01-01 RX ADMIN — FUROSEMIDE 40 MG: 10 INJECTION, SOLUTION INTRAMUSCULAR; INTRAVENOUS at 12:26

## 2020-01-01 RX ADMIN — SENNOSIDES 8.6 MG: 8.6 TABLET, FILM COATED ORAL at 21:09

## 2020-01-01 RX ADMIN — METOPROLOL SUCCINATE 25 MG: 50 TABLET, EXTENDED RELEASE ORAL at 09:09

## 2020-01-01 RX ADMIN — Medication 5 ML: at 13:06

## 2020-01-01 RX ADMIN — HALOPERIDOL LACTATE 4 MG: 5 INJECTION INTRAMUSCULAR at 11:43

## 2020-01-01 RX ADMIN — Medication 10 ML: at 22:29

## 2020-01-01 RX ADMIN — ATROPA BELLADONNA AND OPIUM 1 SUPPOSITORY: 16.2; 6 SUPPOSITORY RECTAL at 11:03

## 2020-01-01 RX ADMIN — VANCOMYCIN HYDROCHLORIDE 1500 MG: 10 INJECTION, POWDER, LYOPHILIZED, FOR SOLUTION INTRAVENOUS at 03:24

## 2020-01-01 RX ADMIN — LISINOPRIL 5 MG: 5 TABLET ORAL at 08:45

## 2020-01-01 RX ADMIN — ALBUTEROL SULFATE 1.25 MG: 2.5 SOLUTION RESPIRATORY (INHALATION) at 07:05

## 2020-01-01 RX ADMIN — ALBUTEROL SULFATE 1.25 MG: 2.5 SOLUTION RESPIRATORY (INHALATION) at 11:13

## 2020-01-01 RX ADMIN — OXYBUTYNIN CHLORIDE 5 MG: 5 TABLET ORAL at 21:26

## 2020-01-01 RX ADMIN — INSULIN LISPRO 1 UNITS: 100 INJECTION, SOLUTION INTRAVENOUS; SUBCUTANEOUS at 18:24

## 2020-01-01 RX ADMIN — ATORVASTATIN CALCIUM 40 MG: 40 TABLET, FILM COATED ORAL at 09:06

## 2020-01-01 RX ADMIN — INSULIN LISPRO 1 UNITS: 100 INJECTION, SOLUTION INTRAVENOUS; SUBCUTANEOUS at 11:39

## 2020-01-01 RX ADMIN — ASPIRIN 81 MG 81 MG: 81 TABLET ORAL at 08:47

## 2020-01-01 RX ADMIN — ATORVASTATIN CALCIUM 40 MG: 40 TABLET, FILM COATED ORAL at 08:46

## 2020-01-01 RX ADMIN — SODIUM CHLORIDE 250 ML: 900 INJECTION, SOLUTION INTRAVENOUS at 21:39

## 2020-01-01 RX ADMIN — INSULIN LISPRO 1 UNITS: 100 INJECTION, SOLUTION INTRAVENOUS; SUBCUTANEOUS at 17:43

## 2020-01-01 RX ADMIN — MORPHINE SULFATE 4 MG: 4 INJECTION INTRAVENOUS at 18:29

## 2020-01-01 RX ADMIN — AMIODARONE HYDROCHLORIDE 200 MG: 200 TABLET ORAL at 17:42

## 2020-01-01 RX ADMIN — WARFARIN SODIUM 2.5 MG: 2.5 TABLET ORAL at 16:54

## 2020-01-01 RX ADMIN — HALOPERIDOL LACTATE 4 MG: 5 INJECTION INTRAMUSCULAR at 23:29

## 2020-01-01 RX ADMIN — OXYBUTYNIN CHLORIDE 5 MG: 5 TABLET ORAL at 08:26

## 2020-01-01 RX ADMIN — INSULIN LISPRO 1 UNITS: 100 INJECTION, SOLUTION INTRAVENOUS; SUBCUTANEOUS at 17:54

## 2020-01-01 RX ADMIN — GABAPENTIN 100 MG: 100 CAPSULE ORAL at 17:37

## 2020-01-01 RX ADMIN — FUROSEMIDE 40 MG: 10 INJECTION, SOLUTION INTRAMUSCULAR; INTRAVENOUS at 15:03

## 2020-01-01 RX ADMIN — OXYBUTYNIN CHLORIDE 5 MG: 5 TABLET ORAL at 21:01

## 2020-01-01 RX ADMIN — MESALAMINE 0.38 G: 0.38 CAPSULE, EXTENDED RELEASE ORAL at 20:00

## 2020-01-01 RX ADMIN — HALOPERIDOL LACTATE 4 MG: 5 INJECTION INTRAMUSCULAR at 16:20

## 2020-01-01 RX ADMIN — Medication 10 ML: at 22:44

## 2020-01-01 RX ADMIN — Medication 10 ML: at 21:26

## 2020-01-01 RX ADMIN — AMIODARONE HYDROCHLORIDE 200 MG: 200 TABLET ORAL at 09:00

## 2020-01-01 RX ADMIN — SENNOSIDES 8.6 MG: 8.6 TABLET, FILM COATED ORAL at 17:36

## 2020-01-01 RX ADMIN — MORPHINE SULFATE 4 MG: 4 INJECTION INTRAVENOUS at 22:22

## 2020-01-01 RX ADMIN — Medication 5 ML: at 14:00

## 2020-01-01 RX ADMIN — FUROSEMIDE 40 MG: 10 INJECTION, SOLUTION INTRAMUSCULAR; INTRAVENOUS at 17:52

## 2020-01-01 RX ADMIN — SPIRONOLACTONE 12.5 MG: 25 TABLET ORAL at 08:11

## 2020-01-01 RX ADMIN — SENNOSIDES 8.6 MG: 8.6 TABLET, FILM COATED ORAL at 08:54

## 2020-01-01 RX ADMIN — LISINOPRIL 5 MG: 5 TABLET ORAL at 08:10

## 2020-01-01 RX ADMIN — WARFARIN SODIUM 1.5 MG: 1 TABLET ORAL at 17:42

## 2020-01-01 RX ADMIN — FUROSEMIDE 40 MG: 10 INJECTION, SOLUTION INTRAMUSCULAR; INTRAVENOUS at 17:18

## 2020-01-01 RX ADMIN — PIPERACILLIN AND TAZOBACTAM 4.5 G: 4; .5 INJECTION, POWDER, FOR SOLUTION INTRAVENOUS at 17:15

## 2020-01-01 RX ADMIN — POTASSIUM CHLORIDE 40 MEQ: 20 TABLET, EXTENDED RELEASE ORAL at 17:02

## 2020-01-01 RX ADMIN — MORPHINE SULFATE 4 MG: 4 INJECTION INTRAVENOUS at 04:04

## 2020-01-01 RX ADMIN — POTASSIUM CHLORIDE 20 MEQ: 200 INJECTION, SOLUTION INTRAVENOUS at 09:47

## 2020-01-01 RX ADMIN — HALOPERIDOL LACTATE 4 MG: 5 INJECTION INTRAMUSCULAR at 11:47

## 2020-01-01 RX ADMIN — POTASSIUM CHLORIDE 40 MEQ: 20 TABLET, EXTENDED RELEASE ORAL at 17:44

## 2020-01-01 RX ADMIN — SODIUM CHLORIDE, SODIUM LACTATE, POTASSIUM CHLORIDE, AND CALCIUM CHLORIDE: 600; 310; 30; 20 INJECTION, SOLUTION INTRAVENOUS at 11:01

## 2020-01-01 RX ADMIN — SPIRONOLACTONE 12.5 MG: 25 TABLET ORAL at 08:45

## 2020-01-01 RX ADMIN — HALOPERIDOL LACTATE 4 MG: 5 INJECTION INTRAMUSCULAR at 05:32

## 2020-01-01 RX ADMIN — Medication 10 ML: at 22:00

## 2020-01-01 RX ADMIN — VANCOMYCIN HYDROCHLORIDE 1500 MG: 10 INJECTION, POWDER, LYOPHILIZED, FOR SOLUTION INTRAVENOUS at 04:18

## 2020-01-01 RX ADMIN — LORAZEPAM 1 MG: 2 INJECTION, SOLUTION INTRAMUSCULAR; INTRAVENOUS at 03:43

## 2020-01-01 RX ADMIN — FUROSEMIDE 20 MG: 40 TABLET ORAL at 07:55

## 2020-01-01 RX ADMIN — INSULIN LISPRO 1 UNITS: 100 INJECTION, SOLUTION INTRAVENOUS; SUBCUTANEOUS at 12:10

## 2020-01-01 RX ADMIN — MORPHINE SULFATE 10 MG: 100 SOLUTION ORAL at 08:37

## 2020-01-01 RX ADMIN — ASPIRIN 81 MG 81 MG: 81 TABLET ORAL at 09:38

## 2020-01-01 RX ADMIN — SPIRONOLACTONE 25 MG: 25 TABLET ORAL at 08:16

## 2020-01-01 RX ADMIN — HALOPERIDOL LACTATE 4 MG: 5 INJECTION INTRAMUSCULAR at 08:40

## 2020-01-01 RX ADMIN — INSULIN LISPRO 2 UNITS: 100 INJECTION, SOLUTION INTRAVENOUS; SUBCUTANEOUS at 18:02

## 2020-01-01 RX ADMIN — Medication 5 ML: at 13:40

## 2020-01-01 RX ADMIN — MORPHINE SULFATE 4 MG: 4 INJECTION INTRAVENOUS at 20:21

## 2020-01-01 RX ADMIN — FUROSEMIDE 20 MG: 40 TABLET ORAL at 17:37

## 2020-01-01 RX ADMIN — ALBUTEROL SULFATE 1.25 MG: 2.5 SOLUTION RESPIRATORY (INHALATION) at 15:20

## 2020-01-01 RX ADMIN — PIPERACILLIN AND TAZOBACTAM 4.5 G: 4; .5 INJECTION, POWDER, FOR SOLUTION INTRAVENOUS at 10:14

## 2020-01-01 RX ADMIN — Medication 10 ML: at 06:18

## 2020-01-01 RX ADMIN — MORPHINE SULFATE 4 MG: 4 INJECTION INTRAVENOUS at 03:43

## 2020-01-01 RX ADMIN — POTASSIUM CHLORIDE 20 MEQ: 200 INJECTION, SOLUTION INTRAVENOUS at 18:19

## 2020-01-01 RX ADMIN — METOPROLOL TARTRATE 12.5 MG: 25 TABLET ORAL at 08:53

## 2020-01-01 RX ADMIN — LORAZEPAM 1 MG: 2 INJECTION INTRAMUSCULAR; INTRAVENOUS at 08:01

## 2020-01-01 RX ADMIN — OXYBUTYNIN CHLORIDE 5 MG: 5 TABLET ORAL at 17:02

## 2020-01-01 RX ADMIN — FUROSEMIDE 80 MG: 10 INJECTION, SOLUTION INTRAMUSCULAR; INTRAVENOUS at 18:28

## 2020-01-01 RX ADMIN — Medication 10 ML: at 12:58

## 2020-01-01 RX ADMIN — FUROSEMIDE 80 MG: 10 INJECTION, SOLUTION INTRAMUSCULAR; INTRAVENOUS at 08:25

## 2020-01-01 RX ADMIN — HALOPERIDOL LACTATE 4 MG: 5 INJECTION INTRAMUSCULAR at 20:36

## 2020-01-01 RX ADMIN — MORPHINE SULFATE 10 MG: 100 SOLUTION ORAL at 21:10

## 2020-01-01 RX ADMIN — ASPIRIN 81 MG 81 MG: 81 TABLET ORAL at 09:09

## 2020-01-01 RX ADMIN — FUROSEMIDE 40 MG: 10 INJECTION, SOLUTION INTRAMUSCULAR; INTRAVENOUS at 08:47

## 2020-01-01 RX ADMIN — FUROSEMIDE 40 MG: 10 INJECTION, SOLUTION INTRAMUSCULAR; INTRAVENOUS at 08:04

## 2020-01-01 RX ADMIN — INSULIN LISPRO 1 UNITS: 100 INJECTION, SOLUTION INTRAVENOUS; SUBCUTANEOUS at 12:26

## 2020-01-01 RX ADMIN — INSULIN LISPRO 1 UNITS: 100 INJECTION, SOLUTION INTRAVENOUS; SUBCUTANEOUS at 06:17

## 2020-01-01 RX ADMIN — Medication 10 ML: at 21:22

## 2020-01-01 RX ADMIN — HALOPERIDOL LACTATE 4 MG: 5 INJECTION INTRAMUSCULAR at 12:57

## 2020-01-01 RX ADMIN — MORPHINE SULFATE 10 MG: 100 SOLUTION ORAL at 11:48

## 2020-01-01 RX ADMIN — POTASSIUM CHLORIDE 20 MEQ: 20 TABLET, EXTENDED RELEASE ORAL at 09:07

## 2020-01-01 RX ADMIN — MORPHINE SULFATE 4 MG: 4 INJECTION INTRAVENOUS at 12:57

## 2020-01-01 RX ADMIN — MORPHINE SULFATE 4 MG: 4 INJECTION INTRAVENOUS at 23:46

## 2020-01-01 RX ADMIN — WARFARIN SODIUM 1 MG: 1 TABLET ORAL at 17:27

## 2020-01-01 RX ADMIN — AMIODARONE HYDROCHLORIDE 200 MG: 200 TABLET ORAL at 08:16

## 2020-01-01 RX ADMIN — MORPHINE SULFATE 4 MG: 4 INJECTION INTRAVENOUS at 10:12

## 2020-01-01 RX ADMIN — ALBUTEROL SULFATE 2.5 MG: 2.5 SOLUTION RESPIRATORY (INHALATION) at 07:29

## 2020-01-01 RX ADMIN — GLYCOPYRROLATE 0.2 MG: 0.2 INJECTION, SOLUTION INTRAMUSCULAR; INTRAVENOUS at 23:58

## 2020-01-01 RX ADMIN — MORPHINE SULFATE 2 MG: 2 INJECTION, SOLUTION INTRAMUSCULAR; INTRAVENOUS at 05:47

## 2020-01-01 RX ADMIN — HALOPERIDOL LACTATE 2 MG: 5 INJECTION INTRAMUSCULAR at 12:52

## 2020-01-01 RX ADMIN — METOPROLOL TARTRATE 12.5 MG: 25 TABLET ORAL at 08:12

## 2020-01-01 RX ADMIN — GABAPENTIN 100 MG: 100 CAPSULE ORAL at 17:12

## 2020-01-01 RX ADMIN — MORPHINE SULFATE 4 MG: 4 INJECTION INTRAVENOUS at 10:58

## 2020-01-01 RX ADMIN — Medication 50 MCG/HR: at 22:46

## 2020-01-01 RX ADMIN — POTASSIUM CHLORIDE 40 MEQ: 20 TABLET, EXTENDED RELEASE ORAL at 17:42

## 2020-01-01 RX ADMIN — Medication 10 ML: at 21:04

## 2020-01-01 RX ADMIN — VANCOMYCIN HYDROCHLORIDE 2500 MG: 10 INJECTION, POWDER, LYOPHILIZED, FOR SOLUTION INTRAVENOUS at 00:27

## 2020-01-01 RX ADMIN — RISPERIDONE 0.5 MG: 1 TABLET ORAL at 22:30

## 2020-01-01 RX ADMIN — MORPHINE SULFATE 4 MG: 4 INJECTION INTRAVENOUS at 10:38

## 2020-01-01 RX ADMIN — PIPERACILLIN AND TAZOBACTAM 4.5 G: 4; .5 INJECTION, POWDER, FOR SOLUTION INTRAVENOUS at 17:52

## 2020-01-01 RX ADMIN — HALOPERIDOL LACTATE 2 MG: 5 INJECTION INTRAMUSCULAR at 12:53

## 2020-01-01 RX ADMIN — POTASSIUM BICARBONATE 40 MEQ: 782 TABLET, EFFERVESCENT ORAL at 17:20

## 2020-01-01 RX ADMIN — INSULIN LISPRO 1 UNITS: 100 INJECTION, SOLUTION INTRAVENOUS; SUBCUTANEOUS at 05:00

## 2020-01-01 RX ADMIN — AMIODARONE HYDROCHLORIDE 200 MG: 200 TABLET ORAL at 08:27

## 2020-01-01 RX ADMIN — METOPROLOL SUCCINATE 25 MG: 50 TABLET, EXTENDED RELEASE ORAL at 08:11

## 2020-01-01 RX ADMIN — FUROSEMIDE 40 MG: 40 TABLET ORAL at 09:09

## 2020-01-01 RX ADMIN — OXYBUTYNIN CHLORIDE 5 MG: 5 TABLET ORAL at 08:54

## 2020-01-01 RX ADMIN — INSULIN LISPRO 1 UNITS: 100 INJECTION, SOLUTION INTRAVENOUS; SUBCUTANEOUS at 12:31

## 2020-01-01 RX ADMIN — URINARY PAIN RELIEF 95 MG: 95 TABLET ORAL at 12:22

## 2020-01-01 RX ADMIN — MORPHINE SULFATE 10 MG: 100 SOLUTION ORAL at 13:34

## 2020-01-01 RX ADMIN — ASPIRIN 81 MG 81 MG: 81 TABLET ORAL at 08:11

## 2020-01-01 RX ADMIN — AMIODARONE HYDROCHLORIDE 200 MG: 200 TABLET ORAL at 08:10

## 2020-01-01 RX ADMIN — INSULIN LISPRO 1 UNITS: 100 INJECTION, SOLUTION INTRAVENOUS; SUBCUTANEOUS at 23:42

## 2020-01-01 RX ADMIN — Medication 10 ML: at 00:04

## 2020-01-01 RX ADMIN — HALOPERIDOL 2 MG: 2 SOLUTION ORAL at 12:04

## 2020-01-01 RX ADMIN — HALOPERIDOL LACTATE 4 MG: 5 INJECTION INTRAMUSCULAR at 17:03

## 2020-01-01 RX ADMIN — POTASSIUM BICARBONATE 40 MEQ: 782 TABLET, EFFERVESCENT ORAL at 11:14

## 2020-01-01 RX ADMIN — MORPHINE SULFATE 4 MG: 4 INJECTION INTRAVENOUS at 05:32

## 2020-01-01 RX ADMIN — Medication 10 ML: at 21:21

## 2020-01-01 RX ADMIN — METOPROLOL SUCCINATE 25 MG: 50 TABLET, EXTENDED RELEASE ORAL at 08:26

## 2020-01-01 RX ADMIN — MORPHINE SULFATE 10 MG: 100 SOLUTION ORAL at 16:22

## 2020-01-01 RX ADMIN — ATORVASTATIN CALCIUM 40 MG: 40 TABLET, FILM COATED ORAL at 00:38

## 2020-01-01 RX ADMIN — LISINOPRIL 5 MG: 5 TABLET ORAL at 09:05

## 2020-01-01 RX ADMIN — MORPHINE SULFATE 4 MG: 4 INJECTION INTRAVENOUS at 13:41

## 2020-01-01 RX ADMIN — FUROSEMIDE 40 MG: 40 TABLET ORAL at 21:26

## 2020-01-01 RX ADMIN — MORPHINE SULFATE 4 MG: 4 INJECTION INTRAVENOUS at 23:53

## 2020-01-01 RX ADMIN — POTASSIUM CHLORIDE 40 MEQ: 20 TABLET, EXTENDED RELEASE ORAL at 09:08

## 2020-01-01 RX ADMIN — METOPROLOL TARTRATE 12.5 MG: 25 TABLET ORAL at 11:30

## 2020-01-01 RX ADMIN — HALOPERIDOL LACTATE 2 MG: 5 INJECTION INTRAMUSCULAR at 05:19

## 2020-01-01 RX ADMIN — Medication 10 ML: at 19:49

## 2020-01-01 RX ADMIN — HALOPERIDOL LACTATE 2 MG: 5 INJECTION INTRAMUSCULAR at 15:00

## 2020-01-01 RX ADMIN — AMIODARONE HYDROCHLORIDE 200 MG: 200 TABLET ORAL at 17:44

## 2020-01-01 RX ADMIN — FUROSEMIDE 20 MG: 40 TABLET ORAL at 17:12

## 2020-01-01 RX ADMIN — INSULIN LISPRO 1 UNITS: 100 INJECTION, SOLUTION INTRAVENOUS; SUBCUTANEOUS at 18:04

## 2020-01-01 RX ADMIN — MORPHINE SULFATE 4 MG: 4 INJECTION INTRAVENOUS at 03:36

## 2020-01-01 RX ADMIN — WARFARIN SODIUM 2 MG: 1 TABLET ORAL at 16:59

## 2020-01-01 RX ADMIN — SPIRONOLACTONE 25 MG: 25 TABLET ORAL at 08:04

## 2020-01-01 RX ADMIN — Medication 2 G: at 11:01

## 2020-01-01 RX ADMIN — OXYBUTYNIN CHLORIDE 5 MG: 5 TABLET ORAL at 17:42

## 2020-01-01 RX ADMIN — PIPERACILLIN AND TAZOBACTAM 4.5 G: 4; .5 INJECTION, POWDER, FOR SOLUTION INTRAVENOUS at 01:00

## 2020-01-01 RX ADMIN — OXYBUTYNIN CHLORIDE 5 MG: 5 TABLET ORAL at 21:18

## 2020-01-01 RX ADMIN — VANCOMYCIN HYDROCHLORIDE 1500 MG: 10 INJECTION, POWDER, LYOPHILIZED, FOR SOLUTION INTRAVENOUS at 10:11

## 2020-01-01 RX ADMIN — SPIRONOLACTONE 25 MG: 25 TABLET ORAL at 10:22

## 2020-01-01 RX ADMIN — AMIODARONE HYDROCHLORIDE 0.5 MG/MIN: 50 INJECTION, SOLUTION INTRAVENOUS at 20:41

## 2020-01-01 RX ADMIN — FUROSEMIDE 40 MG: 40 TABLET ORAL at 08:46

## 2020-01-01 RX ADMIN — POTASSIUM CHLORIDE 40 MEQ: 20 TABLET, EXTENDED RELEASE ORAL at 08:16

## 2020-01-01 RX ADMIN — SPIRONOLACTONE 25 MG: 25 TABLET ORAL at 09:37

## 2020-01-01 RX ADMIN — HALOPERIDOL LACTATE 2 MG: 5 INJECTION INTRAMUSCULAR at 19:58

## 2020-01-01 RX ADMIN — AMIODARONE HYDROCHLORIDE 200 MG: 200 TABLET ORAL at 17:43

## 2020-01-01 RX ADMIN — HALOPERIDOL LACTATE 4 MG: 5 INJECTION INTRAMUSCULAR at 12:50

## 2020-01-01 RX ADMIN — Medication 3 ML: at 21:00

## 2020-01-01 RX ADMIN — Medication 10 ML: at 21:00

## 2020-01-01 RX ADMIN — MORPHINE SULFATE 4 MG: 4 INJECTION INTRAVENOUS at 02:38

## 2020-01-01 RX ADMIN — PIPERACILLIN AND TAZOBACTAM 4.5 G: 4; .5 INJECTION, POWDER, FOR SOLUTION INTRAVENOUS at 17:20

## 2020-01-01 RX ADMIN — ATORVASTATIN CALCIUM 40 MG: 40 TABLET, FILM COATED ORAL at 08:26

## 2020-01-01 RX ADMIN — FUROSEMIDE 40 MG: 10 INJECTION, SOLUTION INTRAMUSCULAR; INTRAVENOUS at 08:17

## 2020-01-01 RX ADMIN — OXYBUTYNIN CHLORIDE 5 MG: 5 TABLET ORAL at 08:16

## 2020-01-01 RX ADMIN — OXYBUTYNIN CHLORIDE 5 MG: 5 TABLET ORAL at 22:30

## 2020-01-01 RX ADMIN — ATORVASTATIN CALCIUM 40 MG: 40 TABLET, FILM COATED ORAL at 09:10

## 2020-01-01 RX ADMIN — METOPROLOL TARTRATE 12.5 MG: 25 TABLET ORAL at 08:18

## 2020-01-01 RX ADMIN — MORPHINE SULFATE 4 MG: 4 INJECTION INTRAVENOUS at 23:37

## 2020-01-01 RX ADMIN — SPIRONOLACTONE 25 MG: 25 TABLET ORAL at 08:48

## 2020-01-01 RX ADMIN — FAMOTIDINE 20 MG: 10 INJECTION INTRAVENOUS at 08:04

## 2020-01-01 RX ADMIN — MORPHINE SULFATE 4 MG: 4 INJECTION INTRAVENOUS at 09:19

## 2020-01-01 RX ADMIN — MORPHINE SULFATE 4 MG: 4 INJECTION INTRAVENOUS at 20:36

## 2020-01-01 RX ADMIN — WARFARIN SODIUM 1 MG: 1 TABLET ORAL at 17:24

## 2020-01-01 RX ADMIN — PROPOFOL 25 MCG/KG/MIN: 10 INJECTION, EMULSION INTRAVENOUS at 21:42

## 2020-01-01 RX ADMIN — HALOPERIDOL LACTATE 4 MG: 5 INJECTION INTRAMUSCULAR at 20:46

## 2020-01-01 RX ADMIN — Medication 10 ML: at 06:06

## 2020-01-01 RX ADMIN — HALOPERIDOL LACTATE 4 MG: 5 INJECTION INTRAMUSCULAR at 08:03

## 2020-01-01 RX ADMIN — MORPHINE SULFATE 4 MG: 4 INJECTION INTRAVENOUS at 17:20

## 2020-01-01 RX ADMIN — LORAZEPAM 1 MG: 2 INJECTION, SOLUTION INTRAMUSCULAR; INTRAVENOUS at 10:39

## 2020-01-01 RX ADMIN — ATORVASTATIN CALCIUM 40 MG: 40 TABLET, FILM COATED ORAL at 08:17

## 2020-01-01 RX ADMIN — URINARY PAIN RELIEF 95 MG: 95 TABLET ORAL at 12:53

## 2020-01-01 RX ADMIN — LORAZEPAM 1 MG: 2 INJECTION INTRAMUSCULAR; INTRAVENOUS at 14:54

## 2020-01-01 RX ADMIN — POTASSIUM CHLORIDE 40 MEQ: 20 TABLET, EXTENDED RELEASE ORAL at 17:24

## 2020-01-01 RX ADMIN — MORPHINE SULFATE 10 MG: 100 SOLUTION ORAL at 12:04

## 2020-01-01 RX ADMIN — WARFARIN SODIUM 2 MG: 1 TABLET ORAL at 17:02

## 2020-01-01 RX ADMIN — FUROSEMIDE 40 MG: 10 INJECTION, SOLUTION INTRAMUSCULAR; INTRAVENOUS at 20:01

## 2020-01-01 RX ADMIN — AMIODARONE HYDROCHLORIDE 200 MG: 200 TABLET ORAL at 17:14

## 2020-01-01 RX ADMIN — ASPIRIN 81 MG 81 MG: 81 TABLET ORAL at 09:10

## 2020-01-01 RX ADMIN — INSULIN LISPRO 1 UNITS: 100 INJECTION, SOLUTION INTRAVENOUS; SUBCUTANEOUS at 00:42

## 2020-01-01 RX ADMIN — LORAZEPAM 1 MG: 2 INJECTION INTRAMUSCULAR; INTRAVENOUS at 20:19

## 2020-01-01 RX ADMIN — MORPHINE SULFATE 2 MG: 2 INJECTION, SOLUTION INTRAMUSCULAR; INTRAVENOUS at 00:00

## 2020-01-01 RX ADMIN — Medication 10 ML: at 05:49

## 2020-01-01 RX ADMIN — OXYBUTYNIN CHLORIDE 5 MG: 5 TABLET ORAL at 00:38

## 2020-01-01 RX ADMIN — Medication 10 ML: at 06:15

## 2020-01-06 PROBLEM — I50.22 SYSTOLIC CHF, CHRONIC (HCC): Status: ACTIVE | Noted: 2020-01-01

## 2020-01-06 NOTE — PROGRESS NOTES
Please call him, labs reviewed. Let's stop Ace as reviewed today at his appt. Let's also take furosemide just in the AM with aldactone. Halve the K he is taking as well. Then follow daily weights every AM, take an extra lasix in afternoon as needed for 2-3 pd weight gain from one day to the next. Only PRN for the 2nd daily dose. We will plan on echo as we discussed and seeing me after. Will need another BMP 1 day before seeing me back. Call for issues.    Thanks

## 2020-02-11 NOTE — ANESTHESIA PREPROCEDURE EVALUATION
Relevant Problems No relevant active problems Anesthetic History No history of anesthetic complications Review of Systems / Medical History Patient summary reviewed and pertinent labs reviewed Pulmonary Comments: Former smoker Neuro/Psych  
 
 
CVA (2011): no residual symptoms Cardiovascular Hypertension CHF (EF 25%, well compensated) Dysrhythmias (NSVT) Pacemaker (BiV ICD), past MI (2000, 2007), CAD, PAD (bilateral carotid dz) and cardiac stents (last 2008) Exercise tolerance: <4 METS:  
 
Comments: H/o severely reduced EF 
 
[facility] 2/2019 - patent stents, non-obstructive CAD, EF 10-15% Echo 2019 - EF 26%, mod MR  
GI/Hepatic/Renal 
  
GERD: well controlled Liver disease (s/p liver resection for liver infection) Endo/Other Diabetes: type 2 Obesity Other Findings Physical Exam 
 
Airway Mallampati: II 
TM Distance: < 4 cm Neck ROM: normal range of motion Mouth opening: Normal 
 
 Cardiovascular Regular rate and rhythm,  S1 and S2 normal,  no murmur, click, rub, or gallop Pertinent negatives: No JVD and peripheral edema Comments: No signs of decompensated heart failure Dental 
No notable dental hx Pulmonary Breath sounds clear to auscultation Abdominal 
GI exam deferred Other Findings Anesthetic Plan ASA: 4 Anesthesia type: total IV anesthesia Induction: Intravenous Anesthetic plan and risks discussed with: Patient and Family

## 2020-02-11 NOTE — PROGRESS NOTES
Patient hob elevated and given box lunch and drink at this time. Right groin site with no bleeding or hematoma

## 2020-02-11 NOTE — ANESTHESIA POSTPROCEDURE EVALUATION
Procedure(s): 
AV NODE ABLATION. total IV anesthesia Anesthesia Post Evaluation Patient location during evaluation: PACU Patient participation: complete - patient participated Level of consciousness: awake and alert Pain management: adequate Airway patency: patent Anesthetic complications: no 
Cardiovascular status: acceptable Respiratory status: acceptable Hydration status: acceptable Post anesthesia nausea and vomiting:  controlled Vitals Value Taken Time BP 94/61 2/11/2020  2:20 PM  
Temp Pulse 87 2/11/2020  2:23 PM  
Resp SpO2 93 % 2/11/2020  2:23 PM  
Vitals shown include unvalidated device data.

## 2020-02-11 NOTE — PROCEDURES
: Hemant Moralez. Ned Shook MD 
 
REFERRING: Almaz Petersen DO 
 
Pre-Procedure Diagnosis 1. Long standing persistent/permanent drug refractory atrial fibrillation 2. Tachy-mariano syndrome 3. Dilated cardiomyopathy Procedure Performed 1. AV node ablation 2. 3D mapping 3. Biventricular ICD reprogramming Anesthesia: MAC Estimated Blood Loss: Less than 10 mL Specimens: * No specimens in log * Complications: None Fluoroscopy Time: 1.0 minutes The procedure, indications, risks, benefits, and alternatives were discussed with the patient and family members, who desired to proceed after questions were answered and informed consent was documented. Procedure: After informed consent was obtained, the patient was brought to the Electrophysiology Laboratory in a fasting state and was prepped and draped in sterile fashion. The biventricular ICD was reprogrammed at he beginning of the case to VVI 40 bpm. Local anesthetic was delivered to the region over the right femoral vein. Access x 1 was obtained under ultrasound guidance using a modified Seldinger technique with placement of a short 8 F sheath into the right femoral vein. A 3.5 mm STSF ablation catheter was advanced into the right atrium and a 3D electroanatomic map of was created with a focus on the [employer]. The ablation catheter was then advanced to the region of the AV junction and with delivery of RF there the appearance of accelerated junctional beats followed by complete heart block. A 20 minute waiting period was then observed and there was no return of AV conduction. The device was reprogrammed to DDDR 80 bpm at the end of the case. The patient tolerated the procedure well and there were no complications. He was allowed to awake from anesthesia and transferred to the recovery area in stable condition. Impression: Successful AV node ablation. Plan: Discharge home after stable bedrest.  
-Continue current medicines. -EP and device clinic follow up in 3-4 weeks. 
-Routine cardiac care per Dr. Francisca Lay. Paloma Payton. Michael Day MD, MS Clinical Cardiac Electrophysiology [facility] Cardiology Paloma Payton. Michael Day MD, MS Clinical Cardiac Electrophysiology [facility] Cardiology

## 2020-02-11 NOTE — PROGRESS NOTES
Patient received to [address] [place] [address] room # 10  Ambulatory from [facility]. Patient scheduled for AVN ablation today with Dr Alber Short. Procedure reviewed & questions answered, voiced good understanding consent obtained & placed on chart. All medications and medical history reviewed. Will prep patient per orders. Patient & family updated on plan of care. The patient has a fraility score of 3-MANAGING WELL, based on reports no recent falls

## 2020-02-11 NOTE — PROGRESS NOTES
Report received from [address] [facility] and [address] [place] [address]. Procedural findings communicated. Intra procedural  medication administration reviewed. Progression of care discussed. Patient received into [address] [facility] 6 post sheath removal.  
 
Access site without bleeding or swelling Dressing dry and intact Patient instructed to limit movement to right lower extremity Routine post procedural vital signs and site assessment initiated

## 2020-02-11 NOTE — DISCHARGE INSTRUCTIONS
ELECTROPHYSIOLOGY STUDY/ABLATION DISCHARGE INSTRUCTIONS    Your Recovery: You had an electrophysiology study for a problem with your heartbeat. You may also have had a catheter ablation to try to correct the problem. You may have swelling, bruising, or a small lump around the site where the catheters went into your body. These should go away in 3 to 4 weeks. Going home:    · Do not drive for 24 hours  · You will need someone to drive you home  · May remove bandage from both groins in 24hrs. · May shower in 24hrs but DO NOT take bath,hot tub or go swimming for 7 days. · Do not put any lotions,ointments,creams or powders over puncture sites. · Place clean band-aid over puncture sites for next 3 days,make sure to change daily. · No strenuous activity/heavy lifting for 1-2 weeks. · If you should develop a fever of 101degrees and puncture site is reddened,warm to the touch or there is [facility] at [phone]. · If you notice bleeding(a drop of blood on banage is ok)lay down and apply pressure. If bleeding does not stop after applying pressure call 911. · Call your doctor if you have any questions or concerns. After your ablation:    You can expect to feel brief palpitations for up to 3 months. If your palpitations return, please contact     1) Continue medicines for now. 2) Call office with any questions. 3) Relax (no heavy lifting/working) for next 48-72 hours. 4) Office follow up in 1 month or as needed for EP evaluation and device check.

## 2020-02-20 PROBLEM — J81.0 ACUTE PULMONARY EDEMA (HCC): Status: ACTIVE | Noted: 2020-01-01

## 2020-02-20 PROBLEM — J96.01 ACUTE RESPIRATORY FAILURE WITH HYPOXIA (HCC): Status: ACTIVE | Noted: 2020-01-01

## 2020-02-20 PROBLEM — I50.23 ACUTE ON CHRONIC SYSTOLIC HEART FAILURE (HCC): Status: ACTIVE | Noted: 2020-01-01

## 2020-02-20 PROBLEM — R09.02 HYPOXIA: Status: RESOLVED | Noted: 2019-01-01 | Resolved: 2020-01-01

## 2020-02-20 PROBLEM — E87.20 LACTIC ACIDOSIS: Status: ACTIVE | Noted: 2020-01-01

## 2020-02-21 NOTE — PROGRESS NOTES
Patient was intubated with a number 7.5 ET Tube. Tube placement verified by auscultation, by CXR and ETCO2 monitor. ET Tube is secured at the 28 cm lisa at the lip and on the center side. Patient was intubated by Dr. Ethan Redding on the 2 attempt. Breath sounds are coarse and diminished. Patient is Negative for subcutaneous air and chest excursion is symmetric. Trachea is midline. Patient is also Negative for cyanosis and is Negative for pitting edema. Patient placed on ventilator on documented settings. All alarms are set and audible. Resuscitation bag is at the head of the bed. Ventilator Settings Mode FIO2 Rate Tidal Volume Pressure PEEP I:E Ratio Pressure control  70 %   24      12 12 cm H20  1:1.5 Peak airway pressure: 24.1 cm H2O Minute ventilation: 11.2 l/min ABG: No results for input(s): PH, PCO2, PO2, HCO3 in the last 72 hours.

## 2020-02-21 NOTE — PROGRESS NOTES
Pt seen in ICU s/p admission acute CHF. Alert and responding by hands and nodding. Wife, Kiarra Ward, at bedside and one son. Confirms demographics. Discussed possible needs for d/c of HH vs STR. Pt independent of ADL's and drives self. CM will follow for any d/c needs per MD or therapy. None voiced per pt or family at present. Care Management Interventions PCP Verified by CM: Yes(confirms Dr. Jennifer Piedra) Mode of Transport at Discharge: Other (see comment) Transition of Care Consult (CM Consult): Discharge Planning Discharge Durable Medical Equipment: (glucometer) Current Support Network: (lives with wife, Kiarra Ward -[phone]/4 sons -2 local, one in [place]) Confirm Follow Up Transport: Self [employer] Information Provided?: (confirms KENNEDY/Kellie ) Discharge Location Discharge Placement: Unable to determine at this time

## 2020-02-21 NOTE — PROGRESS NOTES
Pop Kincaid. Admission Date: 2/20/2020 Daily Progress Note: 2/21/2020 The patient's chart is reviewed and the patient is discussed with the staff. Pt is a 74 yo  male with a history of chronic systolic CHF (EF 33%), moderate MR, CAD,NSVT s/p ICD, aflutter s/p AV node ablation on 2/11/20, and prior CVA. Pt presented to the ER on 2/20/20 with shortness of breath and in acute resp failure with RA sat 80%. Pt was placed on CPAP but required intubation by EMS. Pt's CXR concerning for volume overload/pulmonary edema. Pt was given lasix 60mg IV with minimal UOP. Pt's BNP was 5206 and LA is 5.6 but trended down to 1.7. Pt was started on Zosyn/Vanc secondary to emesis episode during intubation. Subjective:  
 
Pt lying in bed on PRVC. Family at bedside and updated. Pt's son is CRNA in [place]. Pt off sedation and able to follow commands. Current Facility-Administered Medications Medication Dose Route Frequency  NUTRITIONAL SUPPORT ELECTROLYTE PRN ORDERS   Does Not Apply PRN  potassium chloride 20 mEq in 100 ml IVPB  20 mEq IntraVENous Q2H  
 vancomycin (VANCOCIN) 1,000 mg in 0.9% sodium chloride (MBP/ADV) 250 mL  1,000 mg IntraVENous Q12H  perflutren lipid microspheres (DEFINITY) in NS bolus IV  1 mL IntraVENous PRN  propofol (DIPRIVAN) 10 mg/mL infusion  0-50 mcg/kg/min IntraVENous TITRATE  fentaNYL in normal saline (pf) 25 mcg/mL infusion   mcg/hr IntraVENous TITRATE  aspirin chewable tablet 81 mg  81 mg Oral DAILY  atorvastatin (LIPITOR) tablet 40 mg  40 mg Oral DAILY  spironolactone (ALDACTONE) tablet 25 mg  25 mg Oral DAILY  sodium chloride (NS) flush 5-40 mL  5-40 mL IntraVENous Q8H  
 sodium chloride (NS) flush 5-40 mL  5-40 mL IntraVENous PRN  
 insulin lispro (HUMALOG) injection   SubCUTAneous Q6H  
 pantoprazole (PROTONIX) 40 mg in 0.9% sodium chloride 10 mL injection  40 mg IntraVENous DAILY  warfarin (COUMADIN) tablet 2.5 mg  2.5 mg Oral QPM  
 midazolam (VERSED) 100 mg in 0.9% sodium chloride 100 mL infusion  1-10 mg/hr IntraVENous TITRATE Review of Systems Unobtainable due to patient status. Objective:  
 
Vitals:  
 02/21/20 5174 02/21/20 9351 02/21/20 0813 02/21/20 1173 BP:    90/60 Pulse: 80 80 79 80 Resp: 12 16 28 10 Temp:      
SpO2: 100% 100% 100% 100% Weight:      
Height:      
 
 
 
Intake/Output Summary (Last 24 hours) at 2/21/2020 8011 Last data filed at 2/21/2020 9517 Gross per 24 hour Intake 476.62 ml Output 625 ml Net -148.38 ml Physical Exam:  
Constitution:  the patient is well developed and in no acute distress, on PRVC 
EENMT:  Sclera clear, pupils equal, oral mucosa moist 
Respiratory: few crackles, no wheezing Cardiovascular:  RRR without M,G,R 
Gastrointestinal: soft and non-tender; with positive bowel sounds. Musculoskeletal: warm without cyanosis. There is no lower extremity edema. Skin:  no jaundice or rashes Neurologic: no gross neuro deficits Psychiatric:  alert and oriented x 2 CXR:  
 
 
LAB Recent Labs  
  02/21/20 
0524 02/21/20 
0011 GLUCPOC 132* 156* Recent Labs  
  02/21/20 0320 02/20/20 2117 WBC 11.2* 14.3* HGB 12.2* 14.4 HCT 37.9* 47.2  307 INR  --  2.5 Recent Labs  
  02/21/20 0320 02/20/20 2117  139  
K 3.3* 3.7 * 104 CO2 24 21 * 253* BUN 29* 28* CREA 1.15 1.45  
MG  --  2.2 CA 8.8 9.0  
TROIQ  --  0.02  
ALB  --  3.6 TBILI  --  1.0 ALT  --  15 SGOT  --  22 Recent Labs  
  02/21/20 
0308 02/20/20 
2151 PHI 7.530* 7.262* PCO2I 30.6* 45.3*  
PO2I 279* 180* HCO3I 25.6 20.4* Recent Labs  
  02/21/20 
0320 02/20/20 
2117 LAC 1.7 5.6* Assessment:  (Medical Decision Making) Hospital Problems  Date Reviewed: 2/21/2020 Codes Class Noted POA  * (Principal) Acute on chronic systolic heart failure ([facility] [address] ICD-10-CM: I50.23 ICD-9-CM: 428.23  2/20/2020 Yes Overview Signed 2/22/2016 11:06 AM by Madi Ferreira  
  Echo 4/15: EF 30-35%, mild to mod MR Echo 2011: EF 30-35% Repeat echo pending Acute respiratory failure with hypoxia [facility]) ICD-10-CM: J96.01 
ICD-9-CM: 518.81  2/20/2020 Unknown Wean O2 as tolerated Acute pulmonary edema (HCC) ICD-10-CM: J81.0 ICD-9-CM: 518.4  2/20/2020 Unknown Given lasix with minimal UOP Lactic acidosis ICD-10-CM: E87.2 ICD-9-CM: 276.2  2/20/2020 Unknown LA trending down Plan:  (Medical Decision Making) --plans to extubate later today if able 
--discontinue all sedation 
--discontinue vanc, continue zosyn for possible aspiration  
--echo pending 
-- 
 
More than 50% of the time documented was spent in face-to-face contact with the patient and in the care of the patient on the floor/unit where the patient is located. VICTOR M Mcconnell Lungs:  Crackles Heart:  RRR with no Murmur/Rubs/Gallops Additional Comments:  Continue Zosyn, hopefully can extubate later today ,on amiodarone gtt I have spoken with and examined the patient. I agree with the above assessment and plan as documented.  
 
Sergio Serrano MD

## 2020-02-21 NOTE — ED NOTES
TRANSFER - OUT REPORT: 
 
Verbal report given to Sonya Lam RN (name) on Ming Manriquez.  being transferred to [facility] (unit) for routine progression of care Report consisted of patients Situation, Background, Assessment and  
Recommendations(SBAR). Information from the following report(s) SBAR, Kardex, [facility] and Recent Results was reviewed with the receiving nurse. Lines:  
Peripheral IV 02/20/20 Left Hand (Active) Site Assessment Clean, dry, & intact 2/20/2020  9:42 PM  
Phlebitis Assessment 0 2/20/2020  9:42 PM  
Infiltration Assessment 0 2/20/2020  9:42 PM  
Dressing Status Clean, dry, & intact 2/20/2020  9:42 PM  
   
Peripheral IV 02/20/20 Right Antecubital (Active) Site Assessment Clean, dry, & intact 2/20/2020  9:42 PM  
Phlebitis Assessment 0 2/20/2020  9:42 PM  
Infiltration Assessment 0 2/20/2020  9:42 PM  
Dressing Status Clean, dry, & intact 2/20/2020  9:42 PM  
  
 
Opportunity for questions and clarification was provided. Patient transported with: 
 Registered Nurse

## 2020-02-21 NOTE — PROGRESS NOTES
Physical Therapy Note: 
 
Participated in interdisciplinary rounds in ICU/CCU and chart reviewed. Patient is experiencing decrease in function from baseline. Patient would benefit from skilled acute therapy to increase independence with self care/ADLs, strength, endurance, and functional mobility. Recommend PT/OT consult when medically stable and MD agrees. Thank you for your consideration, Meek Candelaria, PT, DPT

## 2020-02-21 NOTE — PROGRESS NOTES
Placed pt back on PS of 8 per NP, for cardiac and fluid concerns. Will hold extubation until further notice

## 2020-02-21 NOTE — H&P
HISTORY AND PHYSICAL [facility]. 
 
2/20/2020 Date of Admission:  2/20/2020 The patient's chart is reviewed and the patient is discussed with the staff. Subjective:  
 
Patient is a 75 y.o.  male presents with shortness of breath. He has a known history of sCHF with last TTE in 2019 showing EF 25% and moderate MR. He just underwent AV node ablation on 2/11. There is no family present and the patient is currently intubated, but the reported history if of gradually worsening shortness of breath over the last week. He was found by EMS in respiratory distress, hypoxic on room air. He was placed on NRB with sats in the low 90's. He was intubated by ER physician due to respiratory distress. Off and on episodes of dyspnea over the past few days. Some diaphoresis intermittently. Wife had wanted to call EMS but patient had delayed. Most labs are still pending but CXR is suggestive of volume overload. ADDENDUM: his wife later arrived and states he had not been having any fever, cough, sputum production, chest pain, dysuria, abd pain, or diarrhea. He did not take his lasix yesterday as he felt like he could start weaning himself off it. She states the light on his defibrillator has gone off twice in the past week including last night. Review of Systems Review of systems not obtained due to patient factors. Patient Active Problem List  
Diagnosis Code  Aortic ectasia, abdominal ([facility] C47.922  Cerebrovascular accident (stroke) ([facility] I63.9  
 Hx of AICD-Implanted Cardiac Defibrillator Z95.810  
 Aortic Aneurysm/Dilation of the Aorta I71.9  ASCAD-of the Native Vessel I25.10  Acute on chronic systolic heart failure (HCC) I50.23  
 Hyperlipidemia E78.5  Chest pain R07.9  Bilateral carotid artery disease ([facility]) I73.9  NSVT (nonsustained ventricular tachycardia) ([facility]) I47.2  Dilated cardiomyopathy ([facility]) I42.0  Long term (current) use of anticoagulants Z79.01  
 Typical atrial flutter (HCC) I48.3  Severe obesity (HCC) E66.01  
 LBBB (left bundle branch block) I44.7  Cardiomyopathy ([facility] I42.9  Systolic CHF, chronic (HCC) I50.22  
 Acute respiratory failure with hypoxia ([facility]) J96.01  
 Acute pulmonary edema (HCC) J81.0  Lactic acidosis E87.2 Prior to Admission Medications Prescriptions Last Dose Informant Patient Reported? Taking? Aspirin, Buffered 81 mg tab   Yes No  
Sig: Take 81 mg by mouth daily. atorvastatin (LIPITOR) 40 mg tablet   Yes No  
Sig: Take 40 mg by mouth daily. carvedilol (COREG) 12.5 mg tablet   Yes No  
Sig: Take 12.5 mg by mouth two (2) times daily (with meals). fenofibric acid (TRILIPIX ER) 135 mg capsule   Yes No  
Sig: Take 135 mg by mouth daily. furosemide (LASIX) 40 mg tablet   No No  
Sig: Take 1 Tab by mouth two (2) times a day.  
gabapentin (NEURONTIN) 300 mg capsule   Yes No  
Sig: Take 300 mg by mouth two (2) times a day. lisinopril (PRINIVIL, ZESTRIL) 2.5 mg tablet   No No  
Sig: Take 1 Tab by mouth daily. loratadine (CLARITIN) 10 mg tablet   Yes No  
Sig: Take 10 mg by mouth.  
mesalamine ER (APRISO) 0.375 gram 24 hour capsule   Yes No  
Sig: Take 0.375 g by mouth daily. Indications: ulcerated colon  
metFORMIN (GLUCOPHAGE) 500 mg tablet   Yes No  
Sig: Take 500 mg by mouth daily. niacin (NIASPAN) 1,000 mg Tb24 tab   Yes No  
Sig: Take 1,000 mg by mouth daily. Currently not taking 9/10/19  
nitroglycerin (NITROSTAT) 0.4 mg SL tablet   No No  
Si Tab by SubLINGual route every five (5) minutes as needed for Chest Pain. spironolactone (ALDACTONE) 25 mg tablet   Yes No  
Sig: Take 25 mg by mouth daily. warfarin (COUMADIN) 5 mg tablet   No No  
Sig: Take 0.5-1 tab every day or as directed Patient taking differently: Take 5 mg by mouth daily. Take 0.5-1 tab every day or as directed Facility-Administered Medications: None Past Medical History:  
Diagnosis Date  Acute myocardial infarction ([facility] [address] 2000 MI PCI x 2  
 Aortic Aneurysm/Dilation of the Aorta 2016  Aortic ectasia, abdominal ([address] 2014  Arrhythmia  ASCAD-of the Native Vessel 2016  CAD (coronary artery disease) 2008 PCI with stents to RCA  Cerebrovascular accident (stroke) ([facility] [address] 2016  Chest pain 2016  Chronic pain  Chronic systolic heart failure ([address] 2016  Congestive heart failure, unspecified  Coronary atherosclerosis of unspecified type of vessel, native or graft 2007 MI PCI x 1  
 GERD (gastroesophageal reflux disease)  History of agent Orange exposure  Hx of AICD-Implanted Cardiac Defibrillator 2016  Hypercholesterolemia  Hyperlipidemia 2016  Hypertension  Ill-defined condition HLD  Ill-defined disease   
 ectatic aorta  Liver disease   
 partial liver resection.  Morbid obesity ([facility] [address]  Myocardial infarct/Anterolateral age unspe. 2016  Stroke [facility]) 2011 Past Surgical History:  
Procedure Laterality Date  CARDIAC SURG PROCEDURE UNLIST MULTIPLE HEART ANGIOPLASTY/STENT  
 HX GI Liver resection  HX OTHER SURGICAL    
 partial liver resection.  HX PACEMAKER    
 ICD/Pacemaker Social History Socioeconomic History  Marital status:  Spouse name: Not on file  Number of children: Not on file  Years of education: Not on file  Highest education level: Not on file Occupational History  Not on file Social Needs  Financial resource strain: Not on file  Food insecurity:  
  Worry: Not on file Inability: Not on file  Transportation needs:  
  Medical: Not on file Non-medical: Not on file Tobacco Use  Smoking status: Former Smoker Packs/day: 1.00 Years: 28.00 Pack years: 28.00 Last attempt to quit: 1999 Years since quittin.1  Smokeless tobacco: Never Used Substance and Sexual Activity  Alcohol use: No  
 Drug use: No  
 Sexual activity: Not on file Lifestyle  Physical activity:  
  Days per week: Not on file Minutes per session: Not on file  Stress: Not on file Relationships  Social connections:  
  Talks on phone: Not on file Gets together: Not on file Attends Yazidism service: Not on file Active member of club or organization: Not on file Attends meetings of clubs or organizations: Not on file Relationship status: Not on file  Intimate partner violence:  
  Fear of current or ex partner: Not on file Emotionally abused: Not on file Physically abused: Not on file Forced sexual activity: Not on file Other Topics Concern  Not on file Social History Narrative  Not on file Family History Problem Relation Age of Onset  Cancer Mother  Other Father   
     brain hemorrage  Heart Disease Maternal Grandfather  Heart Disease Paternal Grandfather Allergies Allergen Reactions  Sulfur Other (comments) BREAK OUT Current Facility-Administered Medications Medication Dose Route Frequency  propofol (DIPRIVAN) 10 mg/mL infusion  0-50 mcg/kg/min IntraVENous TITRATE  fentaNYL in normal saline (pf) 25 mcg/mL infusion   mcg/hr IntraVENous TITRATE Current Outpatient Medications Medication Sig  loratadine (CLARITIN) 10 mg tablet Take 10 mg by mouth.  lisinopril (PRINIVIL, ZESTRIL) 2.5 mg tablet Take 1 Tab by mouth daily.  furosemide (LASIX) 40 mg tablet Take 1 Tab by mouth two (2) times a day.  mesalamine ER (APRISO) 0.375 gram 24 hour capsule Take 0.375 g by mouth daily. Indications: ulcerated colon  warfarin (COUMADIN) 5 mg tablet Take 0.5-1 tab every day or as directed (Patient taking differently: Take 5 mg by mouth daily. Take 0.5-1 tab every day or as directed)  metFORMIN (GLUCOPHAGE) 500 mg tablet Take 500 mg by mouth daily.  atorvastatin (LIPITOR) 40 mg tablet Take 40 mg by mouth daily.  gabapentin (NEURONTIN) 300 mg capsule Take 300 mg by mouth two (2) times a day.  fenofibric acid (TRILIPIX ER) 135 mg capsule Take 135 mg by mouth daily.  nitroglycerin (NITROSTAT) 0.4 mg SL tablet 1 Tab by SubLINGual route every five (5) minutes as needed for Chest Pain.  niacin (NIASPAN) 1,000 mg Tb24 tab Take 1,000 mg by mouth daily. Currently not taking 9/10/19  spironolactone (ALDACTONE) 25 mg tablet Take 25 mg by mouth daily.  carvedilol (COREG) 12.5 mg tablet Take 12.5 mg by mouth two (2) times daily (with meals).  Aspirin, Buffered 81 mg tab Take 81 mg by mouth daily. Objective:  
 
Vitals:  
 02/20/20 2151 02/20/20 2155 02/20/20 2201 02/20/20 2211 BP: 104/64 105/67 104/68 111/73 Pulse: 83 79 79 80 Resp: 22 24 24 24 Temp:      
SpO2: 100% 100% 100% 100% Weight: PHYSICAL EXAM  
 
Constitutional:  the patient is well developed and in no acute distress EENMT:  Sclera clear, pupils equal, oral mucosa moist 
Respiratory: Intubated. Bibasilar crackles. Cardiovascular:  RRR without M,G,R 
Gastrointestinal: soft and non-tender; with positive bowel sounds. Musculoskeletal: warm without cyanosis. There is no lower extremity edema. Skin:  no jaundice or rashes, no wounds Neurologic: sedated Psychiatric:  sedated CXR: 
2/20/20 1. The endotracheal tube tip lies 6.8 cm above the jimi. 2. CHF or fluid overload. Recent Labs  
  02/20/20 2117 WBC 14.3* HGB 14.4 HCT 47.2  INR 2.5 Recent Labs  
  02/20/20 2117   
K 3.7  * CO2 21 BUN 28* CREA 1.45  
MG 2.2 CA 9.0  
TROIQ 0.02  
ALB 3.6 TBILI 1.0 ALT 15 SGOT 22 Recent Labs  
  02/20/20 2151 PHI 7.262* PCO2I 45.3*  
PO2I 180* HCO3I 20.4* Recent Labs  
  02/20/20 
2117 LAC 5.6*  
 
 
 Assessment:  (Medical Decision Making) Hospital Problems  Date Reviewed: 2/20/2020 Codes Class Noted POA * (Principal) Acute on chronic systolic heart failure (HCC) ICD-10-CM: I50.23 ICD-9-CM: 428.23  2/20/2020 Yes Overview Signed 2/22/2016 11:06 AM by Kendal Hudson  
  Echo 4/15: EF 30-35%, mild to mod MR Echo 2011: EF 30-35% Acute respiratory failure with hypoxia [facility]) ICD-10-CM: J96.01 
ICD-9-CM: 518.81  2/20/2020 Unknown Acute pulmonary edema (HCC) ICD-10-CM: J81.0 ICD-9-CM: 518.4  2/20/2020 Unknown Lactic acidosis ICD-10-CM: E87.2 ICD-9-CM: 276.2  2/20/2020 Unknown Patient with a history of systolic heart failure and moderate MR, with recent AV [place] ablation. Now presenting with a week of gradually increasing shortness of breath, intermittent diaphoresis, and CXR suggestive of pulmonary edema. Picture most suggestive of worsening heart failure. Plan:  (Medical Decision Making) --Will admit for further medical management to the ICU.  
--need to awaiting initial lab work for further guidance. Need to include BNP and pct.  
--repeat TTE in the am.  
-hold on abx for now unless some evidence of infection. None at present.  
-may need cardiology to see in the am.  
-change sedation from propofol to fentanyl for hypotension.  
-takes 40mg lasix at home. 60mg IV ordered in the ER already. Monitor BP, I/O, and creatinine for further diuresis. -having intermittent issues with cuff leak from ETT. Seems to have a large trachea and a 7.5 ETT. Doing better with additional air in the cuff. If this fails to hold may eventually need to change to larger ETT. -pharmacy consult to dose coumadin.  
-hold lisinopril and coreg until BP stabilizes, then can restart.  
-hold home metformin. Ssi.  
-PPI while intubated ADDENDUM: WBC 14 but no left shift. BNP 5200 and lactate 5.6.  Overall these labs plus the clinical picture are most suggestive of hypoperfusion due to heart failure. Will follow up the procalcitonin when available and monitor for fever or other signs of infection. More than 50% of the time documented was spent in face-to-face contact with the patient and in the care of the patient on the floor/unit where the patient is located.  
 
Jonathan Back MD

## 2020-02-21 NOTE — PROGRESS NOTES
Ventilator check complete; patient has a #7.5 ET tube secured at the 28 at the lip. Patient is sedated. Patient is not able to follow commands. Breath sounds are coarse. Trachea is midline, Negative for subcutaneous air, and chest excursion is symmetric. Patient is also Negative for cyanosis and is Negative for pitting edema. All alarms are set and audible. Resuscitation bag is at the head of the bed. Ventilator Settings Mode FIO2 Rate Tidal Volume Pressure PEEP I:E Ratio Pressure control  70 % 24     12  12 cm H20  1:1.5 Peak airway pressure: 24.1 cm H2O Minute ventilation: 11.2 l/min ABG: No results for input(s): PH, PCO2, PO2, HCO3 in the last 72 hours. Shani Galvan

## 2020-02-21 NOTE — ED PROVIDER NOTES
78-year-old gentleman presents in severe respiratory distress. Get much history from the patient as he was altered, diaphoretic, and had extremely poor respiratory effort. EMS reported that they were called because of shortness of breath that was gradually getting worse over the past couple of days. They reported a room air sat in the mid 80s. They gave him a breathing treatment and attempted to do CPAP with the patient was fighting it. No other history obtainable. Given his severe air hunger, his altered mental status, and his poor respiratory effort I chose to emergently intubate him. Elements of this note were created using speech recognition software. As such, errors of speech recognition may be present. Past Medical History:  
Diagnosis Date  Acute myocardial infarction ([address] 2000 MI PCI x 2  
 Aortic Aneurysm/Dilation of the Aorta 2/22/2016  Aortic ectasia, abdominal ([address] 5/9/2014  Arrhythmia  ASCAD-of the Native Vessel 2/22/2016  CAD (coronary artery disease) 2008 PCI with stents to RCA  Cerebrovascular accident (stroke) ([address] 1/30/2016  Chest pain 2/22/2016  Chronic pain  Chronic systolic heart failure ([address] 2/22/2016  Congestive heart failure, unspecified  Coronary atherosclerosis of unspecified type of vessel, native or graft 2007 MI PCI x 1  
 GERD (gastroesophageal reflux disease)  History of agent Orange exposure  Hx of AICD-Implanted Cardiac Defibrillator 2/22/2016  Hypercholesterolemia  Hyperlipidemia 2/22/2016  Hypertension  Ill-defined condition HLD  Ill-defined disease   
 ectatic aorta  Liver disease   
 partial liver resection.  Morbid obesity ([address]  Myocardial infarct/Anterolateral age unspe. 2/22/2016  Stroke [facility]) 12/27/2011 Past Surgical History:  
Procedure Laterality Date  CARDIAC SURG PROCEDURE UNLIST  MULTIPLE HEART ANGIOPLASTY/STENT  
 HX GI    
 Liver resection  HX OTHER SURGICAL    
 partial liver resection.  HX PACEMAKER    
 ICD/Pacemaker Family History:  
Problem Relation Age of Onset  Cancer Mother  Other Father   
     brain hemorrage  Heart Disease Maternal Grandfather  Heart Disease Paternal Grandfather Social History Socioeconomic History  Marital status:  Spouse name: Not on file  Number of children: Not on file  Years of education: Not on file  Highest education level: Not on file Occupational History  Not on file Social Needs  Financial resource strain: Not on file  Food insecurity:  
  Worry: Not on file Inability: Not on file  Transportation needs:  
  Medical: Not on file Non-medical: Not on file Tobacco Use  Smoking status: Former Smoker Packs/day: 1.00 Years: 28.00 Pack years: 28.00 Last attempt to quit: 1999 Years since quittin.1  Smokeless tobacco: Never Used Substance and Sexual Activity  Alcohol use: No  
 Drug use: No  
 Sexual activity: Not on file Lifestyle  Physical activity:  
  Days per week: Not on file Minutes per session: Not on file  Stress: Not on file Relationships  Social connections:  
  Talks on phone: Not on file Gets together: Not on file Attends Pentecostal service: Not on file Active member of club or organization: Not on file Attends meetings of clubs or organizations: Not on file Relationship status: Not on file  Intimate partner violence:  
  Fear of current or ex partner: Not on file Emotionally abused: Not on file Physically abused: Not on file Forced sexual activity: Not on file Other Topics Concern  Not on file Social History Narrative  Not on file ALLERGIES: Sulfur Review of Systems Unable to perform ROS: Patient unresponsive Vitals:  
 20 2113 20 2119 20 BP: 140/66 124/70  140/66 Pulse: 81 84  81 Resp: 22 Temp: 98.5 °F (36.9 °C) SpO2:  95%  (!) 79% Weight:   103 kg (227 lb) Physical Exam 
Vitals signs and nursing note reviewed. Constitutional:   
   General: He is in acute distress. Appearance: He is ill-appearing and diaphoretic. Comments: Ill-appearing with air hunger HENT:  
   Head: Normocephalic and atraumatic. Nose: No congestion or rhinorrhea. Mouth/Throat:  
   Comments: No oral pharyngeal edema or erythema Cardiovascular:  
   Rate and Rhythm: Normal rate and regular rhythm. Pulses: Normal pulses. Pulmonary:  
   Comments: Very poor respiratory effort Abdominal:  
   Palpations: Abdomen is soft. Musculoskeletal:  
   Right lower leg: Edema present. Left lower leg: Edema present. Skin: 
   General: Skin is warm. MDM Number of Diagnoses or Management Options Diagnosis management comments: CRITICAL CARE Documentation: This patient is critically ill and there is a high probability of of imminent or life threatening deterioration in the patient's condition without immediate management. The nature of the patient's clinical problem is: Respiratory failure I have spent 60 minutes in direct patient care, documentation, review of labs/xrays/old records, discussion with intensivist, cardiology, spouse. The time involved in the performance of separately reportable procedures was not counted toward critical care time. Jazzmine Cortés MD; 2/20/2020 @10:29 PM 
 
 
 
  
 
Intubation Date/Time: 2/20/2020 10:27 PM 
Performed by: Krishan Adan MD 
Authorized by: Krishan Adan MD  
 
Consent:  
  Consent obtained:  Emergent situation Pre-procedure details:  
  Patient status:  Altered mental status Pretreatment medications:  None Paralytics:  Succinylcholine Procedure details:  
  Preoxygenation:  Bag valve mask   CPR in progress: no   
 Intubation method:  Oral 
  Oral intubation technique:  Video-assisted Tube size (mm):  7.5 Tube type:  Cuffed Number of attempts:  1 Ventilation between attempts: no   
  Cricoid pressure: no   
  Tube visualized through cords: yes Placement assessment:  
  Tube secured with:  ETT anthony Breath sounds:  Equal 
  Placement verification: chest rise, CXR verification and ETCO2 detector CXR findings:  ETT in proper place Post-procedure details:  
  Patient tolerance of procedure: Tolerated well, no immediate complications Comments:  
   Greta Akhtar placement of an endotracheal tube indicated a leak potentially from a malfunctioning cuff. Therefore I remove that tube and placed a new tube.

## 2020-02-21 NOTE — CONSULTS
[address] Cardiology Consultation Date of  Admission: 2/20/2020  9:11 PM  
 
Primary Care Physician: Dr. Julia De La Fuente Primary Cardiologist: Dr. Bello Nichols Referring Physician: Dr. Carrie Skiff Consulting Physician: Dr. Nathan Mcfarlane CC/Reason for consult: HFrEF 
 
HPI:  Jluis Morocho is a 75 y.o. WM with h/o CAD/PCI, ICM/HFrEF EF 26% (echo 3/2019), AF on coumadin, tachy-mariano syndrome s/p Medtronic BiV ICD s/p recent AVN ablation on 2/11, moderate MR, CVA 2011, HTN, dyslipidemia and GERD who presented via EMS to [facility] ED in respiratory distress requiring intubation. Pt was given IV lasix 60 mg x 1 dose with little UOP. Pro BNP was elevated at 5,206, lactic acid 5.6. Pt has been hypotensive overnight. Sedation has been weaned and extubation is planned this AM per pulmonary. [address] Cardiology was consulted for HFrEF. Pt is currently intubated therefore history was obtained from wife and son at bedside and the chart. He has been more SOB for the last 2 weeks. He takes Lasix at home BID. On Wednesday, he missed the afternoon dose of lasix and on Thursday evening he became acutely SOB, diaphoretic and in respiratory distress. He denies CP, palpitations or syncope. Echo pending. Past Medical History:  
Diagnosis Date  Acute myocardial infarction ([address] 2000 MI PCI x 2  
 Aortic Aneurysm/Dilation of the Aorta 2/22/2016  Aortic ectasia, abdominal ([address] 5/9/2014  Arrhythmia  ASCAD-of the Native Vessel 2/22/2016  CAD (coronary artery disease) 2008 PCI with stents to RCA  Cerebrovascular accident (stroke) ([address] 1/30/2016  Chest pain 2/22/2016  Chronic pain  Chronic systolic heart failure ([address] 2/22/2016  Congestive heart failure, unspecified  Coronary atherosclerosis of unspecified type of vessel, native or graft 2007 MI PCI x 1  
 GERD (gastroesophageal reflux disease)  History of agent Orange exposure  Hx of AICD-Implanted Cardiac Defibrillator 2/22/2016  Hypercholesterolemia  Hyperlipidemia 2016  Hypertension  Ill-defined condition HLD  Ill-defined disease   
 ectatic aorta  Liver disease   
 partial liver resection.  Morbid obesity ([facility] [address]  Myocardial infarct/Anterolateral age unspe. 2016  Stroke [facility]) 2011 Past Surgical History:  
Procedure Laterality Date  CARDIAC SURG PROCEDURE UNLIST MULTIPLE HEART ANGIOPLASTY/STENT  
 HX GI Liver resection  HX OTHER SURGICAL    
 partial liver resection.  HX PACEMAKER    
 ICD/Pacemaker Allergies Allergen Reactions  Sulfur Other (comments) BREAK OUT Social History Socioeconomic History  Marital status:  Spouse name: Not on file  Number of children: Not on file  Years of education: Not on file  Highest education level: Not on file Occupational History  Not on file Social Needs  Financial resource strain: Not on file  Food insecurity:  
  Worry: Not on file Inability: Not on file  Transportation needs:  
  Medical: Not on file Non-medical: Not on file Tobacco Use  Smoking status: Former Smoker Packs/day: 1.00 Years: 28.00 Pack years: 28.00 Last attempt to quit: 1999 Years since quittin.1  Smokeless tobacco: Never Used Substance and Sexual Activity  Alcohol use: No  
 Drug use: No  
 Sexual activity: Not on file Lifestyle  Physical activity:  
  Days per week: Not on file Minutes per session: Not on file  Stress: Not on file Relationships  Social connections:  
  Talks on phone: Not on file Gets together: Not on file Attends Rastafarian service: Not on file Active member of club or organization: Not on file Attends meetings of clubs or organizations: Not on file Relationship status: Not on file  Intimate partner violence:  
  Fear of current or ex partner: Not on file Emotionally abused: Not on file Physically abused: Not on file Forced sexual activity: Not on file Other Topics Concern  Not on file Social History Narrative  Not on file Family History Problem Relation Age of Onset  Cancer Mother  Other Father   
     brain hemorrage  Heart Disease Maternal Grandfather  Heart Disease Paternal Grandfather Current Facility-Administered Medications Medication Dose Route Frequency  NUTRITIONAL SUPPORT ELECTROLYTE PRN ORDERS   Does Not Apply PRN  potassium chloride 20 mEq in 100 ml IVPB  20 mEq IntraVENous Q2H  
 vancomycin (VANCOCIN) 1,000 mg in 0.9% sodium chloride (MBP/ADV) 250 mL  1,000 mg IntraVENous Q12H  perflutren lipid microspheres (DEFINITY) in NS bolus IV  1 mL IntraVENous PRN  propofol (DIPRIVAN) 10 mg/mL infusion  0-50 mcg/kg/min IntraVENous TITRATE  fentaNYL in normal saline (pf) 25 mcg/mL infusion   mcg/hr IntraVENous TITRATE  aspirin chewable tablet 81 mg  81 mg Oral DAILY  atorvastatin (LIPITOR) tablet 40 mg  40 mg Oral DAILY  spironolactone (ALDACTONE) tablet 25 mg  25 mg Oral DAILY  sodium chloride (NS) flush 5-40 mL  5-40 mL IntraVENous Q8H  
 sodium chloride (NS) flush 5-40 mL  5-40 mL IntraVENous PRN  
 insulin lispro (HUMALOG) injection   SubCUTAneous Q6H  
 pantoprazole (PROTONIX) 40 mg in 0.9% sodium chloride 10 mL injection  40 mg IntraVENous DAILY  warfarin (COUMADIN) tablet 2.5 mg  2.5 mg Oral QPM  
 midazolam (VERSED) 100 mg in 0.9% sodium chloride 100 mL infusion  1-10 mg/hr IntraVENous TITRATE Review of symptoms: 
Unable to obtain Subjective:  
Physical Exam 
 
Visit Vitals BP 91/59 Pulse 81 Temp 98 °F (36.7 °C) Resp 19 Ht 6' 1\" (1.854 m) Wt 104.8 kg (231 lb 0.7 oz) SpO2 100% BMI 30.48 kg/m² General Appearance:  Well developed, well nourished, alert, intubated Ears/Nose/Mouth/Throat:   Hearing grossly normal. 
  
    Neck: Supple. Chest:   Lungs fairly clear with few crackles bilaterally. Cardiovascular:  Regular rate and rhythm, S1, S2 Abdomen:   Soft, non-tender, bowel sounds are active. Extremities: No edema bilaterally. Skin: Warm and dry. Labs:  
Recent Results (from the past 24 hour(s)) EKG, 12 LEAD, INITIAL Collection Time: 02/20/20  9:16 PM  
Result Value Ref Range Ventricular Rate 94 BPM  
 Atrial Rate 94 BPM  
 P-R Interval 160 ms QRS Duration 156 ms  
 Q-T Interval 426 ms  
 QTC Calculation (Bezet) 532 ms Calculated P Axis 88 degrees Calculated R Axis -115 degrees Calculated T Axis 73 degrees Diagnosis    
  !! AGE AND GENDER SPECIFIC ECG ANALYSIS !! Electronic ventricular pacemaker Inferior infarct , age undetermined Abnormal ECG When compared with ECG of 11-FEB-2020 09:13, Sinus rhythm has replaced Electronic ventricular pacemaker Confirmed by [facility]  MD (), Corrine Soto ([id number]) on 2/21/2020 7:15:32 AM 
  
CBC WITH AUTOMATED DIFF Collection Time: 02/20/20  9:17 PM  
Result Value Ref Range WBC 14.3 (H) 4.3 - 11.1 K/uL  
 RBC 5.03 4.23 - 5.6 M/uL  
 HGB 14.4 13.6 - 17.2 g/dL HCT 47.2 41.1 - 50.3 % MCV 93.8 79.6 - 97.8 FL  
 MCH 28.6 26.1 - 32.9 PG  
 MCHC 30.5 (L) 31.4 - 35.0 g/dL  
 RDW 13.9 11.9 - 14.6 % PLATELET 175 660 - 135 K/uL MPV 13.2 (H) 9.4 - 12.3 FL ABSOLUTE NRBC 0.00 0.0 - 0.2 K/uL  
 DF AUTOMATED NEUTROPHILS 48 43 - 78 % LYMPHOCYTES 42 13 - 44 % MONOCYTES 5 4.0 - 12.0 % EOSINOPHILS 3 0.5 - 7.8 % BASOPHILS 1 0.0 - 2.0 % IMMATURE GRANULOCYTES 2 0.0 - 5.0 %  
 ABS. NEUTROPHILS 6.8 1.7 - 8.2 K/UL  
 ABS. LYMPHOCYTES 5.9 (H) 0.5 - 4.6 K/UL  
 ABS. MONOCYTES 0.8 0.1 - 1.3 K/UL  
 ABS. EOSINOPHILS 0.4 0.0 - 0.8 K/UL  
 ABS. BASOPHILS 0.1 0.0 - 0.2 K/UL  
 ABS. IMM. GRANS. 0.3 0.0 - 0.5 K/UL METABOLIC PANEL, COMPREHENSIVE Collection Time: 02/20/20  9:17 PM  
Result Value Ref Range  Sodium 139 136 - 145 mmol/L  
 Potassium 3.7 3.5 - 5.1 mmol/L Chloride 104 98 - 107 mmol/L  
 CO2 21 21 - 32 mmol/L Anion gap 14 7 - 16 mmol/L Glucose 253 (H) 65 - 100 mg/dL BUN 28 (H) 8 - 23 MG/DL Creatinine 1.45 0.8 - 1.5 MG/DL  
 GFR est AA >60 >60 ml/min/1.73m2 GFR est non-AA 50 (L) >60 ml/min/1.73m2 Calcium 9.0 8.3 - 10.4 MG/DL Bilirubin, total 1.0 0.2 - 1.1 MG/DL  
 ALT (SGPT) 15 12 - 65 U/L  
 AST (SGOT) 22 15 - 37 U/L Alk. phosphatase 63 50 - 136 U/L Protein, total 7.3 6.3 - 8.2 g/dL Albumin 3.6 3.2 - 4.6 g/dL Globulin 3.7 (H) 2.3 - 3.5 g/dL A-G Ratio 1.0 (L) 1.2 - 3.5 LACTIC ACID Collection Time: 02/20/20  9:17 PM  
Result Value Ref Range Lactic acid 5.6 (HH) 0.4 - 2.0 MMOL/L  
PTT Collection Time: 02/20/20  9:17 PM  
Result Value Ref Range aPTT 33.5 24.3 - 35.4 SEC PROTHROMBIN TIME + INR Collection Time: 02/20/20  9:17 PM  
Result Value Ref Range Prothrombin time 27.8 (H) 12.0 - 14.7 sec INR 2.5 MAGNESIUM Collection Time: 02/20/20  9:17 PM  
Result Value Ref Range Magnesium 2.2 1.8 - 2.4 mg/dL NT-PRO BNP Collection Time: 02/20/20  9:17 PM  
Result Value Ref Range NT pro-BNP 5,206 (H) <450 PG/ML  
TROPONIN I Collection Time: 02/20/20  9:17 PM  
Result Value Ref Range Troponin-I, Qt. 0.02 0.02 - 0.05 NG/ML  
PROCALCITONIN Collection Time: 02/20/20  9:17 PM  
Result Value Ref Range Procalcitonin 0.06 ng/mL POC G3 Collection Time: 02/20/20  9:51 PM  
Result Value Ref Range Device: VENT    
 FIO2 (POC) 100 % pH (POC) 7.262 (L) 7.35 - 7.45    
 pCO2 (POC) 45.3 (H) 35 - 45 MMHG  
 pO2 (POC) 180 (H) 75 - 100 MMHG  
 HCO3 (POC) 20.4 (L) 22 - 26 MMOL/L  
 sO2 (POC) 99 (H) 95 - 98 % Base deficit (POC) 7 mmol/L Mode ASSIST CONTROL Set Rate 18 bpm  
 PEEP/CPAP (POC) 10 cmH2O Allens test (POC) YES Site RIGHT RADIAL Specimen type (POC) ARTERIAL Performed by WSissy RSissy Johnson CO2, POC 22 MMOL/L  Pressure control 15    
 Respiratory comment: PhysicianNotified COLLECT TIME 2,150 POC TROPONIN Collection Time: 02/20/20 10:06 PM  
Result Value Ref Range Troponin-I (POC) 0.04 0.02 - 0.05 ng/ml GLUCOSE, POC Collection Time: 02/21/20 12:11 AM  
Result Value Ref Range Glucose (POC) 156 (H) 65 - 100 mg/dL POC G3 Collection Time: 02/21/20  3:08 AM  
Result Value Ref Range Device: VENT    
 FIO2 (POC) 70 % pH (POC) 7.530 (H) 7.35 - 7.45    
 pCO2 (POC) 30.6 (L) 35 - 45 MMHG  
 pO2 (POC) 279 (H) 75 - 100 MMHG  
 HCO3 (POC) 25.6 22 - 26 MMOL/L  
 sO2 (POC) 100 (H) 95 - 98 % Base excess (POC) 3 mmol/L Mode ASSIST CONTROL Tidal volume 541 ml Set Rate 24 bpm  
 PEEP/CPAP (POC) 12 cmH2O Allens test (POC) NOT APPLICABLE Inspiratory Time 0.99 sec Site RIGHT RADIAL Specimen type (POC) ARTERIAL Performed by Jerman   
 CO2, POC 27 MMOL/L Pressure control 12 Respiratory comment: NurseNotified Exhaled minute volume 12.10 L/min COLLECT TIME 305 METABOLIC PANEL, BASIC Collection Time: 02/21/20  3:20 AM  
Result Value Ref Range Sodium 143 136 - 145 mmol/L Potassium 3.3 (L) 3.5 - 5.1 mmol/L Chloride 110 (H) 98 - 107 mmol/L  
 CO2 24 21 - 32 mmol/L Anion gap 9 7 - 16 mmol/L Glucose 135 (H) 65 - 100 mg/dL BUN 29 (H) 8 - 23 MG/DL Creatinine 1.15 0.8 - 1.5 MG/DL  
 GFR est AA >60 >60 ml/min/1.73m2 GFR est non-AA >60 >60 ml/min/1.73m2 Calcium 8.8 8.3 - 10.4 MG/DL  
CBC W/O DIFF Collection Time: 02/21/20  3:20 AM  
Result Value Ref Range WBC 11.2 (H) 4.3 - 11.1 K/uL  
 RBC 4.24 4.23 - 5.6 M/uL  
 HGB 12.2 (L) 13.6 - 17.2 g/dL HCT 37.9 (L) 41.1 - 50.3 % MCV 89.4 79.6 - 97.8 FL  
 MCH 28.8 26.1 - 32.9 PG  
 MCHC 32.2 31.4 - 35.0 g/dL  
 RDW 13.6 11.9 - 14.6 % PLATELET 764 906 - 412 K/uL MPV 12.6 (H) 9.4 - 12.3 FL ABSOLUTE NRBC 0.00 0.0 - 0.2 K/uL LACTIC ACID  Collection Time: 02/21/20  3:20 AM  
 Result Value Ref Range Lactic acid 1.7 0.4 - 2.0 MMOL/L  
GLUCOSE, POC Collection Time: 02/21/20  5:24 AM  
Result Value Ref Range Glucose (POC) 132 (H) 65 - 100 mg/dL URINALYSIS W/ RFLX MICROSCOPIC Collection Time: 02/21/20  5:45 AM  
Result Value Ref Range Color YELLOW Appearance CLEAR Specific gravity 1.010 1.001 - 1.023    
 pH (UA) 6.5 5.0 - 9.0 Protein NEGATIVE  NEG mg/dL Glucose NEGATIVE  NEG mg/dL Ketone NEGATIVE  NEG mg/dL Bilirubin NEGATIVE  NEG Blood NEGATIVE  NEG Urobilinogen 0.2 0.2 - 1.0 EU/dL Nitrites NEGATIVE  NEG Leukocyte Esterase NEGATIVE  NEG Bacteria 0 0 /hpf Pt has been seen and examined by Dr. Campbell Cowden. He agrees with the following assessment and plan. Assessment/Plan:  
  
  
 Diagnosis  Acute respiratory failure with hypoxia ([facility] [address]- intubated with plan for extubation today per pulmonary  Acute pulmonary edema ([facility]) s/p 1 dose IV lasix with little UOP, symptoms improved nearing extubation, echo pending, interrogate Medtronic PM- interrogation shows NSVT multiple episodes with ATP termination until last PM when it was under detection zone of 150- add IV Amio, IV Lasix 40 mg BID, replace electrolytes and monitor, decreased detection zone to 140  Lactic acidosis- resolved  ASCAD s/p PCI- no angina reported, ASA, statin, no BB/ACE-I/ARB with hypotension  Typical atrial flutter/ atrial fibrillation s/p AVN ablation on 2/11/20- on coumadin  HFrEF EF 26% (3/2019)/Ischemic cardiomyopathy ([address]- s/p IV lasix x 1 dose, holding BB/ACE-I/ARB with hypotension  Bilateral carotid artery disease ([address]  Hyperlipidemia- statin  Cerebrovascular accident (stroke)- 2011  Severe obesity ([address] Thank you for consulting [address] Cardiology and allowing us to participate in the care of this patient. We will continue to follow along with you.  
 
Chinyere Hendrickson PA-C  
 
ATTENDING ADDENDUM: 
 
 Patient seen and examined by me. Agree with above note by physician extender. Key findings are:  No CP at present, awake and alert on vent at present, rhythm stable with V-pacing s/p AVN ablation with permanent AF, but having recurrent short bursts of NSVT with recurrent ATP, but had 8 sec episode of VT below detection limit with symptoms. Also with severe LV dysfunction and acute respiratory distress prior to admit. Doing better but needs continued IV lasix diuresis and monitoring of renal function tomorrow and daily. Replete lytes PRN. Adjusting and lowering detection limit of VT on ICD today as well. Starting amiodarone given numerous bursts of NSVT, consult EP if continues. He reported dizziness int he past and amio stopped late last year, but with recurrent malignant arrhythmias now, we will resume amio as tolerated. CV- RRR without murmur Lungs- Clear bilaterally anterolaterally on vent Abd- soft, nontender, nondistended Ext- no edema Plan: As above. Terisa Runner, MD 
[facility] Cardiology Pager [phone]

## 2020-02-21 NOTE — PROGRESS NOTES
TRANSFER - IN REPORT: 
 
Verbal [facility] ([facility]  [facility] ED (unit) for routine progression of care Report consisted of patients Situation, Background, Assessment and  
Recommendations(SBAR). Information from the following report(s) ED Summary and Recent Results was reviewed with the receiving nurse. Opportunity for questions and clarification was provided. Assessment completed upon patients arrival to unit and care assumed.

## 2020-02-21 NOTE — PROGRESS NOTES
Warfarin dosing per pharmacist 
 
Jluis FrancisSissy is a 75 y.o. male. Height: 6' 1\" (185.4 cm)    Weight: 104.8 kg (231 lb 0.7 oz) Indication:  Afib Goal INR:  2-3 Home dose:  2.5 mg daily Risk factors or significant drug interactions:  No 
 
Other anticoagulants:  No 
 
Daily Monitoring Date  INR     Warfarin dose HGB              Notes 2/21  3.0  1 mg  12.2 Patient's home dose of warfarin is 2.5 mg daily Due to new start of amiodarone and potential interaction causing increased INR, will give 1 mg tonight. Pharmacy will continue to monitor INR and dose warfarin Thank you, Dianna Mullen, PharmD Clinical Pharmacy PGY1 Resident [phone]

## 2020-02-21 NOTE — ED TRIAGE NOTES
Pt arrives via [facility] from home. Pt in respiratory distress on EMS arrival. Room air saturation 86%. On non rebreather at 15L patient sats at 92%. PT responsive to voice, diaphoretic and tripoding. Pt has history of CHF, MIx3. Pt reports shortness of breath x1 week worse today. Dr. Leanne Dent at bedside.

## 2020-02-21 NOTE — ED NOTES
Dr. Daly Krishnamurthy at [facility] to intubate patient. Respiratory at bedside. 2 PIVs in place. 30mg etomodate and 150mg of succ. Administered. Size 7.5 tube. 25mm at the lip.

## 2020-02-21 NOTE — INTERDISCIPLINARY ROUNDS
Interdisciplinary team rounds were held 2/21/2020 with the following team members:Care Management, Nursing, Nutrition, Palliative Care, Pastoral Care, Pharmacy, Physical Therapy, Physician, Respiratory Therapy and Clinical Coordinator and the patient. Plan of care discussed. See clinical pathway and/or care plan for interventions and desired outcomes.

## 2020-02-21 NOTE — PROGRESS NOTES
Found pt on CPAP of 8. Pt previously pt back on a rate for apnea episodes. Sedation is weaned. Waiting to assess pt for further apnea episodes.

## 2020-02-21 NOTE — PROGRESS NOTES
Pharmacokinetic Consult to Pharmacist 
 
Samantha No. is a 75 y.o. male being treated for sepsis with vancomycin. Height: 6' 1\" (185.4 cm)  Weight: 104.8 kg (231 lb 0.7 oz) Lab Results Component Value Date/Time BUN 29 (H) 02/21/2020 03:20 AM  
 Creatinine 1.15 02/21/2020 03:20 AM  
 WBC 11.2 (H) 02/21/2020 03:20 AM  
 Procalcitonin 0.06 02/20/2020 09:17 PM  
 Lactic acid 1.7 02/21/2020 03:20 AM  
  
Estimated Creatinine Clearance: 70.6 mL/min (based on SCr of 1.15 mg/dL). CULTURES: 
Results Procedure Component Value Units Date/Time CULTURE, URINE [[id number]] Order Status:  Sent Specimen:  Urine from Clean catch Day 1 of vancomycin. Goal trough is 15-20. Vancomycin dose initiated at 2,500 mg load, followed by 1,000 mg q12h. Will continue to follow patient. Thank you, Juan uLis Bae, PharmD

## 2020-02-21 NOTE — PROGRESS NOTES
Ventilator check complete; patient has a #7.5 ET tube secured at the 28 at the lip. Patient is sedated. Patient is not able to follow commands. Breath sounds are coarse. Trachea is midline, Negative for subcutaneous air, and chest excursion is symmetric. Patient is also Negative for cyanosis and is Negative for pitting edema. All alarms are set and audible. Resuscitation bag is at the head of the bed.   
  
Ventilator Settings Mode FIO2 Spon Rate Spon Tidal Volume Pressure Support PEEP I:E Ratio Pressure Support 35% 18   754ml  10  8 cm H20    
  
Peak airway pressure: 19 cm H2O Minute ventilation: 9.3L/min Able to wean to PS

## 2020-02-21 NOTE — PROGRESS NOTES
Verbal order to extubate per Dr. Shasta Roa. Suctioned moderate to large secretions from ETT and strong cough. Sats remained > 95% on 35%. Extubated to 5L NC, then increased to 8L with sats of 88%, then to 12L HF NC with sats of 89%. Pt WOB increased. Placed on Airvo of 60L and 60% with sats of 92% but pt still has mild WOB. Notified Dr. Shasta Roa of status. Verbal order for BiPAP if needed.

## 2020-02-21 NOTE — ADVANCED PRACTICE NURSE
Dual skin assessment completed with Adin Hudson RN. Patients skin is warm, dry and intact, no breakdown noted, Allevyn placed on sacrum to prevent skin breakdown.

## 2020-02-22 PROBLEM — I42.9 CARDIOMYOPATHY (HCC): Status: RESOLVED | Noted: 2019-01-01 | Resolved: 2020-01-01

## 2020-02-22 PROBLEM — E87.20 LACTIC ACIDOSIS: Status: RESOLVED | Noted: 2020-01-01 | Resolved: 2020-01-01

## 2020-02-22 NOTE — PROGRESS NOTES
[facility] CARDIOLOGY PROGRESS NOTE 
 
2/22/2020 8:13 AM 
 
Admit Date: 2/20/2020 Subjective:  
Stable overnight without angina, CHF, or palpitations. Vitals stable and controlled. No other complaints overnight. Tolerating meds well. Extubated and doing fairly well, BP marginal. No significant NSVT overnight. Objective:  
  
Vitals:  
 02/22/20 1019 02/22/20 0701 02/22/20 6982 02/22/20 6523 BP: (!) 89/62 105/63 (!) 85/60 100/61 Pulse: 80 80 80 80 Resp: 24 19 23 27 Temp:      
SpO2: 98% 98% 94% 91% Weight:      
Height:      
 
 
Physical Exam: 
Neck- supple, 12cm JVD at 45 deg CV- regular rate and rhythm no MRG Lung- clear bilaterally anteriorly, decreased bibasilarly Abd- soft, nontender, nondistended Ext- no edema Skin- warm and dry Data Review:  
Recent Labs  
  02/22/20 
0423 02/21/20 
1409 02/21/20 
1005 02/21/20 
0320   --   --  143  
K 3.4* 4.0  --  3.3*  
MG 2.1  --  2.1  --   
BUN 31*  --   --  29* CREA 1.20  --   --  1.15  
*  --   --  135* WBC 11.2*  --   --  11.2* HGB 12.2*  --   --  12.2* HCT 37.6*  --   --  37.9*  
  --   --  197 INR 3.8*  --  3.0  -- Assessment and Plan:  
 
 Acute respiratory failure with hypoxia ([address]- improved, extubated, comfortable on BiPAP at present, continue to monitor.  Acute pulmonary edema ([address]- improved, extubated, net 1.7L out thus far, continue IV lasix and monitor UOP,  echo pending, interrogate Medtronic PM- interrogation shows NSVT multiple episodes with ATP termination until last PM when it was under detection zone of 150- improvement in rhythm on IV amio at 0.5mg/min - continue IV amio x 24 hours and then convert to po dosing, replace electrolytes and monitor, decreased detection zone to 140  Lactic acidosis- resolved  ASCAD s/p PCI- no angina reported, ASA, statin, no BB/ACE-I/ARB with hypotension  Typical atrial flutter/ atrial fibrillation s/p AVN ablation on 2/11/20- on coumadin- hold today with INR 3.8, recheck in AM and resume when INR<2.5  HFrEF EF 26% (3/2019)/Ischemic cardiomyopathy ([facility] [address]- continue IV lasix, holding BB/ACE-I/ARB with hypotension- resume as taking at home when BP will allow.  Bilateral carotid artery disease ([facility] [address]- asymptomatic, continue outpatient surveillance.  Hyperlipidemia- statin as taking at home, outpatient surveillance.  Cerebrovascular accident (stroke)- 2011  
  
 
 
JESSICA. Janel Savage MD 
[facility] Cardiology Pager [phone]

## 2020-02-22 NOTE — PROGRESS NOTES
Warfarin dosing per pharmacist 
 
Jluis FrancisSissy is a 75 y.o. male. Height: 6' 1\" (185.4 cm)    Weight: 104.8 kg (231 lb 0.7 oz) Indication:  Afib Goal INR:  2-3 Home dose:  2.5 mg daily Risk factors or significant drug interactions:  No 
 
Other anticoagulants:  No 
 
Daily Monitoring Date  INR     Warfarin dose HGB              Notes 2/21  3.0  1 mg  12.2   
2/22  3.8  Hold  12.2 INR to 3.8. Hold warfarin currently. Following daily INR. Thank you, Sarah Atkinson, Pharm. D. Clinical Pharmacist 
[phone]

## 2020-02-22 NOTE — PROGRESS NOTES
Bedside, Verbal and Written shift change report given to Nic Philippe RN (oncoming nurse) by Garo Martínez RN (offgoing nurse). Report included the following information SBAR, Kardex, Procedure Summary, Intake/Output, MAR, Recent Results, Cardiac Rhythm paced and Alarm Parameters .

## 2020-02-22 NOTE — PROGRESS NOTES
Problem: Dysphagia (Adult) Goal: *Speech Goal: (INSERT TEXT) Description LTG: Patient will tolerate least restrictive diet without overt signs or symptoms of airway compromise by discharge. STG: Patient will tolerate chopped mechanical soft diet and thin liquids without overt signs or symptoms of aspiration 100% of the time. STG: Patient will participate in modified barium swallow study as clinically indicated. Outcome: Progressing Towards Goal 
  
SPEECH LANGUAGE PATHOLOGY: DYSPHAGIA- Initial Assessment NAME/AGE/GENDER: Jluis Morocho is a 75 y.o. male DATE: 2/22/2020 PRIMARY DIAGNOSIS: Acute on chronic systolic heart failure ([facility] [address] [I50.23] ICD-10: Treatment Diagnosis: R13.12 Dysphagia, Oropharyngeal Phase INTERDISCIPLINARY COLLABORATION: Registered Nurse PRECAUTIONS/ALLERGIES: Sulfur SUBJECTIVE Patient pleasant and cooperative. Wife and son present. Patient currently on Optiflow with O2 sats remaining 95-96% throughout PO trials. History of Present Injury/Illness: Mr. Sonya Ruiz  has a past medical history of Acute myocardial infarction [facility]) (2000), Aortic Aneurysm/Dilation of the Aorta (2/22/2016), Aortic ectasia, abdominal ([facility] [address] (5/9/2014), Arrhythmia, ASCAD-of the Native Vessel (2/22/2016), CAD (coronary artery disease) (2008), Cerebrovascular accident (stroke) ([address] (1/30/2016), Chest pain (2/22/2016), Chronic pain, Chronic systolic heart failure ([address] (2/22/2016), Congestive heart failure, unspecified, Coronary atherosclerosis of unspecified type of vessel, native or graft (2007), GERD (gastroesophageal reflux disease), History of agent Orange exposure, Hx of AICD-Implanted Cardiac Defibrillator (2/22/2016), Hypercholesterolemia, Hyperlipidemia (2/22/2016), Hypertension, Ill-defined condition, Ill-defined disease, Liver disease, Morbid obesity ([address] Myocardial infarct/Anterolateral age unspe.  (2/22/2016), and Stroke ([facility] [address] (12/27/2011). He also has no past medical history of Arthritis, Asthma, Autoimmune disease ([facility] [address] Cancer ([facility] [address] Chronic kidney disease, Chronic obstructive pulmonary disease ([facility] [address] Diabetes ([facility] [address] Difficult intubation, Malignant hyperthermia due to anesthesia, Nausea & vomiting, Pseudocholinesterase deficiency, Psychiatric disorder, PUD (peptic ulcer disease), Seizures ([facility] [address] Sleep apnea, Thromboembolus ([facility] [address] or Thyroid disease. Elvie Day He also  has a past surgical history that includes hx other surgical; pr cardiac surg procedure unlist; hx gi; and hx pacemaker. Problem List:  (Impairments causing functional limitations): 1. dysphagia 2. Previous Dysphagia: NONE REPORTED Diet Prior to Evaluation: NPO Orientation:  
Person Place Time Situation Pain: Pain Scale 1: Numeric (0 - 10) Pain Intensity 1: 0 Cognitive-Linguistic Screen: 
? Speech Production:  
o WNL ? Expressive Language: 
o WNL ? Receptive Language: 
o WNL ? Cognition:  
o WNL OBJECTIVE Oral Motor:  
· Labial: No impairment · Dentition: Natural and with dental bridge · Oral Hygiene: Adequate · Lingual: No impairment Swallow assessment: 
 Patient presented with thin liquids, applesauce, mixed consistency and cracker. No overt signs or symptoms of aspiration observed with any of the textures presented. Patient exhibits increased oral transit time with applesauce with only slight improvement with mixed consistency and cracker. He is able to completely clear oral cavity with extra time provided. ASSESSMENT Patient presents with delayed swallow initiation and extra-oral movements prior to swallow trigger, but with adequate laryngeal excursion to palpation and clear to cervical auscultation. Voice clear after swallow. Recommend initiate chopped mechanical soft diet with extra sauces/gravies. Thin liquids ok. Give meds whole with water or in applesauce as tolerated. Patient should be fully awake/alert and upright for all PO. No PO when on BiPap. Tool Used: Dysphagia Outcome and Severity Scale (SAMREEN) Score Comments Normal Diet  [] 7 With no strategies or extra time needed Functional Swallow  [] 6 May have mild oral or pharyngeal delay Mild Dysphagia  [] 5 Which may require one diet consistency restricted Mild-Moderate Dysphagia  [] 4 With 1-2 diet consistencies restricted Moderate Dysphagia  [] 3 With 2 or more diet consistencies restricted Moderate-Severe Dysphagia  [] 2 With partial PO strategies (trials with ST only) Severe Dysphagia  [] 1 With inability to tolerate any PO safely Score:  Initial: 5 Most Recent: 5 (Date 02/22/20 ) Interpretation of Tool: The Dysphagia Outcome and Severity Scale (SAMREEN) is a simple, easy-to-use, 7-point scale developed to systematically rate the functional severity of dysphagia based on objective assessment and make recommendations for diet level, independence level, and type of nutrition. Current Medications: No current facility-administered medications on file prior to encounter. Current Outpatient Medications on File Prior to Encounter Medication Sig Dispense Refill  loratadine (CLARITIN) 10 mg tablet Take 10 mg by mouth.  furosemide (LASIX) 40 mg tablet Take 1 Tab by mouth two (2) times a day. 60 Tab 6  
 mesalamine ER (APRISO) 0.375 gram 24 hour capsule Take 0.375 g by mouth daily. Indications: ulcerated colon  warfarin (COUMADIN) 5 mg tablet Take 0.5-1 tab every day or as directed (Patient taking differently: Take 5 mg by mouth daily. Take 0.5-1 tab every day or as directed) 30 Tab 11  
 metFORMIN (GLUCOPHAGE) 500 mg tablet Take 500 mg by mouth daily.  gabapentin (NEURONTIN) 300 mg capsule Take 300 mg by mouth two (2) times a day.  fenofibric acid (TRILIPIX ER) 135 mg capsule Take 135 mg by mouth daily.  spironolactone (ALDACTONE) 25 mg tablet Take 25 mg by mouth daily.  carvedilol (COREG) 12.5 mg tablet Take 12.5 mg by mouth two (2) times daily (with meals).  Aspirin, Buffered 81 mg tab Take 81 mg by mouth daily.  atorvastatin (LIPITOR) 40 mg tablet Take 40 mg by mouth daily.  nitroglycerin (NITROSTAT) 0.4 mg SL tablet 1 Tab by SubLINGual route every five (5) minutes as needed for Chest Pain. 25 Tab 6 PLAN   
FREQUENCY/DURATION: Continue to follow patient 2 times a week for duration of hospital stay to address above goals. - Recommendations for next treatment session: Next treatment will address diet tolerance REHABILITATION POTENTIAL FOR STATED GOALS: Good COMPLIANCE WITH PROGRAM/EXERCISES: Will assess as treatment progresses CONTINUATION OF SKILLED SERVICES/MEDICAL NECESSITY: 
? Patient is expected to demonstrate progress in  swallow timeliness, swallow function and diet tolerance in order to  work toward diet advancement. ? Patient continues to require skilled intervention due to dysphagia. RECOMMENDATIONS  
DIET:  
? PO:  Mechanical soft with chopped meat and vegetables, extra sauces/gravies ? Liquids:  regular thin MEDICATIONS: whole with liquid or floating in puree as tolerated ASPIRATION PRECAUTIONS · Slow rate of intake · Small bites/sips · Upright at 90 degrees during meal 
· No PO while on BiPap COMPENSATORY STRATEGIES/MODIFICATIONS · Alternate liquids/solids · Small sips and bites EDUCATION: 
· Recommendations discussed with Nursing · Family · Patient RECOMMENDATIONS for CONTINUED SPEECH THERAPY:  
YES: Anticipate need for ongoing speech therapy during this hospitalization. SAFETY: 
After treatment position/precautions: · Upright in bed · RN notified · Family at bedside Total Treatment Duration:  
Time In: 9746 Time Out: 1235 Angel Barber MA, CCC-SLP

## 2020-02-22 NOTE — PROGRESS NOTES
Per patient family ,they wish for him to be DNR and not to be re intubated if needed Order placed Thuy Esquivel MD

## 2020-02-22 NOTE — PROGRESS NOTES
Critical Care Daily Progress Note: 2/22/2020 Andrzej Hudson. Admission Date: 2/20/2020 The patient's chart is reviewed and the patient is discussed with the staff. Pt is a 76 yo  male with a history of chronic systolic CHF (EF 36%), moderate MR, CAD,NSVT s/p ICD, aflutter s/p AV node ablation on 2/11/20, and prior CVA. Pt presented to the ER on 2/20/20 with shortness of breath and in acute resp failure with RA sat 80%. Pt was placed on CPAP but required intubation by EMS. Pt's CXR concerning for volume overload/pulmonary edema. Pt was given lasix 60mg IV with minimal UOP. Pt's BNP was 5206 and LA is 5.6 but trended down to 1.7. Pt was started on Zosyn/Vanc secondary to emesis episode during intubation. Subjective:  
 
 
Extubated yesterday 
 had to be on optiflow and also BIPAP overnight Now on optiflow 60 L/ 35 % Weak Current Facility-Administered Medications Medication Dose Route Frequency  potassium chloride 20 mEq in 100 ml IVPB  20 mEq IntraVENous Q2H  
 NUTRITIONAL SUPPORT ELECTROLYTE PRN ORDERS   Does Not Apply PRN  piperacillin-tazobactam (ZOSYN) 4.5 g in 0.9% sodium chloride (MBP/ADV) 100 mL  4.5 g IntraVENous Q8H  
 famotidine (PF) (PEPCID) 20 mg in 0.9% sodium chloride 10 mL injection  20 mg IntraVENous Q12H  
 amiodarone (CORDARONE) 450 mg in dextrose 5% 250 mL infusion  0.5-1 mg/min IntraVENous CONTINUOUS  
 furosemide (LASIX) injection 40 mg  40 mg IntraVENous BID  [Held by provider] warfarin (COUMADIN) tablet 1 mg  1 mg Oral QPM  
 aspirin chewable tablet 81 mg  81 mg Oral DAILY  atorvastatin (LIPITOR) tablet 40 mg  40 mg Oral DAILY  spironolactone (ALDACTONE) tablet 25 mg  25 mg Oral DAILY  potassium chloride (K-DUR, KLOR-CON) SR tablet 20 mEq  20 mEq Oral BID  lip protectant (BLISTEX) ointment 1 Each  1 Each Topical PRN  
 sodium chloride (NS) flush 5-40 mL  5-40 mL IntraVENous Q8H  
  sodium chloride (NS) flush 5-40 mL  5-40 mL IntraVENous PRN  
 insulin lispro (HUMALOG) injection   SubCUTAneous Q6H Review of Systems Constitutional:  negative for fever, chills, sweats Cardiovascular:  negative for chest pain, palpitations, syncope, edema Gastrointestinal:  negative for dysphagia, reflux, vomiting, diarrhea, abdominal pain, or melena Neurologic:  negative for focal weakness, numbness, headache Objective:  
 
Vitals:  
 02/22/20 0204 02/22/20 0801 02/22/20 5713 02/22/20 6873 BP:  106/71 101/70 Pulse: 80 84  80 Resp: 24 27  25 Temp:      
SpO2: 91% 91%  90% Weight:      
Height:      
 
 
 
Intake/Output Summary (Last 24 hours) at 2/22/2020 4067 Last data filed at 2/22/2020 8812 Gross per 24 hour Intake 860.62 ml Output 2500 ml Net -1639.38 ml Physical Exam:         
Constitutional:  the patient is well developed and in no acute distress EENMT:  Sclera clear, pupils equal, oral mucosa moist 
Respiratory: crackles Cardiovascular:  RRR without M,G,R 
Gastrointestinal: soft and non-tender; with positive bowel sounds. Musculoskeletal: warm without cyanosis. There is plus 1 lower extremity edema. Skin:  no jaundice or rashes, no wounds Neurologic: no gross neuro deficits Psychiatric:  alert and oriented x 3 LINES: 
Per iv line, Rubio, DRIPS: 
Amiodarone CXR: pending LAB Recent Labs  
  02/22/20 
5822 02/21/20 
2339 02/21/20 
1807 02/21/20 
1206 02/21/20 
9112 GLUCPOC 117* 155* 183* 139* 132* Recent Labs  
  02/22/20 
0423 02/21/20 
1005 02/21/20 
0320 02/20/20 
2117 WBC 11.2*  --  11.2* 14.3* HGB 12.2*  --  12.2* 14.4 HCT 37.6*  --  37.9* 47.2   --  197 307 INR 3.8* 3.0  --  2.5 Recent Labs  
  02/22/20 
0423 02/21/20 
1409 02/21/20 
1005 02/21/20 
0320 02/20/20 
2117   --   --  143 139  
K 3.4* 4.0  --  3.3* 3.7 *  --   --  110* 104 CO2 27  --   --  24 21 *  --   --  135* 253* BUN 31*  --   --  29* 28* CREA 1.20  --   --  1.15 1.45  
MG 2.1  --  2.1  --  2.2 CA 8.9  --   --  8.8 9.0  
TROIQ  --   --   --   --  0.02  
ALB  --   --   --   --  3.6 TBILI  --   --   --   --  1.0 ALT  --   --   --   --  15 SGOT  --   --   --   --  22 Recent Labs  
  02/22/20 
0451 02/21/20 
0308 02/20/20 
2151 PHI 7.447 7.530* 7.262* PCO2I 38.6 30.6* 45.3*  
PO2I 143* 279* 180* HCO3I 26.7* 25.6 20.4* Recent Labs  
  02/21/20 
0320 02/20/20 
2117 LAC 1.7 5.6* Recent Labs  
  02/22/20 
0451 02/21/20 
0308 02/20/20 
2151 PHI 7.447 7.530* 7.262* PCO2I 38.6 30.6* 45.3*  
PO2I 143* 279* 180* HCO3I 26.7* 25.6 20.4* Patient Active Problem List  
Diagnosis Code  Aortic ectasia, abdominal ([facility] [address] A90.160  Cerebrovascular accident (stroke) ([facility] [address] I63.9  
 Hx of AICD-Implanted Cardiac Defibrillator Z95.810  
 Aortic Aneurysm/Dilation of the Aorta I71.9  ASCAD-of the Native Vessel I25.10  Acute on chronic systolic heart failure (HCC) I50.23  
 Hyperlipidemia E78.5  Bilateral carotid artery disease ([facility]) I73.9  NSVT (nonsustained ventricular tachycardia) ([facility]) I47.2  Dilated cardiomyopathy ([facility]) I42.0  Long term (current) use of anticoagulants Z79.01  
 Typical atrial flutter ([facility]) I48.3  Severe obesity ([facility]) E66.01  
 LBBB (left bundle branch block) I44.7  Systolic CHF, chronic ([facility]) I50.22  
 Acute respiratory failure with hypoxia ([facility]) J96.01  
 Acute pulmonary edema ([facility]) J81.0 Assessment:  (Medical Decision Making) Hospital Problems  Date Reviewed: 2/21/2020 Codes Class Noted POA * (Principal) Acute on chronic systolic heart failure (HCC) ICD-10-CM: I50.23 ICD-9-CM: 428.23  2/20/2020 Yes Overview Signed 2/22/2016 11:06 AM by Maral Dumont  
  Echo 4/15: EF 30-35%, mild to mod MR Echo 2011: EF 30-35%  Acute respiratory failure with hypoxia (HCC) ICD-10-CM: J96.01 
 ICD-9-CM: 518.81  2/20/2020 Unknown Acute pulmonary edema (HCC) ICD-10-CM: J81.0 ICD-9-CM: 518.4  2/20/2020 Unknown Dilated cardiomyopathy ([facility] [address] ICD-10-CM: I42.0 ICD-9-CM: 425.4  2/7/2018 Yes NSVT (nonsustained ventricular tachycardia) (HCC) ICD-10-CM: I47.2 ICD-9-CM: 427.1  8/7/2017 Yes Hx of AICD-Implanted Cardiac Defibrillator ICD-10-CM: Z95.810 ICD-9-CM: V45.02  2/22/2016 Yes Overview Signed 2/22/2016 11:07 AM by Radha Maria Follow by Dr. Shaunna Lynn Plan:  (Medical Decision Making) -- continue to wean optiflow - PT 
- will get speech  
-wean optiflow as tolerated Continue Amiodarone gtt per cardiology - check UA 
- will keep in ICU today More than 50% of the time documented was spent in face-to-face contact with the patient and in the care of the patient on the floor/unit where the patient is located.  
 
Sung Quinn MD

## 2020-02-23 NOTE — PROGRESS NOTES
Patient placed back on bipap secondary to increased work of breathing and inability to improve sats >89% after returning to bed. Patient tolerated airvo for ~6 hours (from [id number]).

## 2020-02-23 NOTE — PROGRESS NOTES
Problem: Ventilator Management Goal: *Adequate oxygenation and ventilation Outcome: Progressing Towards Goal 
Goal: *Patient maintains clear airway/free of aspiration Outcome: Progressing Towards Goal 
Goal: *Absence of infection signs and symptoms Outcome: Progressing Towards Goal 
Goal: *Normal spontaneous ventilation Outcome: Progressing Towards Goal 
  
Problem: Patient Education: Go to Patient Education Activity Goal: Patient/Family Education Outcome: Progressing Towards Goal 
  
Problem: Non-Violent Restraints Goal: *Removal from restraints as soon as assessed to be safe Outcome: Progressing Towards Goal 
Goal: *No harm/injury to patient while restraints in use Outcome: Progressing Towards Goal 
Goal: *Patient's dignity will be maintained Outcome: Progressing Towards Goal 
Goal: *Patient Specific Goal (EDIT GOAL, INSERT TEXT) Outcome: Progressing Towards Goal 
Goal: Non-violent Restaints:Standard Interventions Outcome: Progressing Towards Goal 
Goal: Non-violent Restraints:Patient Interventions Outcome: Progressing Towards Goal 
Goal: Patient/Family Education Outcome: Progressing Towards Goal 
  
Problem: Falls - Risk of 
Goal: *Absence of Falls Description Document Rohitesme Cisneros Fall Risk and appropriate interventions in the flowsheet. Outcome: Progressing Towards Goal 
Note: Fall Risk Interventions: 
  
 
Mentation Interventions: Adequate sleep, hydration, pain control, Door open when patient unattended, Bed/chair exit alarm Medication Interventions: Assess postural VS orthostatic hypotension Elimination Interventions: Bed/chair exit alarm, Call light in reach Problem: Patient Education: Go to Patient Education Activity Goal: Patient/Family Education Outcome: Progressing Towards Goal 
  
Problem: Pressure Injury - Risk of 
Goal: *Prevention of pressure injury Description Document Everardo Scale and appropriate interventions in the flowsheet. Outcome: Progressing Towards Goal 
Note: Pressure Injury Interventions: 
Sensory Interventions: Assess changes in LOC Moisture Interventions: Absorbent underpads Activity Interventions: Assess need for specialty bed Mobility Interventions: Assess need for specialty bed Nutrition Interventions: Document food/fluid/supplement intake Friction and Shear Interventions: Apply protective barrier, creams and emollients, HOB 30 degrees or less Problem: Patient Education: Go to Patient Education Activity Goal: Patient/Family Education Outcome: Progressing Towards Goal 
  
Problem: Dysphagia (Adult) Goal: *Speech Goal: (INSERT TEXT) Description LTG: Patient will tolerate least restrictive diet without overt signs or symptoms of airway compromise by discharge. STG: Patient will tolerate chopped mechanical soft diet and thin liquids without overt signs or symptoms of aspiration 100% of the time. STG: Patient will participate in modified barium swallow study as clinically indicated. Outcome: Progressing Towards Goal 
  
Problem: Patient Education: Go to Patient Education Activity Goal: Patient/Family Education Outcome: Progressing Towards Goal

## 2020-02-23 NOTE — PROGRESS NOTES
Critical Care Daily Progress Note: 2/23/2020 Robinson Darnell. Admission Date: 2/20/2020 The patient's chart is reviewed and the patient is discussed with the staff. 
 
  
Pt is a 76 yo  male with a history of chronic systolic CHF (EF 28%), moderate MR, CAD,NSVT s/p ICD, aflutter s/p AV node ablation on 2/11/20, and prior CVA. Pt presented to the ER on 2/20/20 with shortness of breath and in acute resp failure with RA sat 80%. Pt was placed on CPAP but required intubation by EMS. Pt's CXR concerning for volume overload/pulmonary edema. Pt was given lasix 60mg IV with minimal UOP. Pt's BNP was 5206 and LA is 5.6 but trended down to 1.7. Pt was started on Zosyn/Vanc secondary to emesis episode during intubation. Subjective: On and off BIPAP over night More agitated Now on optiflow Current Facility-Administered Medications Medication Dose Route Frequency  LORazepam (ATIVAN) injection 1 mg  1 mg IntraVENous Q4H PRN  
 morphine injection 2 mg  2 mg IntraVENous Q3H PRN  
 NUTRITIONAL SUPPORT ELECTROLYTE PRN ORDERS   Does Not Apply PRN  piperacillin-tazobactam (ZOSYN) 4.5 g in 0.9% sodium chloride (MBP/ADV) 100 mL  4.5 g IntraVENous Q8H  
 famotidine (PF) (PEPCID) 20 mg in 0.9% sodium chloride 10 mL injection  20 mg IntraVENous Q12H  
 amiodarone (CORDARONE) 450 mg in dextrose 5% 250 mL infusion  0.5-1 mg/min IntraVENous CONTINUOUS  
 furosemide (LASIX) injection 40 mg  40 mg IntraVENous BID  [Held by provider] warfarin (COUMADIN) tablet 1 mg  1 mg Oral QPM  
 aspirin chewable tablet 81 mg  81 mg Oral DAILY  atorvastatin (LIPITOR) tablet 40 mg  40 mg Oral DAILY  spironolactone (ALDACTONE) tablet 25 mg  25 mg Oral DAILY  potassium chloride (K-DUR, KLOR-CON) SR tablet 20 mEq  20 mEq Oral BID  lip protectant (BLISTEX) ointment 1 Each  1 Each Topical PRN  
 sodium chloride (NS) flush 5-40 mL  5-40 mL IntraVENous Q8H  
  sodium chloride (NS) flush 5-40 mL  5-40 mL IntraVENous PRN  
 insulin lispro (HUMALOG) injection   SubCUTAneous Q6H Review of Systems Constitutional:  negative for fever, chills, sweats Cardiovascular:  negative for chest pain, palpitations, syncope, edema Gastrointestinal:  negative for dysphagia, reflux, vomiting, diarrhea, abdominal pain, or melena Neurologic:  negative for focal weakness, numbness, headache Objective:  
 
Vitals:  
 02/23/20 1069 02/23/20 0701 02/23/20 4086 02/23/20 6939 BP: 137/80 112/73 Pulse: 80 80 86 Resp: (!) 31 27 29 Temp:      
SpO2: 100% 96% 94% 94% Weight:      
Height:      
 
 
 
Intake/Output Summary (Last 24 hours) at 2/23/2020 9989 Last data filed at 2/23/2020 6174 Gross per 24 hour Intake 902.73 ml Output 1750 ml Net -847.27 ml Physical Exam:         
Constitutional:  the patient is well developed and in no acute distress EENMT:  Sclera clear, pupils equal, oral mucosa moist 
Respiratory: coarse Cardiovascular:  RRR without M,G,R 
Gastrointestinal: soft and non-tender; with positive bowel sounds. Musculoskeletal: warm without cyanosis. There is plus 1 lower extremity edema. Skin:  no jaundice or rashes, no wounds Neurologic: no gross neuro deficits Psychiatric:  Awake LINES: 
Rubio, per iv line DRIPS: 
Amiodarone gtt CXR:  
 
 
LAB Recent Labs  
  02/23/20 
0542 02/22/20 
2336 02/22/20 
1813 02/22/20 
1239 02/22/20 
1702 GLUCPOC 163* 185* 171* 149* 117* Recent Labs  
  02/23/20 
0314 02/22/20 
0423 02/21/20 
1005 02/21/20 
0320 WBC 18.3* 11.2*  --  11.2* HGB 12.4* 12.2*  --  12.2* HCT 38.6* 37.6*  --  37.9*  
 196  --  197 INR 4.4* 3.8* 3.0  --   
 
Recent Labs  
  02/23/20 
0314 02/22/20 
1238 02/22/20 
0423  02/21/20 
1005 02/21/20 
0320 02/20/20 2113   --  143  --   --  143 139  
K 4.4 4.4 3.4*   < >  --  3.3* 3.7 *  --  108*  --   --  110* 104 CO2 24  --  27  --   --  24 21 *  --  138*  --   --  135* 253* BUN 36*  --  31*  --   --  29* 28* CREA 1.52*  --  1.20  --   --  1.15 1.45  
MG 2.2  --  2.1  --  2.1  --  2.2 CA 9.4  --  8.9  --   --  8.8 9.0  
TROIQ  --   --   --   --   --   --  0.02  
ALB  --   --   --   --   --   --  3.6 TBILI  --   --   --   --   --   --  1.0 ALT  --   --   --   --   --   --  15 SGOT  --   --   --   --   --   --  22  
 < > = values in this interval not displayed. Recent Labs  
  02/22/20 0451 02/21/20 
0308 02/20/20 
2151 PHI 7.447 7.530* 7.262* PCO2I 38.6 30.6* 45.3*  
PO2I 143* 279* 180* HCO3I 26.7* 25.6 20.4* Recent Labs  
  02/21/20 
0320 02/20/20 
2117 LAC 1.7 5.6* Recent Labs  
  02/22/20 
0451 02/21/20 
0308 02/20/20 
2151 PHI 7.447 7.530* 7.262* PCO2I 38.6 30.6* 45.3*  
PO2I 143* 279* 180* HCO3I 26.7* 25.6 20.4* Patient Active Problem List  
Diagnosis Code  Aortic ectasia, abdominal ([facility] [address] W67.123  Cerebrovascular accident (stroke) ([facility] [address] I63.9  
 Hx of AICD-Implanted Cardiac Defibrillator Z95.810  
 Aortic Aneurysm/Dilation of the Aorta I71.9  ASCAD-of the Native Vessel I25.10  Acute on chronic systolic heart failure (HCC) I50.23  
 Hyperlipidemia E78.5  Bilateral carotid artery disease (HCC) I73.9  NSVT (nonsustained ventricular tachycardia) ([facility]) I47.2  Dilated cardiomyopathy (HCC) I42.0  Long term (current) use of anticoagulants Z79.01  
 Typical atrial flutter ([facility]) I48.3  Severe obesity ([facility]) E66.01  
 LBBB (left bundle branch block) I44.7  Systolic CHF, chronic ([facility]) I50.22  
 Acute respiratory failure with hypoxia ([facility]) J96.01  
 Acute pulmonary edema ([facility]) J81.0 Assessment:  (Medical Decision Making) Hospital Problems  Date Reviewed: 2/21/2020 Codes Class Noted POA * (Principal) Acute on chronic systolic heart failure (HCC) ICD-10-CM: I50.23 ICD-9-CM: 428.23  2/20/2020 Yes Overview Signed 2/22/2016 11:06 AM by Kendal Hudson  
  Echo 4/15: EF 30-35%, mild to mod MR Echo 2011: EF 30-35% Acute respiratory failure with hypoxia [facility]) ICD-10-CM: J96.01 
ICD-9-CM: 518.81  2/20/2020 Unknown Acute pulmonary edema (HCC) ICD-10-CM: J81.0 ICD-9-CM: 518.4  2/20/2020 Unknown Dilated cardiomyopathy ([facility] [address] ICD-10-CM: I42.0 ICD-9-CM: 425.4  2/7/2018 Yes NSVT (nonsustained ventricular tachycardia) (HCC) ICD-10-CM: I47.2 ICD-9-CM: 427.1  8/7/2017 Yes Hx of AICD-Implanted Cardiac Defibrillator ICD-10-CM: Z95.810 ICD-9-CM: V45.02  2/22/2016 Yes Overview Signed 2/22/2016 11:07 AM by Kendal Hudson Follow by Dr. Marisela Oliver 75 y old with with systolic CHF ( EF 25 %) ICD ,s/p AV ablation presented with respiratory failure concerning for fluid overload, also on ABX for for vomiting and presumed aspiration ,slowly declining respiratory status  ,CXR with pulmonary edema but ? Whether there is also non cardiogenic /ARDS component ,is DNR Plan:  (Medical Decision Making) -- continue support with BIPAP as needed 
- will restart vanco as WBC up and Vanco was initially started but discontinued on 2/21 
- continue lasix  
-continue amiodarone gtt 
- add risperdal 
- hold Coumadin - prognosis guarded given his lack of improvement More than 50% of the time documented was spent in face-to-face contact with the patient and in the care of the patient on the floor/unit where the patient is located.  
 
Pawel Calderon MD

## 2020-02-23 NOTE — PROGRESS NOTES
Patient became SOB/anxious, correia with bright red blood. INR result at 4.4. Dr. Zachary Hayward notified and new orders received. See MAR.

## 2020-02-23 NOTE — PROGRESS NOTES
Warfarin dosing per pharmacist 
 
Delaneynoe Patten is a 75 y.o. male. Height: 6' 1\" (185.4 cm)    Weight: 103.2 kg (227 lb 8.2 oz) Indication:  Afib Goal INR:  2-3 Home dose:  2.5 mg daily Risk factors or significant drug interactions:  No 
 
Other anticoagulants:  No 
 
Daily Monitoring Date  INR     Warfarin dose HGB              Notes 2/21  3.0  1 mg  12.2   
2/22  3.8  Hold  12.2 
2/23  4.4  Hold  12.4 INR to 4.4. Continue to hold warfarin. Following daily INR. Thank you, Dianelys Henson, Pharm. D. Clinical Pharmacist 
[phone]

## 2020-02-23 NOTE — PROGRESS NOTES
Bedside, Verbal and Written shift change report given to Janel Carroll RN (oncoming nurse) by Svitlana Paiz RN (offgoing nurse). Report included the following information SBAR, Kardex, Intake/Output, MAR, Recent Results, Cardiac Rhythm paced;  and Alarm Parameters .

## 2020-02-23 NOTE — PROGRESS NOTES
Pharmacokinetic Consult to Pharmacist 
 
Samson Wall. is a 75 y.o. male being treated for HAP with vancomycin and pip/tazo. Height: 6' 1\" (185.4 cm)  Weight: 103.2 kg (227 lb 8.2 oz) Lab Results Component Value Date/Time BUN 36 (H) 02/23/2020 03:14 AM  
 Creatinine 1.52 (H) 02/23/2020 03:14 AM  
 WBC 18.3 (H) 02/23/2020 03:14 AM  
 Procalcitonin 0.06 02/20/2020 09:17 PM  
 Lactic acid 1.7 02/21/2020 03:20 AM  
  
Estimated Creatinine Clearance: 53 mL/min (A) (based on SCr of 1.52 mg/dL (H)). Day 1 of vancomycin restart. Goal trough is 15-20. Dosing started with 2g x 1 and then 1.5g q24h. Will continue to follow patient. Thank you, Shannan Oden, Pharm. D. Clinical Pharmacist 
[phone]

## 2020-02-24 NOTE — PROGRESS NOTES
Bedside, Verbal and Written shift change report given to Deborah Linn RN (oncoming nurse) by [facility], RN (offgoing nurse). Report included the following information SBAR, Kardex, ED Summary, Intake/Output, MAR, Recent Results, Cardiac Rhythm paced, Alarm Parameters  and Quality Measures.

## 2020-02-24 NOTE — PROGRESS NOTES
Chart reviewed as pt remains in ICU. Now extubated and currently on optiflow. IV lasix continues. [facility] per cardiology for tx to floor, pending Pulmonary. PPD for any potential rehab needs. PT/OT per MD. CM following.

## 2020-02-24 NOTE — PROGRESS NOTES
Problem: Dysphagia (Adult) Goal: *Speech Goal: (INSERT TEXT) Description LTG: Patient will tolerate least restrictive diet without overt signs or symptoms of airway compromise by discharge. STG: Patient will tolerate chopped mechanical soft diet and thin liquids without overt signs or symptoms of aspiration 100% of the time. STG: Patient will participate in modified barium swallow study as clinically indicated. Outcome: Progressing Towards Goal 
 
SPEECH LANGUAGE PATHOLOGY: DYSPHAGIA- Daily Note  1 NAME/AGE/GENDER: Keanu Dodd is a 75 y.o. male DATE: 2/24/2020 PRIMARY DIAGNOSIS: [facility] chronic [facility] [I50.23] ICD-10: Treatment Diagnosis: R13.11 Dysphagia, Oral Phase INTERDISCIPLINARY COLLABORATION: Registered Nurse PRECAUTIONS/ALLERGIES: Sulfur SUBJECTIVE More lethargic today. Wife and RN report greatly prolonged mastication with chewables today. Baseline coughing prior to trials. Orientation:  
Person Pain: Pain Scale 1: Numeric (0 - 10) Pain Intensity 1: 0 OBJECTIVE Patient seen for diet tolerance. He was positioned upright in bed for trials. Timely swallow with liquids via straw sips. Prolonged mastication with bites of pears. Chewing for single bolus persisted for >3 minutes. Puree and thin liquid wash utilized to assist with oral clearance. Perseverative chewing also occurred with puree. Liquid wash most effective in removing oral residue. ASSESSMENT Moderate oral dysphagia with perseverative chewing of puree and prolonged mastication of pears. Timely swallow without s/sx of airway compromise with liquids. Discussed safety concerns with chewable textures due to prolonged mastication and poor oral clearance. Improved tolerance with liquids. Recommend downgrade diet to full liquids with medications crushed in puree. Patient/wife in agreement with plan.  Will follow up for trials of chewable for potential upgrade at next session. Tool Used: Dysphagia Outcome and Severity Scale (SAMREEN) Score Comments Normal Diet  [] 7 With no strategies or extra time needed Functional Swallow  [] 6 May have mild oral or pharyngeal delay Mild Dysphagia  [] 5 Which may require one diet consistency restricted Mild-Moderate Dysphagia  [] 4 With 1-2 diet consistencies restricted Moderate Dysphagia  [] 3 With 2 or more diet consistencies restricted Moderate-Severe Dysphagia  [] 2 With partial PO strategies (trials with ST only) Severe Dysphagia  [] 1 With inability to tolerate any PO safely Score:  Initial: 3 Most Recent: x (Date 02/24/20 ) Interpretation of Tool: The Dysphagia Outcome and Severity Scale (SAMREEN) is a simple, easy-to-use, 7-point scale developed to systematically rate the functional severity of dysphagia based on objective assessment and make recommendations for diet level, independence level, and type of nutrition. PLAN   
FREQUENCY/DURATION: Continue to follow patient 3 times a week for duration of hospital stay to address above goals. - Recommendations for next treatment session: Next treatment will address diet tolerance, trials of chewable textures for potential upgrades REHABILITATION POTENTIAL FOR STATED GOALS: Good COMPLIANCE WITH PROGRAM/EXERCISES: Will assess as treatment progresses CONTINUATION OF SKILLED SERVICES/MEDICAL NECESSITY: 
? Patient is expected to demonstrate progress in  swallow strength, swallow timeliness, swallow function, diet tolerance and swallow safety in order to  improve swallow safety, work toward diet advancement and decrease aspiration risk. ? Patient continues to require skilled intervention due to dysphagia. RECOMMENDATIONS  
DIET:  
? Full liquids MEDICATIONS: Crushed in puree ASPIRATION PRECAUTIONS · Slow rate of intake · Small bites/sips · Upright at 90 degrees during meal 
  
 COMPENSATORY STRATEGIES/MODIFICATIONS · None EDUCATION: 
· Recommendations discussed with Nursing · Family · Patient RECOMMENDATIONS for CONTINUED SPEECH THERAPY: YES: Anticipate need for ongoing speech therapy during this hospitalization. SAFETY: 
After treatment position/precautions: · Upright in bed · Wife at bedside · Call light within reach Total Treatment Duration:  
Time In: 6969 Time Out: 1423 Yogi Nowak, [facility] AN[facility]AT[facility], HARJEET-SLP

## 2020-02-24 NOTE — PROGRESS NOTES
Initial visit made to patient and a prayer was provided for the patient, his wife and son. A  card was left. The patient prefers to be called \"Vince\". Alexis Brown MDIV

## 2020-02-24 NOTE — PROGRESS NOTES
[facility] CARDIOLOGY PROGRESS NOTE 
 
2/24/2020 7:55 AM 
 
Admit Date: 2/20/2020 Subjective:  
Stable overnight without angina, CHF, or palpitations. Vitals stable and controlled. No other complaints overnight. Tolerating meds well. No NSVT overnight, breathing improved, continues lasix. Objective:  
  
Vitals:  
 02/24/20 0401 02/24/20 0430 02/24/20 0501 02/24/20 6444 BP: 105/60  107/64 Pulse: 81 82 80 Resp: (!) 35 25 25 Temp:      
SpO2: 99% 90% 97% Weight:    105.2 kg (231 lb 14.8 oz) Height:      
 
 
Physical Exam: 
Neck- supple, 8-10cm JVD 
CV- regular rate and rhythm no MRG Lung- clear bilaterally anteriorly, decreased bibasilar Abd- soft, nontender, nondistended Ext- trace LE edema Skin- warm and dry Data Review:  
Recent Labs  
  02/24/20 
0340 02/23/20 
0314  142  
K 3.3* 4.4 MG 2.3 2.2 BUN 39* 36* CREA 1.22 1.52* * 177* WBC 13.5* 18.3* HGB 9.9* 12.4*  
HCT 31.2* 38.6*  
 242 INR 2.5 4.4* Assessment and Plan:  
 
 Acute respiratory failure with hypoxia (HCC)- improved, extubated, comfortable on Optiflow at present, wean as tolerated per critical care, continue to monitor.  OK to go to floor from my standpoint. Defer to Pulmonary.  Acute pulmonary edema (HCC)- improved, extubated, net 2.5L out thus far, continue IV lasix and monitor UOP,  echo EF 20-25% with moderate to severe MR -  interrogation shows NSVT multiple episodes with ATP termination until last PM when it was under detection zone of 150- improvement in rhythm on IV amio at 0.5mg/min - converted to po amiodarone yesterday  -  replace electrolytes and monitor, decreased detection zone to 140  Lactic acidosis- resolved  ASCAD s/p PCI- no angina reported, ASA, statin, no BB/ACE-I/ARB with hypotension  Typical atrial flutter / atrial fibrillation s/p AVN ablation on 2/11/20- on coumadin- held yesterday but INR 2.5 today, resume coumadin and check INR in AM  
  HFrEF EF 26% (3/2019)/Ischemic cardiomyopathy (HCC)- continue IV lasix for now, holding BB/ACE-I/ARB with hypotension- resume as taking at home when BP will allow.   
 Bilateral carotid artery disease ([facility] [address]- asymptomatic, continue outpatient surveillance.   
 Hyperlipidemia- statin as taking at home, outpatient surveillance.   
 Cerebrovascular accident (stroke)- 2011- clinical surveillance BOLIVAR Whipple MD 
[facility] Cardiology Pager [phone]

## 2020-02-24 NOTE — PROGRESS NOTES
Patient with increased lethargy and confusion throughout the day. Orders received to obtain STAT ABG per Dr. Viry Holloway.

## 2020-02-24 NOTE — PROGRESS NOTES
Physical Therapy Note: 
 
Participated in interdisciplinary rounds in ICU/CCU and chart reviewed. Patient is experiencing decrease in function from baseline. Patient would benefit from skilled acute therapy to increase independence with self care/ADLs, strength, endurance, and functional mobility. Recommend PT/OT consult when medically stable and MD agrees.  
 
Thank you for your consideration, 
Aleida Zeng, PT, DPT

## 2020-02-24 NOTE — PROGRESS NOTES
Problem: Falls - Risk of 
Goal: *Absence of Falls Description Document Marielle Pinon Fall Risk and appropriate interventions in the flowsheet. Outcome: Progressing Towards Goal 
Note: Fall Risk Interventions: 
Mobility Interventions: Bed/chair exit alarm, Communicate number of staff needed for ambulation/transfer, OT consult for ADLs, Patient to call before getting OOB, PT Consult for mobility concerns, PT Consult for assist device competence, Strengthening exercises (ROM-active/passive), Utilize walker, cane, or other assistive device Mentation Interventions: Adequate sleep, hydration, pain control, Bed/chair exit alarm, Door open when patient unattended, Evaluate medications/consider consulting pharmacy, Increase mobility, More frequent rounding, Reorient patient, Room close to nurse's station, Toileting rounds, Update white board Medication Interventions: Assess postural VS orthostatic hypotension, Bed/chair exit alarm, Evaluate medications/consider consulting pharmacy, Patient to call before getting OOB, Teach patient to arise slowly Elimination Interventions: Bed/chair exit alarm, Call light in reach, Patient to call for help with toileting needs, Toilet paper/wipes in reach, Toileting schedule/hourly rounds Problem: Patient Education: Go to Patient Education Activity Goal: Patient/Family Education Outcome: Progressing Towards Goal 
  
Problem: Pressure Injury - Risk of 
Goal: *Prevention of pressure injury Description Document Everardo Scale and appropriate interventions in the flowsheet.  
Outcome: Progressing Towards Goal 
Note: Pressure Injury Interventions: 
Sensory Interventions: Assess changes in LOC, Assess need for specialty bed, Avoid rigorous massage over bony prominences, Check visual cues for pain, Discuss PT/OT consult with provider, Float heels, Keep linens dry and wrinkle-free, Maintain/enhance activity level, Minimize linen layers, Monitor skin under medical devices, Pad between skin to skin, Turn and reposition approx. every two hours (pillows and wedges if needed) Moisture Interventions: Absorbent underpads, Apply protective barrier, creams and emollients, Assess need for specialty bed, Check for incontinence Q2 hours and as needed, Internal/External urinary devices, Maintain skin hydration (lotion/cream), Minimize layers, Moisture barrier Activity Interventions: Assess need for specialty bed, Increase time out of bed, Pressure redistribution bed/mattress(bed type), PT/OT evaluation Mobility Interventions: Assess need for specialty bed, Float heels, HOB 30 degrees or less, Pressure redistribution bed/mattress (bed type), PT/OT evaluation, Turn and reposition approx. every two hours(pillow and wedges) Nutrition Interventions: Document food/fluid/supplement intake, Discuss nutritional consult with provider, Offer support with meals,snacks and hydration Friction and Shear Interventions: Apply protective barrier, creams and emollients, Feet elevated on foot rest, Foam dressings/transparent film/skin sealants, HOB 30 degrees or less, Lift team/patient mobility team, Minimize layers, Transferring/repositioning devices Problem: Patient Education: Go to Patient Education Activity Goal: Patient/Family Education Outcome: Progressing Towards Goal 
  
Problem: Dysphagia (Adult) Goal: *Speech Goal: (INSERT TEXT) Description LTG: Patient will tolerate least restrictive diet without overt signs or symptoms of airway compromise by discharge. STG: Patient will tolerate chopped mechanical soft diet and thin liquids without overt signs or symptoms of aspiration 100% of the time. STG: Patient will participate in modified barium swallow study as clinically indicated. Outcome: Progressing Towards Goal 
  
Problem: Patient Education: Go to Patient Education Activity Goal: Patient/Family Education Outcome: Progressing Towards Goal 
  
Problem: Ventilator Management Goal: *Adequate oxygenation and ventilation Outcome: Resolved/Met Goal: *Patient maintains clear airway/free of aspiration Outcome: Resolved/Met Goal: *Absence of infection signs and symptoms Outcome: Resolved/Met Goal: *Normal spontaneous ventilation Outcome: Resolved/Met Problem: Patient Education: Go to Patient Education Activity Goal: Patient/Family Education Outcome: Resolved/Met

## 2020-02-24 NOTE — PROGRESS NOTES
Warfarin dosing per pharmacist 
 
Cassidycaesarrenetta Brown is a 75 y.o. male. Height: 6' 1\" (185.4 cm)    Weight: 105.2 kg (231 lb 14.8 oz) Indication:  Afib Goal INR:  2-3 Home dose:  2.5 mg daily Risk factors or significant drug interactions:  No 
 
Other anticoagulants:  No 
 
Daily Monitoring Date  INR     Warfarin dose HGB              Notes 2/21  3.0  1 mg  12.2   
2/22  3.8  Hold  12.2 
2/23  4.4  Hold  12.4 
2/24  2.5  0.5 mg     9.9 Warfarin restarted today. Patient will receive 0.5 mg today and pharmacy will monitor INR Thank you, Erasto Ferguson, PharmD Clinical Pharmacy PGY1 Resident [phone]

## 2020-02-24 NOTE — PROGRESS NOTES
ABG results reviewed with Dr. Chris Man. Orders to increase O2 to 50%. Patient to stay in unit overnight per Dr. Chris Man.

## 2020-02-24 NOTE — INTERDISCIPLINARY ROUNDS
Interdisciplinary team rounds were held 2/24/2020 with the following team members:Care Management, Nursing, Nurse Practitioner, Nutrition, Palliative Care, Pastoral Care, Pharmacy, Physical Therapy, Physician, Respiratory Therapy and Clinical Coordinator and the patient. Plan of care discussed. See clinical pathway and/or care plan for interventions and desired outcomes.

## 2020-02-24 NOTE — PROGRESS NOTES
Bedside, Verbal and Written shift change report given to Elmer Portillo (oncoming nurse) by Authur Lombard, RN (offgoing nurse). Report included the following information SBAR, Kardex, Intake/Output, MAR, Recent Results, Cardiac Rhythm paced and Alarm Parameters .

## 2020-02-24 NOTE — PROGRESS NOTES
Bedside, Verbal and Written shift change report given to Rama Goode RN (oncoming nurse) by Mumtaz Mendoza RN (offgoing nurse). Report included the following information SBAR, Kardex, Intake/Output, MAR, Recent Results, Cardiac Rhythm paced and Alarm Parameters . Patient resting comfortably in bed. Family at bedside.

## 2020-02-24 NOTE — PROGRESS NOTES
Critical Care Daily Progress Note: 2/24/2020 Antwon Cortez. Admission Date: 2/20/2020 The patient's chart is reviewed and the patient is discussed with the staff. 76 yo CM with a history of chronic systolic CHF (EF 48%), moderate MR, CAD, NSVT s/p ICD, aflutter s/p AV node ablation on 2/11/20, and prior CVA. He presented to the ER on 2/20/20 with shortness of breath and in acute resp failure with RA sat 80%. Was initially placed on CPAP but required intubation by EMS. CXR concerning for volume overload/pulmonary edema and was given Lasix 60mg IV with minimal UOP. BNP was 5206 and LA is 5.6 but trended down to 1.7. Was started on Zosyn/Vanc secondary to emesis episode during intubation. Was able to be extubated 2/21, wore BIPAP and weaned to Opti-flow. Cardiology following for NSVT with chronic ICD. Interrogations with multiple episodes under detection zone and was placed on Amio. Family decided on DNR status. Subjective:  
 
Sitting up in bed, states he needs to have a BM. Has frequent wet cough, occasionally productive. Remains on Air-Vo 60L, 60%. Wife at the bedside. Current Facility-Administered Medications Medication Dose Route Frequency  tuberculin injection 5 Units  5 Units IntraDERMal ONCE  
 [START ON 2/25/2020] Vancomycin Trough Reminder   Other ONCE  
 LORazepam (ATIVAN) injection 1 mg  1 mg IntraVENous Q4H PRN  
 morphine injection 2 mg  2 mg IntraVENous Q3H PRN  
 risperiDONE (RisperDAL) tablet 0.5 mg  0.5 mg Oral QHS  amiodarone (CORDARONE) tablet 200 mg  200 mg Oral BID  potassium chloride (K-DUR, KLOR-CON) SR tablet 20 mEq  20 mEq Oral DAILY  vancomycin (VANCOCIN) 1500 mg in  ml infusion  1,500 mg IntraVENous Q24H  
 NUTRITIONAL SUPPORT ELECTROLYTE PRN ORDERS   Does Not Apply PRN  piperacillin-tazobactam (ZOSYN) 4.5 g in 0.9% sodium chloride (MBP/ADV) 100 mL  4.5 g IntraVENous Q8H  
  famotidine (PF) (PEPCID) 20 mg in 0.9% sodium chloride 10 mL injection  20 mg IntraVENous Q12H  furosemide (LASIX) injection 40 mg  40 mg IntraVENous BID  warfarin (COUMADIN) tablet 1 mg  1 mg Oral QPM  
 aspirin chewable tablet 81 mg  81 mg Oral DAILY  atorvastatin (LIPITOR) tablet 40 mg  40 mg Oral DAILY  spironolactone (ALDACTONE) tablet 25 mg  25 mg Oral DAILY  lip protectant (BLISTEX) ointment 1 Each  1 Each Topical PRN  
 sodium chloride (NS) flush 5-40 mL  5-40 mL IntraVENous Q8H  
 sodium chloride (NS) flush 5-40 mL  5-40 mL IntraVENous PRN  
 insulin lispro (HUMALOG) injection   SubCUTAneous Q6H Review of Systems Constitutional:  negative for fever, chills, sweats Cardiovascular:  negative for chest pain, palpitations, syncope, edema Gastrointestinal:  negative for dysphagia, reflux, vomiting, diarrhea, abdominal pain, or melena Neurologic:  negative for focal weakness, numbness, headache Objective:  
 
Vitals:  
 02/24/20 1814 02/24/20 0802 02/24/20 9509 02/24/20 9780 BP: 120/73 Pulse:  80 80 80 Resp:  20 22 19 Temp:      
SpO2:  99% 100% 100% Weight:      
Height:      
 
 
 
Intake/Output Summary (Last 24 hours) at 2/24/2020 0845 Last data filed at 2/24/2020 0906 Gross per 24 hour Intake 1336.5 ml Output 1475 ml Net -138.5 ml Physical Exam:         
Constitutional:  the patient is well developed and in no acute distress, Air-Vo 60L, 60%, sat 98% EENMT:  Sclera clear, pupils equal, oral mucosa moist 
Respiratory: scattered anterior crackles throughout, wet, frequent cough Cardiovascular:  Regular, paced without M,G,R 
Gastrointestinal: soft and non-tender; with positive bowel sounds. Musculoskeletal: warm without cyanosis. There is no lower extremity edema. Moving extremities Skin:  no jaundice or rashes, no wounds Neurologic: no gross neuro deficits Psychiatric:  Awake, alert and oriented x2 ECHO 2/20/20: -  Left ventricle: The ventricle was markedly dilated. Systolic function markedly reduced. EF 20-25%. Severe diffuse hypokinesis. Wall thickness was mildly to moderately increased. -  Right ventricle: Systolic function was reduced. -  Left atrium: The atrium was moderately dilated. -  Right atrium: The atrium was mildly dilated. -  Inferior vena cava, hepatic veins: The inferior vena cava was mildly dilated. -  Mitral valve: There was mild annular calcification. There was moderate to severe regurgitation  
-  Tricuspid valve: There was mild regurgitation. LINES: 
PIV site Rubio 2/20/20 DRIPS: 
None CXR:  
2/23/20:  Bilateral diffuse airspace disease, similar to prior exam. 
 
 
LAB Recent Labs  
  02/24/20 
0610 02/23/20 
2356 02/23/20 
1747 02/23/20 
1131 02/23/20 
0542 GLUCPOC 150* 159* 161* 162* 163* Recent Labs  
  02/24/20 
0340 02/23/20 
0314 02/22/20 
0423 WBC 13.5* 18.3* 11.2* HGB 9.9* 12.4* 12.2* HCT 31.2* 38.6* 37.6*  
 242 196 INR 2.5 4.4* 3.8* Recent Labs  
  02/24/20 
0340 02/23/20 
0314 02/22/20 
1238 02/22/20 
0423  142  --  143  
K 3.3* 4.4 4.4 3.4*  
 108*  --  108* CO2 30 24  --  27 * 177*  --  138* BUN 39* 36*  --  31* CREA 1.22 1.52*  --  1.20 MG 2.3 2.2  --  2.1 CA 8.9 9.4  --  8.9 Recent Labs  
  02/22/20 
0451 PHI 7.447 PCO2I 38.6 PO2I 143* HCO3I 26.7* No results for input(s): LCAD, LAC in the last 72 hours. Recent Labs  
  02/22/20 
0451 PHI 7.447 PCO2I 38.6 PO2I 143* HCO3I 26.7* Assessment:  (Medical Decision Making) Hospital Problems  Date Reviewed: 2/24/2020 Codes Class Noted POA * (Principal) Acute on chronic systolic heart failure (HCC) ICD-10-CM: I50.23 ICD-9-CM: 428.23  2/20/2020 Yes Overview Signed 2/22/2016 11:06 AM by Tiny Mail  
  Echo 2/20/20:  EF 20-25% Echo 4/15: EF 30-35%, mild to mod MR Echo 2011: EF 30-35% Chronic--diuresing Acute respiratory failure with hypoxia [facility]) ICD-10-CM: J96.01 
ICD-9-CM: 518.81  2/20/2020 Unknown On Air-Vo Acute pulmonary edema (HCC) ICD-10-CM: J81.0 ICD-9-CM: 518.4  2/20/2020 Unknown Per cardiology Dilated cardiomyopathy ([facility] [address] ICD-10-CM: I42.0 ICD-9-CM: 425.4  2/7/2018 Yes  
 chronic NSVT (nonsustained ventricular tachycardia) (HCC) ICD-10-CM: I47.2 ICD-9-CM: 427.1  8/7/2017 Yes On PO Amiodarone Hx of AICD-Implanted Cardiac Defibrillator ICD-10-CM: Z95.810 ICD-9-CM: V45.02  2/22/2016 Yes Overview Signed 2/22/2016 11:07 AM by Isabell Mejía Follow by Dr. Mike Vargas 
  
  
 chronic  
  
 
75 y old with with systolic CHF (EF 25 %) ICD ,s/p AV ablation presented with respiratory failure concerning for fluid overload, also on ABX for for vomiting and presumed aspiration ,slowly declining respiratory status ,CXR with pulmonary edema but ? Whether there is also non cardiogenic /ARDS component ,is DNR Plan:  (Medical Decision Making) --Wean Air-Vo as tolerated 
--Continue Lasix 40 mg IV BID, urine output 1.8 L, creat 1.22 today 
--Cardiology following--on PO Amiodarone 
--Zosyn and Vancomycin  
--WBC down to 13.5 (was 18.3 yesterday), afebrile --Risperdal 0.5 mg HS 
--Pharmacy dosing Coumadin--INR 2.5 More than 50% of the time documented was spent in face-to-face contact with the patient and in the care of the patient on the floor/unit where the patient is located. aMg Camara, NP Lungs: mild rhonchi b/l. Has red blood in mouth. Heart S1 and S2 audible, no murmers or rubs appreciated Other  
 
coughing up blood and likely from small vessels that have broken up. Will monitor. Taper optiflow to 50/50 from 60/60. Continue lasix May need central access if loses new one in foot. Awaiting INR to come down. I have spoken with and examined the patient. I have reviewed the history, examination, assessment, and plan and agree with the above.  
 
Marcus Laresch, MD 
 
 
 This note was signed electronically. Errors are unfortunately her likely due to dictation software.

## 2020-02-25 PROBLEM — Z66 DNR (DO NOT RESUSCITATE): Status: ACTIVE | Noted: 2020-01-01

## 2020-02-25 NOTE — PROGRESS NOTES
Problem: Dysphagia (Adult) Goal: *Speech Goal: (INSERT TEXT) Description LTG: Patient will tolerate least restrictive diet without overt signs or symptoms of airway compromise by discharge. STG: Patient will tolerate chopped mechanical soft diet and thin liquids without overt signs or symptoms of aspiration 100% of the time. STG: Patient will participate in modified barium swallow study as clinically indicated. Outcome: Progressing Towards Goal 
 
SPEECH LANGUAGE PATHOLOGY: DYSPHAGIA- Daily Note  1 NAME/AGE/GENDER: Samson Meza is a 75 y.o. male DATE: 2/25/2020 PRIMARY DIAGNOSIS: Acute on chronic systolic heart failure ([facility] [I50.23] ICD-10: Treatment Diagnosis: R13.11 Dysphagia, Oral Phase INTERDISCIPLINARY COLLABORATION: Registered Nurse PRECAUTIONS/ALLERGIES: Sulfur SUBJECTIVE Patient sitting in bedside chair. He is more alert today, but minimally verbal. Wife reports he eat minimal amount of lunch. Orientation:  
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 Patient able to independently drink from cup and straw today Delayed cough following serial cup sips (approximately 4 ounce), but did not appear to be directly related to airway compromise due to length of delay and clear vocal quality prior to cough. No additional s/sx of airway compromise. Patient held puree bolus in oral cavity between 7-25 seconds and required verbal cues to initiate swallow. Single swallow, likely indicating adequate oral clearance, but increased risk of aspiration due to significant delay. Slow chewing with nutrigrain bar. He again held bolus in mouth >30 second prior to swallow. He accepted only 1 bite before declining any additional intake. ASSESSMENT Patient continues to present with oral dysphagia.  Increased risk of airway compromise due to delayed swallow with puree and chewable textures. Recommend continue full liquid diet with medications crushed in puree. He will require 1:1 assistance for po intake including cues to swallow pudding/applesauce trials. Will continue to follow for diet tolerance and po trials for potential advancement . Tool Used: Dysphagia Outcome and Severity Scale (SAMREEN) Score Comments Normal Diet  [] 7 With no strategies or extra time needed Functional Swallow  [] 6 May have mild oral or pharyngeal delay Mild Dysphagia  [] 5 Which may require one diet consistency restricted Mild-Moderate Dysphagia  [] 4 With 1-2 diet consistencies restricted Moderate Dysphagia  [] 3 With 2 or more diet consistencies restricted Moderate-Severe Dysphagia  [] 2 With partial PO strategies (trials with ST only) Severe Dysphagia  [] 1 With inability to tolerate any PO safely Score:  Initial: 3 Most Recent: 3 (Date 02/25/20 ) Interpretation of Tool: The Dysphagia Outcome and Severity Scale (SAMREEN) is a simple, easy-to-use, 7-point scale developed to systematically rate the functional severity of dysphagia based on objective assessment and make recommendations for diet level, independence level, and type of nutrition. PLAN   
FREQUENCY/DURATION: Continue to follow patient 3 times a week for duration of hospital stay to address above goals. - Recommendations for next treatment session: Next treatment will address oral dysphagia REHABILITATION POTENTIAL FOR STATED GOALS: Good COMPLIANCE WITH PROGRAM/EXERCISES: Will assess as treatment progresses CONTINUATION OF SKILLED SERVICES/MEDICAL NECESSITY: 
? Patient is expected to demonstrate progress in  swallow strength, swallow timeliness, swallow function, diet tolerance and swallow safety in order to  improve swallow safety, work toward diet advancement and decrease aspiration risk. ? Patient continues to require skilled intervention due to dysphagia. RECOMMENDATIONS  
DIET:  
? Full liquids MEDICATIONS: Whole in puree ASPIRATION PRECAUTIONS · Slow rate of intake · Small bites/sips · Upright at 90 degrees during meal 
  
COMPENSATORY STRATEGIES/MODIFICATIONS · Alternate liquids/solids · 1:1 assistance including cues for swallow EDUCATION: 
· Recommendations discussed with Nursing · Family · Patient RECOMMENDATIONS for CONTINUED SPEECH THERAPY: YES: Anticipate need for ongoing speech therapy during this hospitalization. SAFETY: 
After treatment position/precautions: · Upright in bed · RN notified · WIFE at bedside · Call light within reach Total Treatment Duration:  
Time In: 8291 Time Out: 1513 Jose Antonio Fry, [facility], HARJEET-SLP

## 2020-02-25 NOTE — PROGRESS NOTES
Pt still agitated and trying to get out of bed. Pt biting and pulling at mitts and lap belt. Unable to redirect behavior. Pt tachypneic in the 50's and grimacing. BP elevated. Morphine given per PRN order.

## 2020-02-25 NOTE — PROGRESS NOTES
Family at bedside requesting Palliative Care consult tomorrow to assist with POC and decision making.

## 2020-02-25 NOTE — PROGRESS NOTES
[facility] CARDIOLOGY PROGRESS NOTE 
      
 
2/25/2020 3:44 PM 
 
Admit Date: 2/20/2020 Subjective:  
Patient with established severe BiV failure. CXR remains abnormal and suggests volume overload vs ARDS. ROS: 
Cardiovascular:  As noted above Objective:  
  
Vitals:  
 02/25/20 1201 02/25/20 1301 02/25/20 1401 02/25/20 1501 BP: 99/62 97/62 97/60 98/60 Pulse: 80 80 80 80 Resp: 29 20 19 25 Temp:      
SpO2: 99% 99% 99% 100% Weight:      
Height:      
 
 
Physical Exam: 
General-No Acute Distress Neck- supple, no JVD 
CV- regular rate and rhythm no MRG Lung- Diminished bilaterally Abd- soft, nontender, nondistended Ext- no edema bilaterally. Skin- warm and dry Data Review:  
Recent Labs  
  02/25/20 
0317 02/24/20 
0340  142  
K 3.4* 3.3*  
MG 2.4 2.3 BUN 36* 39* CREA 1.09 1.22  
* 141* WBC 11.5* 13.5* HGB 8.6* 9.9*  
HCT 26.6* 31.2*  
 181 INR 2.3 2.5 SUMMARY: 
  
-  Left ventricle: The ventricle was markedly dilated. Systolic function was 
markedly reduced. Ejection fraction was estimated in the range of 20 % to 25  
%. There was severe diffuse hypokinesis. Wall thickness was mildly to moderately 
increased. 
  
-  Right ventricle: Systolic function was reduced. 
  
-  Left atrium: The atrium was moderately dilated. 
  
-  Right atrium: The atrium was mildly dilated. 
  
-  Inferior vena cava, hepatic veins: The inferior vena cava was mildly 
dilated. 
  
-  Mitral valve: There was mild annular calcification. There was moderate to 
severe regurgitation. 
  
-  Tricuspid valve: There was mild regurgitation. Assessment/Plan:  
 
Principal Problem: 
  Acute on chronic systolic heart failure ([address] (2/20/2020) Patient with EF 20-25%, severe MR and severe RV dysfunction. Prognosis is poor. CXR remains abnormal and either edema or ARDS. BP marginal and holding all BP meds except spironolactone. Change to lasix gtt - 10mg/hr. Agree with DNR status Active Problems: Hx of AICD-Implanted Cardiac Defibrillator (2/22/2016) Normal function NSVT (nonsustained ventricular tachycardia) ([facility] [address] (8/7/2017) On amiodarone Dilated cardiomyopathy ([address] (2/7/2018) As above Acute respiratory failure with hypoxia ([address] (2/20/2020) As above - on ABx per pulmonary Acute pulmonary edema ([address] (2/20/2020) Start lasix gtt DNR (do not resuscitate) (2/25/2020) As above Atrial fibrillation S/P AV node ablation. DC warfarin as Hgb continues to drop  - ? Etiology Anemia Acutely worse. ? Pulmonary hemorrhage/GI blood loss. Stopped warfarin. May need transfusion.    
 
 
 
 
Kieran Geronimo MD 
2/25/2020 3:44 PM

## 2020-02-25 NOTE — PROGRESS NOTES
Pt found climbing over side rails and attempting to get out of bed. Unable to redirect pts behavior. Ativan given per PRN order. Mitts applied to bilateral hands and pt assisted back to bed.

## 2020-02-25 NOTE — PROGRESS NOTES
Critical Care Daily Progress Note: 2/25/2020 Samson Wall. Admission Date: 2/20/2020 The patient's chart is reviewed and the patient is discussed with the staff. 76 yo CM with a history of chronic systolic CHF (EF 31%), moderate MR, CAD, NSVT s/p ICD, aflutter s/p AV node ablation on 2/11/20, and prior CVA. He presented to the ER on 2/20/20 with shortness of breath and in acute resp failure with RA sat 80%. Was initially placed on CPAP but required intubation by EMS. CXR concerning for volume overload/pulmonary edema and was given Lasix 60mg IV with minimal UOP. BNP was 5206 and LA is 5.6 but trended down to 1.7. Was started on Zosyn/Vanc secondary to emesis episode during intubation. Was able to be extubated 2/21, wore BIPAP and weaned to Opti-flow. Cardiology following for NSVT with chronic ICD. Interrogations with multiple episodes under detection zone and was placed on Amio. Family decided on DNR status. Having episodes of confusion. Subjective:  
 
Sitting up in bed, states he needs to have a BM. Has frequent wet cough, occasionally productive. Remains on Air-Vo 60L, 60%. Confusion during the night. Wife at the bedside. States overall that he has improved and is currently comfortable. Current Facility-Administered Medications Medication Dose Route Frequency  potassium bicarb-citric acid (EFFER-K) tablet 40 mEq  40 mEq Oral BID  [START ON 2/26/2020] vancomycin (VANCOCIN) 1500 mg in  ml infusion  1,500 mg IntraVENous Q18H  
 opium-belladonna (B&O 16-A SUPPRETTE) 16.2-60 mg suppository 1 Suppository  1 Suppository Rectal Q8H PRN  
 tuberculin injection 5 Units  5 Units IntraDERMal ONCE  warfarin (COUMADIN) tablet 0.5 mg  0.5 mg Oral QPM  
 LORazepam (ATIVAN) injection 1 mg  1 mg IntraVENous Q4H PRN  
 morphine injection 2 mg  2 mg IntraVENous Q3H PRN  
 risperiDONE (RisperDAL) tablet 0.5 mg  0.5 mg Oral QHS  amiodarone (CORDARONE) tablet 200 mg  200 mg Oral BID  potassium chloride (K-DUR, KLOR-CON) SR tablet 20 mEq  20 mEq Oral DAILY  NUTRITIONAL SUPPORT ELECTROLYTE PRN ORDERS   Does Not Apply PRN  piperacillin-tazobactam (ZOSYN) 4.5 g in 0.9% sodium chloride (MBP/ADV) 100 mL  4.5 g IntraVENous Q8H  
 furosemide (LASIX) injection 40 mg  40 mg IntraVENous BID  aspirin chewable tablet 81 mg  81 mg Oral DAILY  atorvastatin (LIPITOR) tablet 40 mg  40 mg Oral DAILY  spironolactone (ALDACTONE) tablet 25 mg  25 mg Oral DAILY  lip protectant (BLISTEX) ointment 1 Each  1 Each Topical PRN  
 sodium chloride (NS) flush 5-40 mL  5-40 mL IntraVENous Q8H  
 sodium chloride (NS) flush 5-40 mL  5-40 mL IntraVENous PRN  
 insulin lispro (HUMALOG) injection   SubCUTAneous Q6H Review of Systems Constitutional:  negative for fever, chills, sweats Cardiovascular:  negative for chest pain, palpitations, syncope, edema Gastrointestinal:  negative for dysphagia, reflux, vomiting, diarrhea, abdominal pain, or melena Neurologic:  negative for focal weakness, numbness, headache Objective:  
 
Vitals:  
 02/25/20 0600 02/25/20 0701 02/25/20 8843 02/25/20 6891 BP: 106/70 112/70  114/65 Pulse: 80 80  80 Resp: (!) 38 22 Temp:      
SpO2: 96% 97% 99% Weight:      
Height:      
 
 
 
Intake/Output Summary (Last 24 hours) at 2/25/2020 1012 Last data filed at 2/25/2020 4275 Gross per 24 hour Intake 1040 ml Output 1350 ml Net -310 ml Physical Exam:         
Constitutional:  the patient is well developed and in no acute distress, Air-Vo 60L, 60%, sat 97% EENMT:  Sclera clear, pupils equal, oral mucosa moist 
Respiratory: scattered anterior crackles throughout, wet, frequent cough Cardiovascular:  Regular, paced without M,G,R 
Gastrointestinal: soft and non-tender; with positive bowel sounds. Musculoskeletal: warm without cyanosis. There is no lower extremity edema. Moving extremities Skin:  no jaundice or rashes, no wounds Neurologic: no gross neuro deficits Psychiatric:  Awake, alert and oriented x2 ECHO 2/20/20:   
-  Left ventricle: The ventricle was markedly dilated. Systolic function markedly reduced. EF 20-25%. Severe diffuse hypokinesis. Wall thickness was mildly to moderately increased. -  Right ventricle: Systolic function was reduced. -  Left atrium: The atrium was moderately dilated. -  Right atrium: The atrium was mildly dilated. -  Inferior vena cava, hepatic veins: The inferior vena cava was mildly dilated. -  Mitral valve: There was mild annular calcification. There was moderate to severe regurgitation  
-  Tricuspid valve: There was mild regurgitation. LINES: 
PIV site Rubio 2/20/20 DRIPS: 
None CXR: None today 2/23/20:  Bilateral diffuse airspace disease, similar to prior exam. 
 
 
LAB Recent Labs  
  02/25/20 
0418 02/24/20 
2225 02/24/20 
1754 02/24/20 
1205 02/24/20 
6921 GLUCPOC 151* 211* 170* 184* 150* Recent Labs  
  02/25/20 
0317 02/24/20 
0340 02/23/20 
9586 WBC 11.5* 13.5* 18.3* HGB 8.6* 9.9* 12.4*  
HCT 26.6* 31.2* 38.6*  
 181 242 INR 2.3 2.5 4.4* Recent Labs  
  02/25/20 
0317 02/24/20 
0340 02/23/20 
3590  142 142  
K 3.4* 3.3* 4.4  
 107 108* CO2 30 30 24 * 141* 177* BUN 36* 39* 36* CREA 1.09 1.22 1.52* MG 2.4 2.3 2.2 CA 8.8 8.9 9.4 Recent Labs  
  02/24/20 
1611 PHI 7.504* PCO2I 33.4*  
PO2I 61* HCO3I 26.3* No results for input(s): LCAD, LAC in the last 72 hours. Recent Labs  
  02/24/20 
1611 PHI 7.504* PCO2I 33.4*  
PO2I 61* HCO3I 26.3* Assessment:  (Medical Decision Making) Hospital Problems  Date Reviewed: 2/25/2020 Codes Class Noted POA * (Principal) Acute on chronic systolic heart failure (HCC) ICD-10-CM: I50.23 ICD-9-CM: 428.23  2/20/2020 Yes  Overview Signed 2/22/2016 11:06 AM by Esther Phillips  
 Echo 2/20/20:  EF 20-25% Echo 4/15: EF 30-35%, mild to mod MR Echo 2011: EF 30-35% Chronic--diuresing Acute respiratory failure with hypoxia [facility]) ICD-10-CM: J96.01 
ICD-9-CM: 518.81  2/20/2020 Unknown Alternating with BIPAP and Air-Vo Acute pulmonary edema (HCC) ICD-10-CM: J81.0 ICD-9-CM: 518.4  2/20/2020 Unknown Per cardiology Dilated cardiomyopathy ([facility] [address] ICD-10-CM: I42.0 ICD-9-CM: 425.4  2/7/2018 Yes  
 chronic NSVT (nonsustained ventricular tachycardia) (HCC) ICD-10-CM: I47.2 ICD-9-CM: 427.1  8/7/2017 Yes On PO Amiodarone Hx of AICD-Implanted Cardiac Defibrillator ICD-10-CM: Z95.810 ICD-9-CM: V45.02  2/22/2016 Yes Overview Signed 2/22/2016 11:07 AM by Stoney Alegria Follow by Dr. Roya Simental 
  
  
 chronic  
  
 
75 y old with with systolic CHF (EF 25 %) ICD ,s/p AV ablation presented with respiratory failure concerning for fluid overload, also on ABX for for vomiting and presumed aspiration ,slowly declining respiratory status, CXR with pulmonary edema but ? Whether there is also non cardiogenic/ARDS component, is DNR Plan:  (Medical Decision Making) --Alternating between BiPAP and Air-Vo - continue lasix and repeat CXR in AM, if edema worse or unchanged will try bumex drip, if improved will continue current care. --Lasix 40 mg IV BID, urine output 1.35 L, creat 1.09 today 
--Cardiology following--on PO Amiodarone 
--Zosyn and Vancomycin day 5 
--Urine culture: no growth  
--WBC down to 11.5 (was 13.5 yesterday), hgb down to 8.6, afebrile --Risperdal 0.5 mg HS 
--Pharmacy dosing Coumadin--INR 2.3 
--Consult Palliative Care for goals of care and family support. --May need central access--if so will need to hold Coumadin  
--Hemoptysis --Speech following--recommend full liquid with meds crushed in pureed.  
 
More than 50% of the time documented was spent in face-to-face contact with the patient and in the care of the patient on the floor/unit where the patient is located.  
 
Ivonne Jama MD

## 2020-02-25 NOTE — CONSULTS
Palliative Care Patient: Jluis Morocho MRN: [id number]  SSN: [id number] YOB: 1945  Age: 75 y.o. Sex: male Date of Request: 2/25/20 Date of Consult:  2/25/2020 Reason for Consult:  goals of care Requesting Physician: CHAO Narayan Assessment/Plan:  
 
Principal Diagnosis:   
Debility, Unspecified  R53.81 Additional Diagnoses: · Acute Respiratory Failure, Unspecified  J96.00 
· Advance Care Planning Counseling W32.06 · Dyspnea  R06.00 · Dysphagia  R13.10 · Fatigue, Lethargy  R53.83 
· Counseling, Encounter for Medical Advice  Z71.9 
· Encounter for Palliative Care  Z51.5 · Bladder spasms Palliative Performance Scale (PPS): PPS: 30 Medical Decision Making:  
Reviewed and summarized notes from admission to present. Discussed case with appropriate providers: ICU IDT rounds, VAZQUEZ Del Valle Reviewed laboratory and x-ray data from admission to present. Pt resting in bed, wife present. Pt denies pain, except for intermittent cramping which he and his wife feel is due to the Rubio catheter. He has had 3 episodes of cramping in the last 2 hours. With the pt's agreement, have ordered B&O suppositories PRN; VAZQUEZ Tolliverew aware. Pt denies SOB, N/V. This is the pt's 4th admission in 6 months. Pt and wife decided on DNR status yesterday, as the pt is very slow to improve. Added DNR to problem list.  Mr Sonya Ruiz confirmed that he does not want to be intubated under any circumstances. We began to discuss what the pt's wishes are concerning aggressiveness of care going forward. We talked about the pt's quality of life as a way of gauging whether to return to the hospital during an exacerbation or to use home hospice services. Ms Sonya Ruiz will talk with her three sons, two of whom are nurses. Ms Sonya Ruiz mentioned that the pt now has 100% coverage from the [facility].   She is hoping the [facility] can provide some assistance at home, as she is unable to turn or lift the pt. Will make sure WALT Coleman is aware. Will continue to follow intermittently. Will discuss findings with members of the interdisciplinary team.   
 
Thank you for this referral.    
 
  
. 
 
Subjective:  
 
History obtained from:  Family, Care Provider and Chart Chief Complaint: Dyspnea History of Present Illness:   Mr Luis Felipe Jimenez is a 74 yo M with a PMH of sHF ( EF 25%), AV node ablation on 2/11/20, defibrillator, and other conditions listed below who was brought from home to the ER by EMS on 2/20/20 with c/o of SOB, then intubated in the ER due to respiratory distress. This is the pt's 4th admission in 6 months due to HF. Admitted for likely pulmonary edema and management of HF. Seen by Cardiology for NSVT, PT/OT. Extubated 2/22/20. Now DNR. Advance Directive: No      
Code Status:  DNR Health Care Power of : No - Patient does not have a [address]. Wife is health care surrogate. Past Medical History:  
Diagnosis Date  Acute myocardial infarction ([address] 2000 MI PCI x 2  
 Aortic Aneurysm/Dilation of the Aorta 2/22/2016  Aortic ectasia, abdominal ([address] 5/9/2014  Arrhythmia  ASCAD-of the Native Vessel 2/22/2016  CAD (coronary artery disease) 2008 PCI with stents to RCA  Cerebrovascular accident (stroke) ([address] 1/30/2016  Chest pain 2/22/2016  Chronic pain  Chronic systolic heart failure ([address] 2/22/2016  Congestive heart failure, unspecified  Coronary atherosclerosis of unspecified type of vessel, native or graft 2007 MI PCI x 1  
 GERD (gastroesophageal reflux disease)  History of agent Orange exposure  Hx of AICD-Implanted Cardiac Defibrillator 2/22/2016  Hypercholesterolemia  Hyperlipidemia 2/22/2016  Hypertension  Ill-defined condition HLD  Ill-defined disease   
 ectatic aorta  Liver disease   
 partial liver resection.  Morbid obesity ([facility]  Myocardial infarct/Anterolateral age unspe. 2016  Stroke [facility]) 2011 Past Surgical History:  
Procedure Laterality Date  CARDIAC SURG PROCEDURE UNLIST MULTIPLE HEART ANGIOPLASTY/STENT  
 HX GI Liver resection  HX OTHER SURGICAL    
 partial liver resection.  HX PACEMAKER    
 ICD/Pacemaker Family History Problem Relation Age of Onset  Cancer Mother  Other Father   
     brain hemorrage  Heart Disease Maternal Grandfather  Heart Disease Paternal Grandfather Social History Tobacco Use  Smoking status: Former Smoker Packs/day: 1.00 Years: 28.00 Pack years: 28.00 Last attempt to quit: 1999 Years since quittin.1  Smokeless tobacco: Never Used Substance Use Topics  Alcohol use: No  
 
Prior to Admission medications Medication Sig Start Date End Date Taking? Authorizing Provider  
loratadine (CLARITIN) 10 mg tablet Take 10 mg by mouth. Yes Provider, Historical  
furosemide (LASIX) 40 mg tablet Take 1 Tab by mouth two (2) times a day. 19  Yes Cezar NGUYEN NP  
mesalamine ER (APRISO) 0.375 gram 24 hour capsule Take 0.375 g by mouth daily. Indications: ulcerated colon   Yes Provider, Historical  
warfarin (COUMADIN) 5 mg tablet Take 0.5-1 tab every day or as directed Patient taking differently: Take 5 mg by mouth daily. Take 0.5-1 tab every day or as directed 19  Yes David Reid MD  
metFORMIN (GLUCOPHAGE) 500 mg tablet Take 500 mg by mouth daily. Yes Provider, Historical  
gabapentin (NEURONTIN) 300 mg capsule Take 300 mg by mouth two (2) times a day. Yes Provider, Historical  
fenofibric acid (TRILIPIX ER) 135 mg capsule Take 135 mg by mouth daily. Yes Provider, Historical  
spironolactone (ALDACTONE) 25 mg tablet Take 25 mg by mouth daily.    Yes Provider, Historical  
carvedilol (COREG) 12.5 mg tablet Take 12.5 mg by mouth two (2) times daily (with meals). Yes Provider, Historical  
Aspirin, Buffered 81 mg tab Take 81 mg by mouth daily. Yes Provider, Historical  
atorvastatin (LIPITOR) 40 mg tablet Take 40 mg by mouth daily. Provider, Historical  
nitroglycerin (NITROSTAT) 0.4 mg SL tablet 1 Tab by SubLINGual route every five (5) minutes as needed for Chest Pain. 6/14/16   Juanita Opitz, DO Allergies Allergen Reactions  Sulfur Other (comments) BREAK OUT Review of Systems: A comprehensive review of systems was negative except for: Constitutional: Positive for fatigue. Respiratory: Positive for dyspnea. Genitourinary: Positive for bladder spasms. Objective:  
 
Visit Vitals /65 Pulse 80 Temp 98.5 °F (36.9 °C) Resp 22 Ht 6' 1\" (1.854 m) Wt 229 lb 8 oz (104.1 kg) SpO2 99% BMI 30.28 kg/m² Physical Exam: 
 
General:  Cooperative. No acute distress. Eyes:  Conjunctivae/corneas clear Nose: Nares normal. Septum midline. Neck: Supple, symmetrical, trachea midline, no JVD Lungs:   Clear to auscultation bilaterally, unlabored Heart:  Regular rate and rhythm, no murmur Abdomen:   Soft, non-tender, non-distended Extremities: Normal, atraumatic, no cyanosis or edema Skin: Skin color, texture, turgor normal. No rash or lesions. Neurologic: Nonfocal  
Psych: lethargic and oriented Assessment:  
 
Hospital Problems  Date Reviewed: 2/25/2020 Codes Class Noted POA  
 DNR (do not resuscitate) ICD-10-CM: R39 ICD-9-CM: V49.86  2/25/2020 Unknown * (Principal) Acute on chronic systolic heart failure (HCC) ICD-10-CM: I50.23 ICD-9-CM: 428.23  2/20/2020 Yes Overview Addendum 2/24/2020  9:21 AM by Pierce Hernandez NP Echo 2/20/20:  EF 20-25% Echo 4/15: EF 30-35%, mild to mod MR Echo 2011: EF 30-35% Acute respiratory failure with hypoxia [facility]) ICD-10-CM: J96.01 
ICD-9-CM: 518.81  2/20/2020 Yes Acute pulmonary edema (HCC) ICD-10-CM: J81.0 ICD-9-CM: 518.4  2/20/2020 Yes Dilated cardiomyopathy ([address] ICD-10-CM: I42.0 ICD-9-CM: 425.4  2/7/2018 Yes NSVT (nonsustained ventricular tachycardia) (HCC) ICD-10-CM: I47.2 ICD-9-CM: 427.1  8/7/2017 Yes Hx of AICD-Implanted Cardiac Defibrillator ICD-10-CM: Z95.810 ICD-9-CM: V45.02  2/22/2016 Yes Overview Signed 2/22/2016 11:07 AM by Neris Reid Follow by Dr. Oanh Rodriguez Signed By: Patricio Tobar NP February 25, 2020

## 2020-02-25 NOTE — PROGRESS NOTES
Bedside, Verbal and Written shift change report given to VAZQUEZ Vila (oncoming nurse) by Ammon Pelaez RN (offgoing nurse). Report included the following information SBAR, Kardex, Intake/Output, MAR, Recent Results, Cardiac Rhythm paced and Alarm Parameters . Patient resting comfortably in bed.

## 2020-02-25 NOTE — PROGRESS NOTES
Pt remains confused to place, situation, and time. Forgetful and attempting to climb out of the bed periodically throughout day. Pt assisted to the chair with chair alarm.

## 2020-02-25 NOTE — PROGRESS NOTES
Warfarin dosing per pharmacist 
 
Rudy Brannon. is a 75 y.o. male. Height: 6' 1\" (185.4 cm)    Weight: 104.1 kg (229 lb 8 oz) Indication:  Afib Goal INR:  2-3 Home dose:  2.5 mg daily Risk factors or significant drug interactions:  No 
 
Other anticoagulants:  No 
 
Daily Monitoring Date  INR     Warfarin dose HGB              Notes 2/21  3.0  1 mg  12.2   
2/22  3.8  Hold  12.2 
2/23  4.4  Hold  12.4 
2/24  2.5  0.5 mg     9.9 
2/25  2.3  0.5 mg   8.6 Warfarin restarted yesterday. Patient will receive another 0.5 mg today and pharmacy will monitor INR Thank you, Gal Sanches, PharmD Clinical Pharmacy PGY1 Resident [phone]

## 2020-02-25 NOTE — PROGRESS NOTES
Pharmacokinetic Consult to Pharmacist 
 
Jenny Pham. is a 75 y.o. male being treated for HAP with Vancomycin and Zosyn. Height: 6' 1\" (185.4 cm)  Weight: 104.1 kg (229 lb 8 oz) Lab Results Component Value Date/Time BUN 36 (H) 02/25/2020 03:17 AM  
 Creatinine 1.09 02/25/2020 03:17 AM  
 WBC 11.5 (H) 02/25/2020 03:17 AM  
 Procalcitonin 0.06 02/20/2020 09:17 PM  
 Lactic acid 1.7 02/21/2020 03:20 AM  
  
Estimated Creatinine Clearance: 74.2 mL/min (based on SCr of 1.09 mg/dL). Lab Results Component Value Date/Time Vancomycin,trough 8.3 02/25/2020 08:14 AM  
 
 
Day 3 of vancomycin. Goal trough is 15-20. Will change to 1500 mg q 18 hours. Will continue to follow patient. Thank you, Kallie Agosto, PharmD Clinical Pharmacy PGY1 Resident [phone]

## 2020-02-25 NOTE — PROGRESS NOTES
A follow up visit was made to the patient. Emotional support, spiritual presence and  
prayer were provided for the patient and his wife, Zev Salinas. Zev Salinas shared that she wants her  to have as good as quality of life until he is not able. ALFONSO Machuca

## 2020-02-26 NOTE — PROGRESS NOTES
Problem: Dysphagia (Adult) Goal: *Speech Goal: (INSERT TEXT) Description LTG: Patient will tolerate least restrictive diet without overt signs or symptoms of airway compromise by discharge. STG: Patient will tolerate chopped mechanical soft diet and thin liquids without overt signs or symptoms of aspiration 100% of the time. STG: Patient will participate in modified barium swallow study as clinically indicated. Outcome: Progressing Towards Goal 
 
SPEECH LANGUAGE PATHOLOGY: DYSPHAGIA- Daily Note  2 NAME/AGE/GENDER: Justino Hernandez is a 75 y.o. male DATE: 2/26/2020 PRIMARY DIAGNOSIS: Acute on chronic systolic heart failure ([facility] [I50.23] ICD-10: Treatment Diagnosis: R13.11 Dysphagia, Oral Phase INTERDISCIPLINARY COLLABORATION: Registered Nurse PRECAUTIONS/ALLERGIES: Sulfur SUBJECTIVE Reclined in chair. C/o of bladder pain when positioned upright. Son and wife at bedside. Orientation:  
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 He consumed serial sips of thin liquids via straw sips without s/sx of airway compromise. Bolus holding with puree requiring moderate verbal cues for swallow. Decreased sustained attention and distraction by pain/movement outside of room impacting swallow initiation. Once swallow occurred, adequate oral clearance. Improved oral prep and swallow with chewable textures. Mild pocketing on right side. He independently alternated bites/sips to clear oral residue. ASSESSMENT Improved oral dysphagia today. He continues with intermittent bolus holding, but length of time is decreased with chewable textures. Mild right sided pocketing cleared with independent sips of water. Recommend upgrade to mechanical soft diet/thin liquids. Will continue to follow for diet tolerance with upgraded diet. Tool Used: Dysphagia Outcome and Severity Scale (SAMREEN) Score Comments Normal Diet  [] 7 With no strategies or extra time needed Functional Swallow  [] 6 May have mild oral or pharyngeal delay Mild Dysphagia  [] 5 Which may require one diet consistency restricted Mild-Moderate Dysphagia  [] 4 With 1-2 diet consistencies restricted Moderate Dysphagia  [] 3 With 2 or more diet consistencies restricted Moderate-Severe Dysphagia  [] 2 With partial PO strategies (trials with ST only) Severe Dysphagia  [] 1 With inability to tolerate any PO safely Score:  Initial: 4 Most Recent: 3 (Date 02/26/20 ) Interpretation of Tool: The Dysphagia Outcome and Severity Scale (SAMREEN) is a simple, easy-to-use, 7-point scale developed to systematically rate the functional severity of dysphagia based on objective assessment and make recommendations for diet level, independence level, and type of nutrition. PLAN   
FREQUENCY/DURATION: Continue to follow patient 3 times a week for duration of hospital stay to address above goals. - Recommendations for next treatment session: Next treatment will address oral dysphagia REHABILITATION POTENTIAL FOR STATED GOALS: Good COMPLIANCE WITH PROGRAM/EXERCISES: Will assess as treatment progresses CONTINUATION OF SKILLED SERVICES/MEDICAL NECESSITY: 
? Patient is expected to demonstrate progress in  swallow strength, swallow timeliness, swallow function, diet tolerance and swallow safety in order to  improve swallow safety, work toward diet advancement and decrease aspiration risk. ? Patient continues to require skilled intervention due to dysphagia. RECOMMENDATIONS  
DIET:  
? PO:  Mechanical soft ? Liquids:  regular thin ? MEDICATIONS: Whole in puree ASPIRATION PRECAUTIONS · Slow rate of intake · Small bites/sips · Upright at 90 degrees during meal 
  
COMPENSATORY STRATEGIES/MODIFICATIONS · Alternate liquids/solids · 1:1 assistance including cues for swallow EDUCATION: 
· Recommendations discussed with Nursing · Family · Patient RECOMMENDATIONS for CONTINUED SPEECH THERAPY: YES: Anticipate need for ongoing speech therapy during this hospitalization. SAFETY: 
After treatment position/precautions: · Upright in bed · RN notified · Wife and son at bedside · Call light within reach Total Treatment Duration:  
Time In: 7221 Time Out: 1230 [address] CCC-SLP

## 2020-02-26 NOTE — PROGRESS NOTES
Critical Care Daily Progress Note: 2/26/2020 Justino Pulse. Admission Date: 2/20/2020 The patient's chart is reviewed and the patient is discussed with the staff. 76 yo CM with a history of chronic systolic CHF (EF 10%), moderate MR, CAD, NSVT s/p ICD, aflutter s/p AV node ablation on 2/11/20, and prior CVA. He presented to the ER on 2/20/20 with shortness of breath and in acute resp failure with RA sat 80%. Was initially placed on CPAP but required intubation by EMS. CXR concerning for volume overload/pulmonary edema and was given Lasix 60mg IV with minimal UOP. BNP was 5206 and LA is 5.6 but trended down to 1.7. Was started on Zosyn/Vanc secondary to emesis episode during intubation. Was able to be extubated 2/21, wore BIPAP and weaned to Opti-flow. Cardiology following for NSVT with chronic ICD. Interrogations with multiple episodes under detection zone and was placed on Amio. Family decided on DNR status. Having episodes of confusion. Subjective:  
 
Currently on 40% Fio2 with O2 sat 92-98%. Comfortable. Fluid balance net neg 786ml yesterday on lasix drip at No other complaints aside from difficulty sleeping and bladder spasms. Only slight improvement in CXR Holding anticoagulant per cardiology for anemia. Current Facility-Administered Medications Medication Dose Route Frequency  metoprolol succinate (TOPROL-XL) XL tablet 25 mg  25 mg Oral DAILY  lisinopril (PRINIVIL, ZESTRIL) tablet 5 mg  5 mg Oral DAILY  vancomycin (VANCOCIN) 1500 mg in  ml infusion  1,500 mg IntraVENous Q18H  
 opium-belladonna (B&O 16-A SUPPRETTE) 16.2-60 mg suppository 1 Suppository  1 Suppository Rectal Q8H PRN  
 furosemide (LASIX) 100 mg in 0.9% sodium chloride 100 mL infusion  10 mg/hr IntraVENous CONTINUOUS  
 LORazepam (ATIVAN) injection 1 mg  1 mg IntraVENous Q4H PRN  
 morphine injection 2 mg  2 mg IntraVENous Q3H PRN  
  risperiDONE (RisperDAL) tablet 0.5 mg  0.5 mg Oral QHS  amiodarone (CORDARONE) tablet 200 mg  200 mg Oral BID  potassium chloride (K-DUR, KLOR-CON) SR tablet 20 mEq  20 mEq Oral DAILY  NUTRITIONAL SUPPORT ELECTROLYTE PRN ORDERS   Does Not Apply PRN  piperacillin-tazobactam (ZOSYN) 4.5 g in 0.9% sodium chloride (MBP/ADV) 100 mL  4.5 g IntraVENous Q8H  
 aspirin chewable tablet 81 mg  81 mg Oral DAILY  atorvastatin (LIPITOR) tablet 40 mg  40 mg Oral DAILY  spironolactone (ALDACTONE) tablet 25 mg  25 mg Oral DAILY  lip protectant (BLISTEX) ointment 1 Each  1 Each Topical PRN  
 sodium chloride (NS) flush 5-40 mL  5-40 mL IntraVENous Q8H  
 sodium chloride (NS) flush 5-40 mL  5-40 mL IntraVENous PRN  
 insulin lispro (HUMALOG) injection   SubCUTAneous Q6H Review of Systems Constitutional:  negative for fever, chills, sweats Cardiovascular:  negative for chest pain, palpitations, syncope, edema Gastrointestinal:  negative for dysphagia, reflux, vomiting, diarrhea, abdominal pain, or melena Neurologic:  negative for focal weakness, numbness, headache Objective:  
 
Vitals:  
 02/26/20 0500 02/26/20 0506 02/26/20 0600 02/26/20 0700 BP:  97/63 Pulse: 80 80 80 80 Resp: 24 27 9 14 Temp:      
SpO2: 95% 96% 98% 92% Weight:      
Height:      
 
 
 
Intake/Output Summary (Last 24 hours) at 2/26/2020 2055 Last data filed at 2/26/2020 2536 Gross per 24 hour Intake 1438.67 ml Output 2225 ml Net -786.33 ml Physical Exam:         
Constitutional:  the patient is well developed and in no acute distress, Air-Vo 40%FiO2 EENMT:  Sclera clear, pupils equal, oral mucosa moist 
Respiratory: crackles in lower 1/3 posteriorly Cardiovascular:  Regular, paced without M,G,R 
Gastrointestinal: soft and non-tender; with positive bowel sounds. Musculoskeletal: warm without cyanosis. There is no lower extremity edema. Moving extremities Skin:  no jaundice or rashes, no wounds Neurologic: no gross neuro deficits Psychiatric:  Awake, alert and oriented x2 ECHO 2/20/20:   
-  Left ventricle: The ventricle was markedly dilated. Systolic function markedly reduced. EF 20-25%. Severe diffuse hypokinesis. Wall thickness was mildly to moderately increased. -  Right ventricle: Systolic function was reduced. -  Left atrium: The atrium was moderately dilated. -  Right atrium: The atrium was mildly dilated. -  Inferior vena cava, hepatic veins: The inferior vena cava was mildly dilated. -  Mitral valve: There was mild annular calcification. There was moderate to severe regurgitation  
-  Tricuspid valve: There was mild regurgitation. LINES: 
PIV site Rubio 2/20/20 DRIPS: 
None CXR: 2/23-->2/26 slight improvement. LAB Recent Labs  
  02/26/20 
0541 02/25/20 
2355 02/25/20 
1719 02/25/20 
1308 02/25/20 
0418 GLUCPOC 145* 149* 186* 129* 151* Recent Labs  
  02/26/20 
0344 02/25/20 
0317 02/24/20 
0340 WBC 11.5* 11.5* 13.5* HGB 8.7* 8.6* 9.9*  
HCT 28.1* 26.6* 31.2*  
 162 181 INR 2.4 2.3 2.5 Recent Labs  
  02/26/20 
0344 02/25/20 
0317 02/24/20 
0340  145 142  
K 3.1* 3.4* 3.3*  
 107 107 CO2 30 30 30 * 144* 141* BUN 31* 36* 39* CREA 1.11 1.09 1.22  
MG 2.4 2.4 2.3 CA 8.7 8.8 8.9 Recent Labs  
  02/24/20 
1611 PHI 7.504* PCO2I 33.4*  
PO2I 61* HCO3I 26.3* No results for input(s): LCAD, LAC in the last 72 hours. Recent Labs  
  02/24/20 
1611 PHI 7.504* PCO2I 33.4*  
PO2I 61* HCO3I 26.3* Assessment:  (Medical Decision Making) Hospital Problems  Date Reviewed: 2/25/2020 Codes Class Noted POA * (Principal) Acute on chronic systolic heart failure (HCC) ICD-10-CM: I50.23 ICD-9-CM: 428.23  2/20/2020 Yes Overview Signed 2/22/2016 11:06 AM by Kendal Hudson  
  Echo 2/20/20:  EF 20-25% Echo 4/15: EF 30-35%, mild to mod MR 
 Echo 2011: EF 30-35% Chronic--diuresing Acute respiratory failure with hypoxia [facility]) ICD-10-CM: J96.01 
ICD-9-CM: 518.81  2/20/2020 Unknown Alternating with BIPAP and Air-Vo Acute pulmonary edema (HCC) ICD-10-CM: J81.0 ICD-9-CM: 518.4  2/20/2020 Unknown Per cardiology Dilated cardiomyopathy ([facility] ICD-10-CM: I42.0 ICD-9-CM: 425.4  2/7/2018 Yes  
 chronic NSVT (nonsustained ventricular tachycardia) (HCC) ICD-10-CM: I47.2 ICD-9-CM: 427.1  8/7/2017 Yes On PO Amiodarone Hx of AICD-Implanted Cardiac Defibrillator ICD-10-CM: Z95.810 ICD-9-CM: V45.02  2/22/2016 Yes Overview Signed 2/22/2016 11:07 AM by Terrence David Follow by Dr. Jaren Morrison 
  
  
 chronic  
  
 
75 y old with with systolic CHF (EF 25 %) ICD ,s/p AV ablation presented with respiratory failure concerning for fluid overload, also on ABX for for vomiting and presumed aspiration ,slowly declining respiratory status, CXR with pulmonary edema but ? Whether there is also non cardiogenic/ARDS component, is DNR Plan:  (Medical Decision Making) --Continue lasix drip with goal of 1L net negative. --increase potassium supplementation 
--wean to conventional nasal cannula 
--day 7/7 zosyn/day 4/5 vanco.  Will stop zosyn today and vanco tomorrow. --initiate physical therapy. --OK to hold coumadin per cardiology. Check FOBT. DAH seems unlikely given clinical picture. No massive hemoptysis. --will transfer to floor today. Spoke with hospitalist about assumption of care tomorrow and we will still follow on a consultative basis. More than 50% of the time documented was spent in face-to-face contact with the patient and in the care of the patient on the floor/unit where the patient is located.  
 
Mckay Mancini MD

## 2020-02-26 NOTE — PROGRESS NOTES
[facility] CARDIOLOGY PROGRESS NOTE 
      
 
2/26/2020 3:44 PM 
 
Admit Date: 2/20/2020 Subjective:  
Patient with established severe BiV failure. CXR remains abnormal and suggests volume overload vs ARDS. ROS: 
Cardiovascular:  As noted above Objective:  
  
Vitals:  
 02/25/20 2017 02/25/20 2100 02/25/20 2200 02/25/20 2358 BP:  140/82 111/63 108/66 Pulse:  80 80 80 Resp:  (!) 38 30 11 Temp:      
SpO2: 97% 98% 95% 96% Weight:      
Height:      
 
 
Physical Exam: 
General-No Acute Distress Neck- supple, no JVD 
CV- regular rate and rhythm no MRG Lung- Diminished bilaterally Abd- soft, nontender, nondistended Ext- no edema bilaterally. Skin- warm and dry Data Review:  
Recent Labs  
  02/26/20 
0344 02/25/20 
0505  145  
K 3.1* 3.4* MG 2.4 2.4 BUN 31* 36* CREA 1.11 1.09  
* 144* WBC 11.5* 11.5* HGB 8.7* 8.6* HCT 28.1* 26.6*  
 162 INR 2.4 2.3 SUMMARY: 
  
-  Left ventricle: The ventricle was markedly dilated. Systolic function was 
markedly reduced. Ejection fraction was estimated in the range of 20 % to 25  
%. There was severe diffuse hypokinesis. Wall thickness was mildly to moderately 
increased. 
  
-  Right ventricle: Systolic function was reduced. 
  
-  Left atrium: The atrium was moderately dilated. 
  
-  Right atrium: The atrium was mildly dilated. 
  
-  Inferior vena cava, hepatic veins: The inferior vena cava was mildly 
dilated. 
  
-  Mitral valve: There was mild annular calcification. There was moderate to 
severe regurgitation. 
  
-  Tricuspid valve: There was mild regurgitation. Assessment/Plan:  
 
Principal Problem: 
  Acute on chronic systolic heart failure ([address] (2/20/2020) Patient with EF 20-25%, severe MR and severe RV dysfunction. Prognosis is poor. CXR remains abnormal and either edema or ARDS. Change to lasix gtt - 10mg/hr. Agree with DNR status.  We can try low dose HFrEF (heart failure reduced EF) meds. toprol xl and lisinopril. prog still poor. Consider hospice. Active Problems: Hx of AICD-Implanted Cardiac Defibrillator (2/22/2016) Normal function NSVT (nonsustained ventricular tachycardia) ([facility] (8/7/2017) On amiodarone Dilated cardiomyopathy ([facility] (2/7/2018) As above Acute respiratory failure with hypoxia ([facility] (2/20/2020) As above - on ABx per pulmonary Acute pulmonary edema ([facility] (2/20/2020) Start lasix gtt DNR (do not resuscitate) (2/25/2020) As above Atrial fibrillation S/P AV node ablation. DC warfarin as Hgb continues to drop  - ? Etiology Anemia Acutely worse. ? Pulmonary hemorrhage/GI blood loss. Stopped warfarin. May need transfusion.    
 
 
 
 
Shawn Ibrahim MD 
2/26/2020 3:44 PM

## 2020-02-26 NOTE — HOSPICE
Met with the patient, his wife Sherren Laud and his son Roderick, who is a CRNA, at the patient's bedside. The patient was sitting in a recliner with O2NC in place. Sammy Stephens was sipping water out of a straw. He is very slow to respond to questions. We discussed the Hospice Care and the Hospice Philosophy. We discussed the criteria for Routine Hospice Care ([address]) and the services provided with this care. We discussed the criteria for General In Patient (GIP) care at the [facility]) and the services provided there. Roderick said that his father is to a point that his mother can't take care of him alone. We discussed the family hiring help in the home as well as asking the [place] for assistance in getting help with Care Givers in the home. This family was interested in [address] at the [facility]. I will review the patient's EMR with Dr. Gemini Lomeli MD, the [facility]) Medical Director early this afternoon and will notify the family and WALT Beltre of his determination. Thank you for this referral, 
 
Dominga Lopez RN, BSN Community Nurse Liaison [facility] [phone]

## 2020-02-26 NOTE — PROGRESS NOTES
Discussed with son STR at [facility] and list given. Also, discussed [facility] contracted facilities and that list given. PPD already in place. Would need PT/OT per MD. Speech following. Awaiting [facility] PLANO consult. The Plan for Transition of Care is related to the following treatment goals: possibly hospice vs STR. The Patient and/or patient representative son and wife, Paresh Gordon, was provided with a choice of provider and agrees  
with the discharge plan. [x] Yes [] No 
 
Freedom of choice list was provided with basic dialogue that supports the patient's individualized plan of care/goals, treatment preferences and shares the quality data associated with the providers.  [x] Yes [] No

## 2020-02-26 NOTE — PROGRESS NOTES
TRANSFER - OUT REPORT: 
 
Verbal report given to Héctor Weinberg RN(name) on Claudette Trejoport.  being transferred to [facility] (unit) for routine progression of care Report consisted of patients Situation, Background, Assessment and  
Recommendations(SBAR). Information from the following report(s) SBAR, ED Summary, Intake/Output, MAR, Recent Results and Cardiac Rhythm V-paced was reviewed with the receiving nurse. Lines:  
Peripheral IV 02/24/20 Right Foot (Active) Site Assessment Clean, dry, & intact 2/26/2020  7:39 AM  
Phlebitis Assessment 0 2/26/2020  7:39 AM  
Infiltration Assessment 0 2/26/2020  7:39 AM  
Dressing Status Clean, dry, & intact 2/26/2020  7:39 AM  
Dressing Type Transparent;Tape 2/26/2020  7:39 AM  
Hub Color/Line Status Patent; Infusing 2/26/2020  7:39 AM  
Alcohol Cap Used No 2/26/2020  7:39 AM  
  
 
Opportunity for questions and clarification was provided. Patient transported with: 
 O2 @ 6 liters Patient-specific medications from Pharmacy Registered Nurse

## 2020-02-26 NOTE — PROGRESS NOTES
Pt continuing to have bladder spasms. Dr Moris Wilburn notified and order for Pyridium placed. Pharmacy to tube dose.

## 2020-02-26 NOTE — PROGRESS NOTES
Bilateral feet cold to touch; unable to tolerate bilateral pedal pulses. Bilateral post/ tib pulses present to doppler. Son and wife at bedside. Refuse vascular consult. State history of numbness/ tingling/ vascular issues.

## 2020-02-26 NOTE — HOSPICE
Reviewed the patient's EMR and treatments with Dr. Leanne Patterson MD the [facility]) Medical Director who determined the patient meets criteria for Routine Hospice Care ([address] I informed the patient's son Will, at the request of Talon Perez the patient's spouse. The family was hoping for General In Patient (GIP) care at the [facility]) as Talon Perez is unable to care for Ngozi Snell in their home without more help that is present in their home. We will continue to follow this patient while he is in [facility] to assist as possible with a Discharge Plan. Thank you, Tam Santana RN, BSN Community Nurse Liaison [facility] [phone]

## 2020-02-26 NOTE — PROGRESS NOTES
A follow up visit was made to the patient. Emotional support, spiritual presence and  
prayer were provided for the patient and his son, Roderick. ALFONSO Hunt

## 2020-02-27 NOTE — PROGRESS NOTES
Report given to [address] [place] [address]. Pt resting in bed,  Family at bedside. No complaints voiced.

## 2020-02-27 NOTE — PROGRESS NOTES
Pt c/o bladder spasms, and family asked for morphine. Morphine given as ordered per request.  Pt reports relief of spasms. See MAR.

## 2020-02-27 NOTE — PROGRESS NOTES
[facility] CARDIOLOGY PROGRESS NOTE 
 
2/27/2020 11:07 AM 
 
Admit Date: 2/20/2020 Subjective:  
Stable overnight without angina, CHF, or palpitations but very weak and 10 beats NSVT this AM despite Amio. BP marginal on lasix gtt with severe LV dysfunction but stable otherwise. No other complaints overnight. Tolerating meds well. Objective:  
  
Vitals:  
 02/27/20 0247 02/27/20 0351 02/27/20 0730 02/27/20 0800 BP: 94/52 94/61 (!) 87/52 Pulse: 80 81 80 80 Resp: 17 18 18 Temp: 98.4 °F (36.9 °C) 98.2 °F (36.8 °C) 97.5 °F (36.4 °C) SpO2: 96% 97% 97% Weight:      
Height:      
 
 
Physical Exam: 
Neck- supple, + 10cm JVD 
CV- regular rate and rhythm no MRG Lung- clear bilaterally anteriorly, decreased/coarse lateral bases Abd- soft, nontender, nondistended Ext- no edema Skin- warm and dry Data Review:  
Recent Labs  
  02/27/20 
0555 02/26/20 
0344  145  
K 3.4* 3.1*  
MG 2.3 2.4 BUN 34* 31* CREA 1.14 1.11  
* 166* WBC 11.0 11.5* HGB 8.8* 8.7* HCT 28.6* 28.1*  
 183 INR 1.9 2.4 Assessment and Plan:  
 
Principal Problem: 
  Acute on chronic systolic heart failure ([address] (2/20/2020) Patient with EF 20-25%, severe MR and severe RV dysfunction. Prognosis is poor. CXR remains abnormal and either edema or ARDS. BP marginal and holding all BP meds except spironolactone. Change to lasix gtt - 10mg/hr. Agree with DNR status. Hold CHF meds if hypotension worsens in future.  
  
Active Problems: Hx of AICD-Implanted Cardiac Defibrillator - Normal function  
  
  NSVT (nonsustained ventricular tachycardia) ([facility]- On amiodarone- 6am today had 10 beats NSVT, asymptomatic, continue to monitor on current amio dose.  
  
  Dilated cardiomyopathy ([address]- As above  
  
  Acute respiratory failure with hypoxia ([address] (2/20/2020) As above - on ABx per pulmonary  
  
  Acute pulmonary edema ([facility] (2/20/2020) Started lasix gtt yesterday, lying flat comfortably, reassess tomorrow, convert to IV BID dosing tomorrow.  
  
  DNR (do not resuscitate) (2/25/2020) As above 
  
  Atrial fibrillation S/P AV node ablation. warfarin stopped but H/H stable x 3 days, resume and try to keep INR about 2-2.5 if possible. Start lower dose 2.5mg dose.  
  
  Anemia Acutely worse 2/24 but stable x 3 days. INR down from >4 to 1.9 today. Resume lower dose nightly coumadin since on amio, try to keep INR on low end of therapeutic range, stop completely if anemia worsens again. Recheck CBC in AM 
 
A. Florence Diaz MD 
[facility] Cardiology Pager [phone]

## 2020-02-27 NOTE — PROGRESS NOTES
END OF SHIFT NOTE: 
 
Intake/Output 
02/27 0701 - 02/27 1900 In: 0 Out: 500 [Urine:500] Voiding: YES Catheter: YES Drain:   
 
 
 
 
 
Stool:  1 occurrences. Stool Assessment Stool Color: Black (02/26/20 1345) Stool Appearance: Loose (02/27/20 0700) Stool Amount: Smear (02/26/20 1345) Stool Source/Status: Rectum (02/26/20 1345) Emesis:  0 occurrences. VITAL SIGNS Patient Vitals for the past 12 hrs: 
 Temp Pulse Resp BP SpO2  
02/27/20 1616     94 % 02/27/20 1600  73     
02/27/20 1550 98.1 °F (36.7 °C) 73 18 111/66 95 % 02/27/20 1236     97 % 02/27/20 1157  80     
02/27/20 1105 98.5 °F (36.9 °C) 80 19 137/77 96 % 02/27/20 0800  80     
02/27/20 0730 97.5 °F (36.4 °C) 80 18 (!) 87/52 97 % Pain Assessment Pain 1 Pain Scale 1: Numeric (0 - 10) (02/27/20 0800) Pain Intensity 1: 0 (02/27/20 0800) Patient Stated Pain Goal: 0 (02/27/20 0800) Pain Reassessment 1: Patient resting w/respiratory rate greater than 10 (02/27/20 0800) Pain Location 1: Abdomen (02/26/20 1041) Pain Description 1: Throbbing (02/26/20 1041) Pain Intervention(s) 1: Medication (see MAR) (02/25/20 0546) Ambulating No 
 
Additional Information:  
 
Shift report given to oncoming nurse at the bedside. Forrest Hoyt

## 2020-02-27 NOTE — PROGRESS NOTES
Justino Pulse. Admission Date: 2/20/2020 Daily Progress Note: 2/27/2020 The patient's chart is reviewed and the patient is discussed with the staff. 74 yo CM with a history of chronic systolic CHF (EF 72%), moderate MR, CAD, NSVT s/p ICD, aflutter s/p AV node ablation on 2/11/20, and prior CVA. He presented to the ER on 2/20/20 with shortness of breath and in acute resp failure with RA sat 80%. Was initially placed on CPAP but required intubation by EMS. CXR concerning for volume overload/pulmonary edema and was given Lasix 60mg IV with minimal UOP. BNP was 5206 and LA is 5.6 but trended down to 1.7. Was started on Zosyn/Vanc secondary to emesis episode during intubation. Was able to be extubated 2/21, wore BIPAP and weaned to Opti-flow. Cardiology following for NSVT with chronic ICD. Interrogations with multiple episodes under detection zone and was placed on Amio. Family decided on DNR status. Having episodes of confusion. Subjective:  
 
Resting in bed, having bladder spasms. On 6L HFNC. Wife at bedside, states she would like for the patient to go to [facility] for therapy instead of hospice. Lasix drip stopped last night due to hypotension and small fluid bolus given. BP now improved at 137/77. Current Facility-Administered Medications Medication Dose Route Frequency  warfarin (COUMADIN) tablet 2.5 mg  2.5 mg Oral QPM  
 oxybutynin (DITROPAN) tablet 5 mg  5 mg Oral TID  metoprolol succinate (TOPROL-XL) XL tablet 25 mg  25 mg Oral DAILY  lisinopril (PRINIVIL, ZESTRIL) tablet 5 mg  5 mg Oral DAILY  potassium chloride (K-DUR, KLOR-CON) SR tablet 40 mEq  40 mEq Oral BID  vancomycin (VANCOCIN) 1500 mg in  ml infusion  1,500 mg IntraVENous Q18H  phenazopyridine (PYRIDIUM) tab 95 mg  95 mg Oral BID PRN  
 opium-belladonna (B&O 16-A SUPPRETTE) 16.2-60 mg suppository 1 Suppository  1 Suppository Rectal Q8H PRN  
  furosemide (LASIX) 100 mg in 0.9% sodium chloride 100 mL infusion  10 mg/hr IntraVENous CONTINUOUS  
 LORazepam (ATIVAN) injection 1 mg  1 mg IntraVENous Q4H PRN  
 morphine injection 2 mg  2 mg IntraVENous Q3H PRN  
 risperiDONE (RisperDAL) tablet 0.5 mg  0.5 mg Oral QHS  amiodarone (CORDARONE) tablet 200 mg  200 mg Oral BID  
 NUTRITIONAL SUPPORT ELECTROLYTE PRN ORDERS   Does Not Apply PRN  
 aspirin chewable tablet 81 mg  81 mg Oral DAILY  atorvastatin (LIPITOR) tablet 40 mg  40 mg Oral DAILY  spironolactone (ALDACTONE) tablet 25 mg  25 mg Oral DAILY  lip protectant (BLISTEX) ointment 1 Each  1 Each Topical PRN  
 sodium chloride (NS) flush 5-40 mL  5-40 mL IntraVENous Q8H  
 sodium chloride (NS) flush 5-40 mL  5-40 mL IntraVENous PRN  
 insulin lispro (HUMALOG) injection   SubCUTAneous Q6H Review of Systems Constitutional: negative for fever, chills, sweats Cardiovascular: negative for chest pain, palpitations, syncope, edema Gastrointestinal:  negative for dysphagia, reflux, vomiting, diarrhea, abdominal pain, or melena Neurologic:  negative for focal weakness, numbness, headache Objective:  
 
Vitals:  
 02/27/20 0730 02/27/20 0800 02/27/20 1105 02/27/20 1157 BP: (!) 87/52  137/77 Pulse: 80 80 80 80 Resp: 18  19 Temp: 97.5 °F (36.4 °C)  98.5 °F (36.9 °C) SpO2: 97%  96% Weight:      
Height:      
 
 
 
Intake/Output Summary (Last 24 hours) at 2/27/2020 1224 Last data filed at 2/27/2020 1105 Gross per 24 hour Intake 3768 ml Output 2025 ml Net 1743 ml Physical Exam:  
Constitution:  the patient is well developed and in no acute distress EENMT:  Sclera clear, pupils equal, oral mucosa moist 
Respiratory: crackles anteriorly and in bases Cardiovascular:  RRR without M,G,R 
Gastrointestinal: soft and non-tender; with positive bowel sounds. Musculoskeletal: warm without cyanosis. There is no lower extremity edema. Skin:  no jaundice or rashes, no wounds Neurologic: no gross neuro deficits Psychiatric:  alert and oriented x 3 CXR:  None today 2/26/2020 LAB Recent Labs  
  02/27/20 
9820 02/26/20 
2347 02/26/20 
1755 02/26/20 
1221 02/26/20 
0541 GLUCPOC 148* 142* 180* 218* 145* Recent Labs  
  02/27/20 
0555 02/26/20 
0344 02/25/20 
3568 WBC 11.0 11.5* 11.5* HGB 8.8* 8.7* 8.6* HCT 28.6* 28.1* 26.6*  
 183 162 INR 1.9 2.4 2.3 Recent Labs  
  02/27/20 
0555 02/26/20 
0344 02/25/20 
4207  145 145  
K 3.4* 3.1* 3.4*  
 106 107 CO2 31 30 30 * 166* 144* BUN 34* 31* 36* CREA 1.14 1.11 1.09  
MG 2.3 2.4 2.4 CA 8.8 8.7 8.8 Recent Labs  
  02/24/20 
1611 PHI 7.504* PCO2I 33.4*  
PO2I 61* HCO3I 26.3* No results for input(s): LCAD, LAC in the last 72 hours. Assessment:  (Medical Decision Making) Hospital Problems  Date Reviewed: 2/27/2020 Codes Class Noted POA  
 DNR (do not resuscitate) ICD-10-CM: M06 ICD-9-CM: V49.86  2/25/2020 Unknown * (Principal) Acute on chronic systolic heart failure (HCC) ICD-10-CM: I50.23 ICD-9-CM: 428.23  2/20/2020 Yes Overview Addendum 2/24/2020  9:21 AM by Laxmi Buenrostro NP Echo 2/20/20:  EF 20-25% Echo 4/15: EF 30-35%, mild to mod MR Echo 2011: EF 30-35% Acute respiratory failure with hypoxia [facility]) ICD-10-CM: J96.01 
ICD-9-CM: 518.81  2/20/2020 Yes Acute pulmonary edema (HCC) ICD-10-CM: J81.0 ICD-9-CM: 518.4  2/20/2020 Yes Dilated cardiomyopathy ([address] ICD-10-CM: I42.0 ICD-9-CM: 425.4  2/7/2018 Yes NSVT (nonsustained ventricular tachycardia) (HCC) ICD-10-CM: I47.2 ICD-9-CM: 427.1  8/7/2017 Yes Hx of AICD-Implanted Cardiac Defibrillator ICD-10-CM: Z95.810 ICD-9-CM: V45.02  2/22/2016 Yes Overview Signed 2/22/2016 11:07 AM by Terrence David Follow by Dr. Jaren Morrison Plan:  (Medical Decision Making) --resume lasix 40mg q12 with strict I/O 
--consult PT, continue OT 
--goal net neg 1L today. More than 50% of the time documented was spent in face-to-face contact with the patient and in the care of the patient on the floor/unit where the patient is located. Elidia Muñoz NP I have spoken with and examined the patient. I agree with the above assessment and plan as documented. Gen: pleasant Lungs:  Crackles L anterior and bilaterally posteriorly to 1/3 up Heart:  RRR with no Murmur/Rubs/Gallops Abd: NTND Ext: no edema 
--as above, continue bolus diuresis --wean oxygen 
--family hoping for rehab and will need PT assessment Denton Roman MD

## 2020-02-27 NOTE — PROGRESS NOTES
Warfarin dosing per pharmacist 
 
Savannah Martinez. is a 75 y.o. male. Height: 6' 1\" (185.4 cm)    Weight: 102.5 kg (226 lb) Indication:  Afib Goal INR:  2-2.5 (per Dr. Patrick Mendoza' note) Home dose:  2.5 mg po daily Risk factors or significant drug interactions:  Amiodarone Other anticoagulants:  N/A Daily Monitoring Date  INR     Warfarin dose HGB              Notes 2/27  1.9  2.5 mg  8.8 Warfarin was discontinued for 3 days due to anemia. Patient also had supratherapeutic level of 4.4 on 2/23. Goal is to keep INR on lower side of goal (2-2.5). Will begin 2.5 mg this evening. Pharmacy will continue to follow patient and monitor INR daily. Thank you, Lety Gutierres, Pharm. D. 
PGY1 Pharmacy Resident [phone]

## 2020-02-27 NOTE — PROGRESS NOTES
Date of Outreach Update: 
[place]. was seen and assessed. Mr. Kye Arroyo is pale/dusky. Confused, oriented to person only. States,\"I'm at [facility] and it is 2010\". Stool heme + today with hgb 8.7, K+ 3.1 (K+ replacement po given). BP 89/57 MAP 65 all the above reported to primary [employer]. MEWS Score: 2 (02/26/20 2019) Vitals:  
 02/26/20 1323 02/26/20 1605 02/26/20 2005 02/26/20 2019 BP: 93/50 (!) 85/51 95/53 (!) 89/57 Pulse: 77 80 82 78 Resp: 22 22 20 16 Temp: 97.5 °F (36.4 °C) 98.3 °F (36.8 °C) 97.5 °F (36.4 °C) 97.4 °F (36.3 °C) SpO2: 99% 95% 97% 96% Weight: 102.5 kg (226 lb) Height:      
  
 
 Pain Assessment Pain Intensity 1: 0 (02/26/20 2019) Pain Location 1: Abdomen Pain Intervention(s) 1: Medication (see MAR) Patient Stated Pain Goal: 0 Will continue to follow up per outreach protocol. Signed By:   Jacquelin Kent RN   February 26, 2020 8:21 PM

## 2020-02-27 NOTE — ROUTINE PROCESS
CHF teaching started post introduction to pt/family; aware of diagnosis. Planner/scale(home) @ BS and will follow. Smoking/ ETOH/Illicit drug use cessation and maintain a healthy weight covered. Pt/family aware that I can not prescribe nor adjust  medications: 15mins Palliative Care score:  RATT 30, seen Refused ACP on admission Start 2L/D Fluid restriction/ cardiac diet CHF teaching continues to pt/family. Emphasis on taking prescription meds as ordered, to keep F/U appts and to call MD STAT if any of the following occur: ? If you gain 2 lbs in one day or 5 lbs in a week, and short of breath. ? If you can not lay flat without developing short of breath or rapid breathing at night; or if it wakes you up. Develop a cough or wheezing. Pt/family verbalizes understanding, will follow to reinforce teaching skills: 20 mins

## 2020-02-27 NOTE — CONSULTS
Consult Patient: Savannah Del Castillo MRN: [id number]  SSN: [id number] YOB: 1945  Age: 75 y.o. Sex: male Subjective:  
  
Savannah Del Castillo is a 75 y.o. male who is being seen for assuming care to our service. Patient was admitted on 2- with Acute respiratory failure with hypoxia and was intubated. He has PMH of chronic systolic CHF (EF 53%), MR, CAD, NSVT s/p ICD, AFlutter s/p AV node ablation, CVA. He was treated for CHF exacerbation. ICD interrogation shows multiple episodes. Patient is on Amiodarone now. Patient is feeling more comfortable today. He is on Lasix drip and making urine vis Rubio's catheter. Wife is at bedside. Past Medical History:  
Diagnosis Date  Acute myocardial infarction ([address] 2000 MI PCI x 2  
 Aortic Aneurysm/Dilation of the Aorta 2/22/2016  Aortic ectasia, abdominal ([address] 5/9/2014  Arrhythmia  ASCAD-of the Native Vessel 2/22/2016  CAD (coronary artery disease) 2008 PCI with stents to RCA  Cerebrovascular accident (stroke) ([address] 1/30/2016  Chest pain 2/22/2016  Chronic pain  Chronic systolic heart failure ([address] 2/22/2016  Congestive heart failure, unspecified  Coronary atherosclerosis of unspecified type of vessel, native or graft 2007 MI PCI x 1  
 GERD (gastroesophageal reflux disease)  History of agent Orange exposure  Hx of AICD-Implanted Cardiac Defibrillator 2/22/2016  Hypercholesterolemia  Hyperlipidemia 2/22/2016  Hypertension  Ill-defined condition HLD  Ill-defined disease   
 ectatic aorta  Liver disease   
 partial liver resection.  Morbid obesity ([address]  Myocardial infarct/Anterolateral age unspe. 2/22/2016  Stroke [facility]) 12/27/2011 Past Surgical History:  
Procedure Laterality Date  CARDIAC SURG PROCEDURE UNLIST MULTIPLE HEART ANGIOPLASTY/STENT  
 HX GI Liver resection  HX OTHER SURGICAL partial liver resection.  HX PACEMAKER    
 ICD/Pacemaker Family History Problem Relation Age of Onset  Cancer Mother  Other Father   
     brain hemorrage  Heart Disease Maternal Grandfather  Heart Disease Paternal Grandfather Social History Tobacco Use  Smoking status: Former Smoker Packs/day: 1.00 Years: 28.00 Pack years: 28.00 Last attempt to quit: 1999 Years since quittin.1  Smokeless tobacco: Never Used Substance Use Topics  Alcohol use: No  
  
Current Facility-Administered Medications Medication Dose Route Frequency Provider Last Rate Last Dose  warfarin (COUMADIN) tablet 2.5 mg  2.5 mg Oral QPM Osvaldo Tatum MD      
 metoprolol succinate (TOPROL-XL) XL tablet 25 mg  25 mg Oral DAILY Delmer Grier MD   Stopped at 20 0900  
 lisinopril (PRINIVIL, ZESTRIL) tablet 5 mg  5 mg Oral DAILY Delmer Grier MD   Stopped at 20 0900  potassium chloride (K-DUR, KLOR-CON) SR tablet 40 mEq  40 mEq Oral BID Anuj Gtz MD   40 mEq at 20 0826  
 vancomycin (VANCOCIN) 1500 mg in  ml infusion  1,500 mg IntraVENous Q18H Anuj Gtz .3 mL/hr at 20 2140 1,500 mg at 20 2140  phenazopyridine (PYRIDIUM) tab 95 mg  95 mg Oral BID PRN Anuj Gtz MD   95 mg at 20 1222  opium-belladonna (B&O 16-A SUPPRETTE) 16.2-60 mg suppository 1 Suppository  1 Suppository Rectal Q8H PRN Jessica Rodriguez NP   1 Suppository at 20 7343  furosemide (LASIX) 100 mg in 0.9% sodium chloride 100 mL infusion  10 mg/hr IntraVENous CONTINUOUS Zayda Hernandez MD   Stopped at 20 2139  LORazepam (ATIVAN) injection 1 mg  1 mg IntraVENous Q4H PRN Cindy Jean-Baptiste MD   1 mg at 20 2019  morphine injection 2 mg  2 mg IntraVENous Q3H PRN Cindy Jean-Baptiste MD   2 mg at 20 1450  
 risperiDONE (RisperDAL) tablet 0.5 mg  0.5 mg Oral QHS Arina Ramirezco MD   Stopped at 02/26/20 2200  
 amiodarone (CORDARONE) tablet 200 mg  200 mg Oral BID Gali Davila MD   200 mg at 02/27/20 0900  
 NUTRITIONAL SUPPORT ELECTROLYTE PRN ORDERS   Does Not Apply PRN Nitza Marroquin MD      
 aspirin chewable tablet 81 mg  81 mg Oral DAILY Gali Davila MD   81 mg at 02/27/20 1375  
 atorvastatin (LIPITOR) tablet 40 mg  40 mg Oral DAILY Gali Davila MD   40 mg at 02/27/20 5927  spironolactone (ALDACTONE) tablet 25 mg  25 mg Oral DAILY Gali Davila MD   25 mg at 02/27/20 4936  
 lip protectant (BLISTEX) ointment 1 Each  1 Each Topical PRN Marcos Jovel MD      
 sodium chloride (NS) flush 5-40 mL  5-40 mL IntraVENous Q8H Nitza Marroquin MD   10 mL at 02/27/20 0549  
 sodium chloride (NS) flush 5-40 mL  5-40 mL IntraVENous PRN Nitza Marroquin MD      
 insulin lispro (HUMALOG) injection   SubCUTAneous Q6H Nitza Marroquin MD   Stopped at 02/27/20 0000 Allergies Allergen Reactions  Sulfur Other (comments) BREAK OUT Review of Systems: 
 
10-point review of systems is negative except what is mentioned in the present illness section. Objective:  
 
Vitals:  
 02/27/20 0247 02/27/20 0351 02/27/20 0730 02/27/20 0800 BP: 94/52 94/61 (!) 87/52 Pulse: 80 81 80 80 Resp: 17 18 18 Temp: 98.4 °F (36.9 °C) 98.2 °F (36.8 °C) 97.5 °F (36.4 °C) SpO2: 96% 97% 97% Weight:      
Height:      
  
 
Physical Exam: 
 
General:                    The patient is a pleasant male in mild acute respiratory distress when exerting himself. Head:                                   Normocephalic/atraumatic. Eyes:                                   No palpebral pallor or scleral icterus. ENT:                                    External auricular and nasal exam within normal limits. Mucous membranes are moist. 
Neck:                                   Supple, non-tender, no JVD. Lungs:                       decreased to auscultation bilaterally without wheezes or crackles. No respiratory distress or accessory muscle use. Heart:                                  Regular rate and rhythm, without murmurs, rubs, or gallops. Abdomen:                  Soft, non-tender, distended with normoactive bowel sounds. Genitourinary:           No tenderness over the bladder or bilateral CVAs. Presence of Rubio's catheter Extremities:               Without clubbing, cyanosis, or edema. Skin:                                    Normal color, texture, and turgor. No rashes, lesions, or jaundice. Pulses:                      Radial and dorsalis pedis pulses present 2+ bilaterally. Capillary refill <2s. Neurologic:                CN II-XII grossly intact and symmetrical.  
                                            Moving all four extremities well with no focal deficits. Psychiatric:                Pleasant demeanor, appropriate affect. Alert and oriented x 3 Lab and data Recent Results (from the past 24 hour(s)) GLUCOSE, POC Collection Time: 02/26/20 12:21 PM  
Result Value Ref Range Glucose (POC) 218 (H) 65 - 100 mg/dL OCCULT BLOOD, STOOL Collection Time: 02/26/20  1:29 PM  
Result Value Ref Range Occult blood, stool POSITIVE (A) NEG    
GLUCOSE, POC Collection Time: 02/26/20  5:55 PM  
Result Value Ref Range Glucose (POC) 180 (H) 65 - 100 mg/dL GLUCOSE, POC Collection Time: 02/26/20 11:47 PM  
Result Value Ref Range Glucose (POC) 142 (H) 65 - 100 mg/dL GLUCOSE, POC Collection Time: 02/27/20  5:23 AM  
Result Value Ref Range Glucose (POC) 148 (H) 65 - 100 mg/dL METABOLIC PANEL, BASIC Collection Time: 02/27/20  5:55 AM  
Result Value Ref Range Sodium 142 136 - 145 mmol/L Potassium 3.4 (L) 3.5 - 5.1 mmol/L  Chloride 107 98 - 107 mmol/L  
 CO2 31 21 - 32 mmol/L Anion gap 4 (L) 7 - 16 mmol/L Glucose 144 (H) 65 - 100 mg/dL BUN 34 (H) 8 - 23 MG/DL Creatinine 1.14 0.8 - 1.5 MG/DL  
 GFR est AA >60 >60 ml/min/1.73m2 GFR est non-AA >60 >60 ml/min/1.73m2 Calcium 8.8 8.3 - 10.4 MG/DL  
CBC W/O DIFF Collection Time: 02/27/20  5:55 AM  
Result Value Ref Range WBC 11.0 4.3 - 11.1 K/uL  
 RBC 3.13 (L) 4.23 - 5.6 M/uL HGB 8.8 (L) 13.6 - 17.2 g/dL HCT 28.6 (L) 41.1 - 50.3 % MCV 91.4 79.6 - 97.8 FL  
 MCH 28.1 26.1 - 32.9 PG  
 MCHC 30.8 (L) 31.4 - 35.0 g/dL  
 RDW 14.6 11.9 - 14.6 % PLATELET 764 884 - 936 K/uL MPV 12.8 (H) 9.4 - 12.3 FL ABSOLUTE NRBC 0.13 0.0 - 0.2 K/uL PROTHROMBIN TIME + INR Collection Time: 02/27/20  5:55 AM  
Result Value Ref Range Prothrombin time 22.1 (H) 12.0 - 14.7 sec INR 1.9 MAGNESIUM Collection Time: 02/27/20  5:55 AM  
Result Value Ref Range Magnesium 2.3 1.8 - 2.4 mg/dL XR chest  
2- IMPRESSION: 
  
1. Bilateral airspace densities, likely pulmonary edema or pneumonia. . No 
significant change compared to prior. Current Facility-Administered Medications:  
  warfarin (COUMADIN) tablet 2.5 mg, 2.5 mg, Oral, QPM, Cecilia Vizcaino MD 
  oxybutynin (DITROPAN) tablet 5 mg, 5 mg, Oral, TID, Gloria Gray MD 
  metoprolol succinate (TOPROL-XL) XL tablet 25 mg, 25 mg, Oral, DAILY, Lore Grier MD, Stopped at 02/27/20 0900 
  lisinopril (PRINIVIL, ZESTRIL) tablet 5 mg, 5 mg, Oral, DAILY, Lore Grier MD, Stopped at 02/27/20 0900   potassium chloride (K-DUR, KLOR-CON) SR tablet 40 mEq, 40 mEq, Oral, BID, Javier Laureano MD, 40 mEq at 02/27/20 0826 
  vancomycin (VANCOCIN) 1500 mg in  ml infusion, 1,500 mg, IntraVENous, Q18H, Javier Laureano MD, Last Rate: 333.3 mL/hr at 02/26/20 2140, 1,500 mg at 02/26/20 2140   phenazopyridine (PYRIDIUM) tab 95 mg, 95 mg, Oral, BID PRN, Javier Laureano MD, 95 mg at 02/26/20 1222   opium-belladonna (B&O 16-A SUPPRETTE) 16.2-60 mg suppository 1 Suppository, 1 Suppository, Rectal, Q8H PRN, Trixie Carbajal NP, 1 Suppository at 02/26/20 9918   furosemide (LASIX) 100 mg in 0.9% sodium chloride 100 mL infusion, 10 mg/hr, IntraVENous, CONTINUOUS, Evaristo Holter, MD, Stopped at 02/26/20 2139   LORazepam (ATIVAN) injection 1 mg, 1 mg, IntraVENous, Q4H PRN, Adalberto Mejia MD, 1 mg at 02/24/20 2019   morphine injection 2 mg, 2 mg, IntraVENous, Q3H PRN, Adalberto Mejia MD, 2 mg at 02/26/20 1450 
  risperiDONE (RisperDAL) tablet 0.5 mg, 0.5 mg, Oral, QHS, Tej Randolph MD, Stopped at 02/26/20 2200 
  amiodarone (CORDARONE) tablet 200 mg, 200 mg, Oral, BID, Kelvin Vizcaino MD, 200 mg at 02/27/20 0900 
  NUTRITIONAL SUPPORT ELECTROLYTE PRN ORDERS, , Does Not Apply, PRN, Shweta Patrick MD 
  aspirin chewable tablet 81 mg, 81 mg, Oral, DAILY, Stan Mayorga MD, 81 mg at 02/27/20 7872 
  atorvastatin (LIPITOR) tablet 40 mg, 40 mg, Oral, DAILY, Stan Mayorga MD, 40 mg at 02/27/20 8264   spironolactone (ALDACTONE) tablet 25 mg, 25 mg, Oral, DAILY, Kelvin Vizcaino MD, 25 mg at 02/27/20 1971 
  lip protectant (BLISTEX) ointment 1 Each, 1 Each, Topical, PRN, Adalberto Mejia MD 
  sodium chloride (NS) flush 5-40 mL, 5-40 mL, IntraVENous, Q8H, Shweta Patrick MD, 10 mL at 02/27/20 0549 
  sodium chloride (NS) flush 5-40 mL, 5-40 mL, IntraVENous, PRN, Shweta Patrick MD 
  insulin lispro (HUMALOG) injection, , SubCUTAneous, Q6H, Shweta Patrick MD, Stopped at 02/27/20 0000 Assessment:  
 
Hospital Problems  Date Reviewed: 2/25/2020 Codes Class Noted POA  
 DNR (do not resuscitate) ICD-10-CM: H42 ICD-9-CM: V49.86  2/25/2020 Unknown * (Principal) Acute on chronic systolic heart failure (HCC) ICD-10-CM: I50.23 ICD-9-CM: 428.23  2/20/2020 Yes Overview Addendum 2/24/2020  9:21 AM by Venu Olmstead NP Echo 2/20/20:  EF 20-25% Echo 4/15: EF 30-35%, mild to mod MR Echo 2011: EF 30-35% Acute respiratory failure with hypoxia [facility]) ICD-10-CM: J96.01 
ICD-9-CM: 518.81  2/20/2020 Yes Acute pulmonary edema (HCC) ICD-10-CM: J81.0 ICD-9-CM: 518.4  2/20/2020 Yes Dilated cardiomyopathy ([address] ICD-10-CM: I42.0 ICD-9-CM: 425.4  2/7/2018 Yes NSVT (nonsustained ventricular tachycardia) (HCC) ICD-10-CM: I47.2 ICD-9-CM: 427.1  8/7/2017 Yes Hx of AICD-Implanted Cardiac Defibrillator ICD-10-CM: Z95.810 ICD-9-CM: V45.02  2/22/2016 Yes Overview Signed 2/22/2016 11:07 AM by Chauncey Garcia Follow by Dr. John Parsons Plan:  
 
CHF, chronic and acute systolic and diastolic Severe MR and RV dysfunction. AF Continue IV Lasix drip. Monitor intake and output and renal function. Continue Spironolactone. BP is on low side. S/P AICD with normal function Resume Warfarin Non-sustained VT On Amiodarone. Acute respiratory failure with hypoxia S/P extubation On Empiric IV antibiotics to cover possible aspiration. Anemia No acute bleeding Monitor. Check CBC, BMP daily for now. Diabetes mellitus type 2 Monitor blood sugar. Cover with insulin sliding scale accordingly. I have discussed the plan of care with patient and spouse at bedside. I have discussed with patient regarding advance directive. Patient would like to have a DNR status. DVT prophylaxis : already on 4 . Signed By: Santiago Ac MD   
 February 27, 2020

## 2020-02-27 NOTE — PROGRESS NOTES
Palliative Care Progress Note Patient: Savannah Martinez. MRN: [id number]  SSN: [id number] YOB: 1945  Age: 75 y.o. Sex: male Assessment/Plan: Chief Complaint/Interval History:  Alert, reports he is doing okay today Principal Diagnosis: · Debility, Unspecified  R53.81 Additional Diagnoses: · Dyspnea  R06.00 · Dysphagia  R13.10 · Fatigue, Lethargy  R53.83 
· Encounter for Palliative Care  Z51.5 Palliative Performance Scale (PPS) PPS: 30 Medical Decision Making:  
Reviewed and summarized notes Discussed case with appropriate providers- Dilshad Danielson Reviewed laboratory and x-ray data Pt resting in bed, no distress noted. No family at bedside. Pt reports he is feeling fair today. He endorses mild dyspnea and weakness, but denies pain. He was evaluated by [facility] yesterday, and met criteria for routine hospice at home, however, his wife is unable to care for him at home. Discussed with  Dilshad Danielson, and reports Dr Oksana Lea has re-evaluated pt, and accepted him at [facility] due to ongoing hypotension. Mario Pacheco is awaiting family to sign consents. Will continue to follow. Will discuss findings with members of the interdisciplinary team.   
 
  
More than 50% of this 25 minute visit was spent counseling and coordination of care as outlined above. Subjective:  
 
Review of Systems: A comprehensive review of systems was negative except for:  
Constitutional: Positive for fatigue. Respiratory: Positive for dyspnea Objective:  
 
Visit Vitals BP (!) 87/52 (BP 1 Location: Right arm, BP Patient Position: At rest) Pulse 80 Temp 97.5 °F (36.4 °C) Resp 18 Ht 6' 1\" (1.854 m) Wt 226 lb (102.5 kg) SpO2 97% BMI 29.82 kg/m² Physical Exam: 
 
General:  Cooperative. Debilitated. No acute distress. Eyes:  Conjunctivae/corneas clear Nose: Nares normal. Septum midline. Neck: Supple, symmetrical, trachea midline Lungs:   Decreased  bilaterally, unlabored Heart:  Regular rate and rhythm Abdomen:   Soft, non-tender, non-distended Extremities: Normal, atraumatic, no cyanosis or edema Skin: Skin color, texture, turgor normal  
Neurologic: Nonfocal  
Psych: Alert and oriented to person and place Signed By: Mary Beth Gillette NP February 27, 2020

## 2020-02-27 NOTE — PROGRESS NOTES
Request sent to [facility] [place]( via [address]) for [facility] per family request. CM spoke with Chg RN about PPD. Case management will continue to monitor.

## 2020-02-27 NOTE — PROGRESS NOTES
Pt received from ICU, at 18; report called to myself from [facility]. Pt on BSC upon arrival.  Stool sent for testing as ordered. Pt settled in bed afterwards. Denies pain. Wife and son at bedside.

## 2020-02-27 NOTE — PROGRESS NOTES
Paged and spoke with Dr Vallejo Home , pt's blood pressures have been low continuously last one was manual 72/50. Pt still having good output. Orders to stop and hold lasix drip and give 250cc bolus. Will continue to monitor pt

## 2020-02-27 NOTE — HOSPICE
Received a message from Dr. Ruta Goodell MD, the [id number] [facility]) Medical Director, that after reviewing Daniel Glynn' EMR, he has determined that Daniel Glynn meets criteria for General In Patient (GIP) care at the [facility]). I contacted the patient's son Roderick who is a CRNA here at [facility]. Roderick said that he was working today and could not meet until lunch. I also called Anil's other son Javier Bernal who is also a RN and left a VM requesting he call me back. I went to the patient's bedside and Daniel Glynn spouse, Woody Dunn, was not present. Daniel Glynn said that she just went to the car. I called Maria A's phone and left a VM. I waited for her to return and also informed the Kavya Urias about Dr. Ashley Tom determination. When Woodymichael Dunn returned she said that she wanted to wait until either Roderick or Javier Dolores could be present to make a decision as a doctor yesterday had started talking to them about rehab. Woody Dunn has my phone number and said that she would call me when one of her sons are present. Thank you, Tri Guzman RN, BSN Community Nurse Liaison [facility] [phone]

## 2020-02-27 NOTE — HOSPICE
Met with the patient's spouse, Sherren Laud, at the bedside. Sherren Laud told me that after talking with her son, Roderick, she has decided that she would like [place] to go to Rehab at [place] [address]. I informed Sherren Laud that we are always available for a Hospice Referral to evaluated [place] for [facility] in the future. I informed the patients  Drew Vilchis of the family's decision. Thank you, Dominga Lopez RN, BSN Community Nurse Liaison [facility] [phone]

## 2020-02-27 NOTE — PROGRESS NOTES
Patient having severe pain with bladder spasms that causes him to work harder to breathe. Called physician and asked for medication to control spasms. Med given, see MAR.

## 2020-02-28 NOTE — PROGRESS NOTES
PT note: PT evaluation received and chart reviewed. Pt working with OT on arrival, will attempt evaluation at later time as schedule allows. Anya Lorenz, PT 
2/28/2020

## 2020-02-28 NOTE — PROGRESS NOTES
Problem: Mobility Impaired (Adult and Pediatric) Goal: *Acute Goals and Plan of Care (Insert Text) Description LTG: 
(1.)Mr. Alessandra Mix will move from supine to sit and sit to supine , scoot up and down and roll side to side with MODIFIED INDEPENDENCE within 7 treatment day(s). (2.)Mr. Alessandra Mix will transfer from bed to chair and chair to bed with SUPERVISION using the least restrictive device within 7 treatment day(s). (3.)Mr. Alessandra Mix will ambulate with STAND BY ASSIST for 250+ feet with the least restrictive device within 7 treatment day(s) while maintaining normal vital signs. (4.)Mr. Alessandra Mix will perform 7 steps with HR and CGA within 7 treatment days for safety ascending and descending stairs for home.  
_______________________________________________________________________________________________ Outcome: Progressing Towards Goal 
  
PHYSICAL THERAPY: Initial Assessment and PM 2/28/2020 INPATIENT: PT Visit Days : 1 Pay[place] / Plan[place] PART A [place] B / Product Type: Medicare /   
  
NAME/AGE/GENDER: Murphy Ramírez is a 75 y.o. male PRIMARY DIAGNOSIS: Acute on chronic systolic heart failure (HCC) [I50.23] Acute on chronic systolic heart failure (HCC) Acute on chronic systolic heart failure ([facility] [address] ICD-10: Treatment Diagnosis:  
 Generalized Muscle Weakness (M62.81) Difficulty in walking, Not elsewhere classified (R26.2) Precaution/Allergies: 
Sulfur ASSESSMENT:  
 
Mr. Alessandra Mix presents with decreased bed mobility, transfers, ambulation, balance, activity tolerance and overall general functional mobility s/p hospital admission with above. Pt to ED on 2/20/20 via EMS from home with SOB, hypoxic. Pt required intubation and admit to ICU, extubated on 2/21/20 and placed to floor 2/26/20. Pt with history of multiple hospital admissions in past 6 months.  Pt alert but confused this date, oriented to self and place, pt's spouse present and assists with history. Pt lives in 1 story home, 7 steps to enter with HR. Pt independent at baseline, drives, no falls, RA at home. Pt with progressive weakness and increased assist recently at home. Pt currently on 4 L/min O2 via n.c, O2 stats 98% sitting in chair. Pt B LE grossly 4+/5 MMT, sensation intact to light touch. Pt CGA for sit to stand from chair, ambulated into hallway with RW for 90' and CGA. Pt with slow reg, cued for posture, walker safety, pursed lip breathing. Pt returned to room, CGA to return to supine and set up for lunch. Pt O2 stats post mobility 93% on 4 L/min. PT to follow for acute care needs, pt's family would like STR at discharge for increased strength and mobility prior to returning home. This section established at most recent assessment PROBLEM LIST (Impairments causing functional limitations): 
Decreased ADL/Functional Activities Decreased Transfer Abilities Decreased Ambulation Ability/Technique Decreased Balance Decreased Activity Tolerance Increased Fatigue Increased Shortness of Breath Decreased Cognition INTERVENTIONS PLANNED: (Benefits and precautions of physical therapy have been discussed with the patient.) Balance Exercise Bed Mobility Family Education Gait Training Home Exercise Program (HEP) Therapeutic Activites Therapeutic Exercise/Strengthening Transfer Training TREATMENT PLAN: Frequency/Duration: 3 times a week for duration of hospital stay Rehabilitation Potential For Stated Goals: Good REHAB RECOMMENDATIONS (at time of discharge pending progress):   
Placement: It is my opinion, based on this patient's performance to date, that Mr. Charisse Way may benefit from intensive therapy at a [address] [place] [address] after discharge due to the functional deficits listed above that are likely to improve with skilled rehabilitation and concerns that he/she may be unsafe to be unsupervised at home due to fall risk, safety concerns with ambulation and transfers. Equipment:  
None at this time HISTORY:  
History of Present Injury/Illness (Reason for Referral): 
Patient is a 75 y.o.  male presents with shortness of breath. He has a known history of sCHF with last TTE in 2019 showing EF 25% and moderate MR. He just underwent AV node ablation on 2/11. There is no family present and the patient is currently intubated, but the reported history if of gradually worsening shortness of breath over the last week. He was found by EMS in respiratory distress, hypoxic on room air. He was placed on NRB with sats in the low 90's. He was intubated by ER physician due to respiratory distress. Off and on episodes of dyspnea over the past few days. Some diaphoresis intermittently. Wife had wanted to call EMS but patient had delayed. Most labs are still pending but CXR is suggestive of volume overload Past Medical History/Comorbidities:  
Mr. Angelica Osman  has a past medical history of Acute myocardial infarction [facility]) (2000), Aortic Aneurysm/Dilation of the Aorta (2/22/2016), Aortic ectasia, abdominal ([address] (5/9/2014), Arrhythmia, ASCAD-of the Native Vessel (2/22/2016), CAD (coronary artery disease) (2008), Cerebrovascular accident (stroke) ([address] (1/30/2016), Chest pain (2/22/2016), Chronic pain, Chronic systolic heart failure ([address] (2/22/2016), Congestive heart failure, unspecified, Coronary atherosclerosis of unspecified type of vessel, native or graft (2007), GERD (gastroesophageal reflux disease), History of agent Orange exposure, Hx of AICD-Implanted Cardiac Defibrillator (2/22/2016), Hypercholesterolemia, Hyperlipidemia (2/22/2016), Hypertension, Ill-defined condition, Ill-defined disease, Liver disease, Morbid obesity ([address] Myocardial infarct/Anterolateral age unspe. (2/22/2016), and Stroke ([address] (12/27/2011).  He also has no past medical history of Arthritis, Asthma, Autoimmune disease ([address] Cancer ([address] Chronic kidney disease, Chronic obstructive pulmonary disease ([address] Diabetes ([address] Difficult intubation, Malignant hyperthermia due to anesthesia, Nausea & vomiting, Pseudocholinesterase deficiency, Psychiatric disorder, PUD (peptic ulcer disease), Seizures ([address] Sleep apnea, Thromboembolus ([address] or Thyroid disease. Mr. Faustino Li  has a past surgical history that includes hx other surgical; pr cardiac surg procedure unlist; hx gi; and hx pacemaker. Social History/Living Environment:  
Home Environment: Private residence # Steps to Enter: 7 One/Two Story Residence[place] story[place] Living Alone: No 
Support Systems: Child(todd), Family member(s), Friends \ neighbors Patient Expects to be Discharged to[de-identified] Private residence Current DME Used/Available at Home: None Prior Level of Function/Work/Activity: 
Lives with spouse, independent [place], drives, no falls, RA Personal Factors:   
      Sex:  male Age:  75 y.o. Overall Behavior:  agreeable Number of Personal Factors/Comorbidities that affect the Plan of Care: 
Age, CAD, CVA, CHF 3+: HIGH COMPLEXITY EXAMINATION:  
Most Recent Physical Functioning:  
Gross Assessment: 
AROM: Generally decreased, functional(B LE) Strength: Generally decreased, functional(B LE) Coordination: Generally decreased, functional 
         
  
Posture: 
Posture (WDL): Exceptions to [facility] Posture Assessment: Forward head, Rounded shoulders Balance: 
Sitting: Intact Standing: Impaired Standing - Static: Good;Fair 
Standing - Dynamic : Constant support; Fair Bed Mobility: 
Supine to Sit: (up in chair) Sit to Supine: Contact guard assistance Scooting: Contact guard assistance Wheelchair Mobility: 
  
Transfers: 
Sit to Stand: Contact guard assistance(from recliner) Stand to Sit: Contact guard assistance Gait: 
  
Speed/Sarina: Slow Step Length: Left shortened;Right shortened Swing Pattern: Left symmetrical;Right symmetrical 
Gait Abnormalities: Decreased step clearance Distance (ft): 90 Feet (ft)(4 L/min O2-stats 96%) Assistive Device: Gait belt;Walker, rolling Ambulation - Level of Assistance: Contact guard assistance Interventions: Verbal cues Body Structures Involved: 
Lungs Muscles Body Functions Affected: 
Respiratory Movement Related Activities and Participation Affected: 
General Tasks and Demands Mobility Self Care Number of elements that affect the Plan of Care: 4+: HIGH COMPLEXITY CLINICAL PRESENTATION:  
Presentation: Evolving clinical presentation with changing clinical characteristics: MODERATE COMPLEXITY CLINICAL DECISION MAKIN[address] [place] [id number] AM-PAC 6 Clicks Basic Mobility Inpatient Short Form How much difficulty does the patient currently have. .. Unable A Lot A Little None 1. Turning over in bed (including adjusting bedclothes, sheets and blankets)? [] 1   [] 2   [] 3   [x] 4  
2. Sitting down on and standing up from a chair with arms ( e.g., wheelchair, bedside commode, etc.)   [] 1   [] 2   [x] 3   [] 4  
3. Moving from lying on back to sitting on the side of the bed? [] 1   [] 2   [x] 3   [] 4 How much help from another person does the patient currently need. .. Total A Lot A Little None 4. Moving to and from a bed to a chair (including a wheelchair)? [] 1   [] 2   [x] 3   [] 4  
5. Need to walk in hospital room? [] 1   [] 2   [x] 3   [] 4  
6. Climbing 3-5 steps with a railing? [] 1   [] 2   [x] 3   [] 4  
© , Trustees of [address] [place] [id number], under license to [employer]. All rights reserved Score:  Initial: 19 Most Recent: X (Date: -- ) Interpretation of Tool:  Represents activities that are increasingly more difficult (i.e. Bed mobility, Transfers, Gait).  
 
Medical Necessity:    
Patient is expected to demonstrate progress in  
strength, range of motion, balance, and coordination 
 to  
 decrease assistance required with overall functional mobility, transfers, ambulation Peggyann Gang Patient demonstrates  
good 
 rehab potential due to higher previous functional level. Reason for Services/Other Comments: 
Patient  
continues to require present interventions due to patient's inability to perform bed mobility, transfers, ambulation safely and effectively Peggyrichmond Gang Use of outcome tool(s) and clinical judgement create a POC that gives a: Clear prediction of patient's progress: LOW COMPLEXITY  
  
 
 
 
TREATMENT:  
(In addition to Assessment/Re-Assessment sessions the following treatments were rendered) Pre-treatment Symptoms/Complaints:  \"I can walk\" Pain: Initial:  
Pain Intensity 1: 0  Post Session:  0/10 post session Gait Training ( 8 min):  Gait training to improve and/or restore physical functioning as related to mobility, strength, balance, and coordination. Ambulated 90 Feet (ft)(4 L/min O2-stats 96%) with Contact guard assistance using a Gait belt;Walker, rolling and minimal Verbal cues related to their stance phase and stride length to promote proper body alignment, promote proper body posture, and promote proper body mechanics. Instruction in performance of posture, walker safety, pursed lip breathing to correct overall functional ambulation. Braces/Orthotics/Lines/Etc:  
correia catheter O2 Device: Hi flow nasal cannula Treatment/Session Assessment:   
Response to Treatment:  fair tolerance, fatigues easily Interdisciplinary Collaboration:  
Physical Therapist 
Registered Nurse After treatment position/precautions:  
Supine in bed Bed/Chair-wheels locked Bed in low position Call light within reach Family at bedside Compliance with Program/Exercises: Will assess as treatment progresses Recommendations/Intent for next treatment session: \"Next visit will focus on advancements to more challenging activities\". Total Treatment Duration: PT Patient Time In/Time Out Time In: 1200 Time Out: 1738 Obie Soriano, PT

## 2020-02-28 NOTE — PROGRESS NOTES
Nutrition Assessment for:  
Length of Stay Assessment: Patient admitted for respiratory failure. Was initially intubated, but has been extubated and moved to floor. Patient has PMH of MI, CAD, CVA, CHF, GERD, HTN, hyperlipidemia, liver disease s/p partial resection, obesity, recent CHF. Hospice was consulted, but family has now decided on rehab. He is awaiting placement. SLP has been following for dysphagia: delayed swallow, intermittent bolus holding, mild right sided pocketing. Noted 1L fluid restriction sign on door - RN confirms. Patient was seen in company of wife. He is somewhat confused, but able to provide some history. His wife fills in where needed. He reports weight loss recently. Wife reports that partially due to intentional fluid loss and partially from diet modifications (smaller portions, following low sodium). Currently patient has not been eating well due to confusion and poor appetite. Wife report slight improvement today with patient taking 2-5 bites of several items from the tray. When RD addresses that he has not been eating enough, he states \"I know that I haven't. \"  
DIET CARDIAC Mechanical Soft Per wife and RN charting, 0% to bites of meals Anthropometrics: 
Height: 6' 1\" (185.4 cm), Weight: 98.4 kg (217 lb), Weight Source: Bed, Body mass index is 28.63 kg/m². BMI class of overweight. Macronutrient needs: (using CBW (Current body weight) bed scale 98.4 kg) EER: 1558-7135 kcal/day (20-22 kcal/kg) EPR: 79-98 g/day (0.8-1.0 g/kg) Nutrition Diagnosis: 
Inadequate oral intake related to poor appetite and confusion as evidenced by patient reported barrier to PO intake, current mental status effecting intake of meals, intake as above. Nutrition Intervention: 
Meals and snacks: Diet per SLP. Will add fluid restriction to diet order Medical food supplement therapy: Add Magic Cup to all trays Coordination of nutrition care by a Nutrition Professional: Jeremiah Kaba 
 Discharge Nutrition Plan: Too soon to determine. [address] [facility] [address] [place] [address], [address], [address] [facility]

## 2020-02-28 NOTE — PROGRESS NOTES
[facility] CARDIOLOGY PROGRESS NOTE 
 
2/28/2020 11:07 AM 
 
Admit Date: 2/20/2020 Subjective:  
Stable overnight but remains extremely weak and tolerates minimal exertional without dyspnea and chest pressure. Objective:  
  
Vitals:  
 02/28/20 0723 02/28/20 1002 02/28/20 1149 02/28/20 1200 BP: 112/69  93/55 Pulse: 81  80 Resp: 18  18 Temp: 97.5 °F (36.4 °C)  97.3 °F (36.3 °C) SpO2: 97% 96% 98% 98% Weight:      
Height:      
 
 
Physical Exam: 
Neck- supple, + 10cm JVD 
CV- regular rate and rhythm no MRG Lung- clear bilaterally anteriorly, decreased/coarse lateral bases Abd- soft, nontender, nondistended Ext- no edema Skin- warm and dry Data Review:  
Recent Labs  
  02/28/20 
0407 02/27/20 
1429 02/27/20 
4873   --  142  
K 3.8  --  3.4* MG 2.4 2.6* 2.3 BUN 31*  --  34* CREA 1.02  --  1.14 *  --  144* WBC 10.7  --  11.0 HGB 8.8*  --  8.8* HCT 28.2*  --  28.6*  
  --  212 INR 2.4  --  1.9 Assessment and Plan:  
 
Principal Problem: 
  Acute on chronic systolic heart failure ([address] (2/20/2020) Patient with EF 20-25%, severe MR and severe RV dysfunction. Prognosis is very poor. CXR remains abnormal and either edema or ARDS. Agree with DNR status and may need to consider transition to hospice. Continue IV lasix and stable on metoprolol succinate and lisinopril.   
  
Active Problems: Hx of AICD-Implanted Cardiac Defibrillator - Normal function  
  
  NSVT (nonsustained ventricular tachycardia) ([address]- On amiodarone Dilated cardiomyopathy ([address]- As above  
  
  Acute respiratory failure with hypoxia ([address] (2/20/2020) As above - on ABx per pulmonary  
  
  Acute pulmonary edema ([address] (2/20/2020) Improved with large volume diuresis and will continue until BP or renal function limit therapy 
  
  DNR (do not resuscitate) (2/25/2020) As above 
  
  Atrial fibrillation S/P AV node ablation.   warfarin. 
  
 Anemia Stable Cisco Byrne MD

## 2020-02-28 NOTE — PROGRESS NOTES
Warfarin dosing per pharmacist 
 
Keanu Austin. is a 75 y.o. male. Height: 6' 1\" (185.4 cm)    Weight: 98.4 kg (217 lb) Indication:  Afib Goal INR:  2-2.5 (per Dr. Bozena Olmstead' note) Home dose:  2.5 mg po daily Risk factors or significant drug interactions:  Amiodarone Other anticoagulants:  N/A Daily Monitoring Date  INR     Warfarin dose HGB              Notes 2/27  1.9  2.5 mg  8.8   
2/28  2.4  1 mg  8.8 Warfarin was discontinued for 3 days due to anemia. Patient also had supratherapeutic level of 4.4 on 2/23. Goal is to keep INR on lower side of goal (2-2.5). Will decrease dose to 1 mg to try to avoid level >2.5. Pharmacy will continue to follow patient and monitor INR daily. Thank you, Lety Gutierres, Pharm. D. 
PGY1 Pharmacy Resident [phone]

## 2020-02-28 NOTE — ROUTINE PROCESS
CHF teaching reinforced to pt; report worsening dyspnea STAT. Planner with Spouse. Will follow to reinforce:10mins NHC-G

## 2020-02-28 NOTE — PROGRESS NOTES
Jenny Pham. Admission Date: 2/20/2020 Daily Progress Note: 2/28/2020 The patient's chart is reviewed and the patient is discussed with the staff. 76 yo CM with a history of chronic systolic CHF (EF 82%), moderate MR, CAD, NSVT s/p ICD, aflutter s/p AV node ablation on 2/11/20, and prior CVA. He presented to the ER on 2/20/20 with shortness of breath and in acute resp failure with RA sat 80%. Was initially placed on CPAP but required intubation by EMS. CXR concerning for volume overload/pulmonary edema and was given Lasix 60mg IV with minimal UOP. BNP was 5206 and LA is 5.6 but trended down to 1.7. Was started on Zosyn/Vanc secondary to emesis episode during intubation. Was able to be extubated 2/21, wore BIPAP and weaned to Opti-flow. Cardiology following for NSVT with chronic ICD. Interrogations with multiple episodes under detection zone and was placed on Amio. Family decided on DNR status. Having episodes of confusion. Subjective:  
 
Patient seen up in room with PT/OT. On 4L HFNC. Wife at bedside, states she would like for the patient to go to [facility] for therapy instead of hospice. SBP 90s-110s since yesterday. Current Facility-Administered Medications Medication Dose Route Frequency  warfarin (COUMADIN) tablet 1 mg  1 mg Oral QPM  
 oxybutynin (DITROPAN) tablet 5 mg  5 mg Oral TID  furosemide (LASIX) injection 40 mg  40 mg IntraVENous Q12H  
 tuberculin injection 5 Units  5 Units IntraDERMal ONCE  
 metoprolol succinate (TOPROL-XL) XL tablet 25 mg  25 mg Oral DAILY  lisinopril (PRINIVIL, ZESTRIL) tablet 5 mg  5 mg Oral DAILY  potassium chloride (K-DUR, KLOR-CON) SR tablet 40 mEq  40 mEq Oral BID  phenazopyridine (PYRIDIUM) tab 95 mg  95 mg Oral BID PRN  
 opium-belladonna (B&O 16-A SUPPRETTE) 16.2-60 mg suppository 1 Suppository  1 Suppository Rectal Q8H PRN  
 LORazepam (ATIVAN) injection 1 mg  1 mg IntraVENous Q4H PRN  
  morphine injection 2 mg  2 mg IntraVENous Q3H PRN  
 risperiDONE (RisperDAL) tablet 0.5 mg  0.5 mg Oral QHS  amiodarone (CORDARONE) tablet 200 mg  200 mg Oral BID  
 NUTRITIONAL SUPPORT ELECTROLYTE PRN ORDERS   Does Not Apply PRN  
 aspirin chewable tablet 81 mg  81 mg Oral DAILY  atorvastatin (LIPITOR) tablet 40 mg  40 mg Oral DAILY  spironolactone (ALDACTONE) tablet 25 mg  25 mg Oral DAILY  lip protectant (BLISTEX) ointment 1 Each  1 Each Topical PRN  
 sodium chloride (NS) flush 5-40 mL  5-40 mL IntraVENous Q8H  
 sodium chloride (NS) flush 5-40 mL  5-40 mL IntraVENous PRN  
 insulin lispro (HUMALOG) injection   SubCUTAneous Q6H Review of Systems Constitutional: negative for fever, chills, sweats Cardiovascular: negative for chest pain, palpitations, syncope, edema Gastrointestinal:  negative for dysphagia, reflux, vomiting, diarrhea, abdominal pain, or melena Neurologic:  negative for focal weakness, numbness, headache Objective:  
 
Vitals:  
 02/27/20 1925 02/27/20 2335 02/28/20 0310 02/28/20 5819 BP: 111/69 103/61 94/65 112/69 Pulse: 80 81 83 81 Resp: 17 18 18 18 Temp: 97.5 °F (36.4 °C) 97.9 °F (36.6 °C) 97.6 °F (36.4 °C) 97.5 °F (36.4 °C) SpO2: 95% 92% 96% 97% Weight: 217 lb (98.4 kg) Height:      
 
 
 
Intake/Output Summary (Last 24 hours) at 2/28/2020 1054 Last data filed at 2/28/2020 4048 Gross per 24 hour Intake 360 ml Output 3300 ml Net -2940 ml Physical Exam:  
Constitution:  the patient is well developed and in no acute distress EENMT:  Sclera clear, pupils equal, oral mucosa moist 
Respiratory:  Crackles, on 4L HFNC Cardiovascular:  RRR without M,G,R 
Gastrointestinal: soft and non-tender; with positive bowel sounds. Musculoskeletal: warm without cyanosis. There is no lower extremity edema. Skin:  no jaundice or rashes, no wounds Neurologic: no gross neuro deficits Psychiatric:  alert and oriented x 3 CXR:  None today 2/26/2020 LAB Recent Labs  
  02/28/20 
0535 02/27/20 
2348 02/27/20 
1806 02/27/20 
1209 02/27/20 
6640 GLUCPOC 131* 158* 156* 182* 148* Recent Labs  
  02/28/20 
0407 02/27/20 
0555 02/26/20 
0344 WBC 10.7 11.0 11.5* HGB 8.8* 8.8* 8.7* HCT 28.2* 28.6* 28.1*  
 212 183 INR 2.4 1.9 2.4 Recent Labs  
  02/28/20 
0407 02/27/20 
1429 02/27/20 
0555 02/26/20 
0344   --  142 145  
K 3.8  --  3.4* 3.1*  
  --  107 106 CO2 29  --  31 30 *  --  144* 166* BUN 31*  --  34* 31* CREA 1.02  --  1.14 1.11  
MG 2.4 2.6* 2.3 2.4 CA 8.8  --  8.8 8.7 No results for input(s): PH, PCO2, PO2, HCO3, PHI, PCO2I, PO2I, HCO3I in the last 72 hours. No results for input(s): LCAD, LAC in the last 72 hours. Assessment:  (Medical Decision Making) Hospital Problems  Date Reviewed: 2/28/2020 Codes Class Noted POA  
 DNR (do not resuscitate) ICD-10-CM: O44 ICD-9-CM: V49.86  2/25/2020 Unknown * (Principal) Acute on chronic systolic heart failure (HCC) ICD-10-CM: I50.23 ICD-9-CM: 428.23  2/20/2020 Yes Overview Addendum 2/24/2020  9:21 AM by Norris Muñoz NP Echo 2/20/20:  EF 20-25% Echo 4/15: EF 30-35%, mild to mod MR Echo 2011: EF 30-35% Acute respiratory failure with hypoxia [facility]) ICD-10-CM: J96.01 
ICD-9-CM: 518.81  2/20/2020 Yes Acute pulmonary edema (HCC) ICD-10-CM: J81.0 ICD-9-CM: 518.4  2/20/2020 Yes Dilated cardiomyopathy ([facility] [address] ICD-10-CM: I42.0 ICD-9-CM: 425.4  2/7/2018 Yes NSVT (nonsustained ventricular tachycardia) (HCC) ICD-10-CM: I47.2 ICD-9-CM: 427.1  8/7/2017 Yes Hx of AICD-Implanted Cardiac Defibrillator ICD-10-CM: Z95.810 ICD-9-CM: V45.02  2/22/2016 Yes Overview Signed 2/22/2016 11:07 AM by Bora Zavala Follow by Dr. Brandi Hoyt Plan:  (Medical Decision Making) --Strict I/O 
--Continue Lasix, Aldactone --Appreciate PT/OT 
--CM working on discharge planning More than 50% of the time documented was spent in face-to-face contact with the patient and in the care of the patient on the floor/unit where the patient is located. Hu Wheeler, CHAO Lungs:  Decreased BS Heart:  Distant RRR with no Murmur/Rubs/Gallops Additional Comments: Diuresing well. Just ambulated in zaman and worn out from it. Last CXR with significant pulmonary edema- recheck in AM. O2 dropped to 3L. I have spoken with and examined the patient. I agree with the above assessment and plan as documented.  
 
Damaris Hayes MD

## 2020-02-28 NOTE — PROGRESS NOTES
Palliative Care Progress Note Patient: Delaney Perez. MRN: [id number]  SSN: [id number] YOB: 1945  Age: 75 y.o. Sex: male Assessment/Plan: Chief Complaint/Interval History:  Reports he feels okay. Has decided on STR Principal Diagnosis: · Debility, Unspecified  R53.81 Additional Diagnoses: · Dysphagia  R13.10 · Fatigue, Lethargy  R53.83 
· Encounter for Palliative Care  Z51.5 Palliative Performance Scale (PPS) PPS: 30 Medical Decision Making:  
Reviewed and summarized notes Discussed case with appropriate providers Reviewed laboratory and x-ray data- CBC, BMP Pt resting in bed, no distress noted. No family at bedside. Pt reports he is feeling okay today. He denies pain and dyspnea at present. Pt was accepted to [facility] yesterday, however, he has decided to pursue STR instead. Family has requested [facility]. He remains hypotensive, but likely his baseline. His breakfast tray is at the bedside, untouched. Assisted in setting in up. Repositioned pt and pillows for comfort. No further PC needs- we will sign off. Thank you for allowing us to participate in Mr Perdomo's care. Will discuss findings with members of the interdisciplinary team.   
 
  
More than 50% of this 15 minute visit was spent counseling and coordination of care as outlined above. Subjective:  
 
Review of Systems: A comprehensive review of systems was negative except for:  
Constitutional: Positive for fatigue. Objective:  
 
Visit Vitals /69 (BP 1 Location: Right arm, BP Patient Position: At rest) Pulse 81 Temp 97.5 °F (36.4 °C) Resp 18 Ht 6' 1\" (1.854 m) Wt 217 lb (98.4 kg) SpO2 97% BMI 28.63 kg/m² Physical Exam: 
 
General:  Cooperative. Debilitated. No acute distress. Eyes:  Conjunctivae/corneas clear Nose: Nares normal. Septum midline. Neck: Supple, symmetrical, trachea midline Lungs:   Decreased  bilaterally, unlabored Heart:  Regular rate and rhythm Abdomen:   Soft, non-tender, non-distended Extremities: Normal, atraumatic, no cyanosis or edema Skin: Skin color, texture, turgor normal  
Neurologic: Nonfocal  
Psych: Alert and oriented Signed By: Ja Alberts NP February 28, 2020

## 2020-02-28 NOTE — PROGRESS NOTES
Problem: Falls - Risk of 
Goal: *Absence of Falls Description Document Myesha York Fall Risk and appropriate interventions in the flowsheet. Outcome: Progressing Towards Goal 
Note: Fall Risk Interventions: 
Mobility Interventions: Patient to call before getting OOB Mentation Interventions: Adequate sleep, hydration, pain control, Bed/chair exit alarm Medication Interventions: Patient to call before getting OOB, Teach patient to arise slowly Elimination Interventions: Call light in reach Problem: Patient Education: Go to Patient Education Activity Goal: Patient/Family Education Outcome: Progressing Towards Goal 
  
Problem: Pressure Injury - Risk of 
Goal: *Prevention of pressure injury Description Document Everardo Scale and appropriate interventions in the flowsheet. Outcome: Progressing Towards Goal 
Note: Pressure Injury Interventions: 
Sensory Interventions: Assess changes in LOC Moisture Interventions: Absorbent underpads Activity Interventions: Pressure redistribution bed/mattress(bed type) Mobility Interventions: Pressure redistribution bed/mattress (bed type) Nutrition Interventions: Offer support with meals,snacks and hydration, Discuss nutritional consult with provider, Document food/fluid/supplement intake Friction and Shear Interventions: Minimize layers Problem: Patient Education: Go to Patient Education Activity Goal: Patient/Family Education Outcome: Progressing Towards Goal 
  
Problem: Dysphagia (Adult) Goal: *Speech Goal: (INSERT TEXT) Description LTG: Patient will tolerate least restrictive diet without overt signs or symptoms of airway compromise by discharge. STG: Patient will tolerate chopped mechanical soft diet and thin liquids without overt signs or symptoms of aspiration 100% of the time. STG: Patient will participate in modified barium swallow study as clinically indicated.   
  
Outcome: Progressing Towards Goal 
  
 Problem: Patient Education: Go to Patient Education Activity Goal: Patient/Family Education Outcome: Progressing Towards Goal 
  
Problem: Delirium Treatment Goal: *Level of consciousness restored to baseline Outcome: Progressing Towards Goal 
Goal: *Level of environmental perceptions restored to baseline Outcome: Progressing Towards Goal 
Goal: *Sensory perception restored to baseline Outcome: Progressing Towards Goal 
Goal: *Emotional stability restored to baseline Outcome: Progressing Towards Goal 
Goal: *Functional assessment restored to baseline Outcome: Progressing Towards Goal 
Goal: *Absence of falls Outcome: Progressing Towards Goal 
Goal: *Will remain free of delirium, CAM Score negative Outcome: Progressing Towards Goal 
Goal: *Cognitive status will be restored to baseline Outcome: Progressing Towards Goal 
Goal: Interventions Outcome: Progressing Towards Goal

## 2020-02-28 NOTE — PROGRESS NOTES
Progress Note Patient: Savannah Martinez. MRN: [id number]  SSN: [id number] YOB: 1945  Age: 75 y.o. Sex: male Admit Date: 2/20/2020 LOS: 8 days Subjective:  
 
Savannah Martinez. is a 75 y.o. male who is being seen for assuming care to our service.  
  
Patient was admitted on 2- with Acute respiratory failure with hypoxia and was intubated.  
  
He has PMH of chronic systolic CHF (EF 72%), MR, CAD, NSVT s/p ICD, AFlutter s/p AV node ablation, CVA.  
  
He was treated for CHF exacerbation. ICD interrogation shows multiple episodes. Patient is on Amiodarone now.  
  
His Lasix IV drip was changed to IV q 12 hours. He continues to have good urine output. Physical Exam: 
  
General:                    The patient is a pleasant male in mild acute respiratory distress when exerting himself.   
Head:                                   Normocephalic/atraumatic. Eyes:                                   No palpebral pallor or scleral icterus. ENT:                                    External auricular and nasal exam within normal limits.                                             DFZXNS membranes are moist. 
Neck:                                   Supple, non-tender, no JVD. Lungs:                       decreased to auscultation bilaterally without wheezes or crackles.                                             No respiratory distress or accessory muscle use. Heart:                                  Regular rate and rhythm, without murmurs, rubs, or gallops. Abdomen:                  Soft, non-tender, distended with normoactive bowel sounds. Genitourinary:           No tenderness over the bladder or bilateral CVAs. Presence of Rubio's catheter Extremities:               Without clubbing, cyanosis, or edema. Skin:                                    Normal color, texture, and turgor. No rashes, lesions, or jaundice. Pulses:                      Radial and dorsalis pedis pulses present 2+ bilaterally.  
                                            Capillary refill <2s. Neurologic:                CN II-XII grossly intact and symmetrical.  
                                            Moving all four extremities well with no focal deficits. Psychiatric:                Pleasant demeanor, appropriate affect. Alert and oriented x 3 Objective:  
 
Vitals:  
 02/27/20 1925 02/27/20 2335 02/28/20 0310 02/28/20 3758 BP: 111/69 103/61 94/65 112/69 Pulse: 80 81 83 81 Resp: 17 18 18 18 Temp: 97.5 °F (36.4 °C) 97.9 °F (36.6 °C) 97.6 °F (36.4 °C) 97.5 °F (36.4 °C) SpO2: 95% 92% 96% 97% Weight: 98.4 kg (217 lb) Height:      
  
 
Intake and Output: 
Current Shift: No intake/output data recorded. Last three shifts: 02/26 1901 - 02/28 0700 In: 4008 [P. O.:600; I.V.:3408] Out: 4600 [GETEQ:[id number]] Physical Exam:  
 
General:                    The patient is a pleasant middle aged male in no acute distress. Head:                                   Normocephalic/atraumatic. Eyes:                                   No palpebral pallor or scleral icterus. ENT:                                    External auricular and nasal exam within normal limits. Mucous membranes are moist. 
Neck:                                   Supple, non-tender, no JVD. Lungs:                       Clear to auscultation bilaterally without wheezes or crackles. No respiratory distress or accessory muscle use. Heart:                                  Regular rate and rhythm, without murmurs, rubs, or gallops. Abdomen:                  Soft, non-tender, non-distended with normoactive bowel sounds. Genitourinary:           No tenderness over the bladder or bilateral CVAs. Extremities:               Without clubbing, cyanosis, or edema. Skin:                                    Normal color, texture, and turgor. No rashes, lesions, or jaundice. Pulses:                      Radial and dorsalis pedis pulses present 2+ bilaterally. Capillary refill <2s. Neurologic:                CN II-XII grossly intact and symmetrical.  
                                            Moving all four extremities well with no focal deficits. Psychiatric:                Pleasant demeanor, appropriate affect. Alert and oriented x 3 Lab/Data Review: 
 
XR chest  
2- IMPRESSION: 
  
1. Bilateral airspace densities, likely pulmonary edema or pneumonia. . No 
significant change compared to prior. Recent Results (from the past 24 hour(s)) GLUCOSE, POC Collection Time: 02/27/20 12:09 PM  
Result Value Ref Range Glucose (POC) 182 (H) 65 - 100 mg/dL MAGNESIUM Collection Time: 02/27/20  2:29 PM  
Result Value Ref Range Magnesium 2.6 (H) 1.8 - 2.4 mg/dL GLUCOSE, POC Collection Time: 02/27/20  6:06 PM  
Result Value Ref Range Glucose (POC) 156 (H) 65 - 100 mg/dL GLUCOSE, POC Collection Time: 02/27/20 11:48 PM  
Result Value Ref Range Glucose (POC) 158 (H) 65 - 100 mg/dL CBC W/O DIFF Collection Time: 02/28/20  4:07 AM  
Result Value Ref Range WBC 10.7 4.3 - 11.1 K/uL  
 RBC 3.08 (L) 4.23 - 5.6 M/uL HGB 8.8 (L) 13.6 - 17.2 g/dL HCT 28.2 (L) 41.1 - 50.3 % MCV 91.6 79.6 - 97.8 FL  
 MCH 28.6 26.1 - 32.9 PG  
 MCHC 31.2 (L) 31.4 - 35.0 g/dL  
 RDW 14.6 11.9 - 14.6 % PLATELET 674 857 - 443 K/uL MPV 12.8 (H) 9.4 - 12.3 FL ABSOLUTE NRBC 0.15 0.0 - 0.2 K/uL PROTHROMBIN TIME + INR Collection Time: 02/28/20  4:07 AM  
Result Value Ref Range Prothrombin time 26.6 (H) 12.0 - 14.7 sec INR 2.4 METABOLIC PANEL, BASIC Collection Time: 02/28/20  4:07 AM  
Result Value Ref Range Sodium 142 136 - 145 mmol/L  Potassium 3.8 3.5 - 5.1 mmol/L  
 Chloride 106 98 - 107 mmol/L  
 CO2 29 21 - 32 mmol/L Anion gap 7 7 - 16 mmol/L Glucose 122 (H) 65 - 100 mg/dL BUN 31 (H) 8 - 23 MG/DL Creatinine 1.02 0.8 - 1.5 MG/DL  
 GFR est AA >60 >60 ml/min/1.73m2 GFR est non-AA >60 >60 ml/min/1.73m2 Calcium 8.8 8.3 - 10.4 MG/DL MAGNESIUM Collection Time: 02/28/20  4:07 AM  
Result Value Ref Range Magnesium 2.4 1.8 - 2.4 mg/dL GLUCOSE, POC Collection Time: 02/28/20  5:35 AM  
Result Value Ref Range Glucose (POC) 131 (H) 65 - 100 mg/dL GLUCOSE, POC Collection Time: 02/28/20 11:36 AM  
Result Value Ref Range Glucose (POC) 197 (H) 65 - 100 mg/dL Current Facility-Administered Medications:  
  warfarin (COUMADIN) tablet 1 mg, 1 mg, Oral, QPM, Natalie Vizcaino MD 
  oxybutynin [facility]) tablet 5 mg, 5 mg, Oral, TID, Genia More MD, 5 mg at 02/28/20 2238   furosemide (LASIX) injection 40 mg, 40 mg, IntraVENous, Q12H, Trever Zarco MD, 40 mg at 02/28/20 4378   tuberculin injection 5 Units, 5 Units, IntraDERMal, ONCE, Kell Sparrow MD, 5 Units at 02/27/20 1652   metoprolol succinate (TOPROL-XL) XL tablet 25 mg, 25 mg, Oral, DAILY, Fany Grier MD, 25 mg at 02/28/20 8408   lisinopril (PRINIVIL, ZESTRIL) tablet 5 mg, 5 mg, Oral, DAILY, Fany Grier MD, Stopped at 02/27/20 0900   potassium chloride (K-DUR, KLOR-CON) SR tablet 40 mEq, 40 mEq, Oral, BID, Trever Zarco MD, 40 mEq at 02/28/20 9936   phenazopyridine (PYRIDIUM) tab 95 mg, 95 mg, Oral, BID PRN, Trever Zarco MD, 95 mg at 02/26/20 1222   opium-belladonna (B&O 16-A SUPPRETTE) 16.2-60 mg suppository 1 Suppository, 1 Suppository, Rectal, Q8H PRN, Namrata Carbajal NP, 1 Suppository at 02/26/20 3461   LORazepam (ATIVAN) injection 1 mg, 1 mg, IntraVENous, Q4H PRN, Darien Aiken MD, 1 mg at 02/24/20 2019   morphine injection 2 mg, 2 mg, IntraVENous, Q3H PRN, Darien Aiken MD, 2 mg at 02/26/20 1912   risperiDONE (RisperDAL) tablet 0.5 mg, 0.5 mg, Oral, QHS, Anabela Cooper MD, 0.5 mg at 02/27/20 2116 
  amiodarone (CORDARONE) tablet 200 mg, 200 mg, Oral, BID, Jett Leal MD, 200 mg at 02/28/20 0847 
  NUTRITIONAL SUPPORT ELECTROLYTE PRN ORDERS, , Does Not Apply, PRN, Corrine Peters MD 
  aspirin chewable tablet 81 mg, 81 mg, Oral, DAILY, Jett Vizcaino MD, 81 mg at 02/28/20 0847 
  atorvastatin (LIPITOR) tablet 40 mg, 40 mg, Oral, DAILY, Bernice Mendoza MD, 40 mg at 02/28/20 5069   spironolactone (ALDACTONE) tablet 25 mg, 25 mg, Oral, DAILY, Jett Vizcaino MD, 25 mg at 02/28/20 0048   lip protectant (BLISTEX) ointment 1 Each, 1 Each, Topical, PRN, Curtis Beverly MD 
  sodium chloride (NS) flush 5-40 mL, 5-40 mL, IntraVENous, Q8H, Corrine Peters MD, 10 mL at 02/28/20 0537 
  sodium chloride (NS) flush 5-40 mL, 5-40 mL, IntraVENous, PRN, Corrine Peters MD 
  insulin lispro (HUMALOG) injection, , SubCUTAneous, Q6H, Corrine Peters MD, Stopped at 02/28/20 6525 Assessment:  
 
Principal Problem: 
  Acute on chronic systolic heart failure ([facility] (2/20/2020) Overview: Echo 2/20/20:  EF 20-25% Echo 4/15: EF 30-35%, mild to mod MR Echo 2011: EF 30-35% Active Problems: Hx of AICD-Implanted Cardiac Defibrillator (2/22/2016) Overview: Follow by Dr. Willow Draper NSVT (nonsustained ventricular tachycardia) ([facility] (8/7/2017) Dilated cardiomyopathy ([facility] [address] (2/7/2018) Acute respiratory failure with hypoxia ([facility] [facility] (2/20/2020) Acute pulmonary edema ([facility] [address] (2/20/2020) DNR (do not resuscitate) (2/25/2020) Plan:  
 
CHF, chronic and acute systolic and diastolic Severe MR and RV dysfunction. AF 
IV Lasix q 12 hours Monitor intake and output and renal function. Continue Spironolactone. BP is on low side. S/P AICD with normal function Resumed Warfarin  
  
Non-sustained VT On Amiodarone.  
  
 Acute respiratory failure with hypoxia S/P extubation On Empiric IV antibiotics to cover possible aspiration.  
  
Anemia No acute bleeding Monitor. Check CBC, BMP daily for now.  
  
Diabetes mellitus type 2 Monitor blood sugar. Cover with insulin sliding scale accordingly.   
  
I have discussed the plan of care with patient.  
  
I have discussed with patient regarding advance directive. Patient would like to have a DNR status.   
  
DVT prophylaxis : already on [address] [place] [address]. Signed By: Jossy Glez MD   
 February 28, 2020

## 2020-02-28 NOTE — PROGRESS NOTES
0703-Bedside report received from Lawson Cardona RN. Resting in bed. No needs voiced. No s/s of acute distress. 1839-END OF SHIFT NOTE: 
Pt's VSS and is in no acute distress. Pt more alert and oriented today. Pt walked with PT. Intake/Output 
02/28 0701 - 02/28 1900 In: 360 [P.O.:360] Out: 700 [Urine:700] Voiding: NO 
Catheter: YES Drain:   
 
Stool:  0 occurrences. Stool Assessment Stool Color: Tona Lacy (02/28/20 0033) Stool Appearance: Formed (02/28/20 0033) Stool Amount: Medium (02/28/20 0033) Stool Source/Status: Rectum (02/28/20 0033) Emesis:  0 occurrences. VITAL SIGNS Patient Vitals for the past 12 hrs: 
 Temp Pulse Resp BP SpO2  
02/28/20 1525 97.4 °F (36.3 °C) 79 20 116/67 97 % 02/28/20 1200     98 % 02/28/20 1149 97.3 °F (36.3 °C) 80 18 93/55 98 % 02/28/20 1002     96 % 02/28/20 0723 97.5 °F (36.4 °C) 81 18 112/69 97 % Pain Assessment Pain 1 Pain Scale 1: Numeric (0 - 10) (02/28/20 1200) Pain Intensity 1: 0 (02/28/20 1200) Patient Stated Pain Goal: 0 (02/27/20 0800) Pain Reassessment 1: Patient resting w/respiratory rate greater than 10 (02/27/20 0800) Pain Location 1: Abdomen (02/26/20 1041) Pain Description 1: Throbbing (02/26/20 1041) Pain Intervention(s) 1: Medication (see MAR) (02/25/20 0546) Ambulating Yes Ammon Woodruff Bedside shift change report given to Lawson Cardona RN (oncoming nurse) by Genny Cooks RN (offgoing nurse). Report included the following information SBAR, Kardex, Intake/Output, MAR and Recent Results.

## 2020-02-28 NOTE — PROGRESS NOTES
Per notes patient would like to go to [facility] hospice Luma Vega, staff [address] [facility]  /   [email or web address]

## 2020-02-28 NOTE — PROGRESS NOTES
Problem: Self Care Deficits Care Plan (Adult) Goal: *Acute Goals and Plan of Care (Insert Text) Description 1. Patient will complete total body bathing and dressing with modified independence and adaptive equipment as needed. 2. Patient will complete toileting with modified independence and adaptive equipment as needed. 3. Patient will tolerate 20 minutes of OT treatment with up to 3 rest breaks to increase activity tolerance for ADLs. 4. Patient will complete functional transfers with modified independence and adaptive equipment as needed. 5. Patient will complete functional mobility for ADLs with modified independence and adaptive equipment as needed. 6. Patient will verbalize 2 energy conservation techniques with no cues from therapist to increase safety and independence with ADLs. Timeframe: 7 visits Outcome: Progressing Towards Goal 
  
OCCUPATIONAL THERAPY: Initial Assessment, Daily Note, and AM 2/28/2020 INPATIENT: OT Visit Days: 1 [place] / [place] / Product Type: Medicare /  
  
NAME/AGE/GENDER: Kamari Nolen. is a 75 y.o. male PRIMARY DIAGNOSIS:  Acute on chronic systolic heart failure (HCC) [I50.23] Acute on chronic systolic heart failure ([facility]) Acute on chronic systolic heart failure ([facility] [address] ICD-10: Treatment Diagnosis:  
 Generalized Muscle Weakness (M62.81) Dizziness and Giddiness (R42) Precautions/Allergies: 
  Fall precautions Sulfur ASSESSMENT:  
 
Mr. Joaquin Ward is a 75 y.o. male admitted with acute on chronic systolic heart failure, SOB requiring intubation. Has since been extubated. At baseline pt lives with wife and reports independence with ADLs, IADLs, ambulation, and driving. No supplemental O2 use, no hx of falls. Upon arrival pt alert and agreeable to OT evaluation and treatment, on 4L O2 HFNC. Sats stable in mid 90s throughout session.  BUE assessment revealed AROM and strength generally decreased, functional in [address] [facility] [address]. Pt completed bed mobility with ModA/cueing for technique. Pt with intact sitting balance. Pt practiced functional transfers with Roxane progressing to CGA and ambulation for ADLs with RW and CGA, cueing for RW management. Pt with some confusion and decreased command following throughout. Responds better to 1 step commands. Pt toileted with [address] for bowel hygiene thoroughness, groomed in standing with Roxane for sequencing. Pt left seated in chair with call bell within reach. Pt presents with deficits in cognition, strength, activity tolerance, balance, ambulation and transfers. Seema Longoria. is currently functioning far below baseline and would benefit from continued OT to increase safety and independence with ADLs. Will follow. This section established at most recent assessment PROBLEM LIST (Impairments causing functional limitations): 
Decreased Strength Decreased ADL/Functional Activities Decreased Transfer Abilities Decreased Ambulation Ability/Technique Decreased Balance Decreased Activity Tolerance Decreased Pacing Skills Decreased Work Simplification/Energy Conservation Techniques Increased Fatigue Increased Shortness of Breath Decreased [place] with Home Exercise Program 
Decreased Cognition INTERVENTIONS PLANNED: (Benefits and precautions of occupational therapy have been discussed with the patient.) Activities of daily living training Adaptive equipment training Balance training Clothing management Cognitive training Community reintergration Donning&doffing training Hygiene training Neuromuscular re-eduation Re-evaluation Therapeutic activity Therapeutic exercise TREATMENT PLAN: Frequency/Duration: Follow patient 3x/week to address above goals. Rehabilitation Potential For Stated Goals: Good REHAB RECOMMENDATIONS (at time of discharge pending progress):   
Placement: It is my opinion, based on this patient's performance to date, that Mr. Selena De León may benefit from intensive therapy at a [facility] after discharge due to the functional deficits listed above that are likely to improve with skilled rehabilitation and concerns that he/she may be unsafe to be unsupervised at home due to impaired strength and balance impacting ADLs, increasing risk of falls. Equipment:  
TBD, may need RW and BSC OCCUPATIONAL PROFILE AND HISTORY:  
History of Present Injury/Illness (Reason for Referral): 
See H&P. \"Patient is a 75 y.o.  male presents with shortness of breath. He has a known history of sCHF with last TTE in 2019 showing EF 25% and moderate MR. He just underwent AV node ablation on 2/11. There is no family present and the patient is currently intubated, but the reported history if of gradually worsening shortness of breath over the last week. He was found by EMS in respiratory distress, hypoxic on room air. He was placed on NRB with sats in the low 90's. He was intubated by ER physician due to respiratory distress. Off and on episodes of dyspnea over the past few days. Some diaphoresis intermittently. Wife had wanted to call EMS but patient had delayed. Most labs are still pending but CXR is suggestive of volume overload. ADDENDUM: his wife later arrived and states he had not been having any fever, cough, sputum production, chest pain, dysuria, abd pain, or diarrhea. He did not take his lasix yesterday as he felt like he could start weaning himself off it. She states the light on his defibrillator has gone off twice in the past week including last night. \" 
Past Medical History/Comorbidities:  
Mr. Selena De León  has a past medical history of Acute myocardial infarction [facility]) (2000), Aortic Aneurysm/Dilation of the Aorta (2/22/2016), Aortic ectasia, abdominal ([address] (5/9/2014), Arrhythmia, ASCAD-of the Native Vessel (2/22/2016), CAD (coronary artery disease) (2008), Cerebrovascular accident (stroke) ([address] (1/30/2016), Chest pain (2/22/2016), Chronic pain, Chronic systolic heart failure ([address] (2/22/2016), Congestive heart failure, unspecified, Coronary atherosclerosis of unspecified type of vessel, native or graft (2007), GERD (gastroesophageal reflux disease), History of agent Orange exposure, Hx of AICD-Implanted Cardiac Defibrillator (2/22/2016), Hypercholesterolemia, Hyperlipidemia (2/22/2016), Hypertension, Ill-defined condition, Ill-defined disease, Liver disease, Morbid obesity ([address] Myocardial infarct/Anterolateral age unspe. (2/22/2016), and Stroke ([address] (12/27/2011). He also has no past medical history of Arthritis, Asthma, Autoimmune disease ([address] Cancer ([address] Chronic kidney disease, Chronic obstructive pulmonary disease ([address] Diabetes ([address] Difficult intubation, Malignant hyperthermia due to anesthesia, Nausea & vomiting, Pseudocholinesterase deficiency, Psychiatric disorder, PUD (peptic ulcer disease), Seizures ([address] Sleep apnea, Thromboembolus ([address] or Thyroid disease. Mr. Alex Ramos  has a past surgical history that includes hx other surgical; pr cardiac surg procedure unlist; hx gi; and hx pacemaker. Social History/Living Environment:  
Home Environment: Private residence # Steps to Enter: 8 One/Two Story Residence: One story Living Alone: No 
Support Systems: Spouse/Significant Other/Partner Patient Expects to be Discharged to[de-identified] Rehabilitation facility Current DME Used/Available at Home: None Tub or Shower Type: Shower Prior Level of Function/Work/Activity: At baseline pt lives with wife and reports independence with ADLs, IADLs, ambulation, and driving. No supplemental O2 use, no hx of falls. Personal Factors:   
      Sex:  male Age:  75 y.o. Other factors that influence how disability is experienced by the patient:  Multiple co-morbidities Number of Personal Factors/Comorbidities that affect the Plan of Care: Expanded review of therapy/medical records (1-2):  MODERATE COMPLEXITY ASSESSMENT OF OCCUPATIONAL PERFORMANCE[de-identified]  
Activities of Daily Living:  
Basic ADLs (From Assessment) Complex ADLs (From Assessment) Feeding: Independent Oral Facial Hygiene/Grooming: Minimum assistance Bathing: Moderate assistance Upper Body Dressing: Setup Lower Body Dressing: Moderate assistance Toileting: Moderate assistance Instrumental ADL Meal Preparation: Total assistance Homemaking: Total assistance Medication Management: Total assistance Financial Management: Total assistance Grooming/Bathing/Dressing Activities of Daily Living Grooming Washing Hands: Minimum assistance Cognitive Retraining Following Commands: Follows one step commands/directions Safety/Judgement: Fall prevention Toileting Bowel Hygiene: Moderate assistance Clothing Management: Maximum assistance Adaptive Equipment: Walker;Grab bars Functional Transfers Bathroom Mobility: Minimum assistance Toilet Transfer : Contact guard assistance Bed/Mat Mobility Rolling: Minimum assistance Supine to Sit: (up in chair) Sit to Supine: Contact guard assistance Sit to Stand: Contact guard assistance(from recliner) Stand to Sit: Contact guard assistance Bed to Chair: Contact guard assistance;Minimum assistance Scooting: Contact guard assistance Most Recent Physical Functioning:  
Gross Assessment: 
AROM: Generally decreased, functional(BUEs) Strength: Generally decreased, functional(BUEs) Coordination: Generally decreased, functional(BUEs) Posture: 
Posture (WDL): Exceptions to [facility] Posture Assessment: Forward head, Rounded shoulders Balance: 
Sitting: Intact Standing: Impaired Standing - Static: Good;Fair 
Standing - Dynamic : Constant support; Fair Bed Mobility: 
Rolling: Minimum assistance Supine to Sit: (up in chair) Sit to Supine: Contact guard assistance Scooting: Contact guard assistance Wheelchair Mobility: 
  
Transfers: 
Sit to Stand: Contact guard assistance(from recliner) Stand to Sit: Contact guard assistance Bed to Chair: Contact guard assistance;Minimum assistance Patient Vitals for the past 6 hrs: 
 BP BP Patient Position SpO2 O2 Flow Rate (L/min) Pulse 20 1002   96 % 4 l/min   
20 1149 93/55 Sitting 98 % 4 l/min 80  
20 1200   98 % 4 l/min Mental Status Neurologic State: Alert, Confused Orientation Level: Disoriented to situation, Disoriented to time, Oriented to person, Oriented to place Cognition: Decreased attention/concentration, Follows commands Perception: Appears intact Perseveration: No perseveration noted Safety/Judgement: Fall prevention Physical Skills Involved: 
Range of Motion Balance Strength Activity Tolerance Cognitive Skills Affected (resulting in the inability to perform in a timely and safe manner): Executive Function Divided Attention Comprehension Psychosocial Skills Affected: 
Habits/Routines Environmental Adaptation Social Interaction Emotional Regulation Self-Awareness Awareness of Others Social Roles Number of elements that affect the Plan of Care: 5+:  HIGH COMPLEXITY CLINICAL DECISION MAKIN [facility] [place] [address] [address] AM-PAC 6 Clicks Daily Activity Inpatient Short Form How much help from another person does the patient currently need. .. Total A Lot A Little None 1. Putting on and taking off regular lower body clothing? [] 1   [x] 2   [] 3   [] 4  
2. Bathing (including washing, rinsing, drying)? [] 1   [x] 2   [] 3   [] 4  
3. Toileting, which includes using toilet, bedpan or urinal?   [] 1   [x] 2   [] 3   [] 4  
4. Putting on and taking off regular upper body clothing? [] 1   [] 2   [x] 3   [] 4  
5. Taking care of personal grooming such as brushing teeth?    [] 1   [] 2   [x] 3   [] 4  
 6.  Eating meals? [] 1   [] 2   [] 3   [x] 4  
© 2007, Trustees of [address] [place] [address] [address] [address], under license to [employer]. All rights reserved Score:  Initial: 16 2/28/2020 Most Recent: X (Date: -- ) Interpretation of Tool:  Represents activities that are increasingly more difficult (i.e. Bed mobility, Transfers, Gait). Medical Necessity:    
Patient demonstrates  
good 
 rehab potential due to higher previous functional level. Reason for Services/Other Comments: 
Patient continues to require skilled intervention due to Inability to complete ADLs at prior level of independence Mliss Stout Use of outcome tool(s) and clinical judgement create a POC that gives a: MODERATE COMPLEXITY  
 
 
 
TREATMENT:  
(In addition to Assessment/Re-Assessment sessions the following treatments were rendered) Pre-treatment Symptoms/Complaints:   
Pain: Initial:  
Pain Intensity 1: 0  Post Session:  same Self Care: (38 minutes): Procedure(s) (per grid) utilized to improve and/or restore self-care/home management as related to toileting and grooming. Required moderate visual, verbal, manual, and tactile cueing to facilitate activities of daily living skills and compensatory activities. Pt practiced functional transfers with Roxane progressing to CGA and ambulation for ADLs with RW and CGA, cueing for RW management. Pt with some confusion and decreased command following throughout. Responds better to 1 step commands. Pt toileted with [address] for bowel hygiene thoroughness, groomed in standing with Roxane for sequencing. Braces/Orthotics/Lines/Etc:  
correia catheter O2 Device: Hi flow nasal cannula Treatment/Session Assessment:   
Response to Treatment:  Tolerated well Interdisciplinary Collaboration: Occupational Therapist 
Registered Nurse After treatment position/precautions:  
Up in chair Bed/Chair-wheels locked Bed in low position Call light within reach RN notified Family at bedside Compliance with Program/Exercises: Compliant all of the time, Will assess as treatment progresses. Recommendations/Intent for next treatment session: \"Next visit will focus on advancements to more challenging activities and reduction in assistance provided\". Total Treatment Duration: OT Patient Time In/Time Out Time In: 0029 Time Out: 1052 Antionette Hopper OTR/L

## 2020-02-28 NOTE — PROGRESS NOTES
SPEECH PATHOLOGY NOTE: 
 
Attempted to see patient for diet tolerance this PM. Patient sleeping. Hold per RN. Will check back at later time/date.  
 
 
Deng Calderon MS, CCC-SLP

## 2020-02-29 NOTE — PROGRESS NOTES
Progress Note Patient: Pop Torre MRN: [id number]  SSN: [id number] YOB: 1945  Age: 75 y.o. Sex: male Admit Date: 2/20/2020 LOS: 9 days Subjective:  
 
Pop Torre is a 75 y.o. male who is being seen for assuming care to our service.  
  
Patient was admitted on 2- with Acute respiratory failure with hypoxia and was intubated.  
  
He has PMH of chronic systolic CHF (EF 80%), MR, CAD, NSVT s/p ICD, AFlutter s/p AV node ablation, CVA.  
  
He was treated for CHF exacerbation. ICD interrogation shows multiple episodes. Patient is on Amiodarone now.  
  
He is on Lasix IV q 12 hours. He continues to have good urine output. Patient is talking well today. Physical Exam: 
  
General:                    The patient is a pleasant male in mild acute respiratory distress when exerting himself.   
Head:                                   Normocephalic/atraumatic. Eyes:                                   No palpebral pallor or scleral icterus. ENT:                                    External auricular and nasal exam within normal limits.                                             XEWQJG membranes are moist. 
Neck:                                   Supple, non-tender, no JVD. Lungs:                       decreased to auscultation bilaterally without wheezes or crackles.                                             No respiratory distress or accessory muscle use. Heart:                                  Regular rate and rhythm, without murmurs, rubs, or gallops. Abdomen:                  Soft, non-tender, distended with normoactive bowel sounds. Genitourinary:           No tenderness over the bladder or bilateral CVAs. Presence of Rubio's catheter Extremities:               Without clubbing, cyanosis, or edema. Skin:                                    Normal color, texture, and turgor. No rashes, lesions, or jaundice. Pulses:                      Radial and dorsalis pedis pulses present 2+ bilaterally.  
                                            Capillary refill <2s. Neurologic:                CN II-XII grossly intact and symmetrical.  
                                            Moving all four extremities well with no focal deficits. Psychiatric:                Pleasant demeanor, appropriate affect. Alert and oriented x 3 Objective:  
 
Vitals:  
 02/28/20 2051 02/28/20 2320 02/29/20 0330 02/29/20 0745 BP:  109/70 114/61 106/64 Pulse:  81 86 80 Resp:  20 20 19 Temp:  98.4 °F (36.9 °C) 98.5 °F (36.9 °C) 97.4 °F (36.3 °C) SpO2: 92% 92% 96% 99% Weight:  97.1 kg (214 lb) Height:      
  
 
Intake and Output: 
Current Shift: No intake/output data recorded. Last three shifts: 02/27 1901 - 02/29 0700 In: 1440 [P.O.:1440] Out: 5400 [GETPO:8178] Physical Exam:  
 
General:                    The patient is a pleasant middle aged male in no acute respiratory  distress. Head:                                   Normocephalic/atraumatic. Eyes:                                   No palpebral pallor or scleral icterus. ENT:                                    External auricular and nasal exam within normal limits. Mucous membranes are moist. 
Neck:                                   Supple, non-tender, no JVD. Lungs:                       Clear to auscultation bilaterally without wheezes or crackles. No respiratory distress or accessory muscle use. Heart:                                  Regular rate and rhythm, without murmurs, rubs, or gallops. Abdomen:                  Soft, non-tender, non-distended with normoactive bowel sounds. Genitourinary:           No tenderness over the bladder or bilateral CVAs. Extremities:               Without clubbing, cyanosis, or edema. Skin:                                    Normal color, texture, and turgor. No rashes, lesions, or jaundice. Pulses:                      Radial and dorsalis pedis pulses present 2+ bilaterally. Capillary refill <2s. Neurologic:                CN II-XII grossly intact and symmetrical.  
                                            Moving all four extremities well with no focal deficits. Psychiatric:                Pleasant demeanor, appropriate affect. Alert and oriented x 3 Lab/Data Review: 
 
XR chest  
2- IMPRESSION: 
  
1. Bilateral airspace densities, likely pulmonary edema or pneumonia. . No 
significant change compared to prior. Recent Results (from the past 24 hour(s)) GLUCOSE, POC Collection Time: 02/28/20 11:36 AM  
Result Value Ref Range Glucose (POC) 197 (H) 65 - 100 mg/dL GLUCOSE, POC Collection Time: 02/28/20  4:25 PM  
Result Value Ref Range Glucose (POC) 157 (H) 65 - 100 mg/dL PLEASE READ & DOCUMENT PPD TEST IN 24 HRS Collection Time: 02/28/20  5:46 PM  
Result Value Ref Range PPD Negative Negative  
 mm Induration 0 0 - 5 mm GLUCOSE, POC Collection Time: 02/28/20 11:13 PM  
Result Value Ref Range Glucose (POC) 141 (H) 65 - 100 mg/dL METABOLIC PANEL, BASIC Collection Time: 02/29/20  4:04 AM  
Result Value Ref Range Sodium 141 136 - 145 mmol/L Potassium 3.9 3.5 - 5.1 mmol/L Chloride 108 (H) 98 - 107 mmol/L  
 CO2 24 21 - 32 mmol/L Anion gap 9 7 - 16 mmol/L Glucose 134 (H) 65 - 100 mg/dL BUN 30 (H) 8 - 23 MG/DL Creatinine 1.13 0.8 - 1.5 MG/DL  
 GFR est AA >60 >60 ml/min/1.73m2 GFR est non-AA >60 >60 ml/min/1.73m2 Calcium 9.5 8.3 - 10.4 MG/DL  
CBC W/O DIFF Collection Time: 02/29/20  4:04 AM  
Result Value Ref Range WBC 12.3 (H) 4.3 - 11.1 K/uL  
 RBC 3.52 (L) 4.23 - 5.6 M/uL  
 HGB 10.1 (L) 13.6 - 17.2 g/dL HCT 32.9 (L) 41.1 - 50.3 %  MCV 93.5 79.6 - 97.8 FL  
 MCH 28.7 26.1 - 32.9 PG  
 MCHC 30.7 (L) 31.4 - 35.0 g/dL  
 RDW 15.6 (H) 11.9 - 14.6 % PLATELET 303 436 - 918 K/uL MPV 12.7 (H) 9.4 - 12.3 FL ABSOLUTE NRBC 0.15 0.0 - 0.2 K/uL PROTHROMBIN TIME + INR Collection Time: 02/29/20  4:04 AM  
Result Value Ref Range Prothrombin time 25.5 (H) 12.0 - 14.7 sec INR 2.2 MAGNESIUM Collection Time: 02/29/20  4:04 AM  
Result Value Ref Range Magnesium 2.4 1.8 - 2.4 mg/dL GLUCOSE, POC Collection Time: 02/29/20  5:33 AM  
Result Value Ref Range Glucose (POC) 158 (H) 65 - 100 mg/dL Current Facility-Administered Medications:  
  warfarin (COUMADIN) tablet 1 mg, 1 mg, Oral, QPM, Lui Vizcaino MD, 1 mg at 02/28/20 1744   oxybutynin (DITROPAN) tablet 5 mg, 5 mg, Oral, TID, Juan Padron MD, 5 mg at 02/29/20 5971   furosemide (LASIX) injection 40 mg, 40 mg, IntraVENous, Q12H, Clark Pa MD, 40 mg at 02/29/20 0817 
  metoprolol succinate (TOPROL-XL) XL tablet 25 mg, 25 mg, Oral, DAILY, Orquidea RUIZ MD, 25 mg at 02/29/20 3906   lisinopril (PRINIVIL, ZESTRIL) tablet 5 mg, 5 mg, Oral, DAILY, Paulo Grier MD, Stopped at 02/27/20 0900   potassium chloride (K-DUR, KLOR-CON) SR tablet 40 mEq, 40 mEq, Oral, BID, Clark Pa MD, 40 mEq at 02/29/20 1865   phenazopyridine (PYRIDIUM) tab 95 mg, 95 mg, Oral, BID PRN, Clark Pa MD, 95 mg at 02/28/20 1253   opium-belladonna (B&O 16-A SUPPRETTE) 16.2-60 mg suppository 1 Suppository, 1 Suppository, Rectal, Q8H PRN, Nieves Carbajal, NP, 1 Suppository at 02/26/20 8519   LORazepam (ATIVAN) injection 1 mg, 1 mg, IntraVENous, Q4H PRN, Arthur Campo MD, 1 mg at 02/24/20 2019   morphine injection 2 mg, 2 mg, IntraVENous, Q3H PRN, Arthur Campo MD, 2 mg at 02/28/20 2001   risperiDONE (RisperDAL) tablet 0.5 mg, 0.5 mg, Oral, QHS, Heriberto Monique MD, 0.5 mg at 02/28/20 2101 
  amiodarone (CORDARONE) tablet 200 mg, 200 mg, Oral, BID, Carrillo, Zahida Desai MD, 200 mg at 02/29/20 0816 
  NUTRITIONAL SUPPORT ELECTROLYTE PRN ORDERS, , Does Not Apply, PRN, Radha Shah MD 
  aspirin chewable tablet 81 mg, 81 mg, Oral, DAILY, Zahida Vizcaino MD, 81 mg at 02/29/20 0816 
  atorvastatin (LIPITOR) tablet 40 mg, 40 mg, Oral, DAILY, Zahida Vizcaino MD, 40 mg at 02/29/20 0585   spironolactone (ALDACTONE) tablet 25 mg, 25 mg, Oral, DAILY, Zahida Vizcaino MD, 25 mg at 02/29/20 7891   lip protectant (BLISTEX) ointment 1 Each, 1 Each, Topical, PRN, Roni Fox MD 
  sodium chloride (NS) flush 5-40 mL, 5-40 mL, IntraVENous, Q8H, Radha Shah MD, 10 mL at 02/29/20 0517 
  sodium chloride (NS) flush 5-40 mL, 5-40 mL, IntraVENous, PRN, Radha Shah MD 
  insulin lispro (HUMALOG) injection, , SubCUTAneous, Q6H, Radha Shah MD, 1 Units at 02/29/20 6190 Assessment:  
 
Principal Problem: 
  Acute on chronic systolic heart failure ([address] (2/20/2020) Overview: Echo 2/20/20:  EF 20-25% Echo 4/15: EF 30-35%, mild to mod MR Echo 2011: EF 30-35% Active Problems: Hx of AICD-Implanted Cardiac Defibrillator (2/22/2016) Overview: Follow by Dr. Lalo Connors NSVT (nonsustained ventricular tachycardia) ([facility] [address] (8/7/2017) Dilated cardiomyopathy ([address] (2/7/2018) Acute respiratory failure with hypoxia ([address] (2/20/2020) Acute pulmonary edema ([address] (2/20/2020) DNR (do not resuscitate) (2/25/2020) Plan:  
 
CHF, chronic and acute systolic and diastolic Severe MR and RV dysfunction. AF 
IV Lasix q 12 hours Monitor intake and output and renal function. Continue Spironolactone. BP is on low side. S/P AICD with normal function Resumed Warfarin  
  
Non-sustained VT On Amiodarone.  
  
Acute respiratory failure with hypoxia S/P extubation On Empiric IV antibiotics to cover possible aspiration.  
  
Anemia No acute bleeding Check CBC, BMP daily for now. Creatinine starts to go up. Monitor closely.  
  
Diabetes mellitus type 2 Monitor blood sugar. Cover with insulin sliding scale accordingly.   
  
I have discussed the plan of care with patient.  
  
I have discussed with patient regarding advance directive. Patient would like to have a DNR status.   
  
DVT prophylaxis : already on [address]. Signed By: Matt Mckinney MD   
 February 29, 2020

## 2020-02-29 NOTE — PROGRESS NOTES
Critical Care Outreach Nurse Progress Report: 
 
Subjective: In to assess pt secondary to transfer from unit. MEWS Score: 1 (02/28/20 1525) Vitals:  
 02/28/20 1200 02/28/20 1525 02/28/20 1910 02/28/20 2051 BP:  116/67 114/73 Pulse:  79 81 Resp:  20 18 Temp:  97.4 °F (36.3 °C) 98.5 °F (36.9 °C) SpO2: 98% 97% 95% 92% Weight:      
Height:      
  
 
LAB DATA: 
 
Recent Labs  
  02/28/20 
0407 02/27/20 
1429 02/27/20 
0555 02/26/20 
0344   --  142 145  
K 3.8  --  3.4* 3.1*  
  --  107 106 CO2 29  --  31 30 AGAP 7  --  4* 9 *  --  144* 166* BUN 31*  --  34* 31* CREA 1.02  --  1.14 1.11  
GFRAA >60  --  >60 >60 GFRNA >60  --  >60 >60  
CA 8.8  --  8.8 8.7 MG 2.4 2.6* 2.3 2.4 Recent Labs  
  02/28/20 
0407 02/27/20 
0555 02/26/20 
0344 WBC 10.7 11.0 11.5* HGB 8.8* 8.8* 8.7* HCT 28.2* 28.6* 28.1*  
 212 183 Assessment: Patient is alert and oriented X 4. He appears drowsy and lethargic. Pallor in color. He appears diaphoretic and reports chest discomfort and SOB. Noted crackles on 5L NC with 96% O2 sat. Total output 700 ml today of orange hazy urine. Patient to receive dose of lasix and prn Morphine due to SOB from primary RN. Informed hospitalist of patient condition. Patient appears more comfortable with less SOB after dose of Morphine. Plan: Will continue outreach protocol.

## 2020-02-29 NOTE — PROGRESS NOTES
Problem: Falls - Risk of 
Goal: *Absence of Falls Description Document Kobi Daley Fall Risk and appropriate interventions in the flowsheet. Outcome: Progressing Towards Goal 
Note: Fall Risk Interventions: 
Mobility Interventions: Patient to call before getting OOB Mentation Interventions: Adequate sleep, hydration, pain control Medication Interventions: Patient to call before getting OOB Elimination Interventions: Call light in reach Problem: Patient Education: Go to Patient Education Activity Goal: Patient/Family Education Outcome: Progressing Towards Goal 
  
Problem: Pressure Injury - Risk of 
Goal: *Prevention of pressure injury Description Document Everardo Scale and appropriate interventions in the flowsheet. Outcome: Progressing Towards Goal 
Note: Pressure Injury Interventions: 
Sensory Interventions: Assess changes in LOC Moisture Interventions: Absorbent underpads Activity Interventions: Pressure redistribution bed/mattress(bed type) Mobility Interventions: Pressure redistribution bed/mattress (bed type) Nutrition Interventions: Offer support with meals,snacks and hydration, Discuss nutritional consult with provider, Document food/fluid/supplement intake Friction and Shear Interventions: Minimize layers Problem: Patient Education: Go to Patient Education Activity Goal: Patient/Family Education Outcome: Progressing Towards Goal 
  
Problem: Dysphagia (Adult) Goal: *Speech Goal: (INSERT TEXT) Description LTG: Patient will tolerate least restrictive diet without overt signs or symptoms of airway compromise by discharge. STG: Patient will tolerate chopped mechanical soft diet and thin liquids without overt signs or symptoms of aspiration 100% of the time. STG: Patient will participate in modified barium swallow study as clinically indicated. Outcome: Progressing Towards Goal 
  
Problem: Patient Education: Go to Patient Education Activity Goal: Patient/Family Education Outcome: Progressing Towards Goal 
  
Problem: Delirium Treatment Goal: *Level of consciousness restored to baseline Outcome: Progressing Towards Goal 
Goal: *Level of environmental perceptions restored to baseline Outcome: Progressing Towards Goal 
Goal: *Sensory perception restored to baseline Outcome: Progressing Towards Goal 
Goal: *Emotional stability restored to baseline Outcome: Progressing Towards Goal 
Goal: *Functional assessment restored to baseline Outcome: Progressing Towards Goal 
Goal: *Absence of falls Outcome: Progressing Towards Goal 
Goal: *Will remain free of delirium, CAM Score negative Outcome: Progressing Towards Goal 
Goal: *Cognitive status will be restored to baseline Outcome: Progressing Towards Goal 
Goal: Interventions Outcome: Progressing Towards Goal 
  
Problem: Nutrition Deficit Goal: *Optimize nutritional status Outcome: Progressing Towards Goal 
  
Problem: Patient Education: Go to Patient Education Activity Goal: Patient/Family Education Outcome: Progressing Towards Goal 
  
Problem: Patient Education: Go to Patient Education Activity Goal: Patient/Family Education Outcome: Progressing Towards Goal

## 2020-02-29 NOTE — PROGRESS NOTES
Warfarin dosing per pharmacist 
 
Antwon Cortez. is a 75 y.o. male. Height: 6' 1\" (185.4 cm)    Weight: 97.1 kg (214 lb) Indication:  Afib Goal INR:  2-2.5 (per Dr. Loy Leventhal' note on 2/27) Home dose:  2.5 mg po daily Risk factors or significant drug interactions:  Amiodarone Other anticoagulants:  N/A Daily Monitoring Date  INR     Warfarin dose HGB              Notes 2/27  1.9  2.5 mg  8.8   
2/28  2.4  1 mg  8.8 
2/29  2.2  1 mg  10.1 Warfarin was discontinued for 3 days due to anemia. Patient also had supratherapeutic level of 4.4 on 2/23. Goal is to keep INR on lower side of goal (2-2.5). Will continue 1 mg this evening. If level falls below 2 tomorrow, would consider increasing dose to 2 mg. Pharmacy will continue to follow patient and monitor INR daily. Thank you, Lety Gutierres, Pharm. D. 
PGY1 Pharmacy Resident [phone]

## 2020-02-29 NOTE — PROGRESS NOTES
Date of Outreach Update: 
Delaney Patten was seen and assessed. MEWS Score: 1 (02/28/20 1525) Vitals:  
 02/28/20 1525 02/28/20 1910 02/28/20 2051 02/28/20 2320 BP: 116/67 114/73  109/70 Pulse: 79 81  81 Resp: 20 18  20 Temp: 97.4 °F (36.3 °C) 98.5 °F (36.9 °C)  98.4 °F (36.9 °C) SpO2: 97% 95% 92% 92% Weight:    97.1 kg (214 lb) Height:      
  
 
 Previous Outreach assessment has been reviewed. Patient is calm. Denies any pain or distress at this time. Reports he has been resting easily with the Morphine. VSS. Will continue to follow up per outreach protocol. Signed By:   Kami Del Cid RN   February 29, 2020 2:34 AM

## 2020-02-29 NOTE — PROGRESS NOTES
[facility] CARDIOLOGY PROGRESS NOTE 
 
2/29/2020 Admit Date: 2/20/2020 Subjective:  
Stable overnight but remains extremely weak and tolerates minimal exertional without dyspnea and chest pressure. Objective:  
  
Vitals:  
 02/28/20 1910 02/28/20 2051 02/28/20 2320 02/29/20 0330 BP: 114/73  109/70 114/61 Pulse: 81  81 86 Resp: 18  20 20 Temp: 98.5 °F (36.9 °C)  98.4 °F (36.9 °C) 98.5 °F (36.9 °C) SpO2: 95% 92% 92% 96% Weight:   97.1 kg (214 lb) Height:      
 
 
Physical Exam: 
Neck- supple, + 10cm JVD 
CV- regular rate and rhythm no MRG Lung- clear bilaterally anteriorly, decreased/coarse lateral bases Abd- soft, nontender, nondistended Ext- no edema Skin- warm and dry Data Review:  
Recent Labs  
  02/29/20 
0404 02/28/20 
0407  142  
K 3.9 3.8 MG 2.4 2.4 BUN 30* 31* CREA 1.13 1.02  
* 122* WBC 12.3* 10.7 HGB 10.1* 8.8* HCT 32.9* 28.2*  
 227 INR 2.2 2.4 Assessment and Plan:  
 
Principal Problem: 
  Acute on chronic systolic heart failure ([address] (2/20/2020) Patient with EF 20-25%, severe MR and severe RV dysfunction. Prognosis is very poor. CXR remains abnormal and either edema or ARDS. Agree with DNR status and may need to consider transition to hospice. Continue IV lasix as long as hemodynamics and renal function tolerate. Stable on metoprolol succinate and lisinopril.   
  
Active Problems: Hx of AICD-Implanted Cardiac Defibrillator - Normal function  
  
  NSVT (nonsustained ventricular tachycardia) ([address]- On amiodarone Dilated cardiomyopathy ([address]- As above  
  
  Acute respiratory failure with hypoxia ([address] (2/20/2020) As above - on ABx per pulmonary  
  
  Acute pulmonary edema ([address] (2/20/2020) Improved with large volume diuresis and will continue until BP or renal function limit therapy 
  
  DNR (do not resuscitate) (2/25/2020) As above 
  
  Atrial fibrillation S/P AV node ablation. warfarin. 
  
  Anemia Stable Jhonny Reese MD

## 2020-02-29 NOTE — PROGRESS NOTES
Trinity Eisenberg. Admission Date: 2/20/2020 Daily Progress Note: 2/29/2020 The patient's chart is reviewed and the patient is discussed with the staff. 76 yo CM with a history of chronic systolic CHF (EF 14-82%), severe MR, CAD, NSVT s/p ICD, aflutter s/p AV node ablation on 2/11/20, and prior CVA. He presented to the ER 2/20/20 with shortness of breath and in acute resp failure with RA sat 80%. Was initially placed on CPAP but required intubation by EMS. CXR concerning for volume overload/pulmonary edema and was given Lasix 60mg IV with minimal UOP. BNP was 5206 and LA is 5.6 but trended down to 1.7. Was started on Zosyn/Vanc secondary to emesis episode during intubation.  Was able to be extubated 2/21, wore BIPAP and weaned to Opti-flow.  Cardiology following for NSVT with chronic ICD. Interrogations with multiple episodes under detection zone and was placed on Amio.  Family decided on DNR status.  Having episodes of confusion Subjective:  
 
99% sats on 4L/HFNC. Occ dry cough. Feels very weak. Feels SOB with minimal exertion. Currently in the bed, trying to finish breakfast.  
 
 
Current Facility-Administered Medications Medication Dose Route Frequency  warfarin (COUMADIN) tablet 1 mg  1 mg Oral QPM  
 oxybutynin (DITROPAN) tablet 5 mg  5 mg Oral TID  furosemide (LASIX) injection 40 mg  40 mg IntraVENous Q12H  
 metoprolol succinate (TOPROL-XL) XL tablet 25 mg  25 mg Oral DAILY  lisinopril (PRINIVIL, ZESTRIL) tablet 5 mg  5 mg Oral DAILY  potassium chloride (K-DUR, KLOR-CON) SR tablet 40 mEq  40 mEq Oral BID  phenazopyridine (PYRIDIUM) tab 95 mg  95 mg Oral BID PRN  
 opium-belladonna (B&O 16-A SUPPRETTE) 16.2-60 mg suppository 1 Suppository  1 Suppository Rectal Q8H PRN  
 LORazepam (ATIVAN) injection 1 mg  1 mg IntraVENous Q4H PRN  
 morphine injection 2 mg  2 mg IntraVENous Q3H PRN  
  risperiDONE (RisperDAL) tablet 0.5 mg  0.5 mg Oral QHS  amiodarone (CORDARONE) tablet 200 mg  200 mg Oral BID  
 NUTRITIONAL SUPPORT ELECTROLYTE PRN ORDERS   Does Not Apply PRN  
 aspirin chewable tablet 81 mg  81 mg Oral DAILY  atorvastatin (LIPITOR) tablet 40 mg  40 mg Oral DAILY  spironolactone (ALDACTONE) tablet 25 mg  25 mg Oral DAILY  lip protectant (BLISTEX) ointment 1 Each  1 Each Topical PRN  
 sodium chloride (NS) flush 5-40 mL  5-40 mL IntraVENous Q8H  
 sodium chloride (NS) flush 5-40 mL  5-40 mL IntraVENous PRN  
 insulin lispro (HUMALOG) injection   SubCUTAneous Q6H Review of Systems Constitutional: negative for fever, chills, sweats +generalized weakness Cardiovascular: negative for chest pain, palpitations, syncope, edema Gastrointestinal:  negative for dysphagia, reflux, vomiting, diarrhea, abdominal pain, or melena Neurologic:  negative for focal weakness, numbness, headache Objective:  
 
Vitals:  
 02/28/20 2051 02/28/20 2320 02/29/20 0330 02/29/20 0745 BP:  109/70 114/61 106/64 Pulse:  81 86 80 Resp:  20 20 19 Temp:  98.4 °F (36.9 °C) 98.5 °F (36.9 °C) 97.4 °F (36.3 °C) SpO2: 92% 92% 96% 99% Weight:  214 lb (97.1 kg) Height:      
 
 
 
Intake/Output Summary (Last 24 hours) at 2/29/2020 4874 Last data filed at 2/29/2020 0330 Gross per 24 hour Intake 840 ml Output 2600 ml Net -1760 ml Physical Exam:  
Constitution:  the patient is well developed and in no acute distress; In bed EENMT:  Sclera clear, pupils equal, oral mucosa moist 
Respiratory: Crackles throughout; No wheezing. 4L HFNC Cardiovascular:  RRR without G,R; Grade 1/6 murmur at the LSB Gastrointestinal: soft and non-tender; with positive bowel sounds. Musculoskeletal: warm without cyanosis. There is no lower extremity edema. Skin:  no jaundice or rashes, no wounds Neurologic: no gross neuro deficits Psychiatric:  alert and oriented x 3 CXR:  
TODAY 2/29 2/26 LAB Recent Labs  
  02/29/20 
0533 02/28/20 
2313 02/28/20 
1625 02/28/20 
1136 02/28/20 
0535 GLUCPOC 158* 141* 157* 197* 131* Recent Labs  
  02/29/20 
0404 02/28/20 
0407 02/27/20 
6471 WBC 12.3* 10.7 11.0 HGB 10.1* 8.8* 8.8* HCT 32.9* 28.2* 28.6*  
 227 212 INR 2.2 2.4 1.9 Recent Labs  
  02/29/20 
0404 02/28/20 
0407 02/27/20 
1429 02/27/20 
4304  142  --  142  
K 3.9 3.8  --  3.4*  
* 106  --  107 CO2 24 29  --  31  
* 122*  --  144* BUN 30* 31*  --  34* CREA 1.13 1.02  --  1.14  
MG 2.4 2.4 2.6* 2.3 CA 9.5 8.8  --  8.8 Assessment:  (Medical Decision Making) Hospital Problems  Date Reviewed: 2/28/2020 Codes Class Noted POA  
 DNR (do not resuscitate) ICD-10-CM: J07 ICD-9-CM: V49.86  2/25/2020 Unknown * (Principal) Acute on chronic systolic heart failure (HCC) ICD-10-CM: I50.23 ICD-9-CM: 428.23  2/20/2020 Yes Overview Addendum 2/24/2020  9:21 AM by Praneeth Starr NP Echo 2/20/20:  EF 20-25% Echo 4/15: EF 30-35%, mild to mod MR Echo 2011: EF 30-35% Acute respiratory failure with hypoxia [facility]) ICD-10-CM: J96.01 
ICD-9-CM: 518.81  2/20/2020 Yes Acceptable sats on 4L Acute pulmonary edema (HCC) ICD-10-CM: J81.0 ICD-9-CM: 518.4  2/20/2020 Yes Continue to diurese Dilated cardiomyopathy ([facility] ICD-10-CM: I42.0 ICD-9-CM: 425.4  2/7/2018 Yes NSVT (nonsustained ventricular tachycardia) (HCC) ICD-10-CM: I47.2 ICD-9-CM: 427.1  8/7/2017 Yes Hx of AICD-Implanted Cardiac Defibrillator ICD-10-CM: Z95.810 ICD-9-CM: V45.02  2/22/2016 Yes Follow by Dr. Juan David Sotomayor Plan:  (Medical Decision Making) --Continue diuresis with aldactone and lasix 40mg q12h, Net neg 1.7L in last 24 hours 
--Continue strict I/O 
--Continue PT/OT More than 50% of the time documented was spent in face-to-face contact with the patient and in the care of the patient on the floor/unit where the patient is located. Julio Kaye, NP Lungs:  Fair air movement with scattered crackles Heart:  RRR with no Murmur/Rubs/Gallops Additional Comments: Pt still appears very weak and breathless at times. CXR with ongoing pulmonary edema. Add low dose albuterol with EZ PAP in hopes of improving aeration. Not making much progress. I have spoken with and examined the patient. I agree with the above assessment and plan as documented.  
 
Venita Frazier MD

## 2020-02-29 NOTE — PROGRESS NOTES
0705-Bedside report received from Belkis Fish RN. Resting in bed. No needs voiced. No s/s of acute distress. 1810-END OF SHIFT NOTE: 
Pt's VSS and is in no acute distress. Pt on tele running a flutter. Pt drinking ensure and ice cream milkshakes with meals. Pt started on magic mouthwash today for mucositis. Intake/Output 
02/29 0701 - 02/29 1900 In: 89 37 13 [P. O.:604] Out: 900 [Urine:900] Voiding: NO 
Catheter: YES Drain:   
 
Stool:  0 occurrences. Stool Assessment Stool Color: Harlo Nissen (02/29/20 0330) Stool Appearance: Soft (02/29/20 0330) Stool Amount: Medium (02/29/20 0330) Stool Source/Status: Rectum (02/29/20 0330) Emesis:  0 occurrences. VITAL SIGNS Patient Vitals for the past 12 hrs: 
 Temp Pulse Resp BP SpO2  
02/29/20 1521     94 % 02/29/20 1440 97.8 °F (36.6 °C) 84 19 106/65 97 % 02/29/20 1145 97.6 °F (36.4 °C) 63 19 99/55 95 % 02/29/20 1116     96 % 02/29/20 0745 97.4 °F (36.3 °C) 80 19 106/64 99 % Pain Assessment Pain 1 Pain Scale 1: Numeric (0 - 10) (02/29/20 0745) Pain Intensity 1: 0 (02/29/20 0745) Patient Stated Pain Goal: 0 (02/27/20 0800) Pain Reassessment 1: Yes (02/28/20 2100) Pain Location 1: Chest (02/28/20 2001) Pain Description 1: Heaviness (02/28/20 2001) Pain Intervention(s) 1: Medication (see MAR) (02/28/20 2001) Ambulating Yes Joel Woodruff 1845-Bedside shift change report given to Varinder Clay RN (oncoming nurse) by Niecy Toth RN (offgoing nurse). Report included the following information SBAR, Kardex, Intake/Output, MAR and Recent Results.

## 2020-03-01 NOTE — PROGRESS NOTES
Warfarin dosing per pharmacist 
 
Pura Cabrera. is a 75 y.o. male. Height: 6' 1\" (185.4 cm)    Weight: 97.1 kg (214 lb) Indication:  Afib Goal INR:  2 to 2.5 (per Dr. Queenie Atkins' note on 2/27) Home dose:  2.5 mg po daily Risk factors or significant drug interactions:  Amiodarone Other anticoagulants:  N/A Daily Monitoring Date  INR     Warfarin dose HGB              Notes 2/27  1.9  2.5 mg  8.8   
2/28  2.4  1 mg  8.8 
2/29  2.2  1 mg  10.1 3/1  2.1  1.5 mg  9.2 Warfarin was discontinued for 3 days due to anemia. Patient also had supratherapeutic level of 4.4 on 2/23. Goal is to keep INR on lower side of goal (2-2.5). INR continues to trend down, now 2.1. Will give warfarin 1.5 mg this evening in an effort to maintain INR between 2 and 2.5 Pharmacy will continue to follow patient and monitor INR daily. Thank you, 
Nisreen Dorsey, PharmD, [facility] Clinical Pharmacist 
[phone]

## 2020-03-01 NOTE — PROGRESS NOTES
Problem: Falls - Risk of 
Goal: *Absence of Falls Description Document Clide Failing Fall Risk and appropriate interventions in the flowsheet. Outcome: Progressing Towards Goal 
Note: Fall Risk Interventions: 
Mobility Interventions: Bed/chair exit alarm, Communicate number of staff needed for ambulation/transfer, Patient to call before getting OOB, Strengthening exercises (ROM-active/passive) Mentation Interventions: Adequate sleep, hydration, pain control, Bed/chair exit alarm, Door open when patient unattended, Increase mobility, More frequent rounding, Update white board, Toileting rounds Medication Interventions: Bed/chair exit alarm, Patient to call before getting OOB, Teach patient to arise slowly Elimination Interventions: Bed/chair exit alarm, Call light in reach, Elevated toilet seat, Patient to call for help with toileting needs, Stay With Me (per policy), Toilet paper/wipes in reach, Toileting schedule/hourly rounds History of Falls Interventions: Door open when patient unattended, Bed/chair exit alarm Problem: Patient Education: Go to Patient Education Activity Goal: Patient/Family Education Outcome: Progressing Towards Goal 
  
Problem: Pressure Injury - Risk of 
Goal: *Prevention of pressure injury Description Document Everardo Scale and appropriate interventions in the flowsheet. Outcome: Progressing Towards Goal 
Note: Pressure Injury Interventions: 
Sensory Interventions: Assess changes in LOC, Check visual cues for pain, Keep linens dry and wrinkle-free, Maintain/enhance activity level, Minimize linen layers Moisture Interventions: Absorbent underpads, Internal/External urinary devices, Maintain skin hydration (lotion/cream), Minimize layers Activity Interventions: Increase time out of bed, Pressure redistribution bed/mattress(bed type) Mobility Interventions: HOB 30 degrees or less, Pressure redistribution bed/mattress (bed type) Nutrition Interventions: Document food/fluid/supplement intake, Offer support with meals,snacks and hydration Friction and Shear Interventions: HOB 30 degrees or less, Minimize layers, Sit at 90-degree angle Problem: Patient Education: Go to Patient Education Activity Goal: Patient/Family Education Outcome: Progressing Towards Goal 
  
Problem: Dysphagia (Adult) Goal: *Speech Goal: (INSERT TEXT) Description LTG: Patient will tolerate least restrictive diet without overt signs or symptoms of airway compromise by discharge. STG: Patient will tolerate chopped mechanical soft diet and thin liquids without overt signs or symptoms of aspiration 100% of the time. STG: Patient will participate in modified barium swallow study as clinically indicated. Outcome: Progressing Towards Goal 
  
Problem: Patient Education: Go to Patient Education Activity Goal: Patient/Family Education Outcome: Progressing Towards Goal 
  
Problem: Delirium Treatment Goal: *Level of consciousness restored to baseline Outcome: Progressing Towards Goal 
Goal: *Level of environmental perceptions restored to baseline Outcome: Progressing Towards Goal 
Goal: *Sensory perception restored to baseline Outcome: Progressing Towards Goal 
Goal: *Emotional stability restored to baseline Outcome: Progressing Towards Goal 
Goal: *Functional assessment restored to baseline Outcome: Progressing Towards Goal 
Goal: *Absence of falls Outcome: Progressing Towards Goal 
Goal: *Will remain free of delirium, CAM Score negative Outcome: Progressing Towards Goal 
Goal: *Cognitive status will be restored to baseline Outcome: Progressing Towards Goal 
Goal: Interventions Outcome: Progressing Towards Goal 
  
Problem: Nutrition Deficit Goal: *Optimize nutritional status Outcome: Progressing Towards Goal 
  
Problem: Patient Education: Go to Patient Education Activity Goal: Patient/Family Education Outcome: Progressing Towards Goal 
  
Problem: Patient Education: Go to Patient Education Activity Goal: Patient/Family Education Outcome: Progressing Towards Goal 
  
Problem: Airway Clearance - Ineffective Goal: *Patent airway Outcome: Progressing Towards Goal 
Goal: *Absence of airway secretions Outcome: Progressing Towards Goal 
Goal: *Able to cough effectively Outcome: Progressing Towards Goal 
  
Problem: Patient Education: Go to Patient Education Activity Goal: Patient/Family Education Outcome: Progressing Towards Goal 
  
Problem: Infection - Risk of, Urinary Catheter-Associated Urinary Tract Infection Goal: *Absence of infection signs and symptoms Outcome: Progressing Towards Goal 
  
Problem: Patient Education: Go to Patient Education Activity Goal: Patient/Family Education Outcome: Progressing Towards Goal 
  
Problem: Diabetes Self-Management Goal: *Disease process and treatment process Description Define diabetes and identify own type of diabetes; list 3 options for treating diabetes. Outcome: Progressing Towards Goal 
Goal: *Incorporating nutritional management into lifestyle Description Describe effect of type, amount and timing of food on blood glucose; list 3 methods for planning meals. Outcome: Progressing Towards Goal 
Goal: *Incorporating physical activity into lifestyle Description State effect of exercise on blood glucose levels. Outcome: Progressing Towards Goal 
Goal: *Developing strategies to promote health/change behavior Description Define the ABC's of diabetes; identify appropriate screenings, schedule and personal plan for screenings. Outcome: Progressing Towards Goal 
Goal: *Using medications safely Description State effect of diabetes medications on diabetes; name diabetes medication taking, action and side effects. Outcome: Progressing Towards Goal 
Goal: *Monitoring blood glucose, interpreting and using results Description Identify recommended blood glucose targets  and personal targets. Outcome: Progressing Towards Goal 
Goal: *Prevention, detection, treatment of acute complications Description List symptoms of hyper- and hypoglycemia; describe how to treat low blood sugar and actions for lowering  high blood glucose level. Outcome: Progressing Towards Goal 
Goal: *Prevention, detection and treatment of chronic complications Description Define the natural course of diabetes and describe the relationship of blood glucose levels to long term complications of diabetes. Outcome: Progressing Towards Goal 
Goal: *Developing strategies to address psychosocial issues Description Describe feelings about living with diabetes; identify support needed and support network Outcome: Progressing Towards Goal 
  
Problem: Patient Education: Go to Patient Education Activity Goal: Patient/Family Education Outcome: Progressing Towards Goal 
  
Problem: Cardiac Output -  Decreased Goal: *Vital signs within specified parameters Outcome: Progressing Towards Goal 
Goal: *Optimal cardiac output Outcome: Progressing Towards Goal 
Goal: *Absence of hypoxia Outcome: Progressing Towards Goal 
Goal: *Absence of peripheral edema Outcome: Progressing Towards Goal 
Goal: *Intravascular fluid volume and electrolyte balance Outcome: Progressing Towards Goal

## 2020-03-01 NOTE — PROGRESS NOTES
Patient has order for ambulating SAT. Patient is hypotensive. Will reassess and complete as tolerated once BP is stable.

## 2020-03-01 NOTE — PROGRESS NOTES
Cassidycaesarrenetta Hank. Admission Date: 2/20/2020 Daily Progress Note: 3/1/2020 The patient's chart is reviewed and the patient is discussed with the staff. 74 yo CM with a history of chronic systolic CHF (EF 69-63%), severe MR, CAD, NSVT s/p ICD, aflutter s/p AV node ablation on 2/11/20, and prior CVA. He presented to the ER 2/20/20 with shortness of breath and in acute resp failure with RA sat 80%. Was initially placed on CPAP but required intubation by EMS. CXR concerning for volume overload/pulmonary edema and was given Lasix 60mg IV with minimal UOP. BNP was 5206 and LA is 5.6 but trended down to 1.7. Was started on Zosyn/Vanc secondary to emesis episode during intubation.  Was able to be extubated 2/21, wore BIPAP and weaned to Opti-flow.  Cardiology following for NSVT with chronic ICD. Interrogations with multiple episodes under detection zone and was placed on Amio.  Family decided on DNR status.  Having episodes of confusion Subjective: Weaned to 2lpm.  BP dropped to 46O systolic. Fluid balance 2L negative yesterday. Renal function stable Current Facility-Administered Medications Medication Dose Route Frequency  warfarin (COUMADIN) tablet 1.5 mg  1.5 mg Oral QPM  
 albuterol (PROVENTIL VENTOLIN) nebulizer solution 1.25 mg  1.25 mg Nebulization QID RT  
 magic mouthwash (MAHESH) oral suspension 10 mL  10 mL Oral Q4H PRN  
 oxybutynin (DITROPAN) tablet 5 mg  5 mg Oral TID  metoprolol succinate (TOPROL-XL) XL tablet 25 mg  25 mg Oral DAILY  lisinopril (PRINIVIL, ZESTRIL) tablet 5 mg  5 mg Oral DAILY  potassium chloride (K-DUR, KLOR-CON) SR tablet 40 mEq  40 mEq Oral BID  phenazopyridine (PYRIDIUM) tab 95 mg  95 mg Oral BID PRN  
 opium-belladonna (B&O 16-A SUPPRETTE) 16.2-60 mg suppository 1 Suppository  1 Suppository Rectal Q8H PRN  
 LORazepam (ATIVAN) injection 1 mg  1 mg IntraVENous Q4H PRN  
  morphine injection 2 mg  2 mg IntraVENous Q3H PRN  
 risperiDONE (RisperDAL) tablet 0.5 mg  0.5 mg Oral QHS  amiodarone (CORDARONE) tablet 200 mg  200 mg Oral BID  
 NUTRITIONAL SUPPORT ELECTROLYTE PRN ORDERS   Does Not Apply PRN  
 aspirin chewable tablet 81 mg  81 mg Oral DAILY  atorvastatin (LIPITOR) tablet 40 mg  40 mg Oral DAILY  spironolactone (ALDACTONE) tablet 25 mg  25 mg Oral DAILY  lip protectant (BLISTEX) ointment 1 Each  1 Each Topical PRN  
 sodium chloride (NS) flush 5-40 mL  5-40 mL IntraVENous Q8H  
 sodium chloride (NS) flush 5-40 mL  5-40 mL IntraVENous PRN  
 insulin lispro (HUMALOG) injection   SubCUTAneous Q6H Review of Systems Constitutional: negative for fever, chills, sweats +generalized weakness Cardiovascular: negative for chest pain, palpitations, syncope, edema Gastrointestinal:  negative for dysphagia, reflux, vomiting, diarrhea, abdominal pain, or melena Neurologic:  negative for focal weakness, numbness, headache Objective:  
 
Vitals:  
 03/01/20 9878 03/01/20 0732 03/01/20 0749 03/01/20 1141 BP: 105/60  105/57 (!) 86/54 Pulse: 80  81 71 Resp: 18  16 17 Temp: 98.2 °F (36.8 °C)  95.7 °F (35.4 °C) 98 °F (36.7 °C) SpO2: 98% 97% 93% 93% Weight:      
Height:      
 
 
 
Intake/Output Summary (Last 24 hours) at 3/1/2020 1209 Last data filed at 3/1/2020 1150 Gross per 24 hour Intake 1090 ml Output 2900 ml Net -1810 ml Physical Exam:  
Constitution:  the patient is well developed and in no acute distress; In bed EENMT:  Sclera clear, pupils equal, oral mucosa moist 
Respiratory: Crackles throughout; No wheezing. 4L HFNC Cardiovascular:  RRR without G,R; Grade 1/6 murmur at the LSB Gastrointestinal: soft and non-tender; with positive bowel sounds. Musculoskeletal: warm without cyanosis. There is no lower extremity edema. Skin:  no jaundice or rashes, no wounds Neurologic: no gross neuro deficits Psychiatric:  alert and oriented x 3 CXR:  
TODAY 2/29 2/26 LAB Recent Labs 03/01/20 
1146 02/29/20 
2357 02/29/20 
1747 02/29/20 
1221 02/29/20 
0533 GLUCPOC 188* 164* 160* 180* 158* Recent Labs 03/01/20 
0413 02/29/20 
0404 02/28/20 
0407 WBC 12.6* 12.3* 10.7 HGB 9.2* 10.1* 8.8* HCT 29.8* 32.9* 28.2*  
 269 227 INR 2.1 2.2 2.4 Recent Labs 03/01/20 
0413 02/29/20 
0404 02/28/20 
0407  141 142  
K 3.9 3.9 3.8  108* 106 CO2 29 24 29 * 134* 122* BUN 25* 30* 31* CREA 0.98 1.13 1.02  
MG 2.2 2.4 2.4 CA 9.9 9.5 8.8 Assessment:  (Medical Decision Making) Hospital Problems  Date Reviewed: 2/28/2020 Codes Class Noted POA  
 DNR (do not resuscitate) ICD-10-CM: A00 ICD-9-CM: V49.86  2/25/2020 Unknown * (Principal) Acute on chronic systolic heart failure (HCC) ICD-10-CM: I50.23 ICD-9-CM: 428.23  2/20/2020 Yes Overview Addendum 2/24/2020  9:21 AM by Lisandro Oropeza NP Echo 2/20/20:  EF 20-25% Echo 4/15: EF 30-35%, mild to mod MR Echo 2011: EF 30-35% Acute respiratory failure with hypoxia [facility]) ICD-10-CM: J96.01 
ICD-9-CM: 518.81  2/20/2020 Yes Acceptable sats on 2lpm  
 Acute pulmonary edema (HCC) ICD-10-CM: J81.0 ICD-9-CM: 518.4  2/20/2020 Yes Diuretics seem to be helping Dilated cardiomyopathy ([facility] [address] ICD-10-CM: I42.0 ICD-9-CM: 425.4  2/7/2018 Yes NSVT (nonsustained ventricular tachycardia) (HCC) ICD-10-CM: I47.2 ICD-9-CM: 427.1  8/7/2017 Yes Hx of AICD-Implanted Cardiac Defibrillator ICD-10-CM: Z95.810 ICD-9-CM: V45.02  2/22/2016 Yes Follow by Dr. Stephan Pleitez Plan:  (Medical Decision Making) --stopped lasix for hypotension today. Already received spironolactone. --follow BP closely and dose lasix accordingly. --wean oxygen 
--continue PT 
--patient hoping patient can get strong enough for [facility] and is also considering hospice if needed. More than 50% of the time documented was spent in face-to-face contact with the patient and in the care of the patient on the floor/unit where the patient is located.  
 
Flor Quintero MD

## 2020-03-01 NOTE — PROGRESS NOTES
Gemini Nieves. Admission Date: 2/20/2020 Daily Progress Note: 3/1/2020 The patient's chart is reviewed and the patient is discussed with the staff. Pt is a 74 yo CM with a history of chronic systolic CHF (EF 17-34%), severe MR, CAD, NSVT s/p ICD, aflutter s/p AV node ablation on 2/11/20, and prior CVA. He presented to the ER 2/20/20 with shortness of breath and in acute resp failure with RA sat 80%. Was initially placed on CPAP but required intubation by EMS. CXR was concerning for volume overload/pulmonary edema and was given Lasix 60mg IV with minimal UOP. BNP was 5206 and LA 5.6 but trended down to 1.7. Was started on Zosyn/Vanc secondary to emesis episode during intubation.  Was able to be extubated 2/21, wore BIPAP and weaned to Opti-flow. Cardiology following for NSVT with chronic ICD. Interrogations with multiple episodes under detection zone and was placed on Amio.  Pt extubated on 2/21. Family decided on DNR status. Having episodes of confusion Subjective:  
 
Pt sitting up in bed on 2L HF-sat 93%. Pt complains of cough. He is asking to go home. He was told by hospitalist that he might be able to go home tomorrow. We discussed O2 needs. He had questions regarding hospitalization and why he got so sick. This was discussed in detail and pt verbalized understanding. Current Facility-Administered Medications Medication Dose Route Frequency  albuterol (PROVENTIL VENTOLIN) nebulizer solution 1.25 mg  1.25 mg Nebulization QID RT  
 magic mouthwash (MAHESH) oral suspension 10 mL  10 mL Oral Q4H PRN  
 warfarin (COUMADIN) tablet 1 mg  1 mg Oral QPM  
 oxybutynin (DITROPAN) tablet 5 mg  5 mg Oral TID  furosemide (LASIX) injection 40 mg  40 mg IntraVENous Q12H  
 metoprolol succinate (TOPROL-XL) XL tablet 25 mg  25 mg Oral DAILY  lisinopril (PRINIVIL, ZESTRIL) tablet 5 mg  5 mg Oral DAILY  potassium chloride (K-DUR, KLOR-CON) SR tablet 40 mEq  40 mEq Oral BID  phenazopyridine (PYRIDIUM) tab 95 mg  95 mg Oral BID PRN  
 opium-belladonna (B&O 16-A SUPPRETTE) 16.2-60 mg suppository 1 Suppository  1 Suppository Rectal Q8H PRN  
 LORazepam (ATIVAN) injection 1 mg  1 mg IntraVENous Q4H PRN  
 morphine injection 2 mg  2 mg IntraVENous Q3H PRN  
 risperiDONE (RisperDAL) tablet 0.5 mg  0.5 mg Oral QHS  amiodarone (CORDARONE) tablet 200 mg  200 mg Oral BID  
 NUTRITIONAL SUPPORT ELECTROLYTE PRN ORDERS   Does Not Apply PRN  
 aspirin chewable tablet 81 mg  81 mg Oral DAILY  atorvastatin (LIPITOR) tablet 40 mg  40 mg Oral DAILY  spironolactone (ALDACTONE) tablet 25 mg  25 mg Oral DAILY  lip protectant (BLISTEX) ointment 1 Each  1 Each Topical PRN  
 sodium chloride (NS) flush 5-40 mL  5-40 mL IntraVENous Q8H  
 sodium chloride (NS) flush 5-40 mL  5-40 mL IntraVENous PRN  
 insulin lispro (HUMALOG) injection   SubCUTAneous Q6H Review of Systems 
+fatigue 
+dyspnea on exertion Constitutional: negative for fever, chills, sweats Cardiovascular: negative for chest pain, palpitations, syncope, edema Gastrointestinal:  negative for dysphagia, reflux, vomiting, diarrhea, abdominal pain, or melena Neurologic:  negative for focal weakness, numbness, headache Objective:  
 
Vitals:  
 02/29/20 2308 03/01/20 9110 03/01/20 0732 03/01/20 0598 BP: 108/64 105/60  105/57 Pulse: 80 80  81 Resp: 18 18  16 Temp: 98.3 °F (36.8 °C) 98.2 °F (36.8 °C)  95.7 °F (35.4 °C) SpO2: 98% 98% 97% 93% Weight:      
Height:      
 
 
 
Intake/Output Summary (Last 24 hours) at 3/1/2020 3893 Last data filed at 3/1/2020 5995 Gross per 24 hour Intake 854 ml Output 3000 ml Net -2146 ml Physical Exam:  
Constitution:  the patient is well developed and in no acute distress, on 2L HF 
EENMT:  Sclera clear, pupils equal, oral mucosa moist 
Respiratory: Crackles throughout; No wheezing. Cardiovascular:  RRR without G,R; Grade 1/6 murmur at the LSB Gastrointestinal: soft and non-tender; with positive bowel sounds. Musculoskeletal: warm without cyanosis. There is no lower extremity edema. Skin:  no jaundice or rashes, no wounds Neurologic: no gross neuro deficits Psychiatric:  alert and oriented x 3 CXR:  
 2/29 2/26 LAB Recent Labs  
  02/29/20 
2357 02/29/20 
1747 02/29/20 
1221 02/29/20 
0533 02/28/20 
2313 GLUCPOC 164* 160* 180* 158* 141* Recent Labs 03/01/20 
0413 02/29/20 
0404 02/28/20 
0407 WBC 12.6* 12.3* 10.7 HGB 9.2* 10.1* 8.8* HCT 29.8* 32.9* 28.2*  
 269 227 INR 2.1 2.2 2.4 Recent Labs 03/01/20 
0413 02/29/20 
0404 02/28/20 
0407  141 142  
K 3.9 3.9 3.8  108* 106 CO2 29 24 29 * 134* 122* BUN 25* 30* 31* CREA 0.98 1.13 1.02  
MG 2.2 2.4 2.4 CA 9.9 9.5 8.8 Assessment:  (Medical Decision Making) Hospital Problems  Date Reviewed: 2/28/2020 Codes Class Noted POA  
 DNR (do not resuscitate) ICD-10-CM: T73 ICD-9-CM: V49.86  2/25/2020 Unknown Chronic * (Principal) Acute on chronic systolic heart failure (HCC) ICD-10-CM: I50.23 ICD-9-CM: 428.23  2/20/2020 Yes Overview Addendum 2/24/2020  9:21 AM by Letha Rodríguez NP Echo 2/20/20:  EF 20-25% Echo 4/15: EF 30-35%, mild to mod MR Echo 2011: EF 30-35% Acute respiratory failure with hypoxia [facility]) ICD-10-CM: J96.01 
ICD-9-CM: 518.81  2/20/2020 Yes Acceptable sats on 4L Acute pulmonary edema (HCC) ICD-10-CM: J81.0 ICD-9-CM: 518.4  2/20/2020 Yes Continue to diurese Dilated cardiomyopathy ([facility] [address] ICD-10-CM: I42.0 ICD-9-CM: 425.4  2/7/2018 Yes NSVT (nonsustained ventricular tachycardia) (HCC) ICD-10-CM: I47.2 ICD-9-CM: 427.1  8/7/2017 Yes Hx of AICD-Implanted Cardiac Defibrillator ICD-10-CM: Z95.810 ICD-9-CM: V45.02  2/22/2016 Yes Follow by Dr. Oanh Rodriguez Plan:  (Medical Decision Making) --Continue diuresis with aldactone and lasix IV 40mg q12h, Net neg 3L in last 24 hours 
--Continue strict I/O -8.6L since admission 
--Continue PT/OT 
--hopefully home tomorrow 
--RT to evaluate for home O2 needs More than 50% of the time documented was spent in face-to-face contact with the patient and in the care of the patient on the floor/unit where the patient is located.  
 
VICTOR M Ordonez

## 2020-03-01 NOTE — PROGRESS NOTES
END OF SHIFT NOTE: 
 
Intake/Output 
02/29 1901 - 03/01 0700 In: 240 [P.O.:240] Out: 1400 [[id number]] Voiding: YES Catheter: YES Drain:   
 
 
 
 
 
Stool:  2 occurrences. Stool Assessment Stool Color: Desma Pickle (03/01/20 0145) Stool Appearance: Soft; Formed (03/01/20 0145) Stool Amount: Small (03/01/20 0145) Stool Source/Status: Rectum (03/01/20 0145) Emesis:  0 occurrences. VITAL SIGNS Patient Vitals for the past 12 hrs: 
 Temp Pulse Resp BP SpO2  
03/01/20 0324 98.2 °F (36.8 °C) 80 18 105/60 98 % 02/29/20 2308 98.3 °F (36.8 °C) 80 18 108/64 98 % 02/29/20 2002 98.5 °F (36.9 °C) 77 18 101/67 98 % 02/29/20 1934     98 % Pain Assessment Pain 1 Pain Scale 1: Numeric (0 - 10) (03/01/20 0145) Pain Intensity 1: 0 (03/01/20 0145) Patient Stated Pain Goal: 0 (03/01/20 0145) Pain Reassessment 1: Yes (02/28/20 2100) Pain Location 1: Chest (02/28/20 2001) Pain Description 1: Heaviness (02/28/20 2001) Pain Intervention(s) 1: Medication (see MAR) (02/28/20 2001) Ambulating Yes;to BR w/ assist 
 
Additional Information: No acute respiratory distress;maintained on 4LPM/hi flow NC. Diuresing well. Shift report given to oncoming nurse Lucinda RN at the bedside.  
 
Michael Robledo RN

## 2020-03-01 NOTE — PROGRESS NOTES
0653-Bedside report received from Riccardo Mayorga RN. Resting in bed. No needs voiced. No s/s of acute distress. 1800-END OF SHIFT NOTE: 
Pt's VSS and is in no acute distress. Pt switched to PO lasix today. Pt ambulating well to restroom. Pt on 2L high flow nc. Intake/Output 03/01 0701 - 03/01 1900 In: 480 [P.O.:480] Out: 800 [Urine:800] Voiding: NO 
Catheter: YES Drain:   
 
 
Stool:  1 occurrences. Stool Assessment Stool Color: Jalaine Proper (03/01/20 0145) Stool Appearance: Soft; Formed (03/01/20 0145) Stool Amount: Small (03/01/20 0145) Stool Source/Status: Rectum (03/01/20 0145) Emesis:  0 occurrences. VITAL SIGNS Patient Vitals for the past 12 hrs: 
 Temp Pulse Resp BP SpO2  
03/01/20 1521     96 % 03/01/20 1503 97.2 °F (36.2 °C) 97 20 98/62 91 % 03/01/20 1229     94 % 03/01/20 1227  81 17 92/45 92 % 03/01/20 1141 98 °F (36.7 °C) 71 17 (!) 86/54 93 % 03/01/20 0749 95.7 °F (35.4 °C) 81 16 105/57 93 % 03/01/20 0732     97 % Pain Assessment Pain 1 Pain Scale 1: Numeric (0 - 10) (03/01/20 0749) Pain Intensity 1: 0 (03/01/20 0749) Patient Stated Pain Goal: 0 (03/01/20 0145) Pain Reassessment 1: Yes (02/28/20 2100) Pain Location 1: Chest (02/28/20 2001) Pain Description 1: Heaviness (02/28/20 2001) Pain Intervention(s) 1: Medication (see MAR) (02/28/20 2001) Ambulating Yes Tamica Woodruff 1845-Bedside shift change report given to Lavern Campuzano RN (oncoming nurse) by Miguel De La Rosa RN (offgoing nurse). Report included the following information SBAR, Kardex, Intake/Output, MAR and Recent Results.

## 2020-03-01 NOTE — PROGRESS NOTES
[facility] CARDIOLOGY PROGRESS NOTE 
 
3/1/2020 Admit Date: 2/20/2020 Subjective:  
Patient with intermittent confusion and remains extremely weak and tolerates minimal exertional without dyspnea and chest pressure. BP mildly low. Objective:  
  
Vitals:  
 03/01/20 0749 03/01/20 1141 03/01/20 1227 03/01/20 1229 BP: 105/57 (!) 86/54 92/45 Pulse: 81 71 81 Resp: 16 17 17 Temp: 95.7 °F (35.4 °C) 98 °F (36.7 °C) SpO2: 93% 93% 92% 94% Weight:      
Height:      
 
 
Physical Exam: 
Neck- supple, + 10cm JVD 
CV- regular rate and rhythm no MRG Lung- clear bilaterally anteriorly, decreased/coarse lateral bases Abd- soft, nontender, nondistended Ext- no edema Skin- warm and dry Data Review:  
Recent Labs 03/01/20 
0413 02/29/20 
0404  141  
K 3.9 3.9 MG 2.2 2.4 BUN 25* 30* CREA 0.98 1.13  
* 134* WBC 12.6* 12.3* HGB 9.2* 10.1* HCT 29.8* 32.9*  
 269 INR 2.1 2.2 Assessment and Plan:  
 
Principal Problem: 
  Acute on chronic systolic heart failure ([address] (2/20/2020) Patient with EF 20-25%, severe MR and severe RV dysfunction. Prognosis is very poor. Agree with DNR status and may need to consider transition to hospice. Stop IV lasix as now mildly hypotensive. May start oral lasix tomorrow. Stable on metoprolol succinate and lisinopril.   
  
Active Problems: Hx of AICD-Implanted Cardiac Defibrillator - Normal function  
  
  NSVT (nonsustained ventricular tachycardia) ([address]- On amiodarone Dilated cardiomyopathy ([address]- As above  
  
  Acute respiratory failure with hypoxia ([address] (2/20/2020) As above - on ABx per pulmonary  
  
  Acute pulmonary edema ([address] (2/20/2020) Improved with large volume diuresis but still with abnormal CXR suggesting non-cardiogenic component.   
  
  DNR (do not resuscitate) (2/25/2020) As above 
  
  Atrial fibrillation S/P AV node ablation. warfarin. 
  
  Anemia Stable Nimo Nichols MD

## 2020-03-01 NOTE — PROGRESS NOTES
Progress Note Patient: Antwon Lorenzo MRN: [id number]  SSN: [id number] YOB: 1945  Age: 75 y.o. Sex: male Admit Date: 2/20/2020 LOS: 10 days Subjective:  
 
Antwon Lorenzo is a 75 y.o. male who is being seen for assuming care to our service.  
  
Patient was admitted on 2- with Acute respiratory failure with hypoxia and was intubated.  
  
He has PMH of chronic systolic CHF (EF 79%), MR, CAD, NSVT s/p ICD, AFlutter s/p AV node ablation, CVA.  
  
He was treated for CHF exacerbation. ICD interrogation shows multiple episodes. Patient is on Amiodarone now.  
  
He is on Lasix IV q 12 hours. He continues to have good urine output. Patient is talking well today. He is still feeling weak. He was confused earlier, but after being redirected by nurse with conversations, he seems more aware of his circumstance. Physical Exam: 
  
General:                    The patient is a pleasant male in mild acute respiratory distress when exerting himself. asking questions. YIEQ:                                   [id number]. Eyes:                                   No palpebral pallor or scleral icterus. ENT:                                    External auricular and nasal exam within normal limits.                                             HKFKSC membranes are moist. 
Neck:                                   Supple, non-tender, no JVD. Lungs:                       decreased to auscultation bilaterally without wheezes or crackles.                                             No respiratory distress or accessory muscle use. Heart:                                  Regular rate and rhythm, without murmurs, rubs, or gallops. Abdomen:                  Soft, non-tender, distended with normoactive bowel sounds. Genitourinary:           No tenderness over the bladder or bilateral CVAs. Presence of Rubio's catheter Extremities:               Without clubbing, cyanosis, or edema. Skin:                                    Normal color, texture, and turgor. No rashes, lesions, or jaundice. Pulses:                      Radial and dorsalis pedis pulses present 2+ bilaterally.  
                                            Capillary refill <2s. Neurologic:                CN II-XII grossly intact and symmetrical.  
                                            Moving all four extremities well with no focal deficits. Psychiatric:                Pleasant demeanor, appropriate affect. Alert and oriented x 3 Objective:  
 
Vitals:  
 02/29/20 2308 03/01/20 6941 03/01/20 0732 03/01/20 4951 BP: 108/64 105/60  105/57 Pulse: 80 80  81 Resp: 18 18  16 Temp: 98.3 °F (36.8 °C) 98.2 °F (36.8 °C)  95.7 °F (35.4 °C) SpO2: 98% 98% 97% 93% Weight:      
Height:      
  
 
Intake and Output: 
Current Shift: No intake/output data recorded. Last three shifts: 02/28 1901 - 03/01 0700 In: 7779 [P.G.:5604] Out: 4900 [UIUVM:2813] Physical Exam:  
 
General:                    The patient is a pleasant middle aged male in mild acute respiratory  Distress when exerting himself. Head:                                   Normocephalic/atraumatic. Eyes:                                   No palpebral pallor or scleral icterus. ENT:                                    External auricular and nasal exam within normal limits. Mucous membranes are moist. 
Neck:                                   Supple, non-tender, no JVD. Lungs:                       Clear to auscultation bilaterally without wheezes or crackles. No respiratory distress or accessory muscle use. Heart:                                  Regular rate and rhythm, without murmurs, rubs, or gallops. Abdomen:                  Soft, non-tender, non-distended with normoactive bowel sounds. Genitourinary:           No tenderness over the bladder or bilateral CVAs. Extremities:               Without clubbing, cyanosis, or edema. Skin:                                    Normal color, texture, and turgor. No rashes, lesions, or jaundice. Pulses:                      Radial and dorsalis pedis pulses present 2+ bilaterally. Capillary refill <2s. Neurologic:                CN II-XII grossly intact and symmetrical.  
                                            Moving all four extremities well with no focal deficits. Psychiatric:                Pleasant demeanor, appropriate affect. Alert and oriented x 3 Lab/Data Review: 
 
XR chest  
2- IMPRESSION: 
  
1. Bilateral airspace densities, likely pulmonary edema or pneumonia. . No 
significant change compared to prior. Recent Results (from the past 24 hour(s)) GLUCOSE, POC Collection Time: 02/29/20 12:21 PM  
Result Value Ref Range Glucose (POC) 180 (H) 65 - 100 mg/dL PLEASE READ & DOCUMENT PPD TEST IN 48 HRS Collection Time: 02/29/20  3:41 PM  
Result Value Ref Range PPD Negative Negative  
 mm Induration 0 0 - 5 mm GLUCOSE, POC Collection Time: 02/29/20  5:47 PM  
Result Value Ref Range Glucose (POC) 160 (H) 65 - 100 mg/dL GLUCOSE, POC Collection Time: 02/29/20 11:57 PM  
Result Value Ref Range Glucose (POC) 164 (H) 65 - 100 mg/dL METABOLIC PANEL, BASIC Collection Time: 03/01/20  4:13 AM  
Result Value Ref Range Sodium 140 136 - 145 mmol/L Potassium 3.9 3.5 - 5.1 mmol/L Chloride 106 98 - 107 mmol/L  
 CO2 29 21 - 32 mmol/L Anion gap 5 (L) 7 - 16 mmol/L Glucose 123 (H) 65 - 100 mg/dL BUN 25 (H) 8 - 23 MG/DL Creatinine 0.98 0.8 - 1.5 MG/DL  
 GFR est AA >60 >60 ml/min/1.73m2 GFR est non-AA >60 >60 ml/min/1.73m2 Calcium 9.9 8.3 - 10.4 MG/DL  
CBC W/O DIFF Collection Time: 03/01/20  4:13 AM  
Result Value Ref Range WBC 12.6 (H) 4.3 - 11.1 K/uL  
 RBC 3.30 (L) 4.23 - 5.6 M/uL HGB 9.2 (L) 13.6 - 17.2 g/dL HCT 29.8 (L) 41.1 - 50.3 % MCV 90.3 79.6 - 97.8 FL  
 MCH 27.9 26.1 - 32.9 PG  
 MCHC 30.9 (L) 31.4 - 35.0 g/dL  
 RDW 15.6 (H) 11.9 - 14.6 % PLATELET 211 530 - 219 K/uL MPV 12.0 9.4 - 12.3 FL ABSOLUTE NRBC 0.16 0.0 - 0.2 K/uL PROTHROMBIN TIME + INR Collection Time: 03/01/20  4:13 AM  
Result Value Ref Range Prothrombin time 24.2 (H) 12.0 - 14.7 sec INR 2.1 MAGNESIUM Collection Time: 03/01/20  4:13 AM  
Result Value Ref Range Magnesium 2.2 1.8 - 2.4 mg/dL Current Facility-Administered Medications:  
  albuterol (PROVENTIL VENTOLIN) nebulizer solution 1.25 mg, 1.25 mg, Nebulization, QID RT, Kandice Frances MD, 2.5 mg at 03/01/20 0729 
  magic mouthwash (MAHESH) oral suspension 10 mL, 10 mL, Oral, Q4H PRN, Kell Sparrow MD, 10 mL at 02/29/20 2122   warfarin (COUMADIN) tablet 1 mg, 1 mg, Oral, QPM, Rosario Coronado MD, 1 mg at 02/29/20 1724   oxybutynin (DITROPAN) tablet 5 mg, 5 mg, Oral, TID, Rosanne Miner MD, 5 mg at 03/01/20 5443   furosemide (LASIX) injection 40 mg, 40 mg, IntraVENous, Q12H, Basil Tavera MD, 40 mg at 03/01/20 0827 
  metoprolol succinate (TOPROL-XL) XL tablet 25 mg, 25 mg, Oral, DAILY, Cherrie, Ijeoma Allen MD, 25 mg at 03/01/20 3033 
  lisinopril (PRINIVIL, ZESTRIL) tablet 5 mg, 5 mg, Oral, DAILY, Ijeoma Grier MD, Stopped at 02/27/20 0900   potassium chloride (K-DUR, KLOR-CON) SR tablet 40 mEq, 40 mEq, Oral, BID, Basil Tavera MD, 40 mEq at 03/01/20 0826 
  phenazopyridine (PYRIDIUM) tab 95 mg, 95 mg, Oral, BID PRN, Basil Tavera MD, 95 mg at 02/28/20 1253   opium-belladonna (B&O 16-A SUPPRETTE) 16.2-60 mg suppository 1 Suppository, 1 Suppository, Rectal, Q8H PRN, Jaye Carbajal NP, 1 Suppository at 02/26/20 7220   LORazepam (ATIVAN) injection 1 mg, 1 mg, IntraVENous, Q4H PRN, Pratima Zacarias MD, 1 mg at 02/24/20 2019   morphine injection 2 mg, 2 mg, IntraVENous, Q3H PRN, Malia Alfred MD, 2 mg at 02/28/20 2001   risperiDONE (RisperDAL) tablet 0.5 mg, 0.5 mg, Oral, QHS, Mckenna Larry MD, 0.5 mg at 02/29/20 2121 
  amiodarone (CORDARONE) tablet 200 mg, 200 mg, Oral, BID, Lashaun Vizcaino MD, 200 mg at 03/01/20 0827 
  NUTRITIONAL SUPPORT ELECTROLYTE PRN ORDERS, , Does Not Apply, PRN, Martina Turner MD 
  aspirin chewable tablet 81 mg, 81 mg, Oral, DAILY, Lashaun Vizcaino MD, 81 mg at 03/01/20 5627 
  atorvastatin (LIPITOR) tablet 40 mg, 40 mg, Oral, DAILY, Garrett Arias MD, 40 mg at 03/01/20 8672   spironolactone (ALDACTONE) tablet 25 mg, 25 mg, Oral, DAILY, Lashaun Vizcaino MD, 25 mg at 03/01/20 0826 
  lip protectant (BLISTEX) ointment 1 Each, 1 Each, Topical, PRN, Malia Alfred MD 
  sodium chloride (NS) flush 5-40 mL, 5-40 mL, IntraVENous, Q8H, Martina Turner MD, 10 mL at 03/01/20 3756   sodium chloride (NS) flush 5-40 mL, 5-40 mL, IntraVENous, PRN, Martina Turner MD 
  insulin lispro (HUMALOG) injection, , SubCUTAneous, Q6H, Martina Turner MD, Stopped at 03/01/20 0600 Assessment:  
 
Principal Problem: 
  Acute on chronic systolic heart failure ([facility] (2/20/2020) Overview: Echo 2/20/20:  EF 20-25% Echo 4/15: EF 30-35%, mild to mod MR Echo 2011: EF 30-35% Active Problems: Hx of AICD-Implanted Cardiac Defibrillator (2/22/2016) Overview: Follow by Dr. Shaunna Lynn NSVT (nonsustained ventricular tachycardia) ([facility] (8/7/2017) [facility] (2/7/2018) Acute respiratory failure with hypoxia ([facility] (2/20/2020) Acute pulmonary edema ([facility] (2/20/2020) DNR (do not resuscitate) (2/25/2020) Plan:  
 
CHF, chronic and acute systolic and diastolic Severe MR and RV dysfunction. AF 
IV Lasix q 12 hours Monitor intake and output and renal function. Continue Spironolactone. BP is on low side. S/P AICD with normal function Resumed Warfarin BMP is checked daily due to Lasix IV.  
  
Non-sustained VT On Amiodarone.  
  
Acute respiratory failure with hypoxia S/P extubation Completed Empiric IV antibiotics to cover possible aspiration.  
  
Anemia No acute bleeding Monitor  
  
Diabetes mellitus type 2 Monitor blood sugar. Cover with insulin sliding scale accordingly.   
  
I have discussed the plan of care with patient.  
  
I have discussed with patient regarding advance directive. Patient would like to have a DNR status.   
  
DVT prophylaxis : already on [address]. Disposition plan : To be determined Signed By: Jossy Glez MD   
 March 1, 2020

## 2020-03-02 NOTE — PROGRESS NOTES
Warfarin dosing per pharmacist 
 
Ming Manriquez. is a 75 y.o. male. Height: 6' 1\" (185.4 cm)    Weight: 98.2 kg (216 lb 8 oz) Indication:  Afib Goal INR:  2 to 2.5 (per Dr. Sydnee Serrano' note on 2/27) Home dose:  2.5 mg po daily Risk factors or significant drug interactions:  Amiodarone Other anticoagulants:  N/A Daily Monitoring Date  INR     Warfarin dose HGB              Notes 2/27  1.9  2.5 mg  8.8   
2/28  2.4  1 mg  8.8 
2/29  2.2  1 mg  10.1 3/1  2.1  1.5 mg  9.2 
3/2  1.9  2 mg  10.2 Warfarin was discontinued for 3 days due to anemia. Patient also had supratherapeutic level of 4.4 on 2/23. Goal is to keep INR on lower side of goal (2-2.5). INR continues to trend down, now 1.9. Will give warfarin 2 mg this evening in an effort to maintain INR between 2 and 2.5. Pharmacy will continue to follow patient and monitor INR daily. Thank you, Lety Gutierres, Pharm. D. 
PGY1 Pharmacy Resident [phone]

## 2020-03-02 NOTE — PROGRESS NOTES
Samson Wall. Admission Date: 2/20/2020 Daily Progress Note: 3/2/2020 The patient's chart is reviewed and the patient is discussed with the staff. 76 yo CM with a history of chronic systolic CHF (EF 75-84%), severe MR, CAD, NSVT s/p ICD, aflutter s/p AV node ablation on 2/11/20, and prior CVA. He presented to the ER 2/20/20 with shortness of breath and in acute resp failure with RA sat 80%. Was initially placed on CPAP but required intubation by EMS. CXR concerning for volume overload/pulmonary edema and was given Lasix 60mg IV with minimal UOP. BNP was 5206 and LA is 5.6 but trended down to 1.7. Was started on Zosyn/Vanc secondary to emesis episode during intubation.  Was able to be extubated 2/21, wore BIPAP and weaned to Opti-flow.  Cardiology following for NSVT with chronic ICD. Interrogations with multiple episodes under detection zone and was placed on Amio.  Family decided on DNR status.  Having episodes of confusion Subjective:  
Awake and alert but weak. Not in pain. No wheezing. Current Facility-Administered Medications Medication Dose Route Frequency  warfarin (COUMADIN) tablet 2 mg  2 mg Oral QPM  
 spironolactone (ALDACTONE) tablet 12.5 mg  12.5 mg Oral DAILY  furosemide (LASIX) tablet 40 mg  40 mg Oral Q12H  
 albuterol (PROVENTIL VENTOLIN) nebulizer solution 1.25 mg  1.25 mg Nebulization QID RT  
 magic mouthwash (MAHESH) oral suspension 10 mL  10 mL Oral Q4H PRN  
 oxybutynin (DITROPAN) tablet 5 mg  5 mg Oral TID  metoprolol succinate (TOPROL-XL) XL tablet 25 mg  25 mg Oral DAILY  lisinopril (PRINIVIL, ZESTRIL) tablet 5 mg  5 mg Oral DAILY  potassium chloride (K-DUR, KLOR-CON) SR tablet 40 mEq  40 mEq Oral BID  phenazopyridine (PYRIDIUM) tab 95 mg  95 mg Oral BID PRN  
 opium-belladonna (B&O 16-A SUPPRETTE) 16.2-60 mg suppository 1 Suppository  1 Suppository Rectal Q8H PRN  
  LORazepam (ATIVAN) injection 1 mg  1 mg IntraVENous Q4H PRN  
 morphine injection 2 mg  2 mg IntraVENous Q3H PRN  
 risperiDONE (RisperDAL) tablet 0.5 mg  0.5 mg Oral QHS  amiodarone (CORDARONE) tablet 200 mg  200 mg Oral BID  
 NUTRITIONAL SUPPORT ELECTROLYTE PRN ORDERS   Does Not Apply PRN  
 aspirin chewable tablet 81 mg  81 mg Oral DAILY  atorvastatin (LIPITOR) tablet 40 mg  40 mg Oral DAILY  lip protectant (BLISTEX) ointment 1 Each  1 Each Topical PRN  
 sodium chloride (NS) flush 5-40 mL  5-40 mL IntraVENous Q8H  
 sodium chloride (NS) flush 5-40 mL  5-40 mL IntraVENous PRN  
 insulin lispro (HUMALOG) injection   SubCUTAneous Q6H Review of Systems Constitutional: negative for fever, chills, sweats +generalized weakness Cardiovascular: negative for chest pain, palpitations, syncope, edema Gastrointestinal:  negative for dysphagia, reflux, vomiting, diarrhea, abdominal pain, or melena Neurologic:  negative for focal weakness, numbness, headache Objective:  
 
Vitals:  
 03/02/20 0317 03/02/20 0704 03/02/20 0745 03/02/20 1105 BP: 132/64  107/58 Pulse: 78  80 Resp: 19  18 Temp: 98.1 °F (36.7 °C)  97.7 °F (36.5 °C) SpO2: 95% 92% 95% 92% Weight:      
Height:      
 
 
 
Intake/Output Summary (Last 24 hours) at 3/2/2020 1138 Last data filed at 3/2/2020 1207 Gross per 24 hour Intake 240 ml Output 1300 ml Net -1060 ml Physical Exam:  
Constitution:  the patient is well developed and in no acute distress; In bed EENMT:  Sclera clear, pupils equal, oral mucosa moist 
Respiratory: Crackles throughout; No wheezing. On RA Cardiovascular:  RRR without G,R; Grade 1/6 murmur at the LSB Gastrointestinal: soft and non-tender; with positive bowel sounds. Musculoskeletal: warm without cyanosis. There is no lower extremity edema. Skin:  no jaundice or rashes, no wounds Neurologic: no gross neuro deficits Psychiatric:  alert and oriented x 3 CXR:  
 TODAY 2/29 2/26 LAB Recent Labs 03/02/20 
6825 03/01/20 
2348 03/01/20 
1723 03/01/20 
1146 02/29/20 
2357 GLUCPOC 150* 164* 174* 188* 164* Recent Labs 03/02/20 
1021 03/01/20 
0413 02/29/20 
0404 WBC 12.7* 12.6* 12.3* HGB 10.2* 9.2* 10.1* HCT 33.5* 29.8* 32.9*  
 264 269 INR 1.9 2.1 2.2 Recent Labs 03/02/20 
3636 03/01/20 
0413 02/29/20 
0404  140 141  
K 4.3 3.9 3.9 * 106 108* CO2 26 29 24 * 123* 134* BUN 27* 25* 30* CREA 0.97 0.98 1.13  
MG 2.4 2.2 2.4  
CA 11.3* 9.9 9.5 Assessment:  (Medical Decision Making) Hospital Problems  Date Reviewed: 2/28/2020 Codes Class Noted POA  
 DNR (do not resuscitate) ICD-10-CM: S80 ICD-9-CM: V49.86  2/25/2020 Unknown * (Principal) Acute on chronic systolic heart failure (HCC) ICD-10-CM: I50.23 ICD-9-CM: 428.23  2/20/2020 Yes Overview Addendum 2/24/2020  9:21 AM by Madina Clement NP Echo 2/20/20:  EF 20-25% Echo 4/15: EF 30-35%, mild to mod MR Echo 2011: EF 30-35% Acute respiratory failure with hypoxia [facility]) ICD-10-CM: J96.01 
ICD-9-CM: 518.81  2/20/2020 Yes Acceptable sats on 2lpm  
 Acute pulmonary edema (HCC) ICD-10-CM: J81.0 ICD-9-CM: 518.4  2/20/2020 Yes Diuretics seem to be helping Dilated cardiomyopathy ([facility] ICD-10-CM: I42.0 ICD-9-CM: 425.4  2/7/2018 Yes NSVT (nonsustained ventricular tachycardia) (HCC) ICD-10-CM: I47.2 ICD-9-CM: 427.1  8/7/2017 Yes Hx of AICD-Implanted Cardiac Defibrillator ICD-10-CM: Z95.810 ICD-9-CM: V45.02  2/22/2016 Yes Follow by Dr. John Parsons Plan:  (Medical Decision Making) --on RA now and keep off if possible 
--mouth is sore and change magic mouth wash to 4 x per day, not prn 
--encouraged to eat 
--get overnight on RA to see if needs oxygen.  On RA now 
--stronger but still weak, would likely benefit from rehab and thinking about it. Also considering hospice per chart 
--continue remaining treatment. More than 50% of the time documented was spent in face-to-face contact with the patient and in the care of the patient on the floor/unit where the patient is located.  
 
Rodney Scanlon MD

## 2020-03-02 NOTE — PROGRESS NOTES
Problem: Dysphagia (Adult) Goal: *Speech Goal: (INSERT TEXT) Description LTG: Patient will tolerate least restrictive diet without overt signs or symptoms of airway compromise by discharge. STG: Patient will tolerate chopped mechanical soft diet and thin liquids without overt signs or symptoms of aspiration 100% of the time. STG: Patient will participate in modified barium swallow study as clinically indicated. Outcome: Progressing Towards Goal 
 
SPEECH LANGUAGE PATHOLOGY: DYSPHAGIA- Daily Note  3 NAME/AGE/GENDER: Yeimi Oviedo is a 75 y.o. male DATE: 3/2/2020 PRIMARY DIAGNOSIS: Acute on chronic systolic heart failure ([facility] [I50.23] ICD-10: Treatment Diagnosis: R13.11 Dysphagia, Oral Phase INTERDISCIPLINARY COLLABORATION: Registered Nurse PRECAUTIONS/ALLERGIES: Sulfur SUBJECTIVE Patient upright in bedside chair for session. Now on room air. Patient complains of oral pain and is using magic mouth wash per wife. Orientation:  
Person Place Time 
 
Pain: Pain Scale 1: Numeric (0 - 10) Pain Intensity 1: 5 Pain Location 1: Mouth Pain Intervention(s) 1: Emotional support OBJECTIVE Patient seen for po trials and diet tolerance. No overt s/sx airway compromise with thin by cup/straw, puree x3 trials, or mechanical soft consistency x1 trial. Oral holding with liquids and puree. Patient states he \"swishes\" the drinks and pudding around in his mouth to help with the pain. No oral holding with chewable trial.  Oral clearance post swallow WNL with all trials. Patient refused further intake due to oral pain. ASSESSMENT Patient reporting mild-moderate oral pain this date, which is suspected to be contributing to oral holding. No pharyngeal dysphagia symptoms identified;however, limited assessment of diet tolerance this date due to minimal PO intake.  Educated patient and patient's wife on option for full liquid diet or puree diet to reduce oral pain when eating; however, patient and wife declined option. Continue prescribed diet: mechanical soft diet/thin liquids. Medications whole in puree. Will continue to follow for diet tolerance with upgraded diet. Tool Used: Dysphagia Outcome and Severity Scale (SAMREEN) Score Comments Normal Diet  [] 7 With no strategies or extra time needed Functional Swallow  [] 6 May have mild oral or pharyngeal delay Mild Dysphagia  [] 5 Which may require one diet consistency restricted Mild-Moderate Dysphagia  [x] 4 With 1-2 diet consistencies restricted Moderate Dysphagia  [] 3 With 2 or more diet consistencies restricted Moderate-Severe Dysphagia  [] 2 With partial PO strategies (trials with ST only) Severe Dysphagia  [] 1 With inability to tolerate any PO safely Score:  Initial: 4 Most Recent: 4 (Date 03/02/20 ) Interpretation of Tool: The Dysphagia Outcome and Severity Scale (SAMREEN) is a simple, easy-to-use, 7-point scale developed to systematically rate the functional severity of dysphagia based on objective assessment and make recommendations for diet level, independence level, and type of nutrition. PLAN   
FREQUENCY/DURATION: Continue to follow patient 3 times a week for duration of hospital stay to address above goals. - Recommendations for next treatment session: Next treatment will address oral dysphagia REHABILITATION POTENTIAL FOR STATED GOALS: Good COMPLIANCE WITH PROGRAM/EXERCISES: Will assess as treatment progresses CONTINUATION OF SKILLED SERVICES/MEDICAL NECESSITY: 
? Patient is expected to demonstrate progress in  swallow strength, swallow timeliness, swallow function, diet tolerance and swallow safety in order to  improve swallow safety, work toward diet advancement and decrease aspiration risk. ? Patient continues to require skilled intervention due to dysphagia. RECOMMENDATIONS  
DIET:  
? PO:  Mechanical soft ? Liquids:  regular thin ? MEDICATIONS: Whole in puree ASPIRATION PRECAUTIONS · Slow rate of intake · Small bites/sips · Upright at 90 degrees during meal 
  
COMPENSATORY STRATEGIES/MODIFICATIONS · Alternate liquids/solids EDUCATION: 
· Recommendations discussed with Nursing · Family · Patient RECOMMENDATIONS for CONTINUED SPEECH THERAPY: YES: Anticipate need for ongoing speech therapy during this hospitalization. SAFETY: 
After treatment position/precautions: · Upright in bed · Wife at bedside · Call light within reach Total Treatment Duration:  
Time In: 3688 Time Out: 3007 Tabitha Huang MS, CCC-SLP

## 2020-03-02 NOTE — PROGRESS NOTES
History of iron deficiency anemia  Patient currently has lot of fatigue  Will check iron panel  SPEECH PATHOLOGY NOTE: 
 
Attempted to see patient for diet tolerance this AM; however, patient currently working with PT. Will check back at later time/date as patient is available and as schedule permits. Axel Martin MS, CCC-SLP  
 
 
ADDENDUM: 
 
Attempted to see patient on 2nd occasion this date; however, PT at bedside. Will check back at later time/date.  
 
Axel Martin MS, CCC-SLP

## 2020-03-02 NOTE — PROGRESS NOTES
Problem: Falls - Risk of 
Goal: *Absence of Falls Description Document Kaylen Soares Fall Risk and appropriate interventions in the flowsheet. Outcome: Progressing Towards Goal 
Note: Fall Risk Interventions: 
Mobility Interventions: Communicate number of staff needed for ambulation/transfer, Patient to call before getting OOB Mentation Interventions: Adequate sleep, hydration, pain control, Evaluate medications/consider consulting pharmacy Medication Interventions: Evaluate medications/consider consulting pharmacy, Patient to call before getting OOB, Teach patient to arise slowly Elimination Interventions: Call light in reach, Patient to call for help with toileting needs, Toilet paper/wipes in reach History of Falls Interventions: Consult care management for discharge planning, Evaluate medications/consider consulting pharmacy, Investigate reason for fall Problem: Pressure Injury - Risk of 
Goal: *Prevention of pressure injury Description Document Everardo Scale and appropriate interventions in the flowsheet. Outcome: Progressing Towards Goal 
Note: Pressure Injury Interventions: 
Sensory Interventions: Assess changes in LOC, Maintain/enhance activity level, Pressure redistribution bed/mattress (bed type) Moisture Interventions: Maintain skin hydration (lotion/cream) Activity Interventions: Increase time out of bed, Pressure redistribution bed/mattress(bed type), PT/OT evaluation Mobility Interventions: HOB 30 degrees or less, Pressure redistribution bed/mattress (bed type) Nutrition Interventions: Offer support with meals,snacks and hydration, Document food/fluid/supplement intake Friction and Shear Interventions: Foam dressings/transparent film/skin sealants, HOB 30 degrees or less

## 2020-03-02 NOTE — PROGRESS NOTES
END OF SHIFT NOTE: 
 
Intake/Output 03/02 0701 - 03/02 1900 In: 480 [P.O.:480] Out: 725 [Urine:725] Voiding: YES Catheter: NO 
Drain:   
 
 
 
 
 
Stool:  1 occurrences. Stool Assessment Stool Color: (flushed before I saw it.) (03/02/20 1301) Stool Appearance: Soft; Formed (03/01/20 2059) Stool Amount: Medium (03/02/20 1301) Stool Source/Status: Rectum (03/02/20 1301) Emesis:  0 occurrences. VITAL SIGNS Patient Vitals for the past 12 hrs: 
 Temp Pulse Resp BP SpO2  
03/02/20 1533 98.2 °F (36.8 °C) 81 18 99/45 90 % 03/02/20 1400     97 % 03/02/20 1143 97.4 °F (36.3 °C) 81 18 118/64 93 % 03/02/20 1105     92 % 03/02/20 0745 97.7 °F (36.5 °C) 80 18 107/58 95 % 03/02/20 0704     92 % Pain Assessment Pain 1 Pain Scale 1: Numeric (0 - 10) (03/02/20 1405) Pain Intensity 1: 5 (03/02/20 1405) Patient Stated Pain Goal: 0 (03/02/20 0805) Pain Reassessment 1: Yes (02/28/20 2100) Pain Location 1: Mouth (03/02/20 1405) Pain Description 1: Heaviness (02/28/20 2001) Pain Intervention(s) 1: Emotional support (03/02/20 1405) Ambulating Yes Additional Information: planning on going to [facility] Shift report given to oncoming nurse at the bedside.  
 
Tiffanie Hong, RN

## 2020-03-02 NOTE — PROGRESS NOTES
Problem: Mobility Impaired (Adult and Pediatric) Goal: *Acute Goals and Plan of Care (Insert Text) Description LTG: 
(1.)Mr. Bert Jones will move from supine to sit and sit to supine , scoot up and down and roll side to side with MODIFIED INDEPENDENCE within 7 treatment day(s). (2.)Mr. Bert Jones will transfer from bed to chair and chair to bed with SUPERVISION using the least restrictive device within 7 treatment day(s). (3.)Mr. Bert Jones will ambulate with STAND BY ASSIST for 250+ feet with the least restrictive device within 7 treatment day(s) while maintaining normal vital signs. (4.)Mr. Bert Jones will perform 7 steps with HR and CGA within 7 treatment days for safety ascending and descending stairs for home.  
_______________________________________________________________________________________________ Outcome: Progressing Towards Goal 
  
PHYSICAL THERAPY: Daily Note and PM 3/2/2020 INPATIENT: PT Visit Days : 2 [place] / [place] PART [place] B / Product Type: Medicare /   
  
NAME/AGE/GENDER: Samantha Fisher is a 75 y.o. male PRIMARY DIAGNOSIS: Acute on chronic systolic heart failure (HCC) [I50.23] Acute on chronic systolic heart failure (HCC) Acute on chronic systolic heart failure ([facility] [address] ICD-10: Treatment Diagnosis:  
 · Generalized Muscle Weakness (M62.81) · Difficulty in walking, Not elsewhere classified (R26.2) Precaution/Allergies: 
Sulfur ASSESSMENT:  
 
Mr. Bert Jones was received in his room. It became clear very quickly that Mr. Bert Jones was having some anxiety issues. His wife was with him and talking to him very carefully. He mentioned a few comments about the war in [facility] and so this PT made sure to go slow. To clarify all that we were going to do. Give him the opportunity to direct what happened in the treatment. It took quite a bit of time to do what seems like not much work when you step back however it was a successful treatment.   His anxiety level by the end was way down. He tolerated increase activity with decreased assist.  He no longer needs oxygen with O2 sats 95-99% on room air with activity. We talked about the bed offer he has at rehab and all agree that it will be a good time for him to build on his confidence and strength to return home more independent. It shouldn't be a long stay as he is doing well. This section established at most recent assessment PROBLEM LIST (Impairments causing functional limitations): 1. Decreased ADL/Functional Activities 2. Decreased Transfer Abilities 3. Decreased Ambulation Ability/Technique 4. Decreased Balance 5. Decreased Activity Tolerance 6. Increased Fatigue 7. Increased Shortness of Breath 8. Decreased Cognition INTERVENTIONS PLANNED: (Benefits and precautions of physical therapy have been discussed with the patient.) 1. Balance Exercise 2. Bed Mobility 3. Family Education 4. Gait Training 5. Home Exercise Program (HEP) 6. Therapeutic Activites 7. Therapeutic Exercise/Strengthening 8. Transfer Training TREATMENT PLAN: Frequency/Duration: 3 times a week for duration of hospital stay Rehabilitation Potential For Stated Goals: Good REHAB RECOMMENDATIONS (at time of discharge pending progress):   
Placement: It is my opinion, based on this patient's performance to date, that Mr. Shanda Jernigan may benefit from intensive therapy at a [address] [place] [address] after discharge due to the functional deficits listed above that are likely to improve with skilled rehabilitation and concerns that he/she may be unsafe to be unsupervised at home due to fall risk, safety concerns with ambulation and transfers. Equipment:  
? None at this time HISTORY:  
History of Present Injury/Illness (Reason for Referral): 
Patient is a 75 y.o.  male presents with shortness of breath. He has a known history of sCHF with last TTE in 2019 showing EF 25% and moderate MRSissy He just underwent AV node ablation on 2/11. There is no family present and the patient is currently intubated, but the reported history if of gradually worsening shortness of breath over the last week. He was found by EMS in respiratory distress, hypoxic on room air. He was placed on NRB with sats in the low 90's. He was intubated by ER physician due to respiratory distress. Off and on episodes of dyspnea over the past few days. Some diaphoresis intermittently. Wife had wanted to call EMS but patient had delayed. Most labs are still pending but CXR is suggestive of volume overload Past Medical History/Comorbidities:  
Mr. Orville Mcwilliams  has a past medical history of Acute myocardial infarction [facility]) (2000), Aortic Aneurysm/Dilation of the Aorta (2/22/2016), Aortic ectasia, abdominal ([address] (5/9/2014), Arrhythmia, ASCAD-of the Native Vessel (2/22/2016), CAD (coronary artery disease) (2008), Cerebrovascular accident (stroke) ([address] (1/30/2016), Chest pain (2/22/2016), Chronic pain, Chronic systolic heart failure ([address] (2/22/2016), Congestive heart failure, unspecified, Coronary atherosclerosis of unspecified type of vessel, native or graft (2007), GERD (gastroesophageal reflux disease), History of agent Orange exposure, Hx of AICD-Implanted Cardiac Defibrillator (2/22/2016), Hypercholesterolemia, Hyperlipidemia (2/22/2016), Hypertension, Ill-defined condition, Ill-defined disease, Liver disease, Morbid obesity ([address] Myocardial infarct/Anterolateral age unspe. (2/22/2016), and Stroke ([address] (12/27/2011).  He also has no past medical history of Arthritis, Asthma, Autoimmune disease ([address] Cancer ([address] Chronic kidney disease, Chronic obstructive pulmonary disease ([address] Diabetes ([address] Difficult intubation, Malignant hyperthermia due to anesthesia, Nausea & vomiting, Pseudocholinesterase deficiency, Psychiatric disorder, PUD (peptic ulcer disease), Seizures ([address] Sleep apnea, Thromboembolus ([facility] [address] or Thyroid disease. Mr. Angelica Osman  has a past surgical history that includes hx other surgical; pr cardiac surg procedure unlist; hx gi; and hx pacemaker. Social History/Living Environment:  
Home Environment: Private residence # Steps to Enter: 8 One/Two Story Residence: One story Living Alone: No 
Support Systems: Spouse/Significant Other/Partner Patient Expects to be Discharged to[de-identified] Rehabilitation facility Current DME Used/Available at Home: None Tub or Shower Type: Shower Prior Level of Function/Work/Activity: 
Lives with spouse, independent at [facility], drives, no falls, RA Personal Factors:   
      Sex:  male Age:  75 y.o. Overall Behavior:  agreeable Number of Personal Factors/Comorbidities that affect the Plan of Care: 
Age, CAD, CVA, CHF 3+: HIGH COMPLEXITY EXAMINATION:  
Most Recent Physical Functioning:  
Gross Assessment: 
  
         
  
Posture: 
  
Balance: 
Sitting: Intact Standing: Impaired; With support Standing - Static: Good Standing - Dynamic : Fair Bed Mobility: 
Rolling: Independent Supine to Sit: Supervision Scooting: Supervision Wheelchair Mobility: 
  
Transfers: 
Sit to Stand: Contact guard assistance Stand to Sit: Contact guard assistance Gait: 
  
Step Length: Right shortened;Left shortened Distance (ft): 50 Feet (ft)(x 2 with seated rest break. then 100 ft back to room. ) Assistive Device: Walker, rolling;Gait belt Ambulation - Level of Assistance: Contact guard assistance Interventions: Verbal cues Body Structures Involved: 1. Lungs 2. Muscles Body Functions Affected: 1. Respiratory 2. Movement Related Activities and Participation Affected: 1. General Tasks and Demands 2. Mobility 3. Self Care Number of elements that affect the Plan of Care: 4+: HIGH COMPLEXITY CLINICAL PRESENTATION:  
Presentation: Evolving clinical presentation with changing clinical characteristics: MODERATE COMPLEXITY CLINICAL DECISION MAKING:  
[facility] 6 Clicks Basic Mobility Inpatient Short Form How much difficulty does the patient currently have. .. Unable A Lot A Little None 1. Turning over in bed (including adjusting bedclothes, sheets and blankets)? [] 1   [] 2   [] 3   [x] 4  
2. Sitting down on and standing up from a chair with arms ( e.g., wheelchair, bedside commode, etc.)   [] 1   [] 2   [x] 3   [] 4  
3. Moving from lying on back to sitting on the side of the bed? [] 1   [] 2   [x] 3   [] 4 How much help from another person does the patient currently need. .. Total A Lot A Little None 4. Moving to and from a bed to a chair (including a wheelchair)? [] 1   [] 2   [x] 3   [] 4  
5. Need to walk in hospital room? [] 1   [] 2   [x] 3   [] 4  
6. Climbing 3-5 steps with a railing? [] 1   [] 2   [x] 3   [] 4  
© 2007, Trustees of [facility], under license to [employer]. All rights reserved Score:  Initial: 19 Most Recent: X (Date: -- ) Interpretation of Tool:  Represents activities that are increasingly more difficult (i.e. Bed mobility, Transfers, Gait). Medical Necessity:    
· Patient is expected to demonstrate progress in  
· strength, range of motion, balance, and coordination ·  to  
· decrease assistance required with overall functional mobility, transfers, ambulation · . · Patient demonstrates · good ·  rehab potential due to higher previous functional level. Reason for Services/Other Comments: 
· Patient · continues to require present interventions due to patient's inability to perform bed mobility, transfers, ambulation safely and effectively · . Use of outcome tool(s) and clinical judgement create a POC that gives a: Clear prediction of patient's progress: LOW COMPLEXITY  
  
 
 
 
TREATMENT:  
(In addition to Assessment/Re-Assessment sessions the following treatments were rendered) Pre-treatment Symptoms/Complaints:\" maybe I should wait until tomorrow. \" 
Pain: Initial:  
Pain Intensity 1: 0  Post Session:  Calm and feeling good about his activity. Therapeutic Activity: (    30): Therapeutic activities including Bed transfers, Chair transfers, Ambulation on level ground and pre gait and gait education to improve mobility. Required minimal Verbal cues to promote motor control of bilateral, upper extremity(s), lower extremity(s). . 
 
Braces/Orthotics/Lines/Etc:  
· correia catheter · O2 Device: Hi flow nasal cannula Treatment/Session Assessment:   
· Response to Treatment:  fair tolerance, fatigues easily · Interdisciplinary Collaboration:  
o Physical Therapist 
o Registered Nurse · After treatment position/precautions:  
o Up in chair 
o Bed/Chair-wheels locked 
o Bed in low position 
o Call light within reach 
o Family at bedside · Compliance with Program/Exercises: Will assess as treatment progresses · Recommendations/Intent for next treatment session: \"Next visit will focus on advancements to more challenging activities\". Total Treatment Duration: PT Patient Time In/Time Out Time In: 4747 Time Out: 3832 Kamilla Garcia, PT

## 2020-03-02 NOTE — PROGRESS NOTES
[facility] CARDIOLOGY PROGRESS NOTE 
      
 
3/2/2020 8:01 AM 
 
Admit Date: 2/20/2020 Subjective: No complaints. ROS: 
GEN:  No fever or chills Cardiovascular:  As noted above:no CP or palpitations. Pulmonary:  As noted above:SOB improved. Neuro:  No new focal motor or sensory loss Objective:  
  
Vitals:  
 03/01/20 1943 03/01/20 2324 03/02/20 1166 03/02/20 8063 BP: 98/58 103/61 132/64 Pulse:  86 78 Resp:  20 19 Temp:  98.1 °F (36.7 °C) 98.1 °F (36.7 °C) SpO2:  95% 95% 92% Weight:      
Height:      
 
 
Physical Exam: 
General-no distress Neck- supple, no JVD 
CV- regular rate and rhythm no MRG Lung- basilar crackles bilaterally Abd- soft, nontender, nondistended Ext- no edema bilaterally. Skin- warm and dry Psychiatric:  Normal mood and affect. Neurologic:  Alert Data Review:  
Recent Labs 03/02/20 
8611 03/01/20 
0413  140  
K 4.3 3.9 MG 2.4 2.2 BUN 27* 25* CREA 0.97 0.98  
* 123* WBC 12.7* 12.6* HGB 10.2* 9.2* HCT 33.5* 29.8*  264 INR 1.9 2.1 TELEMETRY: V-paced. Assessment/Plan:  
 
Principal Problem: 
  Acute on chronic systolic heart failure (HCC) (2/20/2020):Continue GDMT. Transistioning to rehab and ?future hospice/palliative care. Overview: Echo 2/20/20:  EF 20-25% Echo 4/15: EF 30-35%, mild to mod MR Echo 2011: EF 30-35% Active Problems: Hx of AICD-Implanted Cardiac Defibrillator (2/22/2016) Overview: Follow by Dr. Darlin Wesley NSVT (nonsustained ventricular tachycardia) ([facility] [address] (8/7/2017): Stable on BB and Amiodarone. Dilated cardiomyopathy ([address] (2/7/2018):cONTINUE gdmt. Acute respiratory failure with hypoxia ([address] (2/20/2020) Acute pulmonary edema ([address] (2/20/2020): Improving. Continue po lasix,Toprol,Aldactone,and ACE-I. 
 
  DNR (do not resuscitate) (2/25/2020) Alejandro Cooper MD 
3/2/2020 8:01 AM

## 2020-03-02 NOTE — PROGRESS NOTES
Chart review complete, pt has STR bed available at [facility] when medically ready for DC. CM will remain available.

## 2020-03-02 NOTE — PROGRESS NOTES
Progress Note Patient: Rudy Brannon. MRN: [id number]  SSN: [id number] YOB: 1945  Age: 75 y.o. Sex: male Admit Date: 2/20/2020 LOS: 11 days Subjective:  
 
Rudy Mccormick is a 75 y.o. male who is being seen for assuming care to our service.  
  
Patient was admitted on 2- with Acute respiratory failure with hypoxia and was intubated.  
  
He has PMH of chronic systolic CHF (EF 97%), MR, CAD, NSVT s/p ICD, AFlutter s/p AV node ablation, CVA.  
  
He was treated for CHF exacerbation. ICD interrogation shows multiple episodes. Patient is on Amiodarone now.  
  
He is on Lasix IV q 12 hours. He continues to have good urine output. Patient is talking well today. He is still feeling stronger. He is asking and answering. Physical Exam: 
  
General:                    The patient is a pleasant male in no acute respiratory distress. CUTE:                                   [id number]. Eyes:                                   No palpebral pallor or scleral icterus. ENT:                                    External auricular and nasal exam within normal limits.                                             NTGDLX membranes are moist. 
Neck:                                   Supple, non-tender, no JVD. Lungs:                       decreased to auscultation bilaterally without wheezes or crackles.                                             No respiratory distress or accessory muscle use. Heart:                                  Regular rate and rhythm, without murmurs, rubs, or gallops. Abdomen:                  Soft, non-tender, distended with normoactive bowel sounds. Genitourinary:           No tenderness over the bladder or bilateral CVAs. Presence of Rubio's catheter Extremities:               Without clubbing, cyanosis, or edema.  
Skin:                                    Normal color, texture, and turgor. No rashes, lesions, or jaundice. Pulses:                      Radial and dorsalis pedis pulses present 2+ bilaterally.  
                                            Capillary refill <2s. Neurologic:                CN II-XII grossly intact and symmetrical.  
                                            Moving all four extremities well with no focal deficits. Psychiatric:                Pleasant demeanor, appropriate affect. Alert and oriented x 3 Objective:  
 
Vitals:  
 03/01/20 2324 03/02/20 8514 03/02/20 3485 03/02/20 0745 BP: 103/61 132/64  107/58 Pulse: 86 78  80 Resp: 20 19  18 Temp: 98.1 °F (36.7 °C) 98.1 °F (36.7 °C)  97.7 °F (36.5 °C) SpO2: 95% 95% 92% 95% Weight:      
Height:      
  
 
Intake and Output: 
Current Shift: 03/02 0701 - 03/02 1900 In: 240 [P.O.:240] Out: - Last three shifts: 02/29 1901 - 03/02 0700 In: 840 [P.O.:840] Out: 2900 [Urine:2900] Physical Exam:  
 
General:                    The patient is a pleasant middle aged male in mild acute respiratory  Mild distress when exerting himself. Head:                                   Normocephalic/atraumatic. Eyes:                                   No palpebral pallor or scleral icterus. ENT:                                    External auricular and nasal exam within normal limits. Mucous membranes are moist. 
Neck:                                   Supple, non-tender, no JVD. Lungs:                       Clear to auscultation bilaterally without wheezes or crackles. No respiratory distress or accessory muscle use. Heart:                                  Regular rate and rhythm, without murmurs, rubs, or gallops. Abdomen:                  Soft, non-tender, non-distended with normoactive bowel sounds. Genitourinary:           No tenderness over the bladder or bilateral CVAs. Extremities:               Without clubbing, cyanosis, or edema. Skin:                                    Normal color, texture, and turgor. No rashes, lesions, or jaundice. Pulses:                      Radial and dorsalis pedis pulses present 2+ bilaterally. Capillary refill <2s. Neurologic:                CN II-XII grossly intact and symmetrical.  
                                            Moving all four extremities well with no focal deficits. Psychiatric:                Pleasant demeanor, appropriate affect. Alert and oriented x 3 Lab/Data Review: 
 
XR chest  
2- IMPRESSION: 
  
1. Bilateral airspace densities, likely pulmonary edema or pneumonia. . No 
significant change compared to prior. Recent Results (from the past 24 hour(s)) GLUCOSE, POC Collection Time: 03/01/20 11:46 AM  
Result Value Ref Range Glucose (POC) 188 (H) 65 - 100 mg/dL PLEASE READ & DOCUMENT PPD TEST IN 72 HRS Collection Time: 03/01/20  2:00 PM  
Result Value Ref Range PPD Negative Negative  
 mm Induration 0 0 - 5 mm GLUCOSE, POC Collection Time: 03/01/20  5:23 PM  
Result Value Ref Range Glucose (POC) 174 (H) 65 - 100 mg/dL GLUCOSE, POC Collection Time: 03/01/20 11:48 PM  
Result Value Ref Range Glucose (POC) 164 (H) 65 - 100 mg/dL METABOLIC PANEL, BASIC Collection Time: 03/02/20  3:41 AM  
Result Value Ref Range Sodium 142 136 - 145 mmol/L Potassium 4.3 3.5 - 5.1 mmol/L Chloride 109 (H) 98 - 107 mmol/L  
 CO2 26 21 - 32 mmol/L Anion gap 7 7 - 16 mmol/L Glucose 153 (H) 65 - 100 mg/dL BUN 27 (H) 8 - 23 MG/DL Creatinine 0.97 0.8 - 1.5 MG/DL  
 GFR est AA >60 >60 ml/min/1.73m2 GFR est non-AA >60 >60 ml/min/1.73m2 Calcium 11.3 (H) 8.3 - 10.4 MG/DL  
CBC W/O DIFF Collection Time: 03/02/20  3:41 AM  
Result Value Ref Range WBC 12.7 (H) 4.3 - 11.1 K/uL  
 RBC 3.61 (L) 4.23 - 5.6 M/uL HGB 10.2 (L) 13.6 - 17.2 g/dL HCT 33.5 (L) 41.1 - 50.3 % MCV 92.8 79.6 - 97.8 FL  
 MCH 28.3 26.1 - 32.9 PG  
 MCHC 30.4 (L) 31.4 - 35.0 g/dL  
 RDW 16.4 (H) 11.9 - 14.6 % PLATELET 135 747 - 700 K/uL MPV 12.1 9.4 - 12.3 FL ABSOLUTE NRBC 0.13 0.0 - 0.2 K/uL PROTHROMBIN TIME + INR Collection Time: 03/02/20  3:41 AM  
Result Value Ref Range Prothrombin time 22.3 (H) 12.0 - 14.7 sec INR 1.9 MAGNESIUM Collection Time: 03/02/20  3:41 AM  
Result Value Ref Range Magnesium 2.4 1.8 - 2.4 mg/dL GLUCOSE, POC Collection Time: 03/02/20  6:13 AM  
Result Value Ref Range Glucose (POC) 150 (H) 65 - 100 mg/dL Current Facility-Administered Medications:  
  warfarin (COUMADIN) tablet 2 mg, 2 mg, Oral, QPM, Kell Sparrow MD 
  spironolactone (ALDACTONE) tablet 12.5 mg, 12.5 mg, Oral, DAILY, Pedro Luis Alvarez MD, 12.5 mg at 03/02/20 4832   furosemide (LASIX) tablet 40 mg, 40 mg, Oral, Q12H, Pedro Luis Alvarez MD, 40 mg at 03/02/20 0811 
  albuterol (PROVENTIL VENTOLIN) nebulizer solution 1.25 mg, 1.25 mg, Nebulization, QID RT, Ric Ríos MD, 1.25 mg at 03/02/20 8692   magic mouthwash (MAHESH) oral suspension 10 mL, 10 mL, Oral, Q4H PRN, Kell Sparrow MD, 10 mL at 02/29/20 5952 
  oxybutynin (DITROPAN) tablet 5 mg, 5 mg, Oral, TID, Braden Silva MD, 5 mg at 03/02/20 0394 
  metoprolol succinate (TOPROL-XL) XL tablet 25 mg, 25 mg, Oral, DAILY, López Grier MD, 25 mg at 03/02/20 0217 
  lisinopril (PRINIVIL, ZESTRIL) tablet 5 mg, 5 mg, Oral, DAILY, Juana RUIZ MD, 5 mg at 03/02/20 5926   potassium chloride (K-DUR, KLOR-CON) SR tablet 40 mEq, 40 mEq, Oral, BID, Maryam Esparza MD, 40 mEq at 03/02/20 3643   phenazopyridine (PYRIDIUM) tab 95 mg, 95 mg, Oral, BID PRN, Maryam Esparza MD, 95 mg at 02/28/20 1253   opium-belladonna (B&O 16-A SUPPRETTE) 16.2-60 mg suppository 1 Suppository, 1 Suppository, Rectal, Q8H PRN, Stephen Carbajal, NP, 1 Suppository at 02/26/20 3015   LORazepam (ATIVAN) injection 1 mg, 1 mg, IntraVENous, Q4H PRN, Diaz Nguyen MD, 1 mg at 02/24/20 2019   morphine injection 2 mg, 2 mg, IntraVENous, Q3H PRN, Diaz Nguyen MD, 2 mg at 02/28/20 2001   risperiDONE (RisperDAL) tablet 0.5 mg, 0.5 mg, Oral, QHS, Shelly Nicole MD, 0.5 mg at 03/01/20 1946 
  amiodarone (CORDARONE) tablet 200 mg, 200 mg, Oral, BID, Sherrie Downs MD, 200 mg at 03/02/20 0810 
  NUTRITIONAL SUPPORT ELECTROLYTE PRN ORDERS, , Does Not Apply, PRN, Marilyn Walsh MD 
  aspirin chewable tablet 81 mg, 81 mg, Oral, DAILY, Izabella Eng MD, 81 mg at 03/02/20 9183 
  atorvastatin (LIPITOR) tablet 40 mg, 40 mg, Oral, DAILY, Izabella Eng MD, 40 mg at 03/02/20 0477   lip protectant (BLISTEX) ointment 1 Each, 1 Each, Topical, PRN, Diaz Nguyen MD 
  sodium chloride (NS) flush 5-40 mL, 5-40 mL, IntraVENous, Q8H, Marilyn Walsh MD, 10 mL at 03/02/20 3968   sodium chloride (NS) flush 5-40 mL, 5-40 mL, IntraVENous, PRN, Marilyn Walsh MD, 10 mL at 03/01/20 1949 
  insulin lispro (HUMALOG) injection, , SubCUTAneous, Q6H, Marilyn Walsh MD, 1 Units at 03/02/20 2911 Assessment:  
 
Principal Problem: 
  Acute on chronic systolic heart failure ([address] (2/20/2020) Overview: Echo 2/20/20:  EF 20-25% Echo 4/15: EF 30-35%, mild to mod MR Echo 2011: EF 30-35% Active Problems: Hx of AICD-Implanted Cardiac Defibrillator (2/22/2016) Overview: Follow by Dr. Stephan Pleitez NSVT (nonsustained ventricular tachycardia) ([address] (8/7/2017) Dilated cardiomyopathy ([address] (2/7/2018) Acute respiratory failure with hypoxia ([address] (2/20/2020) Acute pulmonary edema ([address] (2/20/2020) DNR (do not resuscitate) (2/25/2020) Plan:  
 
CHF, chronic and acute systolic and diastolic Severe MR and RV dysfunction.   
AF 
 He was on IV Lasix q 12 hours. IV Lasix was stopped due to hypotension. Cardiology will decide if patient should start PO Lasix. Monitor intake and output and renal function. Continue Spironolactone. BP is on low side. S/P AICD with normal function Resumed Warfarin BMP is checked daily.  
  
Non-sustained VT On Amiodarone.  
  
Acute respiratory failure with hypoxia S/P extubation Completed Empiric IV antibiotics to cover possible aspiration.  
  
Anemia No acute bleeding Monitor  
  
Diabetes mellitus type 2 Monitor blood sugar. Cover with insulin sliding scale accordingly.   
  
I have discussed the plan of care with patient.  
  
I have discussed with patient regarding advance directive. Patient would like to have a DNR status.   
  
[place] : [place] [facility] [place] Disposition plan : To be determined Signed By: Melissa Staley MD   
 March 2, 2020

## 2020-03-03 NOTE — PROGRESS NOTES
[facility] CARDIOLOGY PROGRESS NOTE 
      
 
3/3/2020 4:16 PM 
 
Admit Date: 2/20/2020 Subjective:  
 
Improved dyspnea; on room air. Some issues with weakness. ~9.5L net neg ROS: 
Cardiovascular:  As noted above Objective:  
  
Vitals:  
 03/03/20 1517 03/03/20 1535 03/03/20 1554 03/03/20 1658 BP:  (!) 86/49 (!) 84/50 (!) 82/50 Pulse:  80 82 82 Resp:  20 Temp:  97.5 °F (36.4 °C) SpO2: 94% 92% 92% 96% Weight:      
Height:      
 
 
Physical Exam: 
General-No Acute Distress Neck- supple, no JVD 
CV- regular rate and rhythm no MRG Lung- clear bilaterally Abd- soft, nontender, nondistended Ext- no edema bilaterally. Skin- warm and dry Data Review:  
Recent Labs 03/03/20 
7738 03/02/20 
4022  142  
K 4.1 4.3 MG 2.3 2.4 BUN 29* 27* CREA 1.10 0.97 * 153* WBC 12.7* 12.7* HGB 10.5* 10.2* HCT 34.2* 33.5*  
 286 INR 1.7 1.9 Assessment/Plan:  
 
Principal Problem: 
  Acute on chronic systolic heart failure ([address] (2/20/2020) Active Problems: Hx of AICD-Implanted Cardiac Defibrillator (2/22/2016) NSVT (nonsustained ventricular tachycardia) ([address] (8/7/2017) Dilated cardiomyopathy ([address] (2/7/2018) Acute respiratory failure with hypoxia ([address] (2/20/2020) Acute pulmonary edema ([address] (2/20/2020) DNR (do not resuscitate) (2/25/2020) Improved volume status Appears possibly plans to [facility] in am 
Last noted cath with no progression in CAD (2/2019; single coronary artery arising from the right coronary cusp with patent left circumflex and RCA stents). Hx of BiV (11/19)/AVN ablation (2/20); prior aflutter/afib-on coumadin. Severe LV dysfn but low BPs limit further therapy titration Haritha Zaldivar MD 
3/3/2020 4:16 PM

## 2020-03-03 NOTE — PROGRESS NOTES
END OF SHIFT NOTE: 
 
Intake/Output 03/02 1901 - 03/03 0700 In: 27 [P.O.:30] Out: 250 [Urine:250] Voiding: YES Catheter: NO 
Drain:   
 
 
 
 
 
Stool:  1 occurrences. Stool Assessment Stool Color: (flushed before I saw it.) (03/02/20 1301) Stool Appearance: Soft; Formed (03/01/20 2059) Stool Amount: Medium (03/02/20 1301) Stool Source/Status: Rectum (03/02/20 1301) Emesis:  0 occurrences. VITAL SIGNS Patient Vitals for the past 12 hrs: 
 Temp Pulse Resp BP SpO2  
03/03/20 0233 97.7 °F (36.5 °C) 81 20 109/70 93 % 03/03/20 0000  78     
03/02/20 2248 97.8 °F (36.6 °C) 76 20 109/73 92 % 03/02/20 2200     96 % 03/02/20 2115     96 % 03/02/20 2100     (!) 81 % 03/02/20 2004 97.7 °F (36.5 °C) 81 18 102/64 91 % 03/02/20 2000  81     
03/02/20 1938     90 % 03/02/20 1905     90 % Pain Assessment Pain 1 Pain Scale 1: Numeric (0 - 10) (03/02/20 1930) Pain Intensity 1: 0 (03/02/20 1930) Patient Stated Pain Goal: 0 (03/02/20 1930) Pain Reassessment 1: Yes (02/28/20 2100) Pain Location 1: Mouth (03/02/20 1405) Pain Description 1: Heaviness (02/28/20 2001) Pain Intervention(s) 1: Emotional support (03/02/20 1405) Ambulating Yes Additional Information:  
 
Shift report given to oncoming nurse at the bedside. Marguerite Andre

## 2020-03-03 NOTE — PROGRESS NOTES
Pt with poor appetite; eating only a few bites at each meal. Total PO fluid intake this shift = 600 ml. SBP low 80s, Dr. Efren Ash notified, parameters placed on lopressor and lisinopril. Pt ASX. Spouse at bedside. Report given to oncoming RN at bedside.

## 2020-03-03 NOTE — PROGRESS NOTES
Jomarchato Sanjiv. Admission Date: 2/20/2020 Daily Progress Note: 3/3/2020 The patient's chart is reviewed and the patient is discussed with the staff. 76 yo CM with a history of chronic systolic CHF (EF 56-46%), severe MR, CAD, NSVT s/p ICD, aflutter s/p AV node ablation on 2/11/20, and prior CVA. He presented to the ER 2/20/20 with shortness of breath and in acute resp failure with RA sat 80%. Was initially placed on CPAP but required intubation by EMS. CXR concerning for volume overload/pulmonary edema and was given Lasix 60mg IV with minimal UOP. BNP was 5206 and LA is 5.6 but trended down to 1.7. Was started on Zosyn/Vanc secondary to emesis episode during intubation.  Was able to be extubated 2/21, wore BIPAP and weaned to Opti-flow.  Cardiology following for NSVT with chronic ICD. Interrogations with multiple episodes under detection zone and was placed on Amio.  Family decided on DNR status.  Having episodes of confusion Subjective:  
 
Patient seen resting in bed, feels \"okay\". Will be going to [facility] today. Current Facility-Administered Medications Medication Dose Route Frequency  warfarin (COUMADIN) tablet 2 mg  2 mg Oral QPM  
 magic mouthwash (MAHESH) oral suspension 10 mL  10 mL Oral Q6H  
 spironolactone (ALDACTONE) tablet 12.5 mg  12.5 mg Oral DAILY  furosemide (LASIX) tablet 40 mg  40 mg Oral Q12H  
 albuterol (PROVENTIL VENTOLIN) nebulizer solution 1.25 mg  1.25 mg Nebulization QID RT  
 oxybutynin (DITROPAN) tablet 5 mg  5 mg Oral TID  metoprolol succinate (TOPROL-XL) XL tablet 25 mg  25 mg Oral DAILY  lisinopril (PRINIVIL, ZESTRIL) tablet 5 mg  5 mg Oral DAILY  potassium chloride (K-DUR, KLOR-CON) SR tablet 40 mEq  40 mEq Oral BID  phenazopyridine (PYRIDIUM) tab 95 mg  95 mg Oral BID PRN  
 opium-belladonna (B&O 16-A SUPPRETTE) 16.2-60 mg suppository 1 Suppository  1 Suppository Rectal Q8H PRN  
  LORazepam (ATIVAN) injection 1 mg  1 mg IntraVENous Q4H PRN  
 morphine injection 2 mg  2 mg IntraVENous Q3H PRN  
 risperiDONE (RisperDAL) tablet 0.5 mg  0.5 mg Oral QHS  amiodarone (CORDARONE) tablet 200 mg  200 mg Oral BID  
 NUTRITIONAL SUPPORT ELECTROLYTE PRN ORDERS   Does Not Apply PRN  
 aspirin chewable tablet 81 mg  81 mg Oral DAILY  atorvastatin (LIPITOR) tablet 40 mg  40 mg Oral DAILY  lip protectant (BLISTEX) ointment 1 Each  1 Each Topical PRN  
 sodium chloride (NS) flush 5-40 mL  5-40 mL IntraVENous Q8H  
 sodium chloride (NS) flush 5-40 mL  5-40 mL IntraVENous PRN  
 insulin lispro (HUMALOG) injection   SubCUTAneous Q6H Review of Systems Constitutional: negative for fever, chills, sweats +generalized weakness Cardiovascular: negative for chest pain, palpitations, syncope, edema Gastrointestinal:  negative for dysphagia, reflux, vomiting, diarrhea, abdominal pain, or melena Neurologic:  negative for focal weakness, numbness, headache Objective:  
 
Vitals:  
 03/03/20 0400 03/03/20 0705 03/03/20 0817 03/03/20 1110 BP:   101/59 Pulse: 81  71 Resp:   20 Temp:   98.2 °F (36.8 °C) SpO2:  94% 94% 93% Weight:      
Height:      
 
 
 
Intake/Output Summary (Last 24 hours) at 3/3/2020 1122 Last data filed at 3/3/2020 8595 Gross per 24 hour Intake 270 ml Output 425 ml Net -155 ml Physical Exam:  
Constitution:  the patient is well developed and in no acute distress; In bed EENMT:  Sclera clear, pupils equal, oral mucosa moist 
Respiratory: Crackles throughout; No wheezing. On RA Cardiovascular:  RRR without G,R; Grade 1/6 murmur at the LSB Gastrointestinal: soft and non-tender; with positive bowel sounds. Musculoskeletal: warm without cyanosis. There is no lower extremity edema. Skin:  no jaundice or rashes, no wounds Neurologic: no gross neuro deficits Psychiatric:  alert and oriented x 3 CXR:  
2/29 2/26 LAB Recent Labs 03/03/20 
3308 03/02/20 
2356 03/02/20 8705 03/02/20 
1230 03/02/20 
9764 GLUCPOC 133* 154* 177* 184* 150* Recent Labs 03/03/20 
0304 03/02/20 
6909 03/01/20 
0413 WBC 12.7* 12.7* 12.6* HGB 10.5* 10.2* 9.2* HCT 34.2* 33.5* 29.8*  286 264 INR 1.7 1.9 2.1 Recent Labs 03/03/20 
3350 03/02/20 
5236 03/01/20 
0413  142 140  
K 4.1 4.3 3.9 * 109* 106 CO2 26 26 29 * 153* 123* BUN 29* 27* 25* CREA 1.10 0.97 0.98  
MG 2.3 2.4 2.2 CA 11.7* 11.3* 9.9 Assessment:  (Medical Decision Making) Hospital Problems  Date Reviewed: 2/28/2020 Codes Class Noted POA  
 DNR (do not resuscitate) ICD-10-CM: A64 ICD-9-CM: V49.86  2/25/2020 Unknown * (Principal) Acute on chronic systolic heart failure (HCC) ICD-10-CM: I50.23 ICD-9-CM: 428.23  2/20/2020 Yes Overview Addendum 2/24/2020  9:21 AM by Erik Mckinney NP Echo 2/20/20:  EF 20-25% Echo 4/15: EF 30-35%, mild to mod MR Echo 2011: EF 30-35% Acute respiratory failure with hypoxia [facility]) ICD-10-CM: J96.01 
ICD-9-CM: 518.81  2/20/2020 Yes Acceptable sats on 2lpm  
 Acute pulmonary edema (HCC) ICD-10-CM: J81.0 ICD-9-CM: 518.4  2/20/2020 Yes Diuretics seem to be helping Dilated cardiomyopathy ([facility] ICD-10-CM: I42.0 ICD-9-CM: 425.4  2/7/2018 Yes NSVT (nonsustained ventricular tachycardia) (HCC) ICD-10-CM: I47.2 ICD-9-CM: 427.1  8/7/2017 Yes Hx of AICD-Implanted Cardiac Defibrillator ICD-10-CM: Z95.810 ICD-9-CM: V45.02  2/22/2016 Yes Follow by Dr. Lynette Garcia Plan:  (Medical Decision Making) --on RA 
--Continue magic mouth wash 
--encouraged to eat 
--On RA, BERNARDO revealed desaturation of <89% for over 1.5 hrs, would benefit from 2L O2 NC at night. --Still would like to be discharged to rehab, will be d/c'd to [facility] More than 50% of the time documented was spent in face-to-face contact with the patient and in the care of the patient on the floor/unit where the patient is located. Sharifa Corado NP I have spoken with and examined the patient. I agree with the above assessment and plan as documented. Now on lasix 40mg bid and spironolactone and BP/K/renal function stable. On RA in daytime and needs 2lpm at night. Gen: pleasant on RA Lungs:  CTA, few rales in bases Heart:  RRR Abd; NTND Ext: edema improved 
 
--stable from respiratory perspective for discharge to [facility]. Would advise standing weight be documented today for future purposes in follow-up. --[facility]n at [facility]. Have provided wife with our contact information should any breathing problems arise.  
 
Mildred Perez MD

## 2020-03-03 NOTE — PROGRESS NOTES
Dr. Ibeth Matias notified via perfect serve pt's bp low 80s over 50, HR 80s. Pt alert, sitting in bed, talking with visitors. Dr. Cristina Presser to put in parameters for lopressor and lisinopril. OK to give scheduled amiodarone.

## 2020-03-03 NOTE — PROGRESS NOTES
Problem: Mobility Impaired (Adult and Pediatric) Goal: *Acute Goals and Plan of Care (Insert Text) Description LTG: 
(1.)Mr. Kye Arroyo will move from supine to sit and sit to supine , scoot up and down and roll side to side with MODIFIED INDEPENDENCE within 7 treatment day(s). (2.)Mr. Kye Arroyo will transfer from bed to chair and chair to bed with SUPERVISION using the least restrictive device within 7 treatment day(s). (3.)Mr. Kye Arroyo will ambulate with STAND BY ASSIST for 250+ feet with the least restrictive device within 7 treatment day(s) while maintaining normal vital signs. (4.)Mr. Kye Arroyo will perform 7 steps with HR and CGA within 7 treatment days for safety ascending and descending stairs for home.  
_______________________________________________________________________________________________ Outcome: Progressing Towards Goal 
  
PHYSICAL THERAPY: Daily Note and PM 3/3/2020 INPATIENT: PT Visit Days : 2 [place] / [place] B / Product Type: Medicare /   
  
NAME/AGE/GENDER: An Walton is a 75 y.o. male PRIMARY DIAGNOSIS: Acute on chronic systolic heart failure (HCC) [I50.23] Acute on chronic systolic heart failure (HCC) Acute on chronic systolic heart failure ([facility] ICD-10: Treatment Diagnosis:  
 · Generalized Muscle Weakness (M62.81) · Difficulty in walking, Not elsewhere classified (R26.2) Precaution/Allergies: 
Sulfur ASSESSMENT:  
 
Mr. Kye Arroyo was willing to participate. Took extra time. He needs someone to be slow and give him extra cuing and really let him know what the plan is and then he needs extra time between activity. His activity tolerance is low. Today he tolerated increased distance but less over all activity. Hopefully to rehab tomorrow. This section established at most recent assessment PROBLEM LIST (Impairments causing functional limitations): 1. Decreased ADL/Functional Activities 2. Decreased Transfer Abilities 3. Decreased Ambulation Ability/Technique 4. Decreased Balance 5. Decreased Activity Tolerance 6. Increased Fatigue 7. Increased Shortness of Breath 8. Decreased Cognition INTERVENTIONS PLANNED: (Benefits and precautions of physical therapy have been discussed with the patient.) 1. Balance Exercise 2. Bed Mobility 3. Family Education 4. Gait Training 5. Home Exercise Program (HEP) 6. Therapeutic Activites 7. Therapeutic Exercise/Strengthening 8. Transfer Training TREATMENT PLAN: Frequency/Duration: 3 times a week for duration of hospital stay Rehabilitation Potential For Stated Goals: Good REHAB RECOMMENDATIONS (at time of discharge pending progress):   
Placement: It is my opinion, based on this patient's performance to date, that Mr. Johanna Barrow may benefit from intensive therapy at a [address] after discharge due to the functional deficits listed above that are likely to improve with skilled rehabilitation and concerns that he/she may be unsafe to be unsupervised at home due to fall risk, safety concerns with ambulation and transfers. Equipment:  
? None at this time HISTORY:  
History of Present Injury/Illness (Reason for Referral): 
Patient is a 75 y.o.  male presents with shortness of breath. He has a known history of sCHF with last TTE in 2019 showing EF 25% and moderate MR. He just underwent AV node ablation on 2/11. There is no family present and the patient is currently intubated, but the reported history if of gradually worsening shortness of breath over the last week. He was found by EMS in respiratory distress, hypoxic on room air. He was placed on NRB with sats in the low 90's. He was intubated by ER physician due to respiratory distress. Off and on episodes of dyspnea over the past few days. Some diaphoresis intermittently. Wife had wanted to call EMS but patient had delayed. Most labs are still pending but CXR is suggestive of volume overload Past Medical History/Comorbidities:  
Mr. Naina Carrizales  has a past medical history of Acute myocardial infarction [facility]) (2000), Aortic Aneurysm/Dilation of the Aorta (2/22/2016), Aortic ectasia, abdominal ([address] (5/9/2014), Arrhythmia, ASCAD-of the Native Vessel (2/22/2016), CAD (coronary artery disease) (2008), Cerebrovascular accident (stroke) ([address] (1/30/2016), Chest pain (2/22/2016), Chronic pain, Chronic systolic heart failure ([address] (2/22/2016), Congestive heart failure, unspecified, Coronary atherosclerosis of unspecified type of vessel, native or graft (2007), GERD (gastroesophageal reflux disease), History of agent Orange exposure, Hx of AICD-Implanted Cardiac Defibrillator (2/22/2016), Hypercholesterolemia, Hyperlipidemia (2/22/2016), Hypertension, Ill-defined condition, Ill-defined disease, Liver disease, Morbid obesity ([address] Myocardial infarct/Anterolateral age unspe. (2/22/2016), and Stroke ([address] (12/27/2011). He also has no past medical history of Arthritis, Asthma, Autoimmune disease ([address] Cancer ([address] Chronic kidney disease, Chronic obstructive pulmonary disease ([address] Diabetes ([address] Difficult intubation, Malignant hyperthermia due to anesthesia, Nausea & vomiting, Pseudocholinesterase deficiency, Psychiatric disorder, PUD (peptic ulcer disease), Seizures ([address] Sleep apnea, Thromboembolus ([address] or Thyroid disease. Mr. Naina Carrizales  has a past surgical history that includes hx other surgical; pr cardiac surg procedure unlist; hx gi; and hx pacemaker. Social History/Living Environment:  
Home Environment: Private residence # Steps to Enter: 8 One/Two Story Residence: One story Living Alone: No 
Support Systems: Spouse/Significant Other/Partner Patient Expects to be Discharged to[de-identified] Rehabilitation facility Current DME Used/Available at Home: None Tub or Shower Type: Shower Prior Level of Function/Work/Activity: Lives with spouse, independent at [facility], drives, no falls, RA Personal Factors:   
      Sex:  male Age:  75 y.o. Overall Behavior:  agreeable Number of Personal Factors/Comorbidities that affect the Plan of Care: 
Age, CAD, CVA, CHF 3+: HIGH COMPLEXITY EXAMINATION:  
Most Recent Physical Functioning:  
Gross Assessment: 
  
         
  
Posture: 
  
Balance: 
Sitting: Intact Standing: Impaired; With support Standing - Static: Good Standing - Dynamic : Fair Bed Mobility: 
Rolling: Independent Supine to Sit: Supervision Scooting: Supervision Wheelchair Mobility: 
  
Transfers: 
Sit to Stand: Contact guard assistance Stand to Sit: Contact guard assistance Bed to Chair: Contact guard assistance Gait: 
  
Speed/Sarina: Slow Step Length: Right shortened;Left shortened Distance (ft): 120 Feet (ft) Assistive Device: Walker, rolling;Gait belt Ambulation - Level of Assistance: Contact guard assistance Body Structures Involved: 1. Lungs 2. Muscles Body Functions Affected: 1. Respiratory 2. Movement Related Activities and Participation Affected: 1. General Tasks and Demands 2. Mobility 3. Self Care Number of elements that affect the Plan of Care: 4+: HIGH COMPLEXITY CLINICAL PRESENTATION:  
Presentation: Evolving clinical presentation with changing clinical characteristics: MODERATE COMPLEXITY CLINICAL DECISION MAKIN [facility] [address] [id number] AM-PAC 6 Clicks Basic Mobility Inpatient Short Form How much difficulty does the patient currently have. .. Unable A Lot A Little None 1. Turning over in bed (including adjusting bedclothes, sheets and blankets)? [] 1   [] 2   [] 3   [x] 4  
2. Sitting down on and standing up from a chair with arms ( e.g., wheelchair, bedside commode, etc.)   [] 1   [] 2   [x] 3   [] 4  
3. Moving from lying on back to sitting on the side of the bed?    [] 1   [] 2   [x] 3   [] 4  
 How much help from another person does the patient currently need. .. Total A Lot A Little None 4. Moving to and from a bed to a chair (including a wheelchair)? [] 1   [] 2   [x] 3   [] 4  
5. Need to walk in hospital room? [] 1   [] 2   [x] 3   [] 4  
6. Climbing 3-5 steps with a railing? [] 1   [] 2   [x] 3   [] 4  
© 2007, Trustees of [facility] [employer], under license to [employer]. All rights reserved Score:  Initial: 19 Most Recent: X (Date: -- ) Interpretation of Tool:  Represents activities that are increasingly more difficult (i.e. Bed mobility, Transfers, Gait). Medical Necessity:    
· Patient is expected to demonstrate progress in  
· strength, range of motion, balance, and coordination ·  to  
· decrease assistance required with overall functional mobility, transfers, ambulation · . · Patient demonstrates · good ·  rehab potential due to higher previous functional level. Reason for Services/Other Comments: 
· Patient · continues to require present interventions due to patient's inability to perform bed mobility, transfers, ambulation safely and effectively · . Use of outcome tool(s) and clinical judgement create a POC that gives a: Clear prediction of patient's progress: LOW COMPLEXITY  
  
 
 
 
TREATMENT:  
(In addition to Assessment/Re-Assessment sessions the following treatments were rendered) Pre-treatment Symptoms/Complaints:No complaints today Pain: Initial:  
Pain Intensity 1: 0  Post Session: No complaints. Therapeutic Activity: (    15): Therapeutic activities including Bed transfers, Chair transfers, Ambulation on level ground and pre gait and gait education to improve mobility. Required minimal   to promote motor control of bilateral, upper extremity(s), lower extremity(s). . 
 
Braces/Orthotics/Lines/Etc:  
· correia catheter · O2 Device: Hi flow nasal cannula Treatment/Session Assessment: · Response to Treatment:  fair tolerance, fatigues easily · Interdisciplinary Collaboration:  
o Physical Therapist 
o Registered Nurse · After treatment position/precautions:  
o Up in chair 
o Bed/Chair-wheels locked 
o Bed in low position 
o Call light within reach 
o Family at bedside · Compliance with Program/Exercises: Will assess as treatment progresses · Recommendations/Intent for next treatment session: \"Next visit will focus on advancements to more challenging activities\". Total Treatment Duration: PT Patient Time In/Time Out Time In: 1200 Time Out: 4516 Ema Garcia, PT

## 2020-03-03 NOTE — PROGRESS NOTES
Warfarin dosing per pharmacist 
 
Pura Cabrera. is a 75 y.o. male. Height: 6' 1\" (185.4 cm)    Weight: 94.6 kg (208 lb 8 oz) Indication:  Afib Goal INR:  2 to 2.5 (per Dr. Queenie Atkins' note on 2/27) Home dose:  2.5 mg po daily Risk factors or significant drug interactions:  Amiodarone Other anticoagulants:  N/A Daily Monitoring Date  INR     Warfarin dose HGB              Notes 2/27  1.9  2.5 mg  8.8   
2/28  2.4  1 mg  8.8 
2/29  2.2  1 mg  10.1 3/1  2.1  1.5 mg  9.2 
3/2  1.9  2 mg  10.2 
3/3  1.7  2 mg  10.5 Warfarin was discontinued for 3 days due to anemia. Patient also had supratherapeutic level of 4.4 on 2/23. Goal is to keep INR on lower side of goal (2-2.5). INR continues to trend down, now 1.7. Will give warfarin 2 mg this evening in an effort to maintain INR between 2 and 2.5. Pharmacy will continue to follow patient and monitor INR daily. Thank you, Lety Gutierres, Pharm. D. 
PGY1 Pharmacy Resident [phone]

## 2020-03-03 NOTE — PROGRESS NOTES
Progress Note Patient: Rudy Mccormick MRN: [id number]  SSN: [id number] YOB: 1945  Age: 75 y.o. Sex: male Admit Date: 2/20/2020 LOS: 12 days Subjective:  
 
Rudy Mccormick is a 75 y.o. male with chronic systolic CHF (EF 36%), MR, CAD, NSVT s/p ICD, AFlutter s/p AV node ablation, CVA who was admitted on 2- with acute respiratory failure with hypoxia requiring intubation. Found to have acute CHF exacerbation, initially requiring lasix drip. ICD interrogation shows multiple episodes. Patient is on Amiodarone now and has been transitioned to PO lasix. 
  
Pt reports he is doing well today. No SOB, CP. + mouth pain but improving over past week. Able to take small amounts PO at a time. Objective:  
 
Vitals:  
 03/03/20 0705 03/03/20 0817 03/03/20 1100 03/03/20 1110 BP:  101/59 95/68 Pulse:  71 80 Resp:  20 22 Temp:  98.2 °F (36.8 °C) 97.5 °F (36.4 °C) SpO2: 94% 94% 93% 93% Weight:      
Height:      
  
 
Intake and Output: 
Current Shift: No intake/output data recorded. Last three shifts: 03/01 1901 - 03/03 0700 In: 510 [P.O.:510] Out: 1475 [[id number]] Physical Exam:  
 
General: NAD HEENT: normal oropharynx Lungs: CTA BL Heart: RRR Abdomen:   Soft, non-tender, non-distended with normoactive bowel sounds. Extremities:no C/C/E Neurologic: CN II-XII grossly intact and symmetrical. No focal deficits Psychiatric:  Pleasant demeanor, appropriate affect. Alert and oriented x 3 Lab/Data Review: 
 
XR chest  
2- IMPRESSION: 
  
1. Bilateral airspace densities, likely pulmonary edema or pneumonia. . No 
significant change compared to prior. Recent Results (from the past 24 hour(s)) GLUCOSE, POC Collection Time: 03/02/20 12:30 PM  
Result Value Ref Range Glucose (POC) 184 (H) 65 - 100 mg/dL GLUCOSE, POC Collection Time: 03/02/20  6:17 PM  
Result Value Ref Range Glucose (POC) 177 (H) 65 - 100 mg/dL GLUCOSE, POC Collection Time: 03/02/20 11:56 PM  
Result Value Ref Range Glucose (POC) 154 (H) 65 - 100 mg/dL METABOLIC PANEL, BASIC Collection Time: 03/03/20  3:47 AM  
Result Value Ref Range Sodium 142 136 - 145 mmol/L Potassium 4.1 3.5 - 5.1 mmol/L Chloride 108 (H) 98 - 107 mmol/L  
 CO2 26 21 - 32 mmol/L Anion gap 8 7 - 16 mmol/L Glucose 152 (H) 65 - 100 mg/dL BUN 29 (H) 8 - 23 MG/DL Creatinine 1.10 0.8 - 1.5 MG/DL  
 GFR est AA >60 >60 ml/min/1.73m2 GFR est non-AA >60 >60 ml/min/1.73m2 Calcium 11.7 (H) 8.3 - 10.4 MG/DL  
CBC W/O DIFF Collection Time: 03/03/20  3:47 AM  
Result Value Ref Range WBC 12.7 (H) 4.3 - 11.1 K/uL  
 RBC 3.71 (L) 4.23 - 5.6 M/uL  
 HGB 10.5 (L) 13.6 - 17.2 g/dL HCT 34.2 (L) 41.1 - 50.3 % MCV 92.2 79.6 - 97.8 FL  
 MCH 28.3 26.1 - 32.9 PG  
 MCHC 30.7 (L) 31.4 - 35.0 g/dL  
 RDW 16.6 (H) 11.9 - 14.6 % PLATELET 632 576 - 790 K/uL MPV 12.4 (H) 9.4 - 12.3 FL ABSOLUTE NRBC 0.05 0.0 - 0.2 K/uL PROTHROMBIN TIME + INR Collection Time: 03/03/20  3:47 AM  
Result Value Ref Range Prothrombin time 20.0 (H) 12.0 - 14.7 sec INR 1.7 MAGNESIUM Collection Time: 03/03/20  3:47 AM  
Result Value Ref Range Magnesium 2.3 1.8 - 2.4 mg/dL GLUCOSE, POC Collection Time: 03/03/20  6:07 AM  
Result Value Ref Range Glucose (POC) 133 (H) 65 - 100 mg/dL Current Facility-Administered Medications:  
  warfarin (COUMADIN) tablet 2 mg, 2 mg, Oral, QPM, Amphan, Choncharoen, MD, 2 mg at 03/02/20 1702   magic mouthwash (MAHESH) oral suspension 10 mL, 10 mL, Oral, Q6H, [place] MD, 10 mL at 03/03/20 0602   spironolactone (ALDACTONE) tablet 12.5 mg, 12.5 mg, Oral, DAILY, Pedro Luis Alvarez MD, 12.5 mg at 03/03/20 7623   furosemide (LASIX) tablet 40 mg, 40 mg, Oral, Q12H, Pedro Luis Alvarez MD, 40 mg at 03/03/20 0909 
  albuterol (PROVENTIL VENTOLIN) nebulizer solution 1.25 mg, 1.25 mg, Nebulization, QID RT, Reina MD Umberto, 1.25 mg at 03/03/20 1110 
  oxybutynin (DITROPAN) tablet 5 mg, 5 mg, Oral, TID, Juan Padron MD, 5 mg at 03/03/20 6025   metoprolol succinate (TOPROL-XL) XL tablet 25 mg, 25 mg, Oral, DAILY, Orquidea RUIZ MD, 25 mg at 03/03/20 9816   lisinopril (PRINIVIL, ZESTRIL) tablet 5 mg, 5 mg, Oral, DAILY, Orquidea RUIZ MD, 5 mg at 03/03/20 4435   potassium chloride (K-DUR, KLOR-CON) SR tablet 40 mEq, 40 mEq, Oral, BID, Clark Pa MD, 40 mEq at 03/03/20 4745   phenazopyridine (PYRIDIUM) tab 95 mg, 95 mg, Oral, BID PRN, Clark Pa MD, 95 mg at 02/28/20 1253   opium-belladonna (B&O 16-A SUPPRETTE) 16.2-60 mg suppository 1 Suppository, 1 Suppository, Rectal, Q8H PRN, Nieves Carbajal NP, 1 Suppository at 02/26/20 5127   LORazepam (ATIVAN) injection 1 mg, 1 mg, IntraVENous, Q4H PRN, Arthur Campo MD, 1 mg at 02/24/20 2019   morphine injection 2 mg, 2 mg, IntraVENous, Q3H PRN, Arthur Campo MD, 2 mg at 02/28/20 2001   risperiDONE (RisperDAL) tablet 0.5 mg, 0.5 mg, Oral, QHS, Heriberto Monique MD, 0.5 mg at 03/02/20 2126 
  amiodarone (CORDARONE) tablet 200 mg, 200 mg, Oral, BID, Sonya Telles MD, 200 mg at 03/03/20 0908 
  NUTRITIONAL SUPPORT ELECTROLYTE PRN ORDERS, , Does Not Apply, PRN, Ventura Lozoya MD 
  aspirin chewable tablet 81 mg, 81 mg, Oral, DAILY, Sonya Telles MD, 81 mg at 03/03/20 2893   atorvastatin (LIPITOR) tablet 40 mg, 40 mg, Oral, DAILY, Lui Vizcaino MD, 40 mg at 03/03/20 0100   lip protectant (BLISTEX) ointment 1 Each, 1 Each, Topical, PRN, Arthur Campo MD 
  sodium chloride (NS) flush 5-40 mL, 5-40 mL, IntraVENous, Q8H, Ventura Lozoya MD, 10 mL at 03/03/20 1735   sodium chloride (NS) flush 5-40 mL, 5-40 mL, IntraVENous, PRN, Ventura Lozoya MD, 10 mL at 03/01/20 1949 
  insulin lispro (HUMALOG) injection, , SubCUTAneous, Q6H, Ventura Lozoya MD, Stopped at 03/03/20 0600 Assessment: Principal Problem: 
  Acute on chronic systolic heart failure ([address] (2/20/2020) Overview: Echo 2/20/20:  EF 20-25% Echo 4/15: EF 30-35%, mild to mod MR Echo 2011: EF 30-35% Active Problems: Hx of AICD-Implanted Cardiac Defibrillator (2/22/2016) Overview: Follow by Dr. Lalo Connors NSVT (nonsustained ventricular tachycardia) ([address] (8/7/2017) Dilated cardiomyopathy ([address] (2/7/2018) Acute respiratory failure with hypoxia ([address] (2/20/2020) Acute pulmonary edema ([address] (2/20/2020) DNR (do not resuscitate) (2/25/2020) Plan:  
 
Acute on chronic combined CHF Severe MR and RV dysfunction  
- cont PO lasix, aldactone - cards following - S/P AICD with normal function NSVT 
- cont BB, amio H/o Afib 
- cont Warfarin  
- cont BB 
  
Acute respiratory failure with hypoxia S/P extubation Completed Empiric IV antibiotics to cover possible aspiration. - now on RA, overnight pulse ox with <89% desat for over 1.5 hrs - needs 2LNC qHS 
  
Anemia No acute bleeding Monitor  
  
Diabetes mellitus type 2 Monitor blood sugar. Cover with insulin sliding scale accordingly.   
  
 
DVT prophylaxis : warfarin Disposition plan : medically stable for d/c, awaiting bed availability at [facility], possibly tomorrow Signed By: Blanca Alvarez MD   
 March 3, 2020

## 2020-03-03 NOTE — PROGRESS NOTES
Problem: Dysphagia (Adult) Goal: *Speech Goal: (INSERT TEXT) Description LTG: Patient will tolerate least restrictive diet without overt signs or symptoms of airway compromise by discharge. STG: Patient will tolerate chopped mechanical soft diet and thin liquids without overt signs or symptoms of aspiration 100% of the time. STG: Patient will participate in modified barium swallow study as clinically indicated. Outcome: Progressing Towards Goal 
 
SPEECH LANGUAGE PATHOLOGY: DYSPHAGIA- Daily Note  4 NAME/AGE/GENDER: Claudette Robert is a 75 y.o. male DATE: 3/3/2020 PRIMARY DIAGNOSIS: Acute on chronic systolic heart failure ([facility] [I50.23] ICD-10: Treatment Diagnosis: R13.11 Dysphagia, Oral Phase INTERDISCIPLINARY COLLABORATION: Registered Nurse PRECAUTIONS/ALLERGIES: Sulfur SUBJECTIVE Patient upright in bed for session. Wife arrived during session. Orientation:  
Person Place- [facility] he's at [facility]  
Time 
 
Pain: Pain Scale 1: Numeric (0 - 10) Pain Intensity 1: 5 Pain Location 1: Mouth Pain Intervention(s) 1: Emotional support OBJECTIVE Patient seen for po trials and diet tolerance. No overt s/sx airway compromise with thin by cup/straw, puree x2 trials, or mechanical soft consistency x1 trial. Prolonged oral prep time and \"swishing\" with puree and thin liquids. Oral prep time over 2 minutes with mechanicals soft consistency. Upon inspection, bolus thoroughly masticated; however, patient continued to manipulate bolus despite verbal cues to swallow. When asked to swallow bolus patient expressed he has been working on losing weight and stated, \"The doctor at the [place] told me to move it around like this and it has helped me lose 40lbs. \" Further trials declined. ASSESSMENT Patient continues to have minimal PO intake and oral holding of puree and chewables. No pharyngeal dysphagia symptoms identified.  Suspect oral pain is contributing to reduced intake; however, also ? psych component. Messaged MD via perfect serve regarding concerns and minimal intake. Continue prescribed diet: mechanical soft diet/thin liquids. Medications whole in puree. Will continue to follow for diet tolerance with upgraded diet. Tool Used: Dysphagia Outcome and Severity Scale (SAMREEN) Score Comments Normal Diet  [] 7 With no strategies or extra time needed Functional Swallow  [] 6 May have mild oral or pharyngeal delay Mild Dysphagia  [] 5 Which may require one diet consistency restricted Mild-Moderate Dysphagia  [x] 4 With 1-2 diet consistencies restricted Moderate Dysphagia  [] 3 With 2 or more diet consistencies restricted Moderate-Severe Dysphagia  [] 2 With partial PO strategies (trials with ST only) Severe Dysphagia  [] 1 With inability to tolerate any PO safely Score:  Initial: 4 Most Recent: 3 (Date 03/03/20 ) Interpretation of Tool: The Dysphagia Outcome and Severity Scale (SAMREEN) is a simple, easy-to-use, 7-point scale developed to systematically rate the functional severity of dysphagia based on objective assessment and make recommendations for diet level, independence level, and type of nutrition. PLAN   
FREQUENCY/DURATION: Continue to follow patient 3 times a week for duration of hospital stay to address above goals. - Recommendations for next treatment session: Next treatment will address oral dysphagia REHABILITATION POTENTIAL FOR STATED GOALS: Good COMPLIANCE WITH PROGRAM/EXERCISES: Will assess as treatment progresses CONTINUATION OF SKILLED SERVICES/MEDICAL NECESSITY: 
? Patient is expected to demonstrate progress in  swallow strength, swallow timeliness, swallow function, diet tolerance and swallow safety in order to  improve swallow safety, work toward diet advancement and decrease aspiration risk. ? Patient continues to require skilled intervention due to dysphagia. RECOMMENDATIONS  
DIET:  
? PO:  Mechanical soft ? Liquids:  regular thin ? MEDICATIONS: Whole in puree ASPIRATION PRECAUTIONS · Slow rate of intake · Small bites/sips · Upright at 90 degrees during meal 
  
COMPENSATORY STRATEGIES/MODIFICATIONS · Alternate liquids/solids EDUCATION: 
· Recommendations discussed with Nursing · Family · Patient · Hospitalist- via perfect serve RECOMMENDATIONS for CONTINUED SPEECH THERAPY: YES: Anticipate need for ongoing speech therapy during this hospitalization. SAFETY: 
After treatment position/precautions: · Upright in bed · Wife at bedside · Call light within reach Total Treatment Duration:  
Time In: 2252 Time Out: 5035 Sajan Cao MS, CCC-SLP

## 2020-03-03 NOTE — PROGRESS NOTES
OT note: 
Pt refused therapy despite encouragement. Will re-attempt at a later date. Kathleen Kilpatrick

## 2020-03-04 NOTE — DISCHARGE SUMMARY
Hospitalist Discharge Summary     Patient ID:  Moisés Hamilton  [id number]  75 y.o.  1945  Admit date: 2/20/2020  9:11 PM  Discharge date and time: 3/4/2020  Attending: Daniel Pierre MD  PCP:  Apolinar Chen MD  Treatment Team: Attending Provider: Daniel Pierre MD; Consulting Provider: Sammie Garcia MD; Utilization Review: Armida West RN; Consulting Provider: Suyapa Villarreal DO; Care Manager: Royal Hooks RN; Staff Nurse: Jose Francisco Harris; Speech Language Pathologist: Moon Drummond; Physical Therapist: Rad Ahuja PT; Occupational Therapy Assistant: Marium Abdullahi    Principal Diagnosis Acute on chronic systolic heart failure [facility])   Principal Problem:    Acute on chronic systolic heart failure ([facility] [address] (2/20/2020)      Overview: Echo 2/20/20:  EF 20-25%      Echo 4/15: EF 30-35%, mild to mod MR      Echo 2011: EF 30-35%    Active Problems:    Hx of AICD-Implanted Cardiac Defibrillator (2/22/2016)      Overview: Follow by Dr. Darlin Wesley      NSVT (nonsustained ventricular tachycardia) ([facility] [address] (8/7/2017)      Dilated cardiomyopathy ([address] (2/7/2018)      Acute respiratory failure with hypoxia ([address] (2/20/2020)      Acute pulmonary edema ([address] (2/20/2020)      DNR (do not resuscitate) (2/25/2020)             Hospital Course:  Please refer to the admission H&P for details of presentation. In summary, Rosa Ryan Jr. is a 75 y. o. male with chronic systolic CHF (EF 44%), MR, CAD, NSVT s/p ICD, AFlutter s/p AV node ablation, CVA who was admitted on 2- with acute respiratory failure with hypoxia requiring intubation. Found to have acute CHF exacerbation, initially requiring lasix drip. ICD interrogation shows multiple episodes. Patient is on Amiodarone now and has been transitioned to PO lasix.     Acute on chronic combined CHF  Severe MR and RV dysfunction   - cont PO lasix, aldactone  - cards following  - S/P AICD with normal function   - needs repeat BMP in 3-5 days to assess for stable renal function (Cr 1.34 on day of discharge)  - outpt cards f/u     NSVT  - cont BB, amio     H/o Afib  - cont Warfarin - still titrating dose with addition of Amio, needs daily INRs until stable  - cont BB     Acute respiratory failure with hypoxia   S/P extubation   Completed Empiric IV antibiotics to cover possible aspiration. - now on RA, overnight pulse ox with <89% desat for over 1.5 hrs - needs 2LNC qHS     Anemia   No acute bleeding   Monitor      Diabetes mellitus type 2  Monitor blood sugar. Cover with insulin sliding scale accordingly. May need resumption of Metformin as appetite improves.      Significant Diagnostic Studies:   XR CHEST PORT   Final Result   IMPRESSION: Pulmonary edema unchanged. XR CHEST SNGL V   Final Result   IMPRESSION:      1. Bilateral airspace densities, likely pulmonary edema or pneumonia. . No   significant change compared to prior. XR CHEST SNGL V   Final Result   IMPRESSION:      1. Bilateral diffuse airspace disease, similar to prior exam.      XR CHEST SNGL V   Final Result   IMPRESSION:    1.  Stable findings of the chest as described above. XR ABD (KUB)   Final Result   IMPRESSION: As above. XR CHEST PORT   Final Result   IMPRESSION:    1. The endotracheal tube tip lies 6.8 cm above the jimi. 2. CHF or fluid overload. Labs: Results:       Chemistry Recent Labs     03/04/20  0450 03/03/20 0347 03/02/20 0341   * 152* 153*    142 142   K 4.8 4.1 4.3   * 108* 109*   CO2 22 26 26   BUN 40* 29* 27*   CREA 1.34 1.10 0.97   CA 10.6* 11.7* 11.3*   AGAP 8 8 7      CBC w/Diff Recent Labs     03/04/20  0450 03/03/20 0347 03/02/20  0341   WBC 13.3* 12.7* 12.7*   RBC 3.98* 3.71* 3.61*   HGB 11.0* 10.5* 10.2*   HCT 36.5* 34.2* 33.5*    282 286      Cardiac Enzymes No results for input(s): CPK, CKND1, OZZY in the last 72 hours.     No lab exists for component: CKRMB, TROIP   Coagulation Recent Labs     03/04/20  0450 03/03/20  0347   PTP 19.8* 20.0*   INR 1.6 1.7       Lipid Panel No results found for: CHOL, CHOLPOCT, CHOLX, CHLST, CHOLV, 064460, HDL, HDLP, LDL, LDLC, DLDLP, 932869, VLDLC, VLDL, TGLX, TRIGL, TRIGP, TGLPOCT, CHHD, CHHDX   BNP No results for input(s): BNPP in the last 72 hours. Liver Enzymes No results for input(s): TP, ALB, TBIL, AP, SGOT, GPT in the last 72 hours. No lab exists for component: DBIL   Thyroid Studies Lab Results   Component Value Date/Time    TSH 1.310 11/05/2019 03:46 AM            Discharge Exam:  Visit Vitals  BP 94/47 (BP 1 Location: Left arm, BP Patient Position: At rest)   Pulse 81   Temp 97.9 °F (36.6 °C)   Resp 18   Ht 6' 1\" (1.854 m)   Wt 97.5 kg (214 lb 14.4 oz)   SpO2 95%   BMI 28.35 kg/m²     General: NAD  HEENT: normal oropharynx  Lungs: CTA BL  Heart: RRR  Abdomen:   Soft, non-tender, non-distended with normoactive bowel sounds. Extremities:no C/C/E  Neurologic: CN II-XII grossly intact and symmetrical. No focal deficits  Psychiatric:  Pleasant demeanor, appropriate affect. Alert and oriented x 3       Disposition: SNF  Discharge Condition: stable  Patient Instructions:   Current Discharge Medication List      START taking these medications    Details   amiodarone (CORDARONE) 200 mg tablet Take 1 Tab by mouth two (2) times a day. Qty: 30 Tab, Refills: 0      lisinopril (PRINIVIL, ZESTRIL) 5 mg tablet Take 1 Tab by mouth daily. Qty: 30 Tab, Refills: 0      metoprolol succinate (TOPROL-XL) 25 mg XL tablet Take 0.5 Tabs by mouth daily. Qty: 30 Tab, Refills: 0      oxybutynin (DITROPAN) 5 mg tablet Take 1 Tab by mouth three (3) times daily. Qty: 30 Tab, Refills: 0         CONTINUE these medications which have CHANGED    Details   spironolactone (ALDACTONE) 25 mg tablet Take 0.5 Tabs by mouth daily.   Qty: 30 Tab, Refills: 0         CONTINUE these medications which have NOT CHANGED    Details   loratadine (CLARITIN) 10 mg tablet Take 10 mg by mouth.      furosemide (LASIX) 40 mg tablet Take 1 Tab by mouth two (2) times a day. Qty: 60 Tab, Refills: 6      mesalamine ER (APRISO) 0.375 gram 24 hour capsule Take 0.375 g by mouth daily. Indications: ulcerated colon      warfarin (COUMADIN) 5 mg tablet Take 0.5-1 tab every day or as directed  Qty: 30 Tab, Refills: 11      gabapentin (NEURONTIN) 300 mg capsule Take 300 mg by mouth two (2) times a day. fenofibric acid (TRILIPIX ER) 135 mg capsule Take 135 mg by mouth daily. Aspirin, Buffered 81 mg tab Take 81 mg by mouth daily. atorvastatin (LIPITOR) 40 mg tablet Take 40 mg by mouth daily. nitroglycerin (NITROSTAT) 0.4 mg SL tablet 1 Tab by SubLINGual route every five (5) minutes as needed for Chest Pain.   Qty: 25 Tab, Refills: 6         STOP taking these medications       metFORMIN (GLUCOPHAGE) 500 mg tablet Comments:   Reason for Stopping:         carvedilol (COREG) 12.5 mg tablet Comments:   Reason for Stopping:         niacin (NIASPAN) 1,000 mg Tb24 tab Comments:   Reason for Stopping:               Activity: PT/OT Eval and Treat  Diet: Cardiac Diet and Diabetic Diet  Wound Care: None needed    Follow-up with PCP, cards  ·     Time spent to discharge patient 32 minutes  Signed:  Cindy Sofia MD  3/4/2020  12:14 PM

## 2020-03-04 NOTE — PROGRESS NOTES
Problem: Dysphagia (Adult) Goal: *Speech Goal: (INSERT TEXT) Description LTG: Patient will tolerate least restrictive diet without overt signs or symptoms of airway compromise by discharge. STG: Patient will tolerate chopped mechanical soft diet and thin liquids without overt signs or symptoms of aspiration 100% of the time. Discontinued 3/4/2020 STG: Patient will tolerate pureed diet/thin liquids without overt s/sx airway compromise. Added 3/2/2020 STG: Patient will participate in modified barium swallow study as clinically indicated. Outcome: Progressing Towards Goal 
 
STG: Patient will participate in cognitive linguistic evaluation as deemed appropriate SPEECH LANGUAGE PATHOLOGY: DYSPHAGIA- Daily Note  5 NAME/AGE/GENDER: Claudette Esparza. is a 75 y.o. male DATE: 3/4/2020 PRIMARY DIAGNOSIS: Acute on chronic systolic heart failure ([facility] [address] [I50.23] ICD-10: Treatment Diagnosis: R13.11 Dysphagia, Oral Phase INTERDISCIPLINARY COLLABORATION: Registered Nurse PRECAUTIONS/ALLERGIES: Sulfur SUBJECTIVE Upright, alert. Wife present. Orientation:  
Person, month, year \"hospital\", unable to give name of hospital 
 
 
Pain: Pain Scale 1: Numeric (0 - 10) Pain Intensity 1: 0 OBJECTIVE Patient seen for po trials and diet tolerance. Presented with 2 bites of nutrigrain bar,2 bites of puree, and thin liquids via cup/straw. No overt s/sx airway compromise with any textures. Perseveratively chewing single bite of nutrigrain bar for several minutes. When prompted to open mouth, bolus masticated, but continues to perseveratively chew despite liquid rinse to clear. Diffuse residue persisted after liquid rinse. Eventual clearance with significantly increased time and encouragement. With puree, patient continued to perseveratively chew. After liquid rinse, patient continued to perseveratively chew liquid/puree combination.  With prompt, patient cleared. Patient attributes his successful weight loss to prolonged chewing, however wife reports this is not patient's baseline. ASSESSMENT Patient continues to demonstrate significant oral dysphagia that appears to be complicated by cognition. Patient perseveratively chewing puree and soft solids. No overt s/sx pharyngeal dysphagia. Recommend downgrade to puree diet/thin liquids. meds whole in puree. Supervision with po intake, and verbal cues to alternate solids/liquids and clear mouth. Will continue to follow. Tool Used: Dysphagia Outcome and Severity Scale (SAMREEN) Score Comments Normal Diet  [] 7 With no strategies or extra time needed Functional Swallow  [] 6 May have mild oral or pharyngeal delay Mild Dysphagia  [] 5 Which may require one diet consistency restricted Mild-Moderate Dysphagia  [x] 4 With 1-2 diet consistencies restricted Moderate Dysphagia  [] 3 With 2 or more diet consistencies restricted Moderate-Severe Dysphagia  [] 2 With partial PO strategies (trials with ST only) Severe Dysphagia  [] 1 With inability to tolerate any PO safely Score:  Initial: 4 Most Recent: 3 (Date 03/04/20 ) Interpretation of Tool: The Dysphagia Outcome and Severity Scale (SAMREEN) is a simple, easy-to-use, 7-point scale developed to systematically rate the functional severity of dysphagia based on objective assessment and make recommendations for diet level, independence level, and type of nutrition. PLAN   
FREQUENCY/DURATION: Continue to follow patient 3 times a week for duration of hospital stay to address above goals. - Recommendations for next treatment session: Next treatment will address oral dysphagia REHABILITATION POTENTIAL FOR STATED GOALS: Good COMPLIANCE WITH PROGRAM/EXERCISES: Will assess as treatment progresses CONTINUATION OF SKILLED SERVICES/MEDICAL NECESSITY: 
? Patient is expected to demonstrate progress in  swallow strength, swallow timeliness, swallow function, diet tolerance and swallow safety in order to  improve swallow safety, work toward diet advancement and decrease aspiration risk. ? Patient continues to require skilled intervention due to dysphagia. RECOMMENDATIONS  
DIET:  
? PO:  Pureed ? Liquids:  regular thin ? MEDICATIONS: Whole in puree ASPIRATION PRECAUTIONS · Slow rate of intake · Small bites/sips · Upright at 90 degrees during meal 
  
COMPENSATORY STRATEGIES/MODIFICATIONS · Alternate liquids/solids · Verbal cues to swallow EDUCATION: 
· Recommendations discussed with Nursing · Family · Patient RECOMMENDATIONS for CONTINUED SPEECH THERAPY: YES: Anticipate need for ongoing speech therapy during this hospitalization. SAFETY: 
After treatment position/precautions: · Upright in bed · Wife at bedside · Call light within reach · RN notified Total Treatment Duration:  
Time In: 1948 Time Out: [address] CCC-SLP

## 2020-03-04 NOTE — DISCHARGE INSTRUCTIONS
Patient Education        Heart Failure: Care Instructions  Your Care Instructions    Heart failure occurs when your heart does not pump as much blood as the body needs. Failure does not mean that the heart has stopped pumping but rather that it is not pumping as well as it should. Over time, this causes fluid buildup in your lungs and other parts of your body. Fluid buildup can cause shortness of breath, fatigue, swollen ankles, and other problems. By taking medicines regularly, reducing sodium (salt) in your diet, checking your weight every day, and making lifestyle changes, you can feel better and live longer. Follow-up care is a key part of your treatment and safety. Be sure to make and go to all appointments, and call your doctor if you are having problems. It's also a good idea to know your test results and keep a list of the medicines you take. How can you care for yourself at home? Medicines    · Be safe with medicines. Take your medicines exactly as prescribed. Call your doctor if you think you are having a problem with your medicine.     · Do not take any vitamins, over-the-counter medicine, or herbal products without talking to your doctor first. Claretha Em not take ibuprofen (Advil or Motrin) and naproxen (Aleve) without talking to your doctor first. They could make your heart failure worse.     · You may take some of the following medicine. ? Angiotensin-converting enzyme inhibitors (ACEIs) or angiotensin II receptor blockers (ARBs) reduce the heart's workload, lower blood pressure, and reduce swelling. Taking an ACEI or ARB may lower your chance of needing to be hospitalized. ? Beta-blockers can slow heart rate, decrease blood pressure, and improve your condition. Taking a beta-blocker may lower your chance of needing to be hospitalized. ? Diuretics, also called water pills, reduce swelling.    You will get more details on the specific medicines your doctor prescribes.   Diet    · Your doctor may suggest that you limit sodium. Your doctor can tell you how much sodium is right for you. An example is less than 3,000 mg a day. This includes all the salt you eat in cooking or in packaged foods. People get most of their sodium from processed foods. Fast food and restaurant meals also tend to be very high in sodium.     · Ask your doctor how much liquid you can drink each day. You may have to limit liquids.    Weight    · Weigh yourself without clothing at the same time each day. Record your weight. Call your doctor if you have a sudden weight gain, such as more than 2 to 3 pounds in a day or 5 pounds in a week. (Your doctor may suggest a different range of weight gain.) A sudden weight gain may mean that your heart failure is getting worse.    Activity level    · Start light exercise (if your doctor says it is okay). Even if you can only do a small amount, exercise will help you get stronger, have more energy, and manage your weight and your stress. Walking is an easy way to get exercise. Start out by walking a little more than you did before. Bit by bit, increase the amount you walk.     · When you exercise, watch for signs that your heart is working too hard. You are pushing yourself too hard if you cannot talk while you are exercising. If you become short of breath or dizzy or have chest pain, stop, sit down, and rest.     · If you feel \"wiped out\" the day after you exercise, walk slower or for a shorter distance until you can work up to a better pace.     · Get enough rest at night. Sleeping with 1 or 2 pillows under your upper body and head may help you breathe easier.    Lifestyle changes    · Do not smoke. Smoking can make a heart condition worse. If you need help quitting, talk to your doctor about stop-smoking programs and medicines. These can increase your chances of quitting for good.  Quitting smoking may be the most important step you can take to protect your heart.     · Limit alcohol to 2 drinks a day for men and 1 drink a day for women. Too much alcohol can cause health problems.     · Avoid getting sick from colds and the flu. Get a pneumococcal vaccine shot. If you have had one before, ask your doctor whether you need another dose. Get a flu shot each year. If you must be around people with colds or the flu, wash your hands often. When should you call for help? Call 911 if you have symptoms of sudden heart failure such as:    · You have severe trouble breathing.     · You cough up pink, foamy mucus.     · You have a new irregular or rapid heartbeat.    Call your doctor now or seek immediate medical care if:    · You have new or increased shortness of breath.     · You are dizzy or lightheaded, or you feel like you may faint.     · You have sudden weight gain, such as more than 2 to 3 pounds in a day or 5 pounds in a week. (Your doctor may suggest a different range of weight gain.)     · You have increased swelling in your legs, ankles, or feet.     · You are suddenly so tired or weak that you cannot do your usual activities.    Watch closely for changes in your health, and be sure to contact your doctor if you develop new symptoms. Where can you learn more? Go to http://daiana-lety.info/. Enter I433 in the search box to learn more about \"Heart Failure: Care Instructions. \"  Current as of: April 9, 2019  Content Version: 12.2  © 9518-9265 [employer]. Care instructions adapted under license by [employer] (which disclaims liability or warranty for this information). If you have questions about a medical condition or this instruction, always ask your healthcare professional. [address] any warranty or liability for your use of this information.

## 2020-03-04 NOTE — PROGRESS NOTES
Problem: Self Care Deficits Care Plan (Adult) Goal: *Acute Goals and Plan of Care (Insert Text) Description 1. Patient will complete total body bathing and dressing with modified independence and adaptive equipment as needed. 2. Patient will complete toileting with modified independence and adaptive equipment as needed. 3. Patient will tolerate 20 minutes of OT treatment with up to 3 rest breaks to increase activity tolerance for ADLs. 4. Patient will complete functional transfers with modified independence and adaptive equipment as needed. 5. Patient will complete functional mobility for ADLs with modified independence and adaptive equipment as needed. 6. Patient will verbalize 2 energy conservation techniques with no cues from therapist to increase safety and independence with ADLs. Timeframe: 7 visits Outcome: Progressing Towards Goal 
  
OCCUPATIONAL THERAPY: Daily Note and AM 3/4/2020 INPATIENT: OT Visit Days: 2 [place] / [place] B / Product Type: Medicare /  
  
NAME/AGE/GENDER: Pura Harrell is a 75 y.o. male PRIMARY DIAGNOSIS:  Acute on chronic systolic heart failure (HCC) [I50.23] Acute on chronic systolic heart failure (HCC) Acute on chronic systolic heart failure ([facility] ICD-10: Treatment Diagnosis:  
 · Generalized Muscle Weakness (M62.81) · Dizziness and Giddiness (R42) Precautions/Allergies: 
  Fall precautions Sulfur ASSESSMENT:  
 
Mr. Daniel Garza is a 75 y.o. male admitted with acute on chronic systolic heart failure, SOB requiring intubation. Has since been extubated. At baseline pt lives with wife and reports independence with ADLs, IADLs, ambulation, and driving. No supplemental O2 use, no hx of falls. 3/4/2020 Pt was supine in bed upon arrival. Pt completed bed mobility and functional transfer with supervision. Good progress made. Continue POC. This section established at most recent assessment PROBLEM LIST (Impairments causing functional limitations): 1. Decreased Strength 2. Decreased ADL/Functional Activities 3. Decreased Transfer Abilities 4. Decreased Ambulation Ability/Technique 5. Decreased Balance 6. Decreased Activity Tolerance 7. Decreased Pacing Skills 8. Decreased Work Simplification/Energy Conservation Techniques 9. Increased Fatigue 10. Increased Shortness of Breath 11. Decreased [place] with Home Exercise Program 
12. Decreased Cognition INTERVENTIONS PLANNED: (Benefits and precautions of occupational therapy have been discussed with the patient.) 1. Activities of daily living training 2. Adaptive equipment training 3. Balance training 4. Clothing management 5. Cognitive training 6. Community reintergration 7. Donning&doffing training 8. Hygiene training 9. Neuromuscular re-eduation 10. Re-evaluation 11. Therapeutic activity 12. Therapeutic exercise TREATMENT PLAN: Frequency/Duration: Follow patient 3x/week to address above goals. Rehabilitation Potential For Stated Goals: Good REHAB RECOMMENDATIONS (at time of discharge pending progress):   
Placement: It is my opinion, based on this patient's performance to date, that Mr. Sonya Ruiz may benefit from intensive therapy at a [address] [place] [address] after discharge due to the functional deficits listed above that are likely to improve with skilled rehabilitation and concerns that he/she may be unsafe to be unsupervised at home due to impaired strength and balance impacting ADLs, increasing risk of falls. Equipment: ? TBD, may need RW and BSC OCCUPATIONAL PROFILE AND HISTORY:  
History of Present Injury/Illness (Reason for Referral): 
See H&P. \"Patient is a 75 y.o.  male presents with shortness of breath. He has a known history of sCHF with last TTE in 2019 showing EF 25% and moderate MR. He just underwent AV node ablation on 2/11.  There is no family present and the patient is currently intubated, but the reported history if of gradually worsening shortness of breath over the last week. He was found by EMS in respiratory distress, hypoxic on room air. He was placed on NRB with sats in the low 90's. He was intubated by ER physician due to respiratory distress. Off and on episodes of dyspnea over the past few days. Some diaphoresis intermittently. Wife had wanted to call EMS but patient had delayed. Most labs are still pending but CXR is suggestive of volume overload. ADDENDUM: his wife later arrived and states he had not been having any fever, cough, sputum production, chest pain, dysuria, abd pain, or diarrhea. He did not take his lasix yesterday as he felt like he could start weaning himself off it. She states the light on his defibrillator has gone off twice in the past week including last night. \" 
Past Medical History/Comorbidities:  
Mr. Garrick Bhatia  has a past medical history of Acute myocardial infarction [facility]) (2000), Aortic Aneurysm/Dilation of the Aorta (2/22/2016), Aortic ectasia, abdominal ([address] (5/9/2014), Arrhythmia, ASCAD-of the Native Vessel (2/22/2016), CAD (coronary artery disease) (2008), Cerebrovascular accident (stroke) ([address] (1/30/2016), Chest pain (2/22/2016), Chronic pain, Chronic systolic heart failure ([address] (2/22/2016), Congestive heart failure, unspecified, Coronary atherosclerosis of unspecified type of vessel, native or graft (2007), GERD (gastroesophageal reflux disease), History of agent Orange exposure, Hx of AICD-Implanted Cardiac Defibrillator (2/22/2016), Hypercholesterolemia, Hyperlipidemia (2/22/2016), Hypertension, Ill-defined condition, Ill-defined disease, Liver disease, Morbid obesity ([address] Myocardial infarct/Anterolateral age unspe. (2/22/2016), and Stroke ([address] (12/27/2011).  He also has no past medical history of Arthritis, Asthma, Autoimmune disease ([address] Cancer ([facility] Chronic kidney disease, Chronic obstructive pulmonary disease ([facility] Diabetes ([facility] [address] [facility] Difficult intubation, Malignant hyperthermia due to anesthesia, Nausea & vomiting, Pseudocholinesterase deficiency, Psychiatric disorder, PUD (peptic ulcer disease), Seizures ([facility] Sleep apnea, Thromboembolus ([facility] [address] [facility] or Thyroid disease. Mr. Pop Acosta  has a past surgical history that includes hx other surgical; pr cardiac surg procedure unlist; hx gi; and hx pacemaker. Social History/Living Environment:  
Home Environment: Private residence # Steps to Enter: 8 One/Two Story Residence: One story Living Alone: No 
Support Systems: Spouse/Significant Other/Partner Patient Expects to be Discharged to[de-identified] Rehabilitation facility Current DME Used/Available at Home: None Tub or Shower Type: Shower Prior Level of Function/Work/Activity: At baseline pt lives with wife and reports independence with ADLs, IADLs, ambulation, and driving. No supplemental O2 use, no hx of falls. Personal Factors:   
      Sex:  male Age:  75 y.o. Other factors that influence how disability is experienced by the patient:  Multiple co-morbidities Number of Personal Factors/Comorbidities that affect the Plan of Care: Expanded review of therapy/medical records (1-2):  MODERATE COMPLEXITY ASSESSMENT OF OCCUPATIONAL PERFORMANCE[de-identified]  
Activities of Daily Living:  
Basic ADLs (From Assessment) Complex ADLs (From Assessment) Feeding: Independent Oral Facial Hygiene/Grooming: Minimum assistance Bathing: Moderate assistance Upper Body Dressing: Setup Lower Body Dressing: Moderate assistance Toileting: Moderate assistance Instrumental ADL Meal Preparation: Total assistance Homemaking: Total assistance Medication Management: Total assistance Financial Management: Total assistance Grooming/Bathing/Dressing Activities of Daily Living Bed/Mat Mobility Supine to Sit: Supervision Sit to Stand: Supervision Stand to Sit: Supervision Bed to Chair: Supervision Most Recent Physical Functioning:  
Gross Assessment: 
  
         
  
Posture: 
Posture (WDL): Exceptions to [facility] Posture Assessment: Forward head, Rounded shoulders Balance: 
Sitting: Intact Standing: Intact Bed Mobility: 
Supine to Sit: Supervision Wheelchair Mobility: 
  
Transfers: 
Sit to Stand: Supervision Stand to Sit: Supervision Bed to Chair: Supervision Patient Vitals for the past 6 hrs: 
 BP BP Patient Position SpO2 Pulse 03/04/20 0706   97 %   
03/04/20 0712 90/53 Supine 94 % 81  
03/04/20 1113 94/47 At rest 96 % 81  
03/04/20 1114   95 %  Mental Status Neurologic State: Alert Orientation Level: Oriented X4 Cognition: Follows commands, Appropriate decision making Perception: Appears intact Perseveration: No perseveration noted Safety/Judgement: Fall prevention Physical Skills Involved: 1. Range of Motion 2. Balance 3. Strength 4. Activity Tolerance Cognitive Skills Affected (resulting in the inability to perform in a timely and safe manner): 1. Executive Function 2. Divided Attention 3. Comprehension Psychosocial Skills Affected: 1. Habits/Routines 2. Environmental Adaptation 3. Social Interaction 4. Emotional Regulation 5. Self-Awareness 6. Awareness of Others 7. Social Roles Number of elements that affect the Plan of Care: 5+:  HIGH COMPLEXITY CLINICAL DECISION MAKING:  
[employer] AM-PAC 6 Clicks Daily Activity Inpatient Short Form How much help from another person does the patient currently need. .. Total A Lot A Little None 1. Putting on and taking off regular lower body clothing? [] 1   [x] 2   [] 3   [] 4  
2. Bathing (including washing, rinsing, drying)? [] 1   [x] 2   [] 3   [] 4  
3.   Toileting, which includes using toilet, bedpan or urinal?   [] 1   [x] 2   [] 3   [] 4  
 4.  Putting on and taking off regular upper body clothing? [] 1   [] 2   [x] 3   [] 4  
5. Taking care of personal grooming such as brushing teeth? [] 1   [] 2   [x] 3   [] 4  
6. Eating meals? [] 1   [] 2   [] 3   [x] 4  
© 2007, Trustees of [address] [place], [place] [id number], under license to [employer]. All rights reserved Score:  Initial: 16 2/28/2020 Most Recent: X (Date: -- ) Interpretation of Tool:  Represents activities that are increasingly more difficult (i.e. Bed mobility, Transfers, Gait). Medical Necessity:    
· Patient demonstrates · good ·  rehab potential due to higher previous functional level. Reason for Services/Other Comments: 
· Patient continues to require skilled intervention due to  
· Inability to complete ADLs at prior level of independence · . Use of outcome tool(s) and clinical judgement create a POC that gives a: MODERATE COMPLEXITY  
 
 
 
TREATMENT:  
(In addition to Assessment/Re-Assessment sessions the following treatments were rendered) Pre-treatment Symptoms/Complaints:   
Pain: Initial:  
Pain Intensity 1: 0  Post Session:  same Therapeutic Activity: (    10): Therapeutic activities including bed mobility and function transfer  to improve mobility and strength. Required supervision   to promote motor control of bilateral, upper extremity(s), lower extremity(s). Braces/Orthotics/Lines/Etc:  
· correia catheter · O2 Device: Hi flow nasal cannula Treatment/Session Assessment:   
· Response to Treatment:  Tolerated well · Interdisciplinary Collaboration:  
o Certified Occupational Therapy Assistant 
o Registered Nurse · After treatment position/precautions:  
o Up in chair 
o Bed/Chair-wheels locked 
o Bed in low position 
o Call light within reach 
o RN notified 
o Family at bedside · Compliance with Program/Exercises: Compliant all of the time, Will assess as treatment progresses. · Recommendations/Intent for next treatment session:   \"Next visit will focus on advancements to more challenging activities and reduction in assistance provided\". Total Treatment Duration: OT Patient Time In/Time Out Time In: 0900 Time Out: 4262 Sharda Leigh

## 2020-03-04 NOTE — PROGRESS NOTES
Warfarin dosing per pharmacist 
 
Judi Gonzalez. is a 75 y.o. male. Height: 6' 1\" (185.4 cm)    Weight: 97.5 kg (214 lb 14.4 oz) Indication:  Afib Goal INR:  2 to 2.5 (per Dr. Marlen Stockton' note on 2/27) Home dose:  2.5 mg po daily Risk factors or significant drug interactions:  Amiodarone Other anticoagulants:  N/A Daily Monitoring Date  INR     Warfarin dose HGB              Notes 2/27  1.9  2.5 mg  8.8   
2/28  2.4  1 mg  8.8 
2/29  2.2  1 mg  10.1 3/1  2.1  1.5 mg  9.2 
3/2  1.9  2 mg  10.2 
3/3  1.7  2 mg  10.5 
3/4  1.6  2.5 mg  11.0 Warfarin was discontinued for 3 days due to anemia. Patient also had supratherapeutic level of 4.4 on 2/23. Goal is to keep INR on lower side of goal (2-2.5). INR continues to trend down, now 1.6. Will give warfarin 2.5 mg this evening in an effort to get level within goal. 
 
Pharmacy will continue to follow patient and monitor INR daily. Thank you, Lety Gutierres, Pharm. D. 
PGY1 Pharmacy Resident [phone]

## 2020-03-04 NOTE — ROUTINE PROCESS
CHF teaching completed pt/ Family via VM/ REHAB., verbalize emphasis on monitoring self and report to MD: 
? If you gain 2 lbs in one day or 5 lbs in a week, and short of breath. ? If you can not lay flat without developing short of breath or rapid breathing at night; or if it wakes you up. Develop a cough or wheezing. ? If you notice swollen hands/feet/ankles or stomach with a bloated/ full feeling. ? If you are  more confused or mentally fuzzy or dizzy. ? If you notice a rapid or change in your heart rate. ? If you become more exhausted all the time and unable to do the same level of activity without stopping to catch your breath. Drink no more than 8 cups a day in 8 oz. cups. Limit Cola Drinks. Your Heart can not handle any more. Stay away from salt (limit anything with salt or sodium in it). Limit to 250mg per serving. Exercise needs to be started with your Doctors approval. 
Reduce stress; Call myself or Provider if assistance is needed. Pass post test via teach back, will make self available post DC ,if an questions arise. Diabetic teaching completed. Planner/scale @ BS:  60 mins total 
 
 
[facility]-  email sent

## 2020-03-12 NOTE — PROGRESS NOTES
Community Care Team documentation for patient in [address] [place] [address]    The information below provided by:[facility]    PT Update:  Transfers CGA/SBA; Gait 50-70 ft with RW at CGA/SBA. SBA with basic self-care tasks; CGA with self-care when standing. Mechanical soft diet and ground meats; Thin liquids. Nursing Update:Medically stable, no new orders.       Discharge Date:3/24/20      Assign to Heydi Fernandez/Keagan Lima

## 2020-03-13 PROBLEM — I34.0 MITRAL REGURGITATION: Status: ACTIVE | Noted: 2020-01-01

## 2020-03-13 PROBLEM — E87.20 LACTIC ACIDOSIS: Status: ACTIVE | Noted: 2020-01-01

## 2020-03-13 PROBLEM — I25.10 CAD (CORONARY ARTERY DISEASE): Status: ACTIVE | Noted: 2020-01-01

## 2020-03-13 PROBLEM — J96.01 ACUTE HYPOXEMIC RESPIRATORY FAILURE (HCC): Status: ACTIVE | Noted: 2020-01-01

## 2020-03-13 PROBLEM — R91.8 PULMONARY INFILTRATE: Status: ACTIVE | Noted: 2020-01-01

## 2020-03-13 NOTE — ROUTINE PROCESS
TRANSFER - OUT REPORT: 
 
Verbal report given to [facility] RN on Pura Cabrera.  being transferred to ICU for routine progression of care Report consisted of patients Situation, Background, Assessment and  
Recommendations(SBAR). Information from the following report(s) ED Summary was reviewed with the receiving nurse. Lines:  
Peripheral IV 03/13/20 Right Hand (Active) Opportunity for questions and clarification was provided. Patient transported with: 
 Monitor O2 @ Bipap liters Registered Nurse

## 2020-03-13 NOTE — ED TRIAGE NOTES
EMS called to [facility] on [address]. Staff checked on him this AM and he was complaining of chest pain. On face mask at 6L at facility and only sating in the 70s. Seen to be in respiratory distress by EMS and placed on CPAP. Wheezing in upper fields for EMS. Not given any medication. 20g RH. . CHF, htn hx with pacemaker. No chest pain en route. Sats stayed at 90% on cpap. bp 122/80, hr  with pacer.

## 2020-03-13 NOTE — ED PROVIDER NOTES
Deuce Moffett is a 75 y.o. male who presents to the ED with a chief complaint of shortness of breath that started yesterday. His wife reports that his lasix has been changed to prn and he has had bp medications stopped. He does have a history of heart failure. No history of copd or asthma. He is not been on any oxygen except when he was in the hospital he is currently at [facility]. Patient unable to provide any history. He was fatigued yesterday. The history is provided by the spouse. Respiratory Distress Past Medical History:  
Diagnosis Date  Acute myocardial infarction ([address] 2000 MI PCI x 2  
 Aortic Aneurysm/Dilation of the Aorta 2/22/2016  Aortic ectasia, abdominal ([address] 5/9/2014  Arrhythmia  ASCAD-of the Native Vessel 2/22/2016  CAD (coronary artery disease) 2008 PCI with stents to RCA  Cerebrovascular accident (stroke) ([address] 1/30/2016  Chest pain 2/22/2016  Chronic pain  Chronic systolic heart failure ([address] 2/22/2016  Congestive heart failure, unspecified  Coronary atherosclerosis of unspecified type of vessel, native or graft 2007 MI PCI x 1  
 GERD (gastroesophageal reflux disease)  History of agent Orange exposure  Hx of AICD-Implanted Cardiac Defibrillator 2/22/2016  Hypercholesterolemia  Hyperlipidemia 2/22/2016  Hypertension  Ill-defined condition HLD  Ill-defined disease   
 ectatic aorta  Liver disease   
 partial liver resection.  Morbid obesity ([address]  Myocardial infarct/Anterolateral age unspe. 2/22/2016  Stroke [facility]) 12/27/2011 Past Surgical History:  
Procedure Laterality Date  CARDIAC SURG PROCEDURE UNLIST MULTIPLE HEART ANGIOPLASTY/STENT  
 HX GI Liver resection  HX OTHER SURGICAL    
 partial liver resection.  HX PACEMAKER    
 ICD/Pacemaker Family History:  
Problem Relation Age of Onset  Cancer Mother  Other Father   
     brain hemorrage  Heart Disease Maternal Grandfather  Heart Disease Paternal Grandfather Social History Socioeconomic History  Marital status:  Spouse name: Not on file  Number of children: Not on file  Years of education: Not on file  Highest education level: Not on file Occupational History  Not on file Social Needs  Financial resource strain: Not on file  Food insecurity Worry: Not on file Inability: Not on file  Transportation needs Medical: Not on file Non-medical: Not on file Tobacco Use  Smoking status: Former Smoker Packs/day: 1.00 Years: 28.00 Pack years: 28.00 Last attempt to quit: 1999 Years since quittin.2  Smokeless tobacco: Never Used Substance and Sexual Activity  Alcohol use: No  
 Drug use: No  
 Sexual activity: Not on file Lifestyle  Physical activity Days per week: Not on file Minutes per session: Not on file  Stress: Not on file Relationships  Social connections Talks on phone: Not on file Gets together: Not on file Attends [employer]ism service: Not on file Active member of club or organization: Not on file Attends meetings of clubs or organizations: Not on file Relationship status: Not on file  Intimate partner violence Fear of current or ex partner: Not on file Emotionally abused: Not on file Physically abused: Not on file Forced sexual activity: Not on file Other Topics Concern  Not on file Social History Narrative  Not on file ALLERGIES: Sulfur Review of Systems Unable to perform ROS: Mental status change Vitals:  
 20 0801 BP: 177/71 Pulse: 81 Resp: 22 Temp: 97.4 °F (36.3 °C) SpO2: (!) 88% Weight: 98.9 kg (218 lb) Height: 6' 1\" (1.854 m) Physical Exam 
Vitals signs and nursing note reviewed. Constitutional:   
   General: He is not in acute distress. Appearance: Normal appearance. He is not ill-appearing, toxic-appearing or diaphoretic. HENT:  
   Head: Normocephalic and atraumatic. Right Ear: Tympanic membrane and ear canal normal.  
   Left Ear: Tympanic membrane and ear canal normal.  
   Nose: Nose normal. No congestion or rhinorrhea. Mouth/Throat:  
   Mouth: Mucous membranes are moist.  
Eyes:  
   Extraocular Movements: Extraocular movements intact. Pupils: Pupils are equal, round, and reactive to light. Neck: Musculoskeletal: Normal range of motion. No neck rigidity or muscular tenderness. Cardiovascular:  
   Rate and Rhythm: Normal rate. Heart sounds: No murmur. No friction rub. No gallop. Pulmonary:  
   Effort: No respiratory distress. Breath sounds: No stridor. Rales (lower lung rales bilaterally) present. No wheezing or rhonchi. Comments: hypoxia Chest:  
   Chest wall: No tenderness. Abdominal:  
   General: Abdomen is flat. There is no distension. Palpations: Abdomen is soft. There is no mass. Tenderness: There is no abdominal tenderness. There is no right CVA tenderness, left CVA tenderness, guarding or rebound. Hernia: No hernia is present. Musculoskeletal:     
   General: No swelling, tenderness, deformity or signs of injury. Skin: 
   General: Skin is warm. Capillary Refill: Capillary refill takes less than 2 seconds. Coloration: Skin is not jaundiced or pale. Findings: No bruising, erythema or lesion. Neurological:  
   General: No focal deficit present. Mental Status: Mental status is at baseline. Cranial Nerves: No cranial nerve deficit. Motor: No weakness. Gait: Gait normal.  
   Deep Tendon Reflexes: Reflexes normal.  
   Comments: Somnolent wakes to voice Psychiatric:     
   Mood and Affect: Mood normal.     
   Behavior: Behavior normal.     
   Thought Content:  Thought content normal.  
 
  
 
MDM 
 Number of Diagnoses or Management Options Acute on chronic congestive heart failure, unspecified heart failure type [facility]): Acute on chronic respiratory failure with hypoxia [facility]):  
Diagnosis management comments: Patient was admitted last month for fluid overload with lactic acidosis and similar presentation and required him to be intubated at that time. I read pulmonary's note from last time and they felt it was likely elevated lactic acid due to hypoperfusion from heart failure. And given negative procalcitonin and no fever currently I feel that this is likely the case as well. I did not give fluids due to his heart failure. I discussed case with cardiology they asked me to give him a second dose of Lasix will continue him on BiPAP. Spoke with the family at length about DNR status and they wish to not be intubated if symptoms were to worsen and he is a dnr. Amount and/or Complexity of Data Reviewed Clinical lab tests: ordered and reviewed Tests in the radiology section of CPT®: reviewed and ordered Discuss the patient with other providers: yes Independent visualization of images, tracings, or specimens: yes Critical Care Total time providing critical care: 30-74 minutes (35 minutes) Procedures

## 2020-03-13 NOTE — PROGRESS NOTES
This note will not be viewable in [address] Patient with ED visit 3/13 following d/c to rehab facility (3/4) Patient was removed from HF bundle as his Final DRG did not reflect primary acute chf. Notify Jacob Akers of removing enrollment from HF bundle

## 2020-03-13 NOTE — H&P
[facility] CARDIOLOGY History &Physical 
            
 
Primary Cardiologist: Dr Lachelle Pierre Primary Care Physician: Dr Sharmin Valdez Admitting Physician: Dr Cj Gomes Subjective:  
 
Patient is a 75 y.o. male who presents with respiratory failure. He has a h/o htn, a fib s/p Medtronic ICD  and AV node ablation 2-2020, CAD w [facility] in 2015 w patent stents to circ and RCA, recent admission 2-2020 w acute respiratory failure w ICD interrogation showing NSVT. He was d/c 3-4 to [facility] on amiodarone and po lasix BID. He had done well, saw Dr Lachelle Pierre yesterday but was found at [facility] w CP w O2 sat in the 70's. EMS placed pt on BIPAP. In ER WBC 14.4, hgb 12, , K 3.1, cr 1.43, lactic acid 4.9, pBNP 3887,  EKG v paced, trop and procal .06. CXR improved B interstitial infiltrates. BP initially 177/71, repeat 14/66. Wife reports pt had been doing well, weight stable, no CP, palpitations, dizziness, LE edema or cough. No F/C. No exposure to sick patients. He has had mild increased dyspnea for 2 days, but was stable at f/u yesterday w Dr Lachelle Pierre. Pt lethargic, on BIPAP and difficult to obtain history from, seems to have suddenly had worsening dyspnea this AM.  Sudden onset, unsure of weight change in past few days. No ICD discharges. Pt has been given IV lasix and started on antibiotics and is more comfortable at this time. Family and wife want pt to be DNR, wife leaning toward comfort care but pt states he \"may make it\". Past Medical History:  
Diagnosis Date  Acute myocardial infarction ([address] 2000 MI PCI x 2  
 Aortic Aneurysm/Dilation of the Aorta 2/22/2016  Aortic ectasia, abdominal ([address] 5/9/2014  Arrhythmia  ASCAD-of the Native Vessel 2/22/2016  CAD (coronary artery disease) 2008 PCI with stents to RCA  Cerebrovascular accident (stroke) ([address] 1/30/2016  Chest pain 2/22/2016  Chronic pain  Chronic systolic heart failure ([address] 2/22/2016  Congestive heart failure, unspecified  Coronary atherosclerosis of unspecified type of vessel, native or graft  MI PCI x 1  
 GERD (gastroesophageal reflux disease)  History of agent Orange exposure  Hx of AICD-Implanted Cardiac Defibrillator 2016  Hypercholesterolemia  Hyperlipidemia 2016  Hypertension  Ill-defined condition HLD  Ill-defined disease   
 ectatic aorta  Liver disease   
 partial liver resection.  Morbid obesity ([address]  Myocardial infarct/Anterolateral age unspe. 2016  Stroke [facility]) 2011 Past Surgical History:  
Procedure Laterality Date  CARDIAC SURG PROCEDURE UNLIST MULTIPLE HEART ANGIOPLASTY/STENT  
 HX GI Liver resection  HX OTHER SURGICAL    
 partial liver resection.  HX PACEMAKER    
 ICD/Pacemaker Allergies Allergen Reactions  Sulfur Other (comments) BREAK OUT Social History Tobacco Use  Smoking status: Former Smoker Packs/day: 1.00 Years: 28.00 Pack years: 28.00 Last attempt to quit: 1999 Years since quittin.2  Smokeless tobacco: Never Used Substance Use Topics  Alcohol use: No  
  
FH:  
Family History Problem Relation Age of Onset  Cancer Mother  Other Father   
     brain hemorrage  Heart Disease Maternal Grandfather  Heart Disease Paternal Grandfather Review of Systems General: unsure about weight change, + weakness, no fever or chills Skin: no rashes, lumps, or other skin changes HEENT: no headache, dizziness, + mouth sore after recent antibiotics Neck: no swollen glands, goiter, pain or stiffness Respiratory: no cough, sputum, hemoptysis, + dyspnea, + wheezing Cardiovascular: + as per HPI Gastrointestinal: no reflux, constipation, diarrhea, liver problems, GI bleeding Urinary: no frequency, urgency , hematuria, burning/pain with urination, recent flank pain, polyuria, nocturia, or difficulty urinating Peripheral Vascular: no claudication, leg cramps, prior DVTs, swelling of calves, legs, or feet, color change, or swelling with redness or tenderness Musculoskeletal: no muscle or joint pain/stiffness, joint swelling, erythema of joints, or back pain Psychiatric: no depression or excessive stress Neurological: + h/o CVA Hematologic: no anemia, easy bruising or bleeding Endocrine: no thyroid problems, no heat or cold intolerance, excessive sweating, polyuria, polydipsia, + diabetes. Objective:  
 
 
Visit Vitals /66 Pulse 80 Temp 97.4 °F (36.3 °C) Resp 22 Ht 6' 1\" (1.854 m) Wt 98.9 kg (218 lb) SpO2 97% BMI 28.76 kg/m² No intake/output data recorded. No intake/output data recorded. Physical Exam: 
General: Chronically ill appearing male who diane eras older than stated age w CPAP in place HEENT: pupils equal and round, no abnormalities noted Neck: supple, thick neck difficult to assess JVD Heart: S1S2 with RRR with 2/6 murmur, ICD in chest wall Lungs: Coarse rhonchi Abd: soft, nontender, nondistended, with good bowel sounds Ext: warm, no edema Skin: warm and dry Psychiatric: Normal mood and affect Neurologic: Alert and oriented X 3 ECG: v paced Data Review:  
  
Recent Results (from the past 24 hour(s)) EKG, 12 LEAD, INITIAL Collection Time: 03/13/20  8:06 AM  
Result Value Ref Range Ventricular Rate 79 BPM  
 Atrial Rate 147 BPM  
 QRS Duration 162 ms Q-T Interval 462 ms QTC Calculation (Bezet) 529 ms Calculated P Axis -128 degrees Calculated R Axis -80 degrees Calculated T Axis 95 degrees Diagnosis Electronic ventricular pacemaker Confirmed by Rosalia Johnson MD (), CHILANGO JOHNSON ([id number]) on 3/13/2020 8:49:44 AM 
  
CBC WITH AUTOMATED DIFF Collection Time: 03/13/20  8:16 AM  
Result Value Ref Range WBC 14.4 (H) 4.3 - 11.1 K/uL  
 RBC 4.46 4.23 - 5.6 M/uL HGB 12.1 (L) 13.6 - 17.2 g/dL HCT 40.2 (L) 41.1 - 50.3 % MCV 90.1 79.6 - 97.8 FL  
 MCH 27.1 26.1 - 32.9 PG  
 MCHC 30.1 (L) 31.4 - 35.0 g/dL  
 RDW 15.7 (H) 11.9 - 14.6 % PLATELET 347 637 - 922 K/uL MPV 12.4 (H) 9.4 - 12.3 FL ABSOLUTE NRBC 0.00 0.0 - 0.2 K/uL  
 DF AUTOMATED NEUTROPHILS 87 (H) 43 - 78 % LYMPHOCYTES 8 (L) 13 - 44 % MONOCYTES 3 (L) 4.0 - 12.0 % EOSINOPHILS 0 (L) 0.5 - 7.8 % BASOPHILS 0 0.0 - 2.0 % IMMATURE GRANULOCYTES 1 0.0 - 5.0 %  
 ABS. NEUTROPHILS 12.5 (H) 1.7 - 8.2 K/UL  
 ABS. LYMPHOCYTES 1.2 0.5 - 4.6 K/UL  
 ABS. MONOCYTES 0.5 0.1 - 1.3 K/UL  
 ABS. EOSINOPHILS 0.0 0.0 - 0.8 K/UL  
 ABS. BASOPHILS 0.1 0.0 - 0.2 K/UL  
 ABS. IMM. GRANS. 0.2 0.0 - 0.5 K/UL METABOLIC PANEL, COMPREHENSIVE Collection Time: 03/13/20  8:16 AM  
Result Value Ref Range Sodium 136 136 - 145 mmol/L Potassium 3.1 (L) 3.5 - 5.1 mmol/L Chloride 102 98 - 107 mmol/L  
 CO2 20 (L) 21 - 32 mmol/L Anion gap 14 7 - 16 mmol/L Glucose 242 (H) 65 - 100 mg/dL BUN 27 (H) 8 - 23 MG/DL Creatinine 1.32 0.8 - 1.5 MG/DL  
 GFR est AA >60 >60 ml/min/1.73m2 GFR est non-AA 56 (L) >60 ml/min/1.73m2 Calcium 9.0 8.3 - 10.4 MG/DL Bilirubin, total 1.6 (H) 0.2 - 1.1 MG/DL  
 ALT (SGPT) 29 12 - 65 U/L  
 AST (SGOT) 20 15 - 37 U/L Alk. phosphatase 86 50 - 136 U/L Protein, total 6.6 6.3 - 8.2 g/dL Albumin 2.8 (L) 3.2 - 4.6 g/dL Globulin 3.8 (H) 2.3 - 3.5 g/dL A-G Ratio 0.7 (L) 1.2 - 3.5 LACTIC ACID Collection Time: 03/13/20  8:16 AM  
Result Value Ref Range Lactic acid 4.9 (HH) 0.4 - 2.0 MMOL/L  
TROPONIN I Collection Time: 03/13/20  8:16 AM  
Result Value Ref Range Troponin-I, Qt. 0.06 (H) 0.02 - 0.05 NG/ML  
NT-PRO BNP Collection Time: 03/13/20  8:16 AM  
Result Value Ref Range NT pro-BNP 3,887 (H) <450 PG/ML  
PROCALCITONIN Collection Time: 03/13/20  8:17 AM  
Result Value Ref Range Procalcitonin 0.06 ng/mL MAGNESIUM  
 Collection Time: 03/13/20  8:17 AM  
Result Value Ref Range Magnesium 2.0 1.8 - 2.4 mg/dL TSH 3RD GENERATION Collection Time: 03/13/20  8:17 AM  
Result Value Ref Range TSH 3.390 0.358 - 3.740 uIU/mL POC G3 Collection Time: 03/13/20  8:19 AM  
Result Value Ref Range Device: BIPAP    
 FIO2 (POC) 60 % pH (POC) 7.420 7.35 - 7.45    
 pCO2 (POC) 31.9 (L) 35 - 45 MMHG  
 pO2 (POC) 114 (H) 75 - 100 MMHG  
 HCO3 (POC) 20.7 (L) 22 - 26 MMOL/L  
 sO2 (POC) 99 (H) 95 - 98 % Base deficit (POC) 3 mmol/L Allens test (POC) YES Site RIGHT RADIAL Specimen type (POC) ARTERIAL Performed by Tera   
 CO2, POC 22 MMOL/L Critical value read back 00:01 Respiratory comment: PhysicianNotified Exhaled minute volume 15.70 L/min COLLECT TIME 815 INFLUENZA A & B AG (RAPID TEST) Collection Time: 03/13/20  9:34 AM  
Result Value Ref Range Influenza A Ag NEGATIVE  NEG Influenza B Ag NEGATIVE  NEG Source Nasopharyngeal    
 
 
CXR: improved opacities Assessment/Plan:  
Acute respiratory failure with hypoxia ([facility] (2/20/2020)- sudden decompensation this AM, on amio for NSVT but will have device interrogated to determine whether pulmonary edema precipitated by arrhythmia, admit to ICU, diuresis, consult pulmonary for management of BIPAP and antibiotics, cont ACe-I, BB; wife leaning towards hospice care NSVT (nonsustained ventricular tachycardia) ([facility] (8/7/2017)- interrogate ICD, cont BB, amio Systolic CHF, chronic ([address] (1/6/2020)- EF 20%, s/p Medtronic ICD, IV lasix, BB, ACE 
 
DNR (do not resuscitate) (2/25/2020)- social work consult, considering hospice Lactic acidosis (3/13/2020)- antibiotics, cultures pending Mitral regurgitation (3/13/2020) CAD (coronary artery disease) (3/13/2020)- trop negative, cont ASA, statin, BB, ACE 
 
PAF- INR pending, s/p AV node ablation, cont coumadin Faizan Christy PA-C 
3/13/2020 
12:14 PM 
 ATTENDING ADDENDUM: 
 
Patient seen and examined by me. Agree with above note by physician extender. Key findings are:  No CP or palpitations and with V-pacing on tele in 80's (no symptoms to suggest recurrent VT and on amio chronically since the recent discharge), but acute SOB this AM with HTN on admit but now BP in 110's after IV lasix and BiPAP, breathing much better now. Doesn't look significantly volume overloaded at present but probable flash pulm edema this AM with severe LV dysfunction and mod-severe MR by last echo. Slowly declining physically. Doing better on BiPAP at present but wants to avoid intubation, patient and family wish to be DNR. Afebrile and no infectious symptoms recently but with elevated WBC and lactic acidosis at present, vanc and zosyn given by ER MD and will be continued in short run (add nystatin S&S given thrush after ABX last visit). Interrogate ICD today to make sure no sustained VT prompting acute respiratory failure. CV- RRR with 2/6 TR murmur, MR murmur. Difficult to assess JVD Lungs- Coarse bases laterally, clear anterior/apical 
Abd- soft, nontender, nondistended Ext- no edema Plan: As above. IV lasix, drip as needed, recheck labs in AM.  Continue po amio but convert to IV if sustained VT on ICD interrogation today. DNR. If declines this hospitalization, needs hospice discussion again. Family wishes to take him home and not back to [facility] after this admission, possibly home with hospice his best option? BOLIVAR Olivier MD 
[address] Cardiology Pager [phone]

## 2020-03-13 NOTE — CONSULTS
CONSULT NOTE Volodymyr Santos. 
 
3/13/2020 Date of Admission:  3/13/2020 The patient's chart is reviewed and the patient is discussed with the staff. Subjective:  
 
Pt is a 74 yo  male with a history of chronic systolic CHF (EF 44-01%), severe MR, CAD, NSVT s/p ICD, aflutter s/p AV node ablation on 2/11/20, and prior CVA. He was recently admitted to [facility] with shortness of breath and in acute resp failure with RA sat 80%. Was initially placed on CPAP but required intubation by EMS. CXR concerning for volume overload/pulmonary edema and was given Lasix 60mg IV with minimal UOP. He was started on Zosyn/Vanc secondary to emesis episode during intubation. [facility] was able to be extubated 2/21, wore BIPAP and weaned to Opti-flow. He was weaned off of O2 and discharged to [facility]. Pt noticed increased shortness of breath about 2 days ago. He was seen yesterday by Dr. Derek Cruz, cardiologist, and hospice was discussed. Pt had a cough last night and was given robitussin. Overnight his respiratory status worsened and his wife was called. EMS was contacted and required CPAP secondary to hypoxemia on 6L with sats in the 70s. Pt was started on BiPAP. Cardiology is admitting and we were consulted to assist with respiratory status. Pt's wife is at bedside. Pt's wife reports that he continues to have the same pattern. She would like a DNR order. She would not want pt to have CPR or intubation. She would like to maintain current treatment for now. She also stresses that he would not want a feeding tube. Pt has been taking a long time to eat secondary to chewing his food well. He isn't eating much. She has been feeling him some food from home which is not on his cardiac diet. Pt does not have any swelling. She has not noticed any fever, chills, or sweats. Pt is awake and reports his breathing is better. He denies pain. He has been more tired lately but has been progressing well in PT. Review of Systems Constitutional: positive for fatigue Eyes: positive for contacts/glasses Ears, nose, mouth, throat, and face: negative Respiratory: positive for cough or dyspnea on exertion Cardiovascular: positive for chest pressure/discomfort Gastrointestinal: negative Genitourinary:positive for frequency and nocturia Hematologic/lymphatic: positive for easy bruising Musculoskeletal:positive for arthralgias and stiff joints Neurological: negative Behavioral/Psych: negative Allergic/Immunologic: negative Patient Active Problem List  
Diagnosis Code  Aortic ectasia, abdominal ([facility] H45.379  Cerebrovascular accident (stroke) ([facility] I63.9  
 Hx of AICD-Implanted Cardiac Defibrillator Z95.810  
 Aortic Aneurysm/Dilation of the Aorta I71.9  ASCAD-of the Native Vessel I25.10  Acute on chronic systolic heart failure (HCC) I50.23  
 Hyperlipidemia E78.5  Bilateral carotid artery disease (HCC) I73.9  NSVT (nonsustained ventricular tachycardia) ([facility]) I47.2  Dilated cardiomyopathy ([facility]) I42.0  Long term (current) use of anticoagulants Z79.01  
 Typical atrial flutter ([facility]) I48.3  Severe obesity ([facility]) E66.01  
 LBBB (left bundle branch block) I44.7  Systolic CHF, chronic ([facility]) I50.22  
 Acute respiratory failure with hypoxia ([facility]) J96.01  
 Acute pulmonary edema ([facility]) J81.0  DNR (do not resuscitate) [phone]  Lactic acidosis E87.2  Mitral regurgitation I34.0  CAD (coronary artery disease) I25.10 [employer] none. Prior to Admission Medications Prescriptions Last Dose Informant Patient Reported? Taking? Aspirin, Buffered 81 mg tab   Yes No  
Sig: Take 81 mg by mouth daily. amiodarone (CORDARONE) 200 mg tablet   No No  
Sig: Take 1 Tab by mouth two (2) times a day. atorvastatin (LIPITOR) 40 mg tablet   Yes No  
Sig: Take 40 mg by mouth daily. fenofibric acid (TRILIPIX ER) 135 mg capsule   Yes No  
Sig: Take 135 mg by mouth daily. furosemide (LASIX) 40 mg tablet   No No  
Sig: Take 1 Tab by mouth two (2) times a day.  
gabapentin (NEURONTIN) 300 mg capsule   Yes No  
Sig: Take 300 mg by mouth two (2) times a day. lisinopril (PRINIVIL, ZESTRIL) 5 mg tablet   No No  
Sig: Take 1 Tab by mouth daily. loratadine (CLARITIN) 10 mg tablet   Yes No  
Sig: Take 10 mg by mouth.  
mesalamine ER (APRISO) 0.375 gram 24 hour capsule   Yes No  
Sig: Take 0.375 g by mouth daily. Indications: ulcerated colon  
metoprolol succinate (TOPROL-XL) 25 mg XL tablet   No No  
Sig: Take 0.5 Tabs by mouth daily. nitroglycerin (NITROSTAT) 0.4 mg SL tablet   No No  
Si Tab by SubLINGual route every five (5) minutes as needed for Chest Pain. oxybutynin (DITROPAN) 5 mg tablet   No No  
Sig: Take 1 Tab by mouth three (3) times daily. spironolactone (ALDACTONE) 25 mg tablet   No No  
Sig: Take 0.5 Tabs by mouth daily. warfarin (COUMADIN) 5 mg tablet   No No  
Sig: Take 0.5-1 tab every day or as directed Patient taking differently: Take 5 mg by mouth daily. Take 0.5-1 tab every day or as directed Facility-Administered Medications: None Past Medical History:  
Diagnosis Date  Acute myocardial infarction ([address]  MI PCI x 2  
 Aortic Aneurysm/Dilation of the Aorta 2016  Aortic ectasia, abdominal ([address] 2014  Arrhythmia  ASCAD-of the Native Vessel 2016  CAD (coronary artery disease) 2008 PCI with stents to RCA  Cerebrovascular accident (stroke) ([address] 2016  Chest pain 2016  Chronic pain  Chronic systolic heart failure ([address] 2016  Congestive heart failure, unspecified  Coronary atherosclerosis of unspecified type of vessel, native or graft 2007 MI PCI x 1  
 GERD (gastroesophageal reflux disease)  History of agent Orange exposure  Hx of AICD-Implanted Cardiac Defibrillator 2016  Hypercholesterolemia  Hyperlipidemia 2016  Hypertension  Ill-defined condition HLD  Ill-defined disease   
 ectatic aorta  Liver disease   
 partial liver resection.  Morbid obesity ([facility]  Myocardial infarct/Anterolateral age unspe. 2016  Stroke [facility]) 2011 Past Surgical History:  
Procedure Laterality Date  CARDIAC SURG PROCEDURE UNLIST MULTIPLE HEART ANGIOPLASTY/STENT  
 HX GI Liver resection  HX OTHER SURGICAL    
 partial liver resection.  HX PACEMAKER    
 ICD/Pacemaker Social History Socioeconomic History  Marital status:  Spouse name: Not on file  Number of children: Not on file  Years of education: Not on file  Highest education level: Not on file Occupational History  Not on file Social Needs  Financial resource strain: Not on file  Food insecurity Worry: Not on file Inability: Not on file  Transportation needs Medical: Not on file Non-medical: Not on file Tobacco Use  Smoking status: Former Smoker Packs/day: 1.00 Years: 28.00 Pack years: 28.00 Last attempt to quit: 1999 Years since quittin.2  Smokeless tobacco: Never Used Substance and Sexual Activity  Alcohol use: No  
 Drug use: No  
 Sexual activity: Not on file Lifestyle  Physical activity Days per week: Not on file Minutes per session: Not on file  Stress: Not on file Relationships  Social connections Talks on phone: Not on file Gets together: Not on file Attends Sikhism service: Not on file Active member of club or organization: Not on file Attends meetings of clubs or organizations: Not on file Relationship status: Not on file  Intimate partner violence Fear of current or ex partner: Not on file Emotionally abused: Not on file Physically abused: Not on file Forced sexual activity: Not on file Other Topics Concern  Not on file Social History Narrative  Not on file Family History Problem Relation Age of Onset  Cancer Mother  Other Father   
     brain hemorrage  Heart Disease Maternal Grandfather  Heart Disease Paternal Grandfather Allergies Allergen Reactions  Sulfur Other (comments) BREAK OUT Current Facility-Administered Medications Medication Dose Route Frequency  nystatin (MYCOSTATIN) 100,000 unit/mL oral suspension 500,000 Units  500,000 Units Oral QID  potassium chloride 20 mEq in 100 ml IVPB  20 mEq IntraVENous ONCE Followed by  
 potassium chloride 20 mEq in 100 ml IVPB  20 mEq IntraVENous ONCE Followed by  
 potassium chloride 20 mEq in 100 ml IVPB  20 mEq IntraVENous ONCE Followed by  
 potassium chloride 20 mEq in 100 ml IVPB  20 mEq IntraVENous ONCE Current Outpatient Medications Medication Sig  
 spironolactone (ALDACTONE) 25 mg tablet Take 0.5 Tabs by mouth daily.  amiodarone (CORDARONE) 200 mg tablet Take 1 Tab by mouth two (2) times a day.  lisinopril (PRINIVIL, ZESTRIL) 5 mg tablet Take 1 Tab by mouth daily.  metoprolol succinate (TOPROL-XL) 25 mg XL tablet Take 0.5 Tabs by mouth daily.  oxybutynin (DITROPAN) 5 mg tablet Take 1 Tab by mouth three (3) times daily.  loratadine (CLARITIN) 10 mg tablet Take 10 mg by mouth.  furosemide (LASIX) 40 mg tablet Take 1 Tab by mouth two (2) times a day.  mesalamine ER (APRISO) 0.375 gram 24 hour capsule Take 0.375 g by mouth daily. Indications: ulcerated colon  warfarin (COUMADIN) 5 mg tablet Take 0.5-1 tab every day or as directed (Patient taking differently: Take 5 mg by mouth daily. Take 0.5-1 tab every day or as directed)  atorvastatin (LIPITOR) 40 mg tablet Take 40 mg by mouth daily.  gabapentin (NEURONTIN) 300 mg capsule Take 300 mg by mouth two (2) times a day.  fenofibric acid (TRILIPIX ER) 135 mg capsule Take 135 mg by mouth daily.   
 nitroglycerin (NITROSTAT) 0.4 mg SL tablet 1 Tab by SubLINGual route every five (5) minutes as needed for Chest Pain.  Aspirin, Buffered 81 mg tab Take 81 mg by mouth daily. Objective:  
 
Vitals:  
 03/13/20 0824 03/13/20 0910 03/13/20 1100 03/13/20 1226 BP:  104/66  105/66 Pulse:  80  79 Resp:      
Temp:      
SpO2: 97%  97% Weight:      
Height: PHYSICAL EXAM  
 
Constitutional:  the patient is well developed and in no acute distress, on BiPAP 11/6 EENMT:  Sclera clear, pupils equal, oral mucosa moist 
Respiratory: diminished anteriorly Cardiovascular:  RRR without M,G,R 
Gastrointestinal: soft and non-tender; with positive bowel sounds. Musculoskeletal: warm without cyanosis. There is no lower extremity edema. Skin:  no jaundice or rashes Neurologic: no gross neuro deficits Psychiatric:  alert and oriented x 3 CXR:   
 
 
Recent Labs  
  03/13/20 
0816 WBC 14.4* HGB 12.1*  
HCT 40.2*  Recent Labs  
  03/13/20 
0817 03/13/20 
7900 NA  --  136 K  --  3.1*  
CL  --  102 GLU  --  242* CO2  --  20* BUN  --  27* CREA  --  1.32  
MG 2.0  --   
CA  --  9.0  
TROIQ  --  0.06* ALB  --  2.8* TBILI  --  1.6* ALT  --  29 SGOT  --  20 Recent Labs  
  03/13/20 
1879 PHI 7.420 PCO2I 31.9*  
PO2I 114* HCO3I 20.7* Recent Labs  
  03/13/20 
3092 LAC 4.9* Assessment:  (Medical Decision Making) Hospital Problems  Date Reviewed: 3/13/2020 Codes Class Noted POA Lactic acidosis ICD-10-CM: E87.2 ICD-9-CM: 276.2  3/13/2020 Unknown Start vanc and zosyn, trend LA Mitral regurgitation ICD-10-CM: I34.0 ICD-9-CM: 424.0  3/13/2020 Unknown Chronic CAD (coronary artery disease) ICD-10-CM: I25.10 ICD-9-CM: 414.00  3/13/2020 Unknown Chronic Pulmonary infiltrate ICD-10-CM: R91.8 ICD-9-CM: 793.19  3/13/2020 Unknown Start vanc/zosyn DNR (do not resuscitate) ICD-10-CM: N77 ICD-9-CM: V49.86  2/25/2020 Yes Chronic * (Principal) Acute respiratory failure with hypoxia ([facility] ICD-10-CM: J96.01 
ICD-9-CM: 518.81  2/20/2020 Yes Wean BiPAP as tolerated, admit to ICU Systolic CHF, chronic (HCC) ICD-10-CM: I50.22 ICD-9-CM: 428.22, 428.0  1/6/2020 Yes Per cardiology NSVT (nonsustained ventricular tachycardia) (HCC) ICD-10-CM: I47.2 ICD-9-CM: 427.1  8/7/2017 Yes Plan:  (Medical Decision Making) --admit to ICU per cardiology 
--wean BiPAP as tolerated 
--start Vanc/Zosyn 
--IV lasix 80 BID ordered 
--continue amiodarone 
--DNR 
--consider hospice care at discharge More than 50% of the time documented was spent in face-to-face contact with the patient and in the care of the patient on the floor/unit where the patient is located. Thank you very much for this referral.  We appreciate the opportunity to participate in this patient's care. Will follow along with above stated plan. VICTOR M Estrada Lungs:  Bilateral crackles Heart:  RRR with no Murmur/Rubs/Gallops Additional Comments:  bipap for now,  Add iv antibx -pt with bilateral infiltrates -probably at least in part chf and volume overload but can not rule out pneumonia with high lactate, have some concern infltrates could be amiodarone related I have spoken with and examined the patient. I agree with the above assessment and plan as documented.  
 
John Lambert MD

## 2020-03-13 NOTE — PROGRESS NOTES
Patient with recent admit and discharge from [facility] (2-20-20 to 3-4-20) with transfer to [facility] / patient coming from [facility] today. PCP listed as Dr. Linh Montes.

## 2020-03-14 PROBLEM — I50.22 CHRONIC SYSTOLIC CHF (CONGESTIVE HEART FAILURE) (HCC): Status: ACTIVE | Noted: 2020-01-01

## 2020-03-14 NOTE — ROUTINE PROCESS
Verbal bedside report received from [address] [place] [address],[address], RN. Assumed care of patient.

## 2020-03-14 NOTE — PROGRESS NOTES
[facility] CARDIOLOGY PROGRESS NOTE 
      
 
3/14/2020 8:34 AM 
 
Admit Date: 3/13/2020 Subjective: Increased SOB, NYHA Class IV sx now, worsening now. Air hunger. resp distress. ROS: 
Deferred. Objective:  
  
Vitals:  
 03/14/20 0119 03/14/20 0443 03/14/20 0700 03/14/20 0715 BP: 100/49 97/63 Pulse: 84 77 Resp: 22 20 Temp: 97.6 °F (36.4 °C) 97.4 °F (36.3 °C) SpO2: 93% 91% (!) 84% 90% Weight:  98.9 kg (218 lb) Height:      
 
 
Physical Exam: 
General-SOB at rest.  
Neck- supple, no JVD 
CV- tachy Lung- rales bilat Abd- soft, nontender, nondistended Ext- mild edema bilaterally. Skin- warm and dry Psychiatric:  deferred Neurologic:  deferred Data Review:  
Recent Labs  
  03/13/20 
1345 03/13/20 
0817 03/13/20 
6903 NA  --   --  136 K  --   --  3.1*  
MG  --  2.0  --   
BUN  --   --  27* CREA  --   --  1.32  
GLU  --   --  242* WBC  --   --  14.4* HGB  --   --  12.1* HCT  --   --  40.2* PLT  --   --  352 INR 4.1*  --   --   
TROIQ  --   --  0.06* TELEMETRY:  sinus Assessment/Plan:  
 
Principal Problem: 
  Acute respiratory failure with hypoxia ([facility] (2/20/2020) Worsening and severe. Patient well known to me, resp distress this AM.  I know him well, plan on palliative measures only for now. PRN morphine, ativan. Continue IV lasix for palliation. DNR, hospice now. Maybe home with hospice in next 24-48 hours once we can better control his sx and make him comfortable. Reviewed with nursing,  at the bedside. Active Problems: 
  NSVT (nonsustained ventricular tachycardia) ([facility] [address] (8/7/2017) Stop amio. Systolic CHF, chronic ([facility] [address] (1/6/2020) worsening DNR (do not resuscitate) (2/25/2020) Confirmed. ICD:  11/4/19: Marlon Tamayo. Will turn off ICD. A flutter: stop coumadin. Will transition to palliative measures now. Bere January, DO 
3/14/2020 8:34 AM

## 2020-03-14 NOTE — HOSPICE
Patient approved for GIP at [facility]. Consents/DNR obtained. Signed by wife Dawn Jamison. Thank you for the referral, Deniz Michelle RN BSN [facility] [phone]

## 2020-03-14 NOTE — DISCHARGE SUMMARY
[facility] Cardiology Discharge Summary Patient ID: Stacia Garg 
[id number] 
75 y.o. 
1945 Admit date: 3/13/2020 Discharge date:  3/14/2020 Admitting Physician: Tasia Douglas MD  
 
Discharge Physician: Perry Harding NP/Dr. Korina Espino Admission Diagnoses: Acute respiratory failure with hypoxia ([address] [J96.01] Acute hypoxemic respiratory failure ([address] [J96.01] Discharge Diagnoses:  
 Diagnosis  Lactic acidosis  Mitral regurgitation  CAD (coronary artery disease)  Pulmonary infiltrate  DNR (do not resuscitate)  Acute on chronic systolic heart failure  Acute respiratory failure with hypoxia  Acute pulmonary edema  Systolic CHF, chronic  LBBB (left bundle branch block)  Severe obesity  Typical atrial flutter  Long term (current) use of anticoagulants  Dilated cardiomyopathy  Bilateral carotid artery disease  NSVT (nonsustained ventricular tachycardia)  Hx of AICD-Implanted Cardiac Defibrillator  Aortic Aneurysm/Dilation of the Aorta  ASCAD-of the Native Vessel  Hyperlipidemia  H/o Cerebrovascular accident (stroke) Cardiology Procedures this admission:  None Consults: Pulmonary/Intensive care and Hospice HPI: Patient is a 75 y.o. male who presents with respiratory failure. He has a h/o htn, a fib s/p [employer] ICD  and AV node ablation 2-2020, CAD w [facility] in 2015 w patent stents to circ and RCA, recent admission 2-2020 w acute respiratory failure w ICD interrogation showing NSVT. He was d/c 3-4 to [facility] on amiodarone and po lasix BID. He had done well, saw Dr Yael Troncoso yesterday but was found at [facility] w CP w O2 sat in the 70's. EMS placed pt on BIPAP. In ER WBC 14.4, hgb 12, , K 3.1, cr 1.43, lactic acid 4.9, pBNP 3887,  EKG v paced, trop and procal 0.06. CXR improved B interstitial infiltrates. BP initially 177/71, repeat 104/66. Wife reports pt had been doing well, weight stable, no CP, palpitations, dizziness, LE edema or cough. No F/C. No exposure to sick patients. He has had mild increased dyspnea for 2 days, but was stable at f/u yesterday w Dr Prerna Marshall. Pt lethargic, on BIPAP and difficult to obtain history from, seems to have suddenly had worsening dyspnea this AM.  Sudden onset, unsure of weight change in past few days. No ICD discharges. Pt has been given IV lasix and started on antibiotics and is more comfortable at this time. Family and wife want pt to be DNR, wife leaning toward comfort care but pt states he \"may make it\". Hospital Course: Patient was evaluated and subsequently admitted for further cardiac evaluation and treatment. The patient was started on IV Lasix for diuresis. He was evaluated by Pulmonology as well. He diuresed fair but continued to have increased and worsening SOB with air hunger and increasing respiratory distress. The decision was made to pursue palliative measures and he was made a DNR. A RR was initiated due to O2 sats in the mid 80s on NRB. Transition to BIPAP was not pursued due to his DNR/palliative status and he was placed on Airvo 60L 100% FiO2. Hospice was consulted. He was approved for admission to the [facility] and appropriate arrangements were made. DISPOSITION: Discharge to  hospice at [facility]. Discharge Exam:  
Visit Vitals /50 Pulse 80 Temp 97.6 °F (36.4 °C) Resp 28 Ht 6' 1\" (1.854 m) Wt 98.9 kg (218 lb) SpO2 (!) 82% BMI 28.76 kg/m² Patient has been seen by Dr. Prerna Marshall this AM: see his progress note for exam details. No results found for this or any previous visit (from the past 24 hour(s)). Patient Instructions: Medications to be changed, adjusted or held per the receiving facility MD orders. PTA Meds: 
Current Discharge Medication List  
  
CONTINUE these medications which have NOT CHANGED Details spironolactone (ALDACTONE) 25 mg tablet Take 0.5 Tabs by mouth daily. Qty: 30 Tab, Refills: 0  
  
amiodarone (CORDARONE) 200 mg tablet Take 1 Tab by mouth two (2) times a day. Qty: 30 Tab, Refills: 0  
  
lisinopril (PRINIVIL, ZESTRIL) 5 mg tablet Take 1 Tab by mouth daily. Qty: 30 Tab, Refills: 0  
  
metoprolol succinate (TOPROL-XL) 25 mg XL tablet Take 0.5 Tabs by mouth daily. Qty: 30 Tab, Refills: 0  
  
oxybutynin (DITROPAN) 5 mg tablet Take 1 Tab by mouth three (3) times daily. Qty: 30 Tab, Refills: 0  
  
loratadine (CLARITIN) 10 mg tablet Take 10 mg by mouth. furosemide (LASIX) 40 mg tablet Take 1 Tab by mouth two (2) times a day. Qty: 60 Tab, Refills: 6  
  
mesalamine ER (APRISO) 0.375 gram 24 hour capsule Take 0.375 g by mouth daily. Indications: ulcerated colon  
  
warfarin (COUMADIN) 5 mg tablet Take 0.5-1 tab every day or as directed Qty: 30 Tab, Refills: 11  
  
atorvastatin (LIPITOR) 40 mg tablet Take 40 mg by mouth daily. gabapentin (NEURONTIN) 300 mg capsule Take 300 mg by mouth two (2) times a day. fenofibric acid (TRILIPIX ER) 135 mg capsule Take 135 mg by mouth daily. nitroglycerin (NITROSTAT) 0.4 mg SL tablet 1 Tab by SubLINGual route every five (5) minutes as needed for Chest Pain. Qty: 25 Tab, Refills: 6 Aspirin, Buffered 81 mg tab Take 81 mg by mouth daily.   
  
  
 
 
 
Signed: 
PRINCE Byrd 
3/14/2020 
7:27 PM

## 2020-03-14 NOTE — ED NOTES
TRANSFER - OUT REPORT: 
 
Verbal report given to Karishma Gudino RN(name) on [facility].  being transferred to [facility](unit) for routine progression of care Report consisted of patients Situation, Background, Assessment and  
Recommendations(SBAR). Information from the following report(s) ED Summary, MAR and Recent Results was reviewed with the receiving nurse. Lines:  
Peripheral IV 03/13/20 Right Hand (Active) Opportunity for questions and clarification was provided. Patient transported with: 
 Monitor O2 @ 2 liters Registered Nurse

## 2020-03-14 NOTE — PROGRESS NOTES
Pharmacokinetic Consult to Pharmacist 
 
Judi Gonzalez. is a 75 y.o. male being treated for sepsis with vancomycin and zosyn. Height: 6' 1\" (185.4 cm)  Weight: 98.9 kg (218 lb) Lab Results Component Value Date/Time BUN 27 (H) 03/13/2020 08:16 AM  
 Creatinine 1.32 03/13/2020 08:16 AM  
 WBC 14.4 (H) 03/13/2020 08:16 AM  
 Procalcitonin 0.06 03/13/2020 08:17 AM  
 Lactic acid 1.4 03/13/2020 03:17 PM  
  
Estimated Creatinine Clearance: 59.8 mL/min (based on SCr of 1.32 mg/dL). CULTURES: 
pending Day 1 of vancomycin. Goal trough is 15 -20. Vancomycin dose initiated at 2.5g load x1, then 1.5g q 18 hr.  
Will continue to follow patient. Thank you, 
Shen Gallegos, PharmD Clinical Pharmacist 
[phone]

## 2020-03-14 NOTE — ROUTINE PROCESS
Verbal bedside report given to Sotero Jean oncoming RN. Patient's situation, background, assessment and recommendations provided. Opportunity for questions provided. Oncoming RN assumed care of patient. Patient awaiting transport to hospice planned for 1930.

## 2020-03-14 NOTE — PROGRESS NOTES
Rapid response called due to patient's decreased SpO2. Patient was on NRB mask upon entrance into the room, and SpO2 at that time was 88%. BiPAP requested but not initiated due to patient being a DNR and also a Palliative patient per physician. ABG also cancelled due to patient being Palliative per physician. Patient placed on 100% Airvo at physician request.  SpO2 increased to 90%. Patient has audible crackles. Will continue to monitor.

## 2020-03-14 NOTE — PROGRESS NOTES
Responded to rapid response Spent time with patient at bedside Prayer offered Compassionate care provided by all Mel Rasheed, staff Elsa catherine [address], [address]  /   [email or web address]

## 2020-03-14 NOTE — PROGRESS NOTES
Nutrition Assessment for:  
Best Practice Alert for Malnutrition Screening Tool: Recently Lost Weight Without Trying: Yes, If Yes, How Much Weight Loss: 14 - 23 lbs, Eating Poorly Due to Decreased Appetite: Yes Assessment: Chart reviewed and discussed with primary RN. Patient admitted with acute respiratory failure. He has continued to decline and is now comfort measures. Nutrition Intervention: No nutrition interventions indicated at this time. Pleas consult nutrition if PCO changes and nutrition needs identified. [address] [facility] [address] [place] [address], [address], [address]

## 2020-03-14 NOTE — PROGRESS NOTES
Ming Manriquez. Admission Date: 3/13/2020 Daily Progress Note: 3/14/2020 The patient's chart is reviewed and the patient is discussed with the staff. 74 yo  male with a history of chronic systolic CHF (EF 72-60%), severe MR, CAD, NSVT s/p ICD, aflutter s/p AV node ablation on 2/11/20, and prior CVA. He was recently admitted to [facility] [place] with shortness of breath and in acute resp failure with RA sat 80%. Was initially placed on CPAP but required intubation by EMS. CXR concerning for volume overload/pulmonary edema and was given Lasix 60mg IV with minimal UOP. He was started on Zosyn/Vanc secondary to emesis episode during intubation. [facility] was able to be extubated 2/21, wore BIPAP and weaned to Opti-flow. He was weaned off of O2 and discharged to [facility]. Pt noticed increased shortness of breath about 2 days ago. He was seen yesterday by Dr. Nick Ibrahim, cardiologist, and hospice was discussed. Pt had a cough last night and was given robitussin. Overnight his respiratory status worsened and his wife was called. EMS was contacted and required CPAP secondary to hypoxemia on 6L with sats in the 70s. Pt was started on BiPAP. Cardiology is admitting and we were consulted to assist with respiratory status. Pt's wife is at bedside. Pt's wife reports that he continues to have the same pattern. She would like a DNR order. She would not want pt to have CPR or intubation. She would like to maintain current treatment for now. She also stresses that he would not want a feeding tube. Pt has been taking a long time to eat secondary to chewing his food well. He isn't eating much. She has been feeling him some food from home which is not on his cardiac diet. Pt does not have any swelling. She has not noticed any fever, chills, or sweats. Pt is awake and reports his breathing is better. He denies pain. He has been more tired lately but has been progressing well in PT. Subjective: Rapid response called earlier due to resp. Distress,  Pt. Now on 100% airvo,  Comfort care Current Facility-Administered Medications Medication Dose Route Frequency  morphine injection 4 mg  4 mg IntraVENous Q2H PRN  
 LORazepam (ATIVAN) injection 2 mg  2 mg IntraVENous Q2H PRN  
 morphine (ROXANOL) concentrated oral syringe 10 mg  10 mg Oral Q1H PRN  
 furosemide (LASIX) injection 80 mg  80 mg IntraVENous BID  sodium chloride (NS) flush 5-40 mL  5-40 mL IntraVENous PRN  
 nitroglycerin (NITROSTAT) tablet 0.4 mg  0.4 mg SubLINGual Q5MIN PRN  
 morphine injection 2 mg  2 mg IntraVENous Q4H PRN  
 acetaminophen (TYLENOL) tablet 650 mg  650 mg Oral Q4H PRN  
 HYDROcodone-acetaminophen (NORCO) 7.5-325 mg per tablet 1 Tab  1 Tab Oral Q4H PRN  promethazine (PHENERGAN) with saline injection 12.5 mg  12.5 mg IntraVENous Q6H PRN  
 LORazepam (ATIVAN) injection 1 mg  1 mg IntraVENous Q4H PRN Review of Systems Constitutional: negative for fever, chills, sweats Cardiovascular: negative for chest pain, palpitations, syncope, edema Gastrointestinal:  negative for dysphagia, reflux, vomiting, diarrhea, abdominal pain, or melena Neurologic:  negative for focal weakness, numbness, headache Objective:  
 
Vitals:  
 03/14/20 0815 03/14/20 0850 03/14/20 1002 03/14/20 1120 BP:   100/50 Pulse:   80 Resp:   28 Temp:   97.6 °F (36.4 °C) SpO2: (!) 88% 90% 96% 99% Weight:      
Height:      
 
 
 
Intake/Output Summary (Last 24 hours) at 3/14/2020 1247 Last data filed at 3/14/2020 1156 Gross per 24 hour Intake 500 ml Output 1550 ml Net -1050 ml Physical Exam:  
Constitution:  the patient is well developed and in no acute distress EENMT:  Sclera clear, pupils equal, oral mucosa moist 
Respiratory: *crackles Cardiovascular:  RRR without M,G,R 
Gastrointestinal: soft and non-tender; with positive bowel sounds. Musculoskeletal: warm without cyanosis. There is no lower extremity edema. Skin:  no jaundice or rashes, no wounds Neurologic: no gross neuro deficits Psychiatric:  sleeping CXR:  
 
 
LAB No results for input(s): GLUCPOC in the last 72 hours. No lab exists for component: [place] Recent Labs  
  03/13/20 
1345 03/13/20 
3157 WBC  --  14.4* HGB  --  12.1* HCT  --  40.2* PLT  --  352 INR 4.1*  --   
 
Recent Labs  
  03/13/20 
0817 03/13/20 
0628 NA  --  136 K  --  3.1*  
CL  --  102 CO2  --  20* GLU  --  242* BUN  --  27* CREA  --  1.32  
MG 2.0  --   
CA  --  9.0  
TROIQ  --  0.06* ALB  --  2.8* TBILI  --  1.6* ALT  --  29 SGOT  --  20 Recent Labs  
  03/13/20 
1645 03/13/20 
9446 PHI 7.483* 7.420 PCO2I 34.8* 31.9*  
PO2I 134* 114* HCO3I 26.1* 20.7* Recent Labs  
  03/13/20 
1517 03/13/20 
5350 LAC 1.4 4.9* Assessment:  (Medical Decision Making) Hospital Problems  Date Reviewed: 3/13/2020 Codes Class Noted POA Lactic acidosis ICD-10-CM: E87.2 ICD-9-CM: 276.2  3/13/2020 Unknown Mitral regurgitation ICD-10-CM: I34.0 ICD-9-CM: 424.0  3/13/2020 Unknown CAD (coronary artery disease) ICD-10-CM: I25.10 ICD-9-CM: 414.00  3/13/2020 Unknown Pulmonary infiltrate ICD-10-CM: R91.8 ICD-9-CM: 793.19  3/13/2020 Unknown Acute hypoxemic respiratory failure ([facility] [address] [address] ICD-10-CM: J96.01 
ICD-9-CM: 518.81  3/13/2020 Unknown DNR (do not resuscitate) ICD-10-CM: W44 ICD-9-CM: V49.86  2/25/2020 Yes * (Principal) Acute respiratory failure with hypoxia ([address] ICD-10-CM: J96.01 
ICD-9-CM: 518.81  2/20/2020 Yes Systolic CHF, chronic (HCC) ICD-10-CM: I50.22 ICD-9-CM: 428.22, 428.0  1/6/2020 Yes NSVT (nonsustained ventricular tachycardia) (HCC) ICD-10-CM: I47.2 ICD-9-CM: 427.1  8/7/2017 Yes Plan:  (Medical Decision Making) 1   Pt now comfort care- family to talk with hospice, would like to take pt home Will sign off 
-- 
 
More than 50% of the time documented was spent in face-to-face contact with the patient and in the care of the patient on the floor/unit where the patient is located.  
 
Princess Biju MD

## 2020-03-14 NOTE — PROGRESS NOTES
TRANSFER - IN REPORT: 
 
Verbal report received from \[facility] [place] [facility]\"" on Andrzej Hudson. being received from ED for routine progression of care. Report consisted of patients Situation, Background, Assessment and Recommendations(SBAR). Information from the following report(s) SBAR, Kardex, ED Summary, Intake/Output and Recent Results was reviewed. Opportunity for questions and clarification was provided. Assessment completed upon patients arrival to unit and care assumed. Patient received to room 316. Patient connected to monitor and assessment completed. Plan of care reviewed. Patient oriented to room and call light. Patient aware to use call light to communicate any chest pain or needs. Admission skin assessment completed with second RN and reveals the following: no skin issues noted on arrival. Sacrum and heels intact. Left subclavian scar from pacemaker.

## 2020-03-14 NOTE — PROGRESS NOTES
Airvo now set at 30L/50%. Patient is tolerating changes well. Family remains at bedside. Will continue to monitor.

## 2020-03-14 NOTE — ROUTINE PROCESS
Bedside and Verbal shift change report given to [facility], [facility], RN (oncoming nurse) by self (offgoing nurse). Report included the following information SBAR, Kardex, Intake/Output, MAR, Recent Results

## 2020-03-14 NOTE — ROUTINE PROCESS
Bedside and Verbal shift change report to be given to Randy Barrientos (oncoming nurse) by Marcela Ricks RN (offgoing nurse). Report included the following information SBAR, Kardex, Intake/Output, Recent Results and Med Rec Status.

## 2020-03-14 NOTE — PROGRESS NOTES
Pt referred for end of life care at [facility]. Family at bedside with [facility] RN and he has been accepted for transfer to the [address] [place] [address] via [place] with DNR. Care Management Interventions PCP Verified by CM: (Dr. Micki Canada) Mode of Transport at Discharge: Monica Elfrida with DNR) Transition of Care Consult (CM Consult): Hospice House(Referral to [facility] for Corey Blair) Discharge Durable Medical Equipment: No 
Physical Therapy Consult: No 
Occupational Therapy Consult: No 
Speech Therapy Consult: No 
Current Support Network: Lives with Spouse The Plan for Transition of Care is Related to the Following Treatment Goals : Pt needs supportive services for end of life care. The Patient and/or Patient Representative was Provided with a Choice of Provider and Agrees with the Discharge Plan?: Yes Name of the Patient Representative Who was Provided with a Choice of Provider and Agrees with the Discharge Plan: spouse Freedom of Choice List was Provided with Basic Dialogue that Supports the Patient's Individualized Plan of Care/Goals, Treatment Preferences and Shares the Quality Data Associated with the Providers?: Yes [employer] Information Provided?: No 
Discharge Location Discharge Placement: Other:([facility] for GIP.)

## 2020-03-14 NOTE — HOSPICE
Reached out to Mrs. Johanna Barrow to arrange a time to meet and discuss Hospice Services. She asked if she could give me a call back a little later this afternoon. Thank you for the referral, Volodymyr Carlton RN BSN [facility] [phone]

## 2020-03-14 NOTE — PROGRESS NOTES
Patient placed on 10 [address] [facility]. Patient's SpO2 82%. Family is aware. Slight increased WOB. RN notified, and states she will give him PRN meds. Family remains at bedside. Chris Lomeli remains at bedside. Patient is currently asleep.

## 2020-03-14 NOTE — PROGRESS NOTES
RAPID RESPONSE CRITICAL CARE OUTREACH NURSE NOTE Pt was seen and examined by this Outreach RN following Rapid Response. Pt status/focused assessment upon arrival to Rapid Response as follows: SITUATION:  Patient with increased SOB, decreased O2 sats in mid 80s on NRB. LATEST VITAL SIGNS AND LAB VALUES RECORDED: 
MEWS Score: 2 (03/14/20 0443) Vitals:  
 03/14/20 0119 03/14/20 0443 03/14/20 0700 03/14/20 0715 BP: 100/49 97/63 Pulse: 84 77 Resp: 22 20 Temp: 97.6 °F (36.4 °C) 97.4 °F (36.3 °C) SpO2: 93% 91% (!) 84% 90% Weight:  98.9 kg (218 lb) Height:      
 
Recent Labs  
  03/13/20 
0817 03/13/20 
9781 NA  --  136 K  --  3.1*  
CL  --  102 CO2  --  20* AGAP  --  14  
GLU  --  242* BUN  --  27* CREA  --  1.32  
GFRAA  --  >60  
GFRNA  --  56* CA  --  9.0 MG 2.0  --   
ALB  --  2.8*  
TP  --  6.6 GLOB  --  3.8* AGRAT  --  0.7* ALT  --  29 Recent Labs  
  03/13/20 
0166 WBC 14.4* HGB 12.1*  
HCT 40.2*  LAB WORK PENDING/SENT DURING RAPID RESPONSE:  N/A Primary RN at bedside:  Demetrio Bryan, RN 
RT at bedside:  Miller Gamboa, RT 
MD at bedside:  Dr. Sudha De Jesus Interventions completed during Rapid Response:  MD at bedside. Patient placed on NRB. Decision made to move pt to palliative measures. Is pt transferring to Critical Care bed? No 
Did Outreach RN assist with transfer? N/a Pt disposition IF not transferring to Critical Care:  O2 sat 88% on NRB. Patient alert. Post Rapid Response plan of care: Moving to palliative measures, no BIPAP at this time.

## 2020-03-14 NOTE — ROUTINE PROCESS
Rapid Response called 0810. Patient with labored breathing, O2 sat 86% on 8L NC. NC changed to NRB mask with 88% O2 sat. Respiratory therapist at bedside and patient switched to Airvo 60L, 100% FiO2, O2 sat 90%. Dr. Eric Ray MD at bedside.  Patient is now comfort care, PRN orders ordered per MD.

## 2020-03-14 NOTE — PROGRESS NOTES
TRANSFER - OUT REPORT: 
 
Verbal report given to [facility] RN(name) on [facility].  being transferred to [facility] for routine progression of care Report consisted of patients Situation, Background, Assessment and  
Recommendations(SBAR). Information from the following report(s) SBAR, Kardex and Intake/Output was reviewed with the receiving nurse. Lines:  
Peripheral IV 03/14/20 Left;Posterior Other(comment) (Active) Site Assessment Clean, dry, & intact 3/14/2020  4:34 PM  
Phlebitis Assessment 0 3/14/2020  4:34 PM  
Infiltration Assessment 0 3/14/2020  4:34 PM  
Dressing Status Clean, dry, & intact 3/14/2020  4:34 PM  
Dressing Type Transparent;Tape 3/14/2020  4:34 PM  
Hub Color/Line Status Patent; Flushed 3/14/2020  4:34 PM  
Alcohol Cap Used No 3/14/2020  4:34 PM  
  
 
Opportunity for questions and clarification was provided. Patient transported with: 
 O2 @ 10 liters Rubio still in place IV still in place Discharge paperwork sent with EMS Greg RN secondary RN discharging patient. Patient unable to sign for discharge on own. Report received, patient accepted to [facility]. EMS transport arrived at 0748.

## 2020-03-15 NOTE — PROGRESS NOTES
Report received from off-going nurse,Susan Foy RN visual identification made, assumed care of pt. Pt resting quietly with eyes closed, but awakened when nurses walked in room. Explained to him where he was and what time it is.  no agitation or restlessness, no grimacing or groaning. Pt respirations unlabored. Tab alert in place, rails up x 2, bed in lowest position, safety maintained. FLACC 0.    0900 pt resting quietly. 1004 pt asking where he is and why he is here, explained to pt and administered haldol for agitation. Physical assessment completed. Lung sounds with scattered wheezes, oxygen at 5 liters per nasal cannula, heart sounds regular, alert but confused to place and situation, active bowel sounds, correia draining clear farrah urine, upper extremities cool with palpable pulses, lower extremities warm with palpable pulses. 1137 pt wife, Paolo Chery, at bedside. [phone] Dr Asha Walker at bedside, speaking with Paolo Chery and two of pt's sons. [phone] with two assist went to turn pt, pt turned himself on his left side. Adrienne Leon a blue book and asked if she had any questions, discussed his medical history. Pt restless, pt states he's short of breath. 1346 administered morphine 4 mg IVP for dyspnea    1430 pt resting quietly, wife Enzo Singleton at bedside.      1530 pt lying on left side, Enzo Singleton states he was asking for a back rub.     1700 pt resting quietly, no grimacing or groaning    Report given to [address] [place] [address]

## 2020-03-15 NOTE — PROGRESS NOTES
[facility]. Patient is GIP level of care for hospice diagnosis of CHF. Symptoms to be managed include respiratory management and end of life care. Patient  is identified by name/. Patient is somnolent upon arrival as he was recently medicated for shortness of breath prior to transfer per report. Oxygen in use via nasal cannula at 5L/minute as per orders. Color acyanotic. Respirations non labored with diminished sounds and crackles heard in bases. Facial features flat,relaxed. FLACC 0/10. Skin is warm,dry and intact. No edema noted. Rubio catheter patent with clear yellow urine observed. No signs/symptoms of pain,anxiety,nausea or dyspnea at this time. Bed extender is use for comfort. Wife and patient's sons have arrived at bedside. Emotional support provided. Family given orientation to hospice house and opportunities for questions to be answered. Bed in low and locked position with side rails up x 2 with call light in reach. Admission complete and initial general hospice care plan initiated which includes spiritual,psychosocial and bereavement. No immediate needs at this time. IDG team,including MD, made aware of plan of care and immediate needs. Family is aware of volunteer services. 2135 Remains somnolent. Unable to awaken enough to administer Senokot safely as ordered. Iv site flushed with ease,site WNL. Wife sitting on edge of bed next to patient discussing how blessed they have been to be  for 49 years as of February. Wife is coping well. Discussed plan of care with family and all parties verbalize understanding and family is in agreement for comfort care. Family deny any spiritual needs at this time. Ongoing emotional support provided. 2330 Continues to sleep soundly with eyes closed. Respirations non labored. Facial features flat,relaxed. FLACC 0/10. No signs/symptoms of pain,anxiety,nausea or dyspnea at this time. Color remains acyanotic. Oxygen continues as ordered. Bed in low and locked position with side rails up x 2 with call light in reach. Door left open as pt is unaccompanied. 0145 Has repositioned self. Continues to rest peacefully with eyes closed. Respirations easy and non labored. Oxygen in use as ordered. FLACC 0/10. No s/sx of pain,anxiety,nausea or dyspnea. All comfort/safety measures continue. Door left open for monitoring. [phone] Awakened during round made by RN/CNA. Eyes open. States he can't remember where he is. Easily reoriented as pt holds this writers hand and smiles as he is told the time and that [employer] aren't even up yet. \" Agrees to try and rest.     0446 Morphine 4 mg given slow IVP for dyspnea. Oxygen remains as per orders. Color acyanotic. Respirations 28/minute. Shakes head no when asked if he felt like he could tolerate po Roxanol. Room temp adjusted,ceiling fan placed on. Emotional support provided. Shakes head no when asked if he is hurting. HOB elevated. 0530 Resting peacefully. Respirations easy and unlabored at 16/minute. Color good. Oxygen continues. No s/sx of pain,anxiety,nausea or distress. FLACC 0/10. All safety measures continue. Door left open as pt is unaccompanied. Report to M. [address] [facility]

## 2020-03-15 NOTE — PROGRESS NOTES
Problem: Pain  Goal: Verbalize satisfaction of level of comfort and symptom control  Description: Pain will be less then 3/10 while at [facility]  Morphine 4 mg q 30 minutes IV/SQ prn   Outcome: Progressing Towards Goal     Problem: Anxiety/Agitation  Goal: Verbalize and demonstrate ability to manage anxiety  Description: The patient/family/caregiver will verbalize and demonstrate ability to manage the patient's anxiety throughout hospice care.  Pt will have relaxed facial features and body language  Haldol 2 mg q 1 hr prn agitation   Outcome: Progressing Towards Goal     Problem: Dyspnea Due to End of Life  Goal: Demonstrate understanding of and ability to manage respiratory symptoms at end of life  Description: Respirations will be less then 24/minute  Morphine 4 mg IV/SQ q 1 hr prn dyspnea   Outcome: Progressing Towards Goal

## 2020-03-15 NOTE — PROGRESS NOTES
Problem: Potential for Skin Breakdown  Goal: Demonstrate ability to care for skin, monitor areas of breakdown and demonstrate methods to prevent breakdown  Description: Patient/family/caregiver will demonstrate ability to care for patient's skin, monitor for areas of breakdown, and demonstrate methods to prevent breakdown during hospice care. Outcome: Progressing Towards Goal     Problem: Pain  Goal: Verbalize satisfaction of level of comfort and symptom control  Outcome: Progressing Towards Goal  Note: Pt will be free from pain during stay at [facility] as evidenced by Flacc <2 or numerical denial of pain     Problem: Anxiety/Agitation  Goal: Verbalize and demonstrate ability to manage anxiety  Description: The patient/family/caregiver will verbalize and demonstrate ability to manage the patient's anxiety throughout hospice care. Outcome: Progressing Towards Goal  Note: Haldol 2 mg subq /IV every 1 hour prn agitation  Ativan 1 mg IV every 2 hours prn anxiety     Problem: Dyspnea Due to End of Life  Goal: Demonstrate understanding of and ability to manage respiratory symptoms at end of life  Outcome: Progressing Towards Goal  Note: Roxanol 10 mg po/sl every 30 minutes prn dyspnea  Morphine 4mg subq /IV every 20 minutes prn dyspnea  Oxygen 5L/NC  Duo-neb every 4 hours prn wheezing/SOB     Problem: Falls - Risk of  Goal: *Absence of Falls  Description: Document Edenilson Fall Risk and appropriate interventions in the flowsheet.   Outcome: Progressing Towards Goal  Note: Fall Risk Interventions:  Mobility Interventions: Bed/chair exit alarm         Medication Interventions: Bed/chair exit alarm    Elimination Interventions: Bed/chair exit alarm, Call light in reach    History of Falls Interventions: Bed/chair exit alarm, Door open when patient unattended, Room close to nurse's station         Problem: Pressure Injury - Risk of  Goal: *Prevention of pressure injury  Description: Document Everardo Scale and appropriate interventions in the flowsheet.   Outcome: Progressing Towards Goal  Note: Pressure Injury Interventions:  Sensory Interventions: Assess changes in LOC, Assess need for specialty bed, Check visual cues for pain, Float heels, Keep linens dry and wrinkle-free, Minimize linen layers    Moisture Interventions: Absorbent underpads, Apply protective barrier, creams and emollients, Assess need for specialty bed, Check for incontinence Q2 hours and as needed, Internal/External urinary devices, Limit adult briefs, Maintain skin hydration (lotion/cream), Minimize layers, Moisture barrier    Activity Interventions: Assess need for specialty bed    Mobility Interventions: Assess need for specialty bed, Float heels, HOB 30 degrees or less    Nutrition Interventions: Document food/fluid/supplement intake    Friction and Shear Interventions: Apply protective barrier, creams and emollients, Feet elevated on foot rest, HOB 30 degrees or less, Lift sheet, Minimize layers

## 2020-03-15 NOTE — HSPC IDG NURSE NOTES
Patient: Taqueria Duarte. Date:2020   Time:   [facility] Nurse Notes  Patient admitted to [facility] from [facility]. Patient is GIP level of care for hospice diagnosis of CHF. Symptoms to be managed include respiratory management and end of life care. Patient  is identified by name/. Patient is somnolent upon arrival as he was recently medicated for shortness of breath prior to transfer per report. Oxygen in use via nasal cannula at 5L/minute as per orders. Color acyanotic. Respirations non labored with diminished sounds and crackles heard in bases. Facial features flat,relaxed. FLACC 0/10. Skin is warm,dry and intact. No edema noted. Rubio catheter patent with clear yellow urine observed. No signs/symptoms of pain,anxiety,nausea or dyspnea at this time. Bed extender is use for comfort. Wife and patient's sons have arrived at bedside. Emotional support provided. Family given orientation to hospice house and opportunities for questions to be answered. Bed in low and locked position with side rails up x 2 with call light in reach. Admission complete and initial general hospice care plan initiated which includes spiritual,psychosocial and bereavement. No immediate needs at this time. IDG team,including MD, made aware of plan of care and immediate needs. Family is aware of volunteer services.         Signed by: Marifer Main RN

## 2020-03-15 NOTE — PROGRESS NOTES
Walking rounds completed with off going RN. Pt identified by name/. Awake and visiting with wife/son at bedside. Denies pain. Respirations easy and unlabored. Facial features flat,relaxed. No s/sx of pain,anxiety,nausea or distress. Bed in low and locked position with side rails up x 2 and call light in reach. 1943 Pm assessment completed. Sitting up in bed talking to his wife. No shortness of breath at this time. Denies pain,anxiety,nausea or needs. Oxygen continues at this time. Color acyanotic. HOB elevated. Wife at bedside. Ongoing emotional support provided. Bed in low and locked position with side rails up x 2 with call light in reach.  Resting peacefully with eyes closed. Respirations non labored. Oxygen in use. Facial features flat,relaxed. No s/sx of pain,anxiety,nausea or distress. Bed in low and locked position with side rails up x 2 and call light in reach. Wife resting at bedside.  Responded to tab alarm sounding. Pt trying to reposition self in bed,wife awakened by alarm. Dyspnea noted with some restlessness. Redirectable. Discussed with wife option for Haldol and Morphine and she states\"it really helped him earlier today. \" Pt nods head and says \"let's try it. \" Medicated with Haldol 2 mg slow IVP for restlessness and Morphine 4 mg IVP for dyspnea. Oxygen continues. Adjusted in bed for comfort. Emotional support provided.  Resting peacefully with eyes closed. Respirations non labored. Oxygen in use. Facial features flat,relaxed. No s/sx of pain,anxiety,nausea or distress. Bed in low and locked position with side rails up x 2 and call light in reach. Wife resting at bedside. 0000 Awakened abruptly with dyspnea at rest and anxiety. Emotional support provided. Ativan 1 mg given slow IVP for anxiety and Morphine 4 mg IVP for dyspnea. Stopping to try and catch his breath after every couple of words. HOB remains elevated, no cyanosis observed. Encouraged to focus on breathing in trhough his nose and out his mouth. Takes this writers hand and follows directions given. Medication education provided to wife who verbalizes understanding. 0050 Resting peacefully with eyes closed. Respirations non labored. Oxygen in use. Facial features flat,relaxed. No s/sx of pain,anxiety,nausea or distress. Bed in low and locked position with side rails up x 2 and call light in reach. Wife resting at bedside. 7299 Awake,anxious with some dyspnea noted at rest. Astrid Flash continues. Emotional support provided. Spoke with wife at bedside and she requests same prn's as were given at midnight as pt got such a good response and has \"been able to get peaceful rest.\" Medicated with Ativan 1 mg given slowly IVP for anxiety with Morphine 4 mg IVP for dyspnea. All comfort/safety measures continue. Per wife request vitals will be obtained when pt wakes this morning. 250 ml farrah colored urine emptied from correia at this time. 0440 Resting peacefully with eyes closed. Respirations non labored. Oxygen in use. Facial features flat,relaxed. No s/sx of pain,anxiety,nausea or distress. Bed in low and locked position with side rails up x 2 and call light in reach. Wife resting at bedside. 0214 Continues to rest peacefully without evidence of pain,anxiety,nausea or dyspnea. Wife asleep at bedside.      Report to [facility]

## 2020-03-15 NOTE — H&P
History and Physical    Patient: Judi Leon MRN: [id number]  SSN: [id number]    YOB: 1945  Age: 75 y.o. Sex: male      Subjective:      Judi Leon is a 75 y.o. male who is a former . He experienced flash pulmonary edema requiring intubation and mechanical ventilatory support On 2/20/2020. John Call demonstrated acute on chronic worsening of his LVEF to 20-25% with marked increased severity of mitral regurgitation.  ProBNP was 5600( prior value January 2020 of 675.)   Patient was found to have multiple episodes of nonsustained ventricular tachycardia and insufficient duration and rate to trigger his implanted defibrillator. Kusum Souza had undergoneAV [place] ablation 2/11/2020 for control of  paroxysmal supraventricular tachycardia.  While he was able to be extubated after careful diuresis, he subsequently developed increasingly severe metabolic encephalopathy with  disorientation to place and circumstances. 2 adult sons with advancedpractice RN credential are making decisions on his behalf and they have opted to stop complex life extending intervention into instead limit further care to comfort measures.  Increasingly severe hypotension and need for parenteral morphine to control somatic pain and dyspnea necessitate hospice care be provided in an inpatient setting. Following this and it was decided to transfer the patient to a rehabilitation facility. He did well for several days but then became more  and ultimately be required emergency hospital admission for recurrence hypoxic respiratory failure and congestive heart  Since then he has responded poorly to therapy, remains only partially responsive is still quite this the on moderate flow oxygen. The family now desires to proceed with comfort measures only. He has received several parenteral doses of morphine and lorazepam since hospital mission.   I have reviewed and confirmed this history with 2 of the patient's sons today in the room. Past Medical History:   Diagnosis Date    Acute myocardial infarction [facility])     MI PCI x 2    Aortic Aneurysm/Dilation of the Aorta 2016    Aortic ectasia, abdominal ([address] 2014    Arrhythmia     ASCAD-of the Native Vessel 2016    CAD (coronary artery disease)     PCI with stents to RCA    Cerebrovascular accident (stroke) ([address] 2016    Chest pain 2016    Chronic pain     Chronic systolic heart failure (HCC) 2016    Congestive heart failure, unspecified     Coronary atherosclerosis of unspecified type of vessel, native or graft     MI PCI x 1    GERD (gastroesophageal reflux disease)     History of agent Orange exposure     Hx of AICD-Implanted Cardiac Defibrillator 2016    Hypercholesterolemia     Hyperlipidemia 2016    Hypertension     Ill-defined condition     HLD    Ill-defined disease     ectatic aorta    Liver disease     partial liver resection.  Morbid obesity (HCC)     Myocardial infarct/Anterolateral age unspe. 2016    Stroke ([address] 2011     Past Surgical History:   Procedure Laterality Date    CARDIAC SURG PROCEDURE UNLIST      MULTIPLE HEART ANGIOPLASTY/STENT    HX GI      Liver resection    HX OTHER SURGICAL      partial liver resection.  HX PACEMAKER      ICD/Pacemaker      Family History   Problem Relation Age of Onset   [facility] Mother     Other Father         brain hemorrage    Heart Disease Maternal Grandfather     Heart Disease Paternal Grandfather      Social History     Tobacco Use    Smoking status: Former Smoker     Packs/day: 1.00     Years: 28.00     Pack years: 28.00     Last attempt to quit: 1999     Years since quittin.2    Smokeless tobacco: Never Used   Substance Use Topics    Alcohol use: No      Prior to Admission medications    Medication Sig Start Date End Date Taking?  Authorizing Provider   spironolactone (ALDACTONE) 25 mg tablet Take 0.5 Tabs by mouth daily. 3/5/20  Yes Mable Landaverde MD   amiodarone (CORDARONE) 200 mg tablet Take 1 Tab by mouth two (2) times a day. 3/4/20  Yes Mable Landaverde MD   lisinopril (PRINIVIL, ZESTRIL) 5 mg tablet Take 1 Tab by mouth daily. 3/5/20  Yes Mable Landaverde MD   metoprolol succinate (TOPROL-XL) 25 mg XL tablet Take 0.5 Tabs by mouth daily. 3/5/20  Yes Mable Landaverde MD   oxybutynin (DITROPAN) 5 mg tablet Take 1 Tab by mouth three (3) times daily. 3/4/20  Yes Mable Landaverde MD   loratadine (CLARITIN) 10 mg tablet Take 10 mg by mouth. Yes Provider, Historical   furosemide (LASIX) 40 mg tablet Take 1 Tab by mouth two (2) times a day. 9/5/19  Yes Tiffany NGUYEN NP   mesalamine ER (APRISO) 0.375 gram 24 hour capsule Take 0.375 g by mouth daily. Indications: ulcerated colon   Yes Provider, Historical   warfarin (COUMADIN) 5 mg tablet Take 0.5-1 tab every day or as directed  Patient taking differently: Take 5 mg by mouth daily. Take 0.5-1 tab every day or as directed 4/22/19  Yes Malissa Reid MD   atorvastatin (LIPITOR) 40 mg tablet Take 40 mg by mouth daily. Yes Provider, Historical   gabapentin (NEURONTIN) 300 mg capsule Take 300 mg by mouth two (2) times a day. Yes Provider, Historical   fenofibric acid (TRILIPIX ER) 135 mg capsule Take 135 mg by mouth daily. Yes Provider, Historical   nitroglycerin (NITROSTAT) 0.4 mg SL tablet 1 Tab by SubLINGual route every five (5) minutes as needed for Chest Pain. 6/14/16  Yes Landon Turner,    Aspirin, Buffered 81 mg tab Take 81 mg by mouth daily. Yes Provider, Historical        Allergies   Allergen Reactions    Sulfur Other (comments)     BREAK OUT       Review of Systems: The remainder of the admission historical database is as outlined in the Clinical Record.     Objective:     Vitals:    03/14/20 2051 03/15/20 0339   BP: 95/52 98/61   Pulse: 78 74   Resp: 16 18   Temp: 96.1 °F (35.6 °C) 97.2 °F (36.2 °C)        Physical Exam:     VITALS are stable as outlined. SKIN examination revealed senile changes only with no rashes or petechia. HEAD and neck examination reveals no cervical venous distention, adenopathy or jaundice. CARDIOPULMONARY lamination reveals a summation gallop with no diastolic murmurs or rubs and a few scattered rhonchi and rales. ABDOMINAL examination reveals no palpable masses or tenderness. EXTREMITIES reveal osteoarthritic changes with mild edema, no calf tenderness and no peripheral ischemic changes. NEUROLOGIC examination reveals patient to be quite drowsy but arousable although he cannot communicate very much. There are no definite lateralizing neurologic abnormalities found.     Assessment:     Hospital Problems  Date Reviewed: 3/14/2020          Codes Class Noted POA    * (Principal) Acute on chronic systolic heart failure ([facility] ICD-10-CM: I50.23  ICD-9-CM: 428.23  2/20/2020 Yes    Overview Addendum 2/24/2020  9:21 AM by Dianna Henley NP     Echo 2/20/20:  EF 20-25%  Echo 4/15: EF 30-35%, mild to mod MR  Echo 2011: EF 30-35%             Bilateral carotid artery disease ([facility] ICD-10-CM: I73.9  ICD-9-CM: 447.9  8/7/2017 Yes        Chronic systolic CHF (congestive heart failure) (HCC) ICD-10-CM: I50.22  ICD-9-CM: 428.22, 428.0  3/14/2020 Unknown              Plan:     Current Facility-Administered Medications   Medication Dose Route Frequency    sodium chloride (NS) flush 3 mL  3 mL IntraVENous Q12H    sodium chloride (NS) flush 3 mL  3 mL IntraVENous PRN    morphine (ROXANOL) concentrated oral syringe 10 mg  10 mg Oral Q30MIN PRN    Or    morphine (ROXANOL) concentrated oral syringe 10 mg  10 mg SubLINGual Q30MIN PRN    morphine injection 4 mg  4 mg SubCUTAneous Q20MIN PRN    Or    morphine injection 4 mg  4 mg IntraVENous Q20MIN PRN    acetaminophen (TYLENOL) tablet 650 mg  650 mg Oral Q4H PRN    acetaminophen (TYLENOL) suppository 650 mg  650 mg Rectal Q3H PRN    LORazepam (ATIVAN) injection 1 mg 1 mg IntraVENous Q2H PRN    loperamide (IMODIUM) capsule 4 mg  4 mg Oral PRN    senna (SENOKOT) tablet 8.6 mg  1 Tab Oral BID    haloperidol lactate (HALDOL) injection 2 mg  2 mg SubCUTAneous Q1H PRN    Or    haloperidol lactate (HALDOL) injection 2 mg  2 mg IntraVENous Q1H PRN    hyoscyamine SL (LEVSIN/SL) tablet 0.125 mg  0.125 mg SubLINGual Q4H PRN    glycopyrrolate (ROBINUL) injection 0.2 mg  0.2 mg SubCUTAneous Q4H PRN    albuterol-ipratropium (DUO-NEB) 2.5 MG-0.5 MG/3 ML  3 mL Nebulization Q4H PRN       I have reviewed situation with concerns were present. Mr. Red Martin seems to have stabilized at the present time and is hard to know whether he he will continue to stabilize for some undefined period of time or whether his death will ensue fairly quickly over the next several days. Monitoring will answer the question and I have discussed this with his family who understands. Should he in fact stabilized then we can reconsider restarting some of his maintenance medications including his diuretics and perhaps beta-blocker.       Signed By: Renetta Denny MD     March 15, 2020

## 2020-03-16 PROBLEM — Z51.5 HOSPICE CARE: Status: ACTIVE | Noted: 2020-01-01

## 2020-03-16 NOTE — HSPC IDG SOCIAL WORKER NOTES
Patient: Jenny Pham. Date: 03/16/20  Time: 9:53 AM    [facility]  Notes    LMSW has read and agrees with the RN initial assessment. LMSW will meet with pt and family to complete the SW assessment.           Signed by: Erin Fine

## 2020-03-16 NOTE — HSPC IDG NURSE NOTES
Patient: Tito Joy. Date: 03/16/20  Time: 12:33 PM    [facility] Nurse Notes  1st IDG: Pt is a 58-year-old male with CHF who is here GIP level of care for management of dyspnea and anxiety. Rubio with UOP of 650cc. IV access was lost this AM. VS stable. Wounds: none. PRN medications: Haldol 2 mg IV/SQ x 3, Ativan 1 mg IV/IM x 4, morphine 4 mg x 7 for pain. Scheduled meds:  none. Minimal PO intake x 9 days. Plan: Add scheduled Haldol 4 mg SQ q 6 hours and Morphine 4 mg SQ q 6 hours. Discontinue all PO meds. D/C IV flushes. Comprehensive plan of care reviewed. IDG and pt./family in agreement with plan of care. The IDG identifies through on-going assessment when a change is needed to the POC; the pt/family will receive care and services necessitated by changes in POC. Medications reviewed by the pharmacist and Medical Director.         Signed by: Timmy Perez RN

## 2020-03-16 NOTE — PROGRESS NOTES
Progress Note    Patient: Aline Morgan. MRN: [id number]  SSN: [id number]    YOB: 1945  Age: 75 y.o. Sex: male      Admit Date: 3/14/2020    LOS: 2 days     Subjective:     Minimally responsive. Responds to stimuli with agitation. Has required morphine IV x 7 for pain/dyspnea, haldol x 3 IV and ativan x 4 IV for agitation. Review of Systems:  Review of systems not obtained due to patient factors. Objective:     Vitals:    03/14/20 2051 03/15/20 0339 03/15/20 1823 03/16/20 1027   BP: 95/52 98/61 99/65 114/66   Pulse: 78 74 80 80   Resp: 16 18 18 16   Temp: 96.1 °F (35.6 °C) 97.2 °F (36.2 °C)  96.9 °F (36.1 °C)        Intake and Output:  Current Shift: No intake/output data recorded. Last three shifts: 03/14 1901 - 03/16 0700  In: -   Out: 1250 [Urine:1250]    Physical Exam:   GENERAL: fatigued, mild distress, appears older than stated age, responds to stimuli with agitation  LUNG: Coarse, diminished breath sounds with labored respirations. Irregular rate with short periods of apnea. HEART: regular rate and rhythm  ABDOMEN: soft, non-tender. Bowel sounds normal.   : Rubio catheter with farrah urine. EXTREMITIES:  extremities with no cyanosis or edema. + pulses. SKIN: Pale. Warm to touch. Skin intact. NEUROLOGIC: Responds to stimuli with agitation. Unable to follow commands. Generalized weakness. Bedbound. PSYCHIATRIC: agitated    Lab/Data Review:  No new labs resulted in the last 24 hours.     Assessment:     Principal Problem:    Acute on chronic systolic heart failure ([facility] [address] (2/20/2020)      Overview: Echo 2/20/20:  EF 20-25%      Echo 4/15: EF 30-35%, mild to mod MR      Echo 2011: EF 30-35%    Active Problems:    Bilateral carotid artery disease ([facility] [address] (8/7/2017)      Chronic systolic CHF (congestive heart failure) ([facility] [address] (3/14/2020)        Plan:     Current Facility-Administered Medications   Medication Dose Route Frequency    haloperidol lactate (HALDOL) injection 4 mg 4 mg SubCUTAneous Q6H    morphine injection 4 mg  4 mg SubCUTAneous Q6H    LORazepam (ATIVAN) injection 1 mg  1 mg IntraMUSCular Q2H PRN    morphine injection 4 mg  4 mg SubCUTAneous Q20MIN PRN    Or    morphine injection 4 mg  4 mg IntraVENous Q20MIN PRN    acetaminophen (TYLENOL) suppository 650 mg  650 mg Rectal Q3H PRN    haloperidol lactate (HALDOL) injection 2 mg  2 mg SubCUTAneous Q1H PRN    Or    haloperidol lactate (HALDOL) injection 2 mg  2 mg IntraVENous Q1H PRN    glycopyrrolate (ROBINUL) injection 0.2 mg  0.2 mg SubCUTAneous Q4H PRN    albuterol-ipratropium (DUO-NEB) 2.5 MG-0.5 MG/3 ML  3 mL Nebulization Q4H PRN     3/14: ([facility]) Admitted GIP with Acute on chronic systolic heart failure for management of pain, dyspnea and agitation. 1. Pain: Morphine 4mg IV/SQ Q6 and Q20 minutes as needed. 2. Dyspnea: Morphine 4mg IV/SQ Q6 and Q20 minutes as needed. Duonebs q4 prn. Glycopyrrolate 0.2mg q4 prn secretions. Oxygen prn.    3. Agitation: Haloperidol 2mg IV/SQ Q6 and Q1 hour prn and Lorazepam 1mg IV/IM q2 hours prn.    4. Family/Pt Support: Family at bedside during exam. Medications and plan of care discussed with nursing staff and family. Will continue to monitor for symptoms and adjust medications as needed to maintain patient comfort. PPS 10%. Case [facility] Dr. Chris Grayson [facility]. DC po meds. Add haldol 2mg SQ Q6 and morphine 4 mg SQ Q6.     Signed By: Manny Baca NP     March 16, 2020

## 2020-03-16 NOTE — PROGRESS NOTES
Problem: Anxiety/Agitation  Goal: Verbalize and demonstrate ability to manage anxiety  Description: The patient/family/caregiver will verbalize and demonstrate ability to manage the patient's anxiety throughout hospice care. Pt will have relaxed facial features and body language  Haldol 2 mg q 1 hr prn agitation   Outcome: Progressing Towards Goal     Problem: Dyspnea Due to End of Life  Goal: Demonstrate understanding of and ability to manage respiratory symptoms at end of life  Description: Respirations will be less then 24/minute  Morphine 4 mg IV/SQ q 1 hr prn dyspnea   Outcome: Progressing Towards Goal     Problem: Falls - Risk of  Goal: *Absence of Falls  Description: Document Clide Failing Fall Risk and appropriate interventions in the flowsheet. Outcome: Progressing Towards Goal  Note: Fall Risk Interventions:  Mobility Interventions: Bed/chair exit alarm    Mentation Interventions: Adequate sleep, hydration, pain control, Bed/chair exit alarm, Door open when patient unattended, More frequent rounding, Reorient patient, Room close to nurse's station, Toileting rounds, Update white board    Medication Interventions: Bed/chair exit alarm    Elimination Interventions: Bed/chair exit alarm, Call light in reach    History of Falls Interventions: Bed/chair exit alarm, Door open when patient unattended, Room close to nurse's station         Problem: Pressure Injury - Risk of  Goal: *Prevention of pressure injury  Description: Document Everardo Scale and appropriate interventions in the flowsheet.   Outcome: Progressing Towards Goal  Note: Pressure Injury Interventions:  Sensory Interventions: Assess changes in LOC, Check visual cues for pain    Moisture Interventions: Absorbent underpads, Apply protective barrier, creams and emollients    Activity Interventions: Assess need for specialty bed    Mobility Interventions: Assess need for specialty bed, Float heels    Nutrition Interventions: Document food/fluid/supplement intake    Friction and Shear Interventions: Apply protective barrier, creams and emollients, Lift sheet

## 2020-03-16 NOTE — PROGRESS NOTES
This note will not be viewable in [address] Patient d/c to [facility] hospice house. Patient was removed from HF bundle as final drg in February was other than acute HF. Resolve bundle episode and remove self from care team

## 2020-03-16 NOTE — [facility] IDG MASTER NOTE
Hospice Interdisciplinary Group Collaborative  Date: 03/16/20  Time: 9:55 AM    ___________________    Patient: Darin Gould. Coverage Information:     Payor: [facility] MEDICARE     Plan: [facility] MEDICARE PART A AND B     Subscriber ID: [id number]     Phone Number:   MRN: [id number]    Current Benefit Period: Benefit Period 1  Start Date: 3/14/2020  End Date: 6/11/2020      Medical Director: Di Graham MD  Hospice Attending Provider: Fiona Clay MD  [address] [place] [address]  [id number]  Phone: [phone]  Fax: [phone]    Level of Care: General Inpatient Care      ___________________    Diagnoses:  Diagnoses of Acute on chronic systolic heart failure [facility]), Bilateral carotid artery stenosis, Abdominal aortic aneurysm (AAA) without rupture ([facility] [address] and Typical atrial flutter ([facility] [address] were pertinent to this visit.     Current Medications:    Current Facility-Administered Medications:     sodium chloride (NS) flush 3 mL, 3 mL, IntraVENous, Q12H, Fernando Barnett MD, 3 mL at 03/15/20 2100    sodium chloride (NS) flush 3 mL, 3 mL, IntraVENous, PRN, Oni Hill MD, Stopped at 03/16/20 0702    morphine (ROXANOL) concentrated oral syringe 10 mg, 10 mg, Oral, Q30MIN PRN **OR** morphine (ROXANOL) concentrated oral syringe 10 mg, 10 mg, SubLINGual, Q30MIN PRN, Oni Hill MD    morphine injection 4 mg, 4 mg, SubCUTAneous, Q20MIN PRN **OR** morphine injection 4 mg, 4 mg, IntraVENous, Q20MIN PRN, Oni Hill MD, 4 mg at 03/16/20 0701    acetaminophen (TYLENOL) tablet 650 mg, 650 mg, Oral, Q4H PRN, Oni Hill MD    acetaminophen (TYLENOL) suppository 650 mg, 650 mg, Rectal, Q3H PRN, Oni Hill MD    LORazepam (ATIVAN) injection 1 mg, 1 mg, IntraVENous, Q2H PRN, Oni Hill MD, 1 mg at 03/16/20 0701    loperamide (IMODIUM) capsule 4 mg, 4 mg, Oral, PRN, Oni Hill MD    senna (SENOKOT) tablet 8.6 mg, 1 Tab, Oral, BID, Oni Hill MD, 8.6 mg at 03/15/20 1736    haloperidol lactate (HALDOL) injection 2 mg, 2 mg, SubCUTAneous, Q1H PRN **OR** haloperidol lactate (HALDOL) injection 2 mg, 2 mg, IntraVENous, Q1H PRN, Markus Vee MD, 2 mg at 03/15/20 2250    glycopyrrolate (ROBINUL) injection 0.2 mg, 0.2 mg, SubCUTAneous, Q4H PRN, Markus Vee MD    albuterol-ipratropium (DUO-NEB) 2.5 MG-0.5 MG/3 ML, 3 mL, Nebulization, Q4H PRN, Kelly Maya MD    Orders:  Orders Placed This Encounter    IP CONSULT TO SPIRITUAL CARE     Standing Status:   Standing     Number of Occurrences:   1     Order Specific Question:   Reason for Consult: Answer: Once on week one, then PRN. For [facility] Only. For contracted patients, their primary hospice will continue to manage spiritual care needs.  DIET PLEASURE     Standing Status:   Standing     Number of Occurrences:   1    VITAL SIGNS     Standing Status:   Standing     Number of Occurrences:   [id number]    NURSING-MISCELLANEOUS: Comfort Care Measures CONTINUOUS     Standing Status:   Standing     Number of Occurrences:   1     Order Specific Question:   Description of Order:     Answer:   Comfort Care Measures    BLADDER CHECKS     May scan bladder PRN for urinary retention and or patient discomfort     Standing Status:   Standing     Number of Occurrences:   [id number]    PAIN ASSESSMENT Pain and Symptoms: Assess ever 4 hours and PRN, for GIP level of care. PRN Routine     Standing Status:   Standing     Number of Occurrences:   [id number]     Order Specific Question:   Please describe the test or procedure you would like to order. Answer:   Pain and Symptoms: Assess ever 4 hours and PRN, for GIP level of care.  BEDREST, COMPLETE     Standing Status:   Standing     Number of Occurrences:   1    DO NOT RESUSCITATE     Standing Status:   Standing     Number of Occurrences:   1     Order Specific Question:   Comfort Measures Only? Answer:    Yes    OXYGEN CANNULA Liters per minute: 5; Indications for O2 therapy: HYPOXIA CONTINUOUS Routine     Standing Status:   Standing     Number of Occurrences:   1     Order Specific Question:   Liters per minute: Answer:   5     Order Specific Question:   Indications for O2 therapy     Answer:   HYPOXIA    sodium chloride (NS) flush 3 mL    sodium chloride (NS) flush 3 mL    OR Linked Order Group     morphine (ROXANOL) concentrated oral syringe 10 mg     morphine (ROXANOL) concentrated oral syringe 10 mg    OR Linked Order Group     morphine injection 4 mg     morphine injection 4 mg    acetaminophen (TYLENOL) tablet 650 mg    acetaminophen (TYLENOL) suppository 650 mg    LORazepam (ATIVAN) injection 1 mg    loperamide (IMODIUM) capsule 4 mg    senna (SENOKOT) tablet 8.6 mg    OR Linked Order Group     haloperidol lactate (HALDOL) injection 2 mg     haloperidol lactate (HALDOL) injection 2 mg    DISCONTD: hyoscyamine SL (LEVSIN/SL) tablet 0.125 mg    glycopyrrolate (ROBINUL) injection 0.2 mg    albuterol-ipratropium (DUO-NEB) 2.5 MG-0.5 MG/3 ML     Order Specific Question:   MODE OF DELIVERY     Answer:   Nebulizer    INITIAL PHYSICIAN ORDER: HOSPICE Level Of Care: General Inpatient; Reason for Admission: 75 male with Systolic CHF admitted for respiratory distress management and EOL care. Standing Status:   Standing     Number of Occurrences:   1     Order Specific Question:   Status     Answer:   Hospice     Order Specific Question:   Level Of Care     Answer:   General Inpatient     Order Specific Question:   Reason for Admission     Answer:   75 male with Systolic CHF admitted for respiratory distress management and EOL care. Order Specific Question:   Inpatient Hospitalization Certified Necessary for the Following Reasons     Answer:   3.  Patient receiving treatment that can only be provided in an inpatient setting (further clarification in H&P documentation)     Order Specific Question:   Admitting Diagnosis     Answer:   Chronic systolic CHF (congestive heart failure) [facility]) [[id number]]     Order Specific Question:   Terminal Prognosis Diagnosis(es)     Answer:   Chronic systolic CHF (congestive heart failure) [facility]) [[id number]]     Order Specific Question:   Admitting Physician     Answer:   Salud Mix     Order Specific Question:   Attending Physician     Answer:   Adri Bender [[id number]]    IP CONSULT TO SOCIAL WORK     Standing Status:   Standing     Number of Occurrences:   1     Order Specific Question:   Reason for Consult: Answer: For [facility] Only. For contracted patients, their primary hospice will continue to manage social work needs. Allergies: Allergies   Allergen Reactions    Sulfur Other (comments)     BREAK OUT       Care Plan:  Multidisciplinary Problems (Active)     Problem: Anxiety/Agitation     Dates: Start: 03/15/20       Description:     Disciplines: Interdisciplinary    Goal: Verbalize and demonstrate ability to manage anxiety     Dates: Start: 03/15/20   Expected End: 03/22/20       Description: The patient/family/caregiver will verbalize and demonstrate ability to manage the patient's anxiety throughout hospice care. Pt will have relaxed facial features and body language  Haldol 2 mg q 1 hr prn agitation     Disciplines: Interdisciplinary    Intervention: Assess for anxiety/agitation     Dates: Start: 03/15/20       Description: Assess for signs and symptoms of anxiety and agitation. Intervention: Instruction strategies to reduce anxiety/agitation     Dates: Start: 03/15/20       Description: Instruct patient/caregiver on strategies to reduce anxiety/agitation.                       Problem: Coping and Emotional Distress     Dates: Start: 03/15/20       Description:     Disciplines: Interdisciplinary    Goal: Demonstrate acceptance of terminal illness and understanding of disease progression     Dates: Start: 03/15/20       Description: Patient/family/caregiver will demonstrate acceptance of terminal disease and understanding of disease progression while employing appropriate coping mechanisms. Disciplines: Interdisciplinary    Intervention: Assess for alteration in coping     Dates: Start: 03/15/20       Description:           Intervention: Assess for signs/symptoms of emotional distress     Dates: Start: 03/15/20       Description:           Intervention: Instruct on effective coping skills     Dates: Start: 03/15/20       Description: Instruct patient/caregiver on effective coping skills. Intervention: Instruct on strategies to reduce emotional distress     Dates: Start: 03/15/20       Description: Instruct patient/caregiver on strategies to reduce emotional distress. Problem: Depression     Dates: Start: 03/15/20       Description:     Disciplines: Interdisciplinary    Goal: Verbalize and demonstrate ability to manage depression     Dates: Start: 03/15/20       Description: The patient/family/caregiver will verbalize and demonstrate ability to manage patient's depression throughout hospice care.     Disciplines: Interdisciplinary    Intervention: Assess for signs/symptoms of depression     Dates: Start: 03/15/20       Description:           Intervention: Clemente Peng on non-pharmacological strategies to reduce depression     Dates: Start: 03/15/20       Description:           Intervention: Instruct on safe managment of antidepressants     Dates: Start: 03/15/20       Description:                       Problem: Dyspnea Due to End of Life     Dates: Start: 03/15/20       Description:     Disciplines: Interdisciplinary    Goal: Demonstrate understanding of and ability to manage respiratory symptoms at end of life     Dates: Start: 03/15/20   Expected End: 03/22/20       Description: Jayne Cousins will be less then 24/minute  Morphine 4 mg IV/SQ q 1 hr prn dyspnea     Disciplines: Interdisciplinary    Intervention: Assess for signs and symptoms of dyspnea     Dates: Start: 03/15/20       Description:           Intervention: Instruct on causes/symptoms of dyspnea     Dates: Start: 03/15/20       Description:           Intervention: Yanira Herron patient/caregiver/family on strategies to effectively manage dyspnea     Dates: Start: 03/15/20       Description:                       Problem: End of Life Process     Dates: Start: 03/15/20       Description:     Disciplines: Interdisciplinary    Goal: Demonstrate understanding of end of life processes     Dates: Start: 03/15/20       Description: Patient/caregiver will understand end of life processes. Disciplines: Interdisciplinary    Intervention: Assess for signs/symptoms of terminal restlessness     Dates: Start: 03/15/20       Description:           Intervention: Instruct on strategies to reduce terminal restlessness     Dates: Start: 03/15/20       Description:           Intervention: Instruct on the dying process     Dates: Start: 03/15/20       Description:           Intervention: Instruct: imminent death     Dates: Start: 03/15/20       Description:           Intervention: Instruct: process at end of life     Dates: Start: 03/15/20       Description:                       Problem: Falls - Risk of     Dates: Start: 03/15/20       Description:     Disciplines: Interdisciplinary    Goal: *Absence of Falls     Dates: Start: 03/15/20   Expected End: 03/22/20       Description: Document Camacho Bunch Fall Risk and appropriate interventions in the flowsheet.     Disciplines: Interdisciplinary                Problem: Pain     Dates: Start: 03/15/20       Description:     Disciplines: Interdisciplinary    Goal: Verbalize satisfaction of level of comfort and symptom control     Dates: Start: 03/15/20   Expected End: 03/22/20       Description: Pain will be less then 3/10 while at [facility]  Morphine 4 mg q 30 minutes IV/SQ prn     Disciplines: Interdisciplinary    Intervention: Assess effectiveness of pain management     Dates: Start: 03/15/20 Description:           Intervention: Assess for signs/symptoms of acute pain     Dates: Start: 03/15/20       Description:           Intervention: Assess for signs/symptoms of chronic pain     Dates: Start: 03/15/20       Description:           Intervention: Instruct on non-pharmacological pain management     Dates: Start: 03/15/20       Description:           Intervention: Instruct on pain scales     Dates: Start: 03/15/20       Description:           Intervention: Instruct on pharmacological pain management     Dates: Start: 03/15/20       Description:                       Problem: Patient Education: Go to Patient Education Activity     Dates: Start: 03/15/20       Description:     Disciplines: Interdisciplinary    Goal: Patient/Family Education     Dates: Start: 03/15/20       Description:     Disciplines: Interdisciplinary                Problem: Patient Education: Go to Patient Education Activity     Dates: Start: 03/15/20       Description:     Disciplines: Interdisciplinary    Goal: Patient/Family Education     Dates: Start: 03/15/20       Description:     Disciplines: Interdisciplinary                Problem: Potential for Skin Breakdown     Dates: Start: 03/15/20       Description:     Disciplines: Interdisciplinary    Goal: Demonstrate ability to care for skin, monitor areas of breakdown and demonstrate methods to prevent breakdown     Dates: Start: 03/15/20   Expected End: 03/22/20       Description: Patient/family/caregiver will demonstrate ability to care for patient's skin, monitor for areas of breakdown, and demonstrate methods to prevent breakdown during hospice care.     Disciplines: Interdisciplinary    Intervention: Assess for skin breakdown     Dates: Start: 03/15/20       Description:           Intervention: Boston Gilmore on strategies to reduce risk of skin breakdown     Dates: Start: 03/15/20       Description:                       Problem: Pressure Injury - Risk of     Dates: Start: 03/15/20 Description:     Disciplines: Interdisciplinary    Goal: *Prevention of pressure injury     Dates: Start: 03/15/20   Expected End: 03/22/20       Description: Document Everardo Scale and appropriate interventions in the flowsheet. Disciplines: Interdisciplinary                Problem: Spiritual Distress     Dates: Start: 03/15/20       Description:     Disciplines: Interdisciplinary    Goal: Distress heard, acknowledged, and addressed     Dates: Start: 03/15/20       Description: Patient/family/caregiver distress will be heard, acknowledged, and addressed throughout hospice care. Disciplines: Interdisciplinary    Intervention: Assess for signs/symptoms of spiritual distress     Dates: Start: 03/15/20       Description:           Intervention: Consult on spiritual care     Dates: Start: 03/15/20       Description: Consult to Spiritual Care          Intervention: Discuss strategies to reduce spiritual distress     Dates: Start: 03/15/20       Description: Discuss with patient/caregiver strategies to reduce spiritual distress.                         Care Plan Problems/Goals      Progressing Towards Goal (6)      Demonstrate ability to care for skin, monitor areas of breakdown and demonstrate methods to prevent breakdown (Potential for Skin Breakdown)    Disciplines:  Interdisciplinary Expected end:  03/22/20        Outcome: Progressing Towards Goal By Bekah Barksdale RN on 03/15/20 1442            Verbalize satisfaction of level of comfort and symptom control (Pain)    Disciplines:  Interdisciplinary Expected end:  03/22/20        Outcome: Progressing Towards Goal By Bekah Barksdale RN on 03/15/20 1442            Verbalize and demonstrate ability to manage anxiety (Anxiety/Agitation)    Disciplines:  Interdisciplinary Expected end:  03/22/20        Outcome: Progressing Towards Goal By Alissa Boyce RN on 03/15/20 7102            Demonstrate understanding of and ability to manage respiratory symptoms at end of life (Dyspnea Due to End of Life)    Disciplines:  Interdisciplinary Expected end:  03/22/20        Outcome: Progressing Towards Goal By Kaden Shah RN on 03/15/20 2219            *Absence of Falls (Falls - Risk of)    Disciplines:  Interdisciplinary Expected end:  03/22/20        Outcome: Progressing Towards Goal By Kaden Shah RN on 03/15/20 2219            *Prevention of pressure injury (Pressure Injury - Risk of)    Disciplines:  Interdisciplinary Expected end:  03/22/20        Outcome: Progressing Towards Goal By Kaden Shah RN on 03/15/20 2219                         No Outcome (6)      Demonstrate acceptance of terminal illness and understanding of disease progression (Coping and Emotional Distress)    Disciplines:  Interdisciplinary Expected end:  -          Verbalize and demonstrate ability to manage depression (Depression)    Disciplines:  Interdisciplinary Expected end:  -          Distress heard, acknowledged, and addressed (Spiritual Distress)    Disciplines:  Interdisciplinary Expected end:  -          Demonstrate understanding of end of life processes (End of Life Process)    Disciplines:  Interdisciplinary Expected end:  -          Patient/Family Education (Patient Education: Go to Patient Education Activity)    Disciplines:  Interdisciplinary Expected end:  -          Patient/Family Education (Patient Education: Go to Patient Education Activity)    Disciplines:  Interdisciplinary Expected end:  -                       Resolved/Met (2)      Demonstrate understanding of hospice philosophy, plan of care, and home hospice program (Hospice Orientation)    Disciplines:  Interdisciplinary Expected end:  03/22/20        Outcome: Resolved/Met By Kaden Shah RN on 03/15/20 0130            Free of falls during episode of care (Risk for Falls)    Disciplines:  Interdisciplinary Expected end:  03/22/20        Outcome: Resolved/Met By Kaden Shah RN on 03/15/20 0130 ___________________    Care Team Notes          POC/IDG Notes      [facility] IDG  Notes by Gilma Bains at 03/16/20 9067  Version 1 of 1    Author:  Gilma Bains Service:  Licensed Clinical  Author Type:      Filed:  03/16/20 0954 Date of Service:  03/16/20 0953 Status:  Signed    :  Gilma Bains ()       Patient: Amanda Johns. Date: 03/16/20  Time: 9:53 AM    [facility]  Notes    LMSW has read and agrees with the RN initial assessment. LMSW will meet with pt and family to complete the SW assessment. Signed by: Abel Duarte       [facility] IDG  Notes by Socorro Oscar at 03/16/20 0372  Version 1 of 1    Author:  Socorro Oscar Service:  Spiritual Care Author Type:  Pastoral Care    Filed:  03/16/20 0918 Date of Service:  03/16/20 8229 Status:  Signed    :  Socorro Oscar (Pastoral Care)       Patient: Amanda Johns. Date: 03/16/20  Time: 9:17 AM    [facility]  Notes  / Grief Counselor has reviewed  Initial Comprehensive Assessment and Initial Plan of Care for Rommel Hogan and  is in agreement with the plans put in place and goals set thus far. / Grief Counselor will follow up with Spiritual and Bereavement Assessments. [address] [place] [address] Counselor will provide care according to patient's/ family's desires. Signed by: Karyle Richard       [facility] Notes by Nirav Zacarias RN at 03/15/20 0120  Version 1 of 1    Author:  Nirav Zacarias RN Service:  NURSING Author Type:  Registered Nurse    Filed:  03/15/20 0121 Date of Service:  03/15/20 0120 Status:  Signed    :  Nirav Zacarias RN (Registered Nurse)       Patient: Amanda Johns. Date:03/14/2020   Time: Trent  [facility] Nurse Notes  Patient admitted to [facility] from [facility]. Patient is GIP level of care for hospice diagnosis of CHF.  Symptoms to be managed include respiratory management and end of life care. Patient  is identified by name/. Patient is somnolent upon arrival as he was recently medicated for shortness of breath prior to transfer per report. Oxygen in use via nasal cannula at 5L/minute as per orders. Color acyanotic. Respirations non labored with diminished sounds and crackles heard in bases. Facial features flat,relaxed. FLACC 0/10. Skin is warm,dry and intact. No edema noted. Rubio catheter patent with clear yellow urine observed. No signs/symptoms of pain,anxiety,nausea or dyspnea at this time. Bed extender is use for comfort. Wife and patient's sons have arrived at bedside. Emotional support provided. Family given orientation to hospice house and opportunities for questions to be answered. Bed in low and locked position with side rails up x 2 with call light in reach. Admission complete and initial general hospice care plan initiated which includes spiritual,psychosocial and bereavement. No immediate needs at this time. IDG team,including MD, made aware of plan of care and immediate needs. Family is aware of volunteer services. Signed by: Charma Prader, RN                Care Team Present:   Team Members Present: Physician, Nurse, , [employer]  Physician Team Member: Bruce Bhatti   Nurse Team Member: Alta Almanzar Rn  Social Work Team Member: Fouzia Hare, [address] [facility],[address] Team Member: Ruthie Castro.

## 2020-03-16 NOTE — [facility] IDG SOCIAL WORKER NOTES
Patient: Pura Cabrera. Date: 03/16/20  Time: 10:01 AM    [facility]  Notes  LMSW will visit with the pt and his family to complete the  initial assessment. The volunteer program has been suspended due to the Corona Virus. LMSW will provide comfort bags to the pt.           Signed by: Lynette Jacques

## 2020-03-16 NOTE — [facility] IDG MASTER NOTE
[facility]  Date: 03/16/20  Time: 12:34 PM    ___________________    Patient: Volodymyr Graham. Coverage Information:     Payor: [place] [employer]     Plan: [place] MED[place]ARE PART A AND B     Subscriber ID: [id number]     Phone Number:   MRN: [id number]    Current Benefit Period: Benefit Period 1  Start Date: 3/14/2020  End Date: 6/11/2020        Hospice Attending Provider: Yudy Alcantara MD  [address] [place] [address]  [id number]  Phone: [phone]  Fax: [phone]    Level of Care: General Inpatient Care      ___________________    Diagnoses:  Diagnoses of Acute on chronic systolic heart failure ([facility] [address] Bilateral carotid artery stenosis, Abdominal aortic aneurysm (AAA) without rupture ([facility] [address] and Typical atrial flutter ([facility] [address] were pertinent to this visit.     Current Medications:    Current Facility-Administered Medications:     haloperidol lactate (HALDOL) injection 4 mg, 4 mg, SubCUTAneous, Q6H, Micki Barth NP    morphine injection 4 mg, 4 mg, SubCUTAneous, Q6H, Micki Barth NP    LORazepam (ATIVAN) injection 1 mg, 1 mg, IntraMUSCular, Q2H PRN, Zhou Mai NP    morphine injection 4 mg, 4 mg, SubCUTAneous, Q20MIN PRN, 4 mg at 03/16/20 1038 **OR** morphine injection 4 mg, 4 mg, IntraVENous, Q20MIN PRN, Flora Villa MD, 4 mg at 03/16/20 0701    acetaminophen (TYLENOL) suppository 650 mg, 650 mg, Rectal, Q3H PRN, Flora Villa MD    haloperidol lactate (HALDOL) injection 2 mg, 2 mg, SubCUTAneous, Q1H PRN, 2 mg at 03/16/20 1012 **OR** haloperidol lactate (HALDOL) injection 2 mg, 2 mg, IntraVENous, Q1H PRN, Flora Villa MD, 2 mg at 03/15/20 2250    glycopyrrolate (ROBINUL) injection 0.2 mg, 0.2 mg, SubCUTAneous, Q4H PRN, Flora Villa MD    albuterol-ipratropium (DUO-NEB) 2.5 MG-0.5 MG/3 ML, 3 mL, Nebulization, Q4H PRN, Flora Villa MD    Orders:  Orders Placed This Encounter    IP CONSULT TO SPIRITUAL CARE     Standing Status:   Standing     Number of Occurrences:   1     Order Specific Question:   Reason for Consult: Answer: Once on week one, then PRN. For [facility] Only. For contracted patients, their primary hospice will continue to manage spiritual care needs.  DIET PLEASURE     Standing Status:   Standing     Number of Occurrences:   1    VITAL SIGNS     Standing Status:   Standing     Number of Occurrences:   [id number]    NURSING-MISCELLANEOUS: Comfort Care Measures CONTINUOUS     Standing Status:   Standing     Number of Occurrences:   1     Order Specific Question:   Description of Order:     Answer:   Comfort Care Measures    BLADDER CHECKS     May scan bladder PRN for urinary retention and or patient discomfort     Standing Status:   Standing     Number of Occurrences:   [id number]    PAIN ASSESSMENT Pain and Symptoms: Assess ever 4 hours and PRN, for GIP level of care. PRN Routine     Standing Status:   Standing     Number of Occurrences:   [id number]     Order Specific Question:   Please describe the test or procedure you would like to order. Answer:   Pain and Symptoms: Assess ever 4 hours and PRN, for GIP level of care.  BEDREST, COMPLETE     Standing Status:   Standing     Number of Occurrences:   1    DO NOT RESUSCITATE     Standing Status:   Standing     Number of Occurrences:   1     Order Specific Question:   Comfort Measures Only? Answer: Yes    OXYGEN CANNULA Liters per minute: 5; Indications for O2 therapy: HYPOXIA CONTINUOUS Routine     Standing Status:   Standing     Number of Occurrences:   1     Order Specific Question:   Liters per minute:      Answer:   5     Order Specific Question:   Indications for O2 therapy     Answer:   HYPOXIA    DISCONTD: sodium chloride (NS) flush 3 mL    DISCONTD: sodium chloride (NS) flush 3 mL    DISCONTD: morphine (ROXANOL) concentrated oral syringe 10 mg    DISCONTD: morphine (ROXANOL) concentrated oral syringe 10 mg    OR Linked Order Group     morphine injection 4 mg     morphine injection 4 mg    DISCONTD: acetaminophen (TYLENOL) tablet 650 mg    acetaminophen (TYLENOL) suppository 650 mg    DISCONTD: LORazepam (ATIVAN) injection 1 mg    DISCONTD: loperamide (IMODIUM) capsule 4 mg    DISCONTD: senna (SENOKOT) tablet 8.6 mg    OR Linked Order Group     haloperidol lactate (HALDOL) injection 2 mg     haloperidol lactate (HALDOL) injection 2 mg    DISCONTD: hyoscyamine SL (LEVSIN/SL) tablet 0.125 mg    glycopyrrolate (ROBINUL) injection 0.2 mg    albuterol-ipratropium (DUO-NEB) 2.5 MG-0.5 MG/3 ML     Order Specific Question:   MODE OF DELIVERY     Answer:   Nebulizer    haloperidol lactate (HALDOL) injection 4 mg    morphine injection 4 mg    LORazepam (ATIVAN) injection 1 mg    INITIAL PHYSICIAN ORDER: HOSPICE Level Of Care: General Inpatient; Reason for Admission: 75 male with Systolic CHF admitted for respiratory distress management and EOL care. Standing Status:   Standing     Number of Occurrences:   1     Order Specific Question:   Status     Answer:   Hospice     Order Specific Question:   Level Of Care     Answer:   General Inpatient     Order Specific Question:   Reason for Admission     Answer:   75 male with Systolic CHF admitted for respiratory distress management and EOL care. Order Specific Question:   Inpatient Hospitalization Certified Necessary for the Following Reasons     Answer:   3.  Patient receiving treatment that can only be provided in an inpatient setting (further clarification in H&P documentation)     Order Specific Question:   Admitting Diagnosis     Answer:   Chronic systolic CHF (congestive heart failure) [facility]) [[id number]]     Order Specific Question:   Terminal Prognosis Diagnosis(es)     Answer:   Chronic systolic CHF (congestive heart failure) [facility]) [[id number]]     Order Specific Question:   Admitting Physician     Answer:   Saira Farrar     Order Specific Question:   Attending Physician     Answer:   Desean Osman [[id number]]    IP CONSULT TO SOCIAL WORK     Standing Status:   Standing     Number of Occurrences:   1     Order Specific Question:   Reason for Consult: Answer: For [facility] Only. For contracted patients, their primary hospice will continue to manage social work needs. Allergies: Allergies   Allergen Reactions    Sulfur Other (comments)     BREAK OUT       Care Plan:  Multidisciplinary Problems (Active)     Problem: Anxiety/Agitation     Dates: Start: 03/15/20       Description:     Disciplines: Interdisciplinary    Goal: Verbalize and demonstrate ability to manage anxiety     Dates: Start: 03/15/20   Expected End: 03/22/20       Description: The patient/family/caregiver will verbalize and demonstrate ability to manage the patient's anxiety throughout hospice care. Pt will have relaxed facial features and body language  Haldol 2 mg q 1 hr prn agitation     Disciplines: Interdisciplinary    Intervention: Assess for anxiety/agitation     Dates: Start: 03/15/20       Description: Assess for signs and symptoms of anxiety and agitation. Intervention: Instruction strategies to reduce anxiety/agitation     Dates: Start: 03/15/20       Description: Instruct patient/caregiver on strategies to reduce anxiety/agitation. Problem: Coping and Emotional Distress     Dates: Start: 03/15/20       Description:     Disciplines: Interdisciplinary    Goal: Demonstrate acceptance of terminal illness and understanding of disease progression     Dates: Start: 03/15/20       Description: Patient/family/caregiver will demonstrate acceptance of terminal disease and understanding of disease progression while employing appropriate coping mechanisms.     Disciplines: Interdisciplinary    Intervention: Assess for alteration in coping     Dates: Start: 03/15/20       Description:           Intervention: Assess for signs/symptoms of emotional distress     Dates: Start: 03/15/20 Description:           Intervention: Instruct on effective coping skills     Dates: Start: 03/15/20       Description: Instruct patient/caregiver on effective coping skills. Intervention: Instruct on strategies to reduce emotional distress     Dates: Start: 03/15/20       Description: Instruct patient/caregiver on strategies to reduce emotional distress. Problem: Depression     Dates: Start: 03/15/20       Description:     Disciplines: Interdisciplinary    Goal: Verbalize and demonstrate ability to manage depression     Dates: Start: 03/15/20       Description: The patient/family/caregiver will verbalize and demonstrate ability to manage patient's depression throughout hospice care.     Disciplines: Interdisciplinary    Intervention: Assess for signs/symptoms of depression     Dates: Start: 03/15/20       Description:           Intervention: Keshawn Butterfield on non-pharmacological strategies to reduce depression     Dates: Start: 03/15/20       Description:           Intervention: Instruct on safe managment of antidepressants     Dates: Start: 03/15/20       Description:                       Problem: Dyspnea Due to End of Life     Dates: Start: 03/15/20       Description:     Disciplines: Interdisciplinary    Goal: Demonstrate understanding of and ability to manage respiratory symptoms at end of life     Dates: Start: 03/15/20   Expected End: 03/22/20       Description: Ebbie Cons will be less then 24/minute  Morphine 4 mg IV/SQ q 1 hr prn dyspnea     Disciplines: Interdisciplinary    Intervention: Assess for signs and symptoms of dyspnea     Dates: Start: 03/15/20       Description:           Intervention: Keshawn Butterfield on causes/symptoms of dyspnea     Dates: Start: 03/15/20       Description:           Intervention: Instruct patient/caregiver/family on strategies to effectively manage dyspnea     Dates: Start: 03/15/20       Description:                       Problem: End of Life Process Dates: Start: 03/15/20       Description:     Disciplines: Interdisciplinary    Goal: Demonstrate understanding of end of life processes     Dates: Start: 03/15/20       Description: Patient/caregiver will understand end of life processes. Disciplines: Interdisciplinary    Intervention: Assess for signs/symptoms of terminal restlessness     Dates: Start: 03/15/20       Description:           Intervention: Instruct on strategies to reduce terminal restlessness     Dates: Start: 03/15/20       Description:           Intervention: Instruct on the dying process     Dates: Start: 03/15/20       Description:           Intervention: Instruct: imminent death     Dates: Start: 03/15/20       Description:           Intervention: Instruct: process at end of life     Dates: Start: 03/15/20       Description:                       Problem: Falls - Risk of     Dates: Start: 03/15/20       Description:     Disciplines: Interdisciplinary    Goal: *Absence of Falls     Dates: Start: 03/15/20   Expected End: 03/22/20       Description: Document Rohit Cisneros Fall Risk and appropriate interventions in the flowsheet.     Disciplines: Interdisciplinary                Problem: Pain     Dates: Start: 03/15/20       Description:     Disciplines: Interdisciplinary    Goal: Verbalize satisfaction of level of comfort and symptom control     Dates: Start: 03/15/20   Expected End: 03/22/20       Description: Pain will be less then 3/10 while at [facility]  Morphine 4 mg q 30 minutes IV/SQ prn     Disciplines: Interdisciplinary    Intervention: Assess effectiveness of pain management     Dates: Start: 03/15/20       Description:           Intervention: Assess for signs/symptoms of acute pain     Dates: Start: 03/15/20       Description:           Intervention: Assess for signs/symptoms of chronic pain     Dates: Start: 03/15/20       Description:           Intervention: Instruct on non-pharmacological pain management     Dates: Start: 03/15/20 Description:           Intervention: Instruct on pain scales     Dates: Start: 03/15/20       Description:           Intervention: Instruct on pharmacological pain management     Dates: Start: 03/15/20       Description:                       Problem: Patient Education: Go to Patient Education Activity     Dates: Start: 03/15/20       Description:     Disciplines: Interdisciplinary    Goal: Patient/Family Education     Dates: Start: 03/15/20       Description:     Disciplines: Interdisciplinary                Problem: Patient Education: Go to Patient Education Activity     Dates: Start: 03/15/20       Description:     Disciplines: Interdisciplinary    Goal: Patient/Family Education     Dates: Start: 03/15/20       Description:     Disciplines: Interdisciplinary                Problem: Potential for Skin Breakdown     Dates: Start: 03/15/20       Description:     Disciplines: Interdisciplinary    Goal: Demonstrate ability to care for skin, monitor areas of breakdown and demonstrate methods to prevent breakdown     Dates: Start: 03/15/20   Expected End: 03/22/20       Description: Patient/family/caregiver will demonstrate ability to care for patient's skin, monitor for areas of breakdown, and demonstrate methods to prevent breakdown during hospice care. Disciplines: Interdisciplinary    Intervention: Assess for skin breakdown     Dates: Start: 03/15/20       Description:           Intervention: Boston Gilmore on strategies to reduce risk of skin breakdown     Dates: Start: 03/15/20       Description:                       Problem: Pressure Injury - Risk of     Dates: Start: 03/15/20       Description:     Disciplines: Interdisciplinary    Goal: *Prevention of pressure injury     Dates: Start: 03/15/20   Expected End: 03/22/20       Description: Document Everardo Scale and appropriate interventions in the flowsheet.     Disciplines: Interdisciplinary                Problem: Spiritual Distress     Dates: Start: 03/15/20 Description:     Disciplines: Interdisciplinary    Goal: Distress heard, acknowledged, and addressed     Dates: Start: 03/15/20       Description: Patient/family/caregiver distress will be heard, acknowledged, and addressed throughout hospice care. Disciplines: Interdisciplinary    Intervention: Assess for signs/symptoms of spiritual distress     Dates: Start: 03/15/20       Description:           Intervention: Consult on spiritual care     Dates: Start: 03/15/20       Description: Consult to Spiritual Care          Intervention: Discuss strategies to reduce spiritual distress     Dates: Start: 03/15/20       Description: Discuss with patient/caregiver strategies to reduce spiritual distress.                         Care Plan Problems/Goals      Progressing Towards Goal (6)      Demonstrate ability to care for skin, monitor areas of breakdown and demonstrate methods to prevent breakdown (Potential for Skin Breakdown)    Disciplines:  Interdisciplinary Expected end:  03/22/20        Outcome: Progressing Towards Goal By Antonette Jean-Baptiste RN on 03/15/20 1442            Verbalize satisfaction of level of comfort and symptom control (Pain)    Disciplines:  Interdisciplinary Expected end:  03/22/20        Outcome: Progressing Towards Goal By Antonette Jean-Baptiste RN on 03/15/20 1442            Verbalize and demonstrate ability to manage anxiety (Anxiety/Agitation)    Disciplines:  Interdisciplinary Expected end:  03/22/20        Outcome: Progressing Towards Goal By Angel Bloom RN on 03/15/20 2219            Demonstrate understanding of and ability to manage respiratory symptoms at end of life (Dyspnea Due to End of Life)    Disciplines:  Interdisciplinary Expected end:  03/22/20        Outcome: Progressing Towards Goal By Angel Bloom RN on 03/15/20 2219            *Absence of Falls (Falls - Risk of)    Disciplines:  Interdisciplinary Expected end:  03/22/20        Outcome: Progressing Towards Goal By [id number] Celena Jacinto RN on 03/15/20 2219            *Prevention of pressure injury (Pressure Injury - Risk of)    Disciplines:  Interdisciplinary Expected end:  03/22/20        Outcome: Progressing Towards Goal By Amara Olivares RN on 03/15/20 2219                         No Outcome (6)      Demonstrate acceptance of terminal illness and understanding of disease progression (Coping and Emotional Distress)    Disciplines:  Interdisciplinary Expected end:  -          Verbalize and demonstrate ability to manage depression (Depression)    Disciplines:  Interdisciplinary Expected end:  -          Distress heard, acknowledged, and addressed (Spiritual Distress)    Disciplines:  Interdisciplinary Expected end:  -          Demonstrate understanding of end of life processes (End of Life Process)    Disciplines:  Interdisciplinary Expected end:  -          Patient/Family Education (Patient Education: Go to Patient Education Activity)    Disciplines:  Interdisciplinary Expected end:  -          Patient/Family Education (Patient Education: Go to Patient Education Activity)    Disciplines:  Interdisciplinary Expected end:  -                       Resolved/Met (2)      Demonstrate understanding of hospice philosophy, plan of care, and home hospice program (Hospice Orientation)    Disciplines:  Interdisciplinary Expected end:  03/22/20        Outcome: Resolved/Met By Amara Olivares RN on 03/15/20 0130            Free of falls during episode of care (Risk for Falls)    Disciplines:  Interdisciplinary Expected end:  03/22/20        Outcome: Resolved/Met By Amara Olivares RN on 03/15/20 0130                              ___________________    Care Team Notes          POC/IDG Notes      [facility] IDG Nurse Notes by Elizabeth Carvalho RN at 03/16/20 1233  Version 1 of 1    Author:  Elizabeth Carvalho RN Service:  Manjit Salomon Author Type:  Registered Nurse    Filed:  03/16/20 1233 Date of Service:  03/16/20 1233 Status:  Signed    :  Omayra Marin Ciaran Romero RN (Registered Nurse)       Patient: Jeannie Robert. Date: 03/16/20  Time: 12:33 PM    \[facility]\"" Nurse Notes  1st IDG: Pt is a 65-year-old male with CHF who is here GIP level of care for management of dyspnea and anxiety. Rubio with UOP of 650cc. IV access was lost this AM. VS stable. Wounds: none. PRN medications: Haldol 2 mg IV/SQ x 3, Ativan 1 mg IV/IM x 4, morphine 4 mg x 7 for pain. Scheduled meds:  none. Minimal PO intake x 9 days. Plan: Add scheduled Haldol 4 mg SQ q 6 hours and Morphine 4 mg SQ q 6 hours. Discontinue all PO meds. D/C IV flushes. Comprehensive plan of care reviewed. IDG and pt./family in agreement with plan of care. The IDG identifies through on-going assessment when a change is needed to the POC; the pt/family will receive care and services necessitated by changes in POC. Medications reviewed by the pharmacist and Medical Director. Signed by: Bea Vega RN       [facility] IDG  Notes by Kandace Coleman at 03/16/20 1153  Version 1 of 1    Author:  Kandace Coleman Service:  Spiritual Care Author Type:  Pastoral Care    Filed:  03/16/20 1206 Date of Service:  03/16/20 1153 Status:  Signed    :  Kandace Coleman (Pastoral Care)       Patient: Jeannie Robert. Date: 03/16/20  Time: 11:53 AM    \[facility]\""  Notes  Assessments pending for spiritual and bereavement care. Signed by: Billie Graham       [facility] IDG  Notes by Delia Messina at 03/16/20 1001  Version 1 of 1    Author:  Delia Messina Service:  Licensed Clinical  Author Type:      Filed:  03/16/20 1157 Date of Service:  03/16/20 1001 Status:  Signed    :  Delia Messina ()       Patient: Jeannie Robert. Date: 03/16/20  Time: 10:01 AM    \[facility]\""  Notes  LMSW will visit with the pt and his family to complete the SW initial assessment. The volunteer program has been suspended due to the Corona Virus.   LMSW will provide comfort bags to the pt. Signed by: Tomer Alvarez       [facility] IDG  Notes by Edgar Martin at 03/16/20 0001  Version 1 of 1    Author:  Edgar Martin Service:  Licensed Clinical  Author Type:      Filed:  03/16/20 0954 Date of Service:  03/16/20 0953 Status:  Signed    :  Edgar Martin ()       Patient: Patricia President. Date: 03/16/20  Time: 9:53 AM    \[facility]\""  Notes    LMSW has read and agrees with the RN initial assessment. LMSW will meet with pt and family to complete the SW assessment. Signed by: Tomer [facility]       \[facility]\"" IDG  Notes by Isiah Sweeney at 03/16/20 8240  Version 1 of 1    Author:  Isiah Sweeney Service:  Spiritual Care Author Type:  Pastoral Care    Filed:  03/16/20 0918 Date of Service:  03/16/20 3693 Status:  Signed    :  Isiah Sweeney (Pastoral Care)       Patient: Patricia President. Date: 03/16/20  Time: 9:17 AM    \[facility]\""  Notes  / Grief Counselor has reviewed  Initial Comprehensive Assessment and Initial Plan of Care for [employer] Power[employer]issy Sanchez and  is in agreement with the plans put in place and goals set thus far. / Grief Counselor will follow up with Spiritual and Bereavement Assessments. [employer] Sanket Katahrine Counselor will provide care according to patient's/ family's desires. Signed by: Heydi Carrera       [facility] IDG Nurse Notes by Werner Cardoso RN at 03/15/20 0120  Version 1 of 1    Author:  Werner Cardoso RN Service:  NURSING Author Type:  Registered Nurse    Filed:  03/15/20 0121 Date of Service:  03/15/20 0120 Status:  Signed    :  Werner Cardoso RN (Registered Nurse)       Patient: Patricia President. Date:03/14/2020   Time: 2030  [facility] Nurse Notes  Patient admitted to [facility] from [facility]. Patient is GIP level of care for hospice diagnosis of CHF.  Symptoms to be managed include respiratory management and end of life care. Patient  is identified by name/. Patient is somnolent upon arrival as he was recently medicated for shortness of breath prior to transfer per report. Oxygen in use via nasal cannula at 5L/minute as per orders. Color acyanotic. Respirations non labored with diminished sounds and crackles heard in bases. Facial features flat,relaxed. FLACC 0/10. Skin is warm,dry and intact. No edema noted. Rubio catheter patent with clear yellow urine observed. No signs/symptoms of pain,anxiety,nausea or dyspnea at this time. Bed extender is use for comfort. Wife and patient's sons have arrived at bedside. Emotional support provided. Family given orientation to hospice house and opportunities for questions to be answered. Bed in low and locked position with side rails up x 2 with call light in reach. Admission complete and initial general hospice care plan initiated which includes spiritual,psychosocial and bereavement. No immediate needs at this time. IDG team,including MD, made aware of plan of care and immediate needs. Family is aware of volunteer services. Signed by: Shital Tiwari RN                Care Team Present:   Team Members Present: (see sign in sheet for attendees)  Physician Team Member: Chriss Echevarria   Nurse Team Member: Zahida Alonso Rn  Social Work Team Member: Kirk Wilder, [address] [facility],[address] Team Member: Ciara Boyce.

## 2020-03-16 NOTE — [facility] IDG CHAPLAIN NOTES
Patient: Robinson Darnell. Date: 03/16/20  Time: 9:17 AM    [facility]  Notes  / Grief Counselor has reviewed  Initial Comprehensive Assessment and Initial Plan of Care for Rommel Adkins. Sandra Rosenberg and  is in agreement with the plans put in place and goals set thus far. / Grief Counselor will follow up with Spiritual and Bereavement Assessments. [place] Tommy Chavira Counselor will provide care according to patient's/ family's desires.        Signed by: Tutu Coronado

## 2020-03-16 NOTE — [facility] IDG CHAPLAIN NOTES
Patient: Samantha No. Date: 03/16/20  Time: 11:53 AM    \[facility]\""  Notes  Assessments pending for spiritual and bereavement care.          Signed by: Kp Olivera

## 2020-03-16 NOTE — PROGRESS NOTES
The shift change report and walking rounds completed with Eunice Dumont RN. Patient was identified by name and . Patient is resting quietly in the bed with no signs of pain, SOB, nausea/ vomit or anxiety/ agitation. The bed is low and locked with two bed rails up and the tab alert in place. The wife is at the bedside. 0701- The patient is very anxious and agitated because he cannot get out of the bed. He is grimacing and moaning. Morphine 4 mg administered subcutaneous because the IV failed. Lorazepam 1 mg administered IM due to IV failure. Removed  the IV that is leaking. 0730- The patient is now resting quietly in the bed. No signs of agitation / anxiety or pain. The medications resolved the symptoms. The patient's wife remains in the room resting on the bench seat with her eyes closed. 1012- Medicated with Haldol 2 mg subcutaneous for agitation AEB patient pulling off his gown and sitting up in the bed trying to climb out of the bed. Medicated with Morphine 4 mg subcutaneous for pain AEB grimacing and moaning. Patient cannot state where the pain is at. The patient's wife is at the bedside and is actually holding the patient in the bed. Educated her on terminal agitation. She voiced understanding. 1039- The patient continues to be agitated and continues to grimace. The agitation is slightly less now than 1012. Administered Lorazepam 1 mg IM and Morphine 4 mg subcutaneous. Assured the patient's wife that we would get the patient comfortable. 1100- The patient is now resting quietly in the bed with no signs of pain or agitation. The patient's wife is resting on the bench. 1330- The patient is on his left side in the bed resting with his eyes closed and no signs of distress observed. The wife and one son are at the bedside. 1500- Medicated with Morphine 4 mg subcutaneous for pain AEB patient grimacing and moaning. Medicated with Haldol 2 mg subcutaneous for agitation.       1530- The patient is resting quietly in the bed. Family remains at the bedside. 1720- Medicated with scheduled Morphine and Haldol subcutaneous for both medications. Reported off to ITema and completed walking rounds.
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
## 2020-03-17 NOTE — PROGRESS NOTES
AM shift change rounds and report completed with Carol German RN. The patient was identified by name and . Patient is resting quietly now after much medications throughout the night. Pain is 0 using non-verbal scale. No signs of agitation, SOB or nausea /vomit observed. The bed is low and locked with tow tab alerts in place. The door to the patients room is left open. 9665- The patient is pulling off his gown and his oxygen. He is very dyspneic and agitated. 3164- Medicated with Morphine 4 mg for dyspnea and pain. Medicated with Lorazepam 1 mg for agitation. Haldol had been administered earlier. Medications administered subcutaneous. 0830- The patient is now resting quietly in the bed with no signs of anxiety or pain or dyspnea. All safety precautions are in place. 1030- The patient is restless and is moaning and grimacing. Medicated with Morphine 4 mg for pain and Haldol 2 mg for agitatio. The CNA is bathing the patient. 1100- The patient is resting quietly now. No signs of distress observed. 1250- The patient was given scheduled Morphine 4 mg subcutaneous and prn Haldol 2 mg subcutaneous for agitation. Mouth care done. Family remains at the bedside. 1310- The patient is now resting quietly in the bed with no signs of distress . 1454- The patient is very anxious, pulling on his gown and on his correia. He is grimacing and moaning. He is trying to get out of the bed. Medicated with Lorazepam 1 mg in right leg for the agitation and with Morphine 4 mg for pain and dyspnea. Repositioned the patient for comfort. The patient's wife is at the bedside and thanks the staff for the care that we are giving her . 1520- The patient is resting quietly in the bed now. The patient does turn himself in the bed.     1703- Scheduled Morphine 4 mg and Haldol 2 mg administered subcutaneous. The patient did not flinch. Reported off to Mary Jo Wheeler RN and completed walking rounds.

## 2020-03-17 NOTE — PROGRESS NOTES
Progress Note    Patient: Robinson Darnell. MRN: [id number]  SSN: [id number]    YOB: 1945  Age: 75 y.o. Sex: male      Admit Date: 3/14/2020    LOS: 3 days     Subjective:     Minimally responsive. Responds to stimuli with agitation. Has required morphine SQ x 7 for pain/dyspnea, haldol x 4   sq and ativan x 2 SQ for agitation. Review of Systems:  Review of systems not obtained due to patient factors. Objective:     Vitals:    03/15/20 1823 03/16/20 1027 03/16/20 1859 03/17/20 0807   BP: 99/65 114/66 93/57 90/50   Pulse: 80 80 80 100   Resp: 18 16 16 16   Temp:  96.9 °F (36.1 °C) 95.7 °F (35.4 °C) 97.8 °F (36.6 °C)        Intake and Output:  Current Shift: No intake/output data recorded. Last three shifts: 03/15 1901 - 03/17 0700  In: -   Out: 650 [Urine:650]    Physical Exam:   GENERAL: fatigued, mild distress, appears older than stated age, responds to stimuli with agitation  LUNG: Coarse, diminished breath sounds with labored respirations. Irregular rate with 10-15 second periods of apnea. HEART: regular rate and rhythm  ABDOMEN: soft, non-tender. Bowel sounds hypoactive. : Rubio catheter with farrah urine. EXTREMITIES:  extremities with no cyanosis or edema. + pulses. SKIN: Pale. Warm to touch. Skin intact. NEUROLOGIC: Responds to stimuli with agitation. Unable to follow commands. Generalized weakness. Bedbound. PSYCHIATRIC: agitated    Lab/Data Review:  No new labs resulted in the last 24 hours.     Assessment:     Principal Problem:    Acute on chronic systolic heart failure ([address] (2/20/2020)      Overview: Echo 2/20/20:  EF 20-25%      Echo 4/15: EF 30-35%, mild to mod MR      Echo 2011: EF 30-35%    Active Problems:    Bilateral carotid artery disease ([address] (8/7/2017)      Chronic systolic CHF (congestive heart failure) ([address] (3/14/2020)      Hospice care (3/16/2020)        Plan:     Current Facility-Administered Medications   Medication Dose Route Frequency    haloperidol lactate (HALDOL) injection 4 mg  4 mg SubCUTAneous Q4H    morphine injection 4 mg  4 mg SubCUTAneous Q6H    LORazepam (ATIVAN) injection 1 mg  1 mg IntraMUSCular Q2H PRN    morphine injection 4 mg  4 mg SubCUTAneous Q20MIN PRN    Or    morphine injection 4 mg  4 mg IntraVENous Q20MIN PRN    acetaminophen (TYLENOL) suppository 650 mg  650 mg Rectal Q3H PRN    haloperidol lactate (HALDOL) injection 2 mg  2 mg SubCUTAneous Q1H PRN    Or    haloperidol lactate (HALDOL) injection 2 mg  2 mg IntraVENous Q1H PRN    glycopyrrolate (ROBINUL) injection 0.2 mg  0.2 mg SubCUTAneous Q4H PRN    albuterol-ipratropium (DUO-NEB) 2.5 MG-0.5 MG/3 ML  3 mL Nebulization Q4H PRN     3/14: (SFPATRICK) Admitted GIP with Acute on chronic systolic heart failure for management of pain, dyspnea and agitation.      1. Pain: Morphine 4mg IV/SQ Q4 and Q20 minutes as needed.     2. Dyspnea: Morphine 4mg IV/SQ Q4 and Q20 minutes as needed. Duonebs q4 prn. Glycopyrrolate 0.2mg q4 prn secretions. Oxygen prn.     3. Agitation: Haloperidol 2mg IV/SQ Q6 and Q1 hour prn and Lorazepam 1mg IV/IM q2 hours prn.     4. Family/Pt Support: Family at bedside during exam. Medications and plan of care discussed with nursing staff and family. Will continue to monitor for symptoms and adjust medications as needed to maintain patient comfort. PPS 10%. Case discussed with Dr. Donald Bro. Increase scheduled morphine to q4.      Signed By: Clare Jernigan NP     March 17, 2020

## 2020-03-17 NOTE — PROGRESS NOTES
0129 Received report from Rad Burgess RN. Pt resting quietly with family at bedside. FLACC 0/10. Family at bedside, reports they are going home for the night. Discussed safety measures and that we would attach the tab alert when they left and open the door. The bed is in the low locked position and side rails are up. No s/sx of distress. 1958 Pt tab alert alarming. Pt restless in bed, turning side to side. No direction following. Pt medicated with Haldol 2mg subq. Door open and 2nd tab alert attached for safety. 2030 Pt resting with eyes closed. No s/sx of distress. 2224 PT FLACC 6/10, grimacing, moaning and restless in bed.  Medicated per order Morphine 4mg sub q.    2255 Report to Norm Kerr RN

## 2020-03-17 NOTE — PROGRESS NOTES
184:  Report from [facility]. Patient ID by name and . Pt in bed with eyes closed. No s/sx of pain, dyspnea, or agitation noted. FLACC 0. Bed low and locked and all safety measures in place. Family at the bedside. :  Pt agitated moving about in the bed and pt appears short of breath. Medicated with scheduled Haldol 4 mg SQ and PRN Morphine 4 mg SQ for dyspnea. Family at the bedside. Will reassess. :  Reassessment. Pt is now resting with no s/sx of pain or agitation. FLACC 0. Assessment complete. :  Scheduled Haldol 4 mg SQ and Morphine 4 mg SQ given as ordered. Pt repositioned in bed for comfort but moves himself independently. All safety measures in place. 0145:  Pt resting with no s/sx of pain, dyspnea, or agitation. FLACC 0.    0334:  Pt TAB alarm sounding. Pt attempting to exit the bed. Throwing legs over the side rails. Pt moaning out. FLACC 5.  Scheduled Haldol SQ given and PRN Morphine 4 mg SQ given for pain. Pt repositioned in bed for comfort. Will reassess. 3727:  Reassessment. Pt now resting with no s/sx of pain or agitation. FLACC 0.    0519: Pt throwing legs over the bed. Scheduled Morphine 4 mg SQ given and PRN Haldol 4 mg SQ given for agitation. Will reassess. 0550:  Reassessment. Pt now resting with no s/sx of pain or agitation. FLACC 0. Pt has received Morphine x 2 for pain and Haldol x 1 for agitation this shift in addition to scheduled medications. Report to Ashleigh Dudley RN.

## 2020-03-17 NOTE — PROGRESS NOTES
Walking rounds completed with off going RN. Pt identified by name/. Pt resting quietly with eyes closed. Respirations non labored. Facial features flat,relaxed. Flacc 0/10. No signs/symptoms of pain,anxiety,nausea or dyspnea at this time. Bed in low and locked position with side rails up x 2 with call light in reach. Tab alarm in use as pt is a falls risk. Door left open as pt is unaccompanied. 2337 Anxious,restless,has pulled gown and oxygen off and will not allow it to be replaced currently. Confused,difficult to redirect. Scheduled Haldol 4 mg given subq with scheduled Morphine 4 mg given subq at this time. Stimulus kept to a minimum at this time with safety maintained. 0028 Medicated with Morphine 4 mg subq for dyspnea. Agitation is starting to subside at this time pt allows this writer to place his oxygen back on. Emotional support provided. Acyanotic. Continue to monitor. 0100 Resting peacefully with eyes closed. Respirations non labored. Oxygen in use. Facial features flat,relaxed. No s/sx of pain,anxiety,nausea or distress. Bed in low and locked position with side rails up x 2 and call light in reach. Door left open for continuous monitoring. 0238 Agitated,pulled oxygen and gown off. Quite dyspneic with respirations 28-30/minute. Emotional support provided. Medicated with Ativan 1 mg given IM for agitation and Morphine 4 mg given subq for dyspnea. Pulled up in bed. Gown replaced as well as oxygen. Ongoing emotional support as well as frequent redirection required to maintain safety. Door left open as pt is unaccompanied. 0309 Resting peacefully with eyes closed. Respirations non labored. Oxygen in use. Facial features flat,relaxed. No s/sx of pain,anxiety,nausea or distress. Bed in low and locked position with side rails up x 2 and call light in reach. Door left open for continuous monitoring. 0532 Scheduled doses of Morphine 4 mg subq and Haldol 4 mg subq given as ordered. Respirations easy and non labored. Facial features flat,relaxed. No s/sx of pain,anxiety,nausea or distress. Oxygen remains in use as ordered. Bed in low and locked position with side rails up x 2 with call light in reach. Door left open as pt is unaccompanied.      Report given to [facility]
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
## 2020-03-18 NOTE — PROGRESS NOTES
Follow up visit:  Patient's wife  Hany Alexander is at bedside. She stated that she did not set an alarm this morning so she could sleep until she woke up on her own. She appears to be refreshed. She is tearful but appropriate. She spoke of how strong her boys have been thus far and how relieved she was to see one of them with some tears because she didn't  Want them to hold their grief inside.  affirmed how important it is to express feelings. Hany Alexander and  prayed together.  assured Hany Alexander of continued availability to listen and support her and her family.

## 2020-03-18 NOTE — PROGRESS NOTES
Report received. Pt round. Pt identified by name. In bed with eyes closed. Oxygen on via high flow nasal canula at 5l/min. No s/s of pain, agitation or dyspnea. Bed low and SR up with tab alerts on.    0805 Pt is restless and moving about in bed. Has removed oxygen and pushes it away with his hand when attempted to reapply. Does not open his eyes. Scheduled haldol 4 mg given SQ. RR is irregular. 5191 Morphine 4 mg SQ and haldol 2 mg SQ for dyspnea and agitation during AM care. 1149 Scheduled haldol and morphine given SQ. Pt is beginning to get restless according to wife. Discussed with wife use of PRN meds if pt does not settle after scheduled meds given. 1410 Pt repositioned. No s/s of distress noted. Family at bedside. 1621 Scheduled haldol and morphine given. Wife states pt is just beginning to get restless. 1815 Pt resting with no s/s of distress.  FLACC 0     1845  Report given to oncoming RN

## 2020-03-18 NOTE — PROGRESS NOTES
Problem: Potential for Skin Breakdown  Goal: Demonstrate ability to care for skin, monitor areas of breakdown and demonstrate methods to prevent breakdown  Description: Patient/family/caregiver will demonstrate ability to care for patient's skin, monitor for areas of breakdown, and demonstrate methods to prevent breakdown during hospice care. Outcome: Progressing Towards Goal     Problem: Pain  Goal: Verbalize satisfaction of level of comfort and symptom control  Description: Pain will be less than 3/10 [place] [facility]  Morphine 4 mg q 30 minutes IV SQ prn   Scheduled Morphine 4 mg SQ q 4 hours   Outcome: Progressing Towards Goal     Problem: Anxiety/Agitation  Goal: Verbalize and demonstrate ability to manage anxiety  Description: The patient/family/caregiver will verbalize and demonstrate ability to manage the patient's anxiety throughout hospice care. Pt will have relaxed facial features and body language  Haldol 2 mg q 1 hr prn agitation   Ativan 1 mg IM q 2 hours PRN agitation  Scheduled Haldol 4 mg SQ q 4 hours     Outcome: Progressing Towards Goal     Problem: Dyspnea Due to End of Life  Goal: Demonstrate understanding of and ability to manage respiratory symptoms at end of life  Description: Respirations will be less then 24/minute  Morphine 4 mg IV/SQ q 1 hr prn dyspnea   Outcome: Progressing Towards Goal     Problem: Falls - Risk of  Goal: *Absence of Falls  Description: Document [facility] Fall Risk and appropriate interventions in the flowsheet.   Outcome: Progressing Towards Goal  Note: Fall Risk Interventions:  Mobility Interventions: Bed/chair exit alarm    Mentation Interventions: Adequate sleep, hydration, pain control, Bed/chair exit alarm, Door open when patient unattended, Reorient patient    Medication Interventions: Bed/chair exit alarm    Elimination Interventions: Bed/chair exit alarm    History of Falls Interventions: Bed/chair exit alarm, Door open when patient unattended         Problem: Pressure Injury - Risk of  Goal: *Prevention of pressure injury  Description: Document Everardo Scale and appropriate interventions in the flowsheet.   Outcome: Progressing Towards Goal  Note: Pressure Injury Interventions:  Sensory Interventions: Assess changes in LOC, Assess need for specialty bed, Float heels, Keep linens dry and wrinkle-free, Minimize linen layers    Moisture Interventions: Absorbent underpads, Apply protective barrier, creams and emollients, Internal/External urinary devices, Limit adult briefs, Maintain skin hydration (lotion/cream), Minimize layers, Moisture barrier    Activity Interventions: Assess need for specialty bed    Mobility Interventions: Assess need for specialty bed, Float heels, HOB 30 degrees or less    Nutrition Interventions: Document food/fluid/supplement intake    Friction and Shear Interventions: Apply protective barrier, creams and emollients, HOB 30 degrees or less, Lift sheet, Minimize layers                Problem: Patient Education: Go to Patient Education Activity  Goal: Patient/Family Education  Outcome: Resolved/Met     Problem: Patient Education: Go to Patient Education Activity  Goal: Patient/Family Education  Outcome: Resolved/Met

## 2020-03-18 NOTE — PROGRESS NOTES
Problem: Potential for Skin Breakdown  Goal: Demonstrate ability to care for skin, monitor areas of breakdown and demonstrate methods to prevent breakdown  Description: Patient/family/caregiver will demonstrate ability to care for patient's skin, monitor for areas of breakdown, and demonstrate methods to prevent breakdown during hospice care. Outcome: Progressing Towards Goal     Problem: Pain  Goal: Verbalize satisfaction of level of comfort and symptom control  Description: Pain will be less then 3/10 while at [facility]  Morphine 4 mg q 30 minutes IV/SQ prn   Outcome: Progressing Towards Goal     Problem: Anxiety/Agitation  Goal: Verbalize and demonstrate ability to manage anxiety  Description: The patient/family/caregiver will verbalize and demonstrate ability to manage the patient's anxiety throughout hospice care. Pt will have relaxed facial features and body language  Haldol 2 mg q 1 hr prn agitation   Outcome: Progressing Towards Goal     Problem: Dyspnea Due to End of Life  Goal: Demonstrate understanding of and ability to manage respiratory symptoms at end of life  Description: Respirations will be less then 24/minute  Morphine 4 mg IV/SQ q 1 hr prn dyspnea   Outcome: Progressing Towards Goal     Problem: Falls - Risk of  Goal: *Absence of Falls  Description: Document Charlene Latin Fall Risk and appropriate interventions in the flowsheet.   Outcome: Progressing Towards Goal  Note: Fall Risk Interventions:  Mobility Interventions: Bed/chair exit alarm    Mentation Interventions: Adequate sleep, hydration, pain control, Bed/chair exit alarm, Door open when patient unattended, Reorient patient    Medication Interventions: Bed/chair exit alarm    Elimination Interventions: Bed/chair exit alarm    History of Falls Interventions: Bed/chair exit alarm, Door open when patient unattended         Problem: Pressure Injury - Risk of  Goal: *Prevention of pressure injury  Description: Document Everardo Scale and appropriate interventions in the flowsheet.   Outcome: Progressing Towards Goal  Note: Pressure Injury Interventions:  Sensory Interventions: Assess changes in LOC, Assess need for specialty bed, Float heels, Keep linens dry and wrinkle-free, Minimize linen layers    Moisture Interventions: Absorbent underpads, Apply protective barrier, creams and emollients, Internal/External urinary devices, Limit adult briefs, Maintain skin hydration (lotion/cream), Minimize layers, Moisture barrier    Activity Interventions: Assess need for specialty bed    Mobility Interventions: Assess need for specialty bed, Float heels, HOB 30 degrees or less    Nutrition Interventions: Document food/fluid/supplement intake    Friction and Shear Interventions: Apply protective barrier, creams and emollients, HOB 30 degrees or less, Lift sheet, Minimize layers

## 2020-03-18 NOTE — PROGRESS NOTES
Problem: Potential for Skin Breakdown  Goal: Demonstrate ability to care for skin, monitor areas of breakdown and demonstrate methods to prevent breakdown  Description: Patient/family/caregiver will demonstrate ability to care for patient's skin, monitor for areas of breakdown, and demonstrate methods to prevent breakdown during hospice care. Outcome: Progressing Towards Goal     Problem: Pain  Goal: Verbalize satisfaction of level of comfort and symptom control  Description: Pain will be less then 3/10 while at [facility]  Morphine 4 mg q 30 minutes IV/SQ prn   Outcome: Progressing Towards Goal     Problem: Anxiety/Agitation  Goal: Verbalize and demonstrate ability to manage anxiety  Description: The patient/family/caregiver will verbalize and demonstrate ability to manage the patient's anxiety throughout hospice care. Pt will have relaxed facial features and body language  Haldol 2 mg q 1 hr prn agitation   Outcome: Progressing Towards Goal     Problem: Coping and Emotional Distress  Goal: Demonstrate acceptance of terminal illness and understanding of disease progression  Description: Patient/family/caregiver will demonstrate acceptance of terminal disease and understanding of disease progression while employing appropriate coping mechanisms.   Outcome: Progressing Towards Goal

## 2020-03-19 NOTE — PROGRESS NOTES
Problem: Dyspnea Due to End of Life  Goal: Demonstrate understanding of and ability to manage respiratory symptoms at end of life  Description: Respirations will be less then 24/minute  Morphine 4 mg IV/SQ q 1 hr prn dyspnea   Outcome: Progressing Towards Goal  Note: Educated wife on Cheyne-Winston respiratory pattern. Apnea is frequent (every 60-80 seconds) and lasts up to 35 seconds.

## 2020-03-19 NOTE — PROGRESS NOTES
1900 Report received from  [facility] with walking rounds. Patient identified by name and . Patient resting quietly in bed with eyes closed. No signs or symptoms of distress noted at present. Bed in low and locked position, side rails up x2, call bell within reach, tab alarm in place. Wife and son Will present at bedside.  Shift assessment completed at this time. Patient opens his eyes to touch, tracks but does not respond verbally or follow commands. Lung sounds are clear, bowel sounds active. Rubio draining clear farrah urine. Skin remains intact. Breathing pattern is still Cheyne-Winston despite patient's increased alertness today per family. 2240 Patient resting quietly in bed with eyes closed, respirations unlabored but irregular. FLACC 0/10, no signs of distress or discomfort. Son Chappie at bedside. 2329 Patient lying bed with his eyes open. He tracks but is nonverbal.  He reached over with his right hand to the left bed rail and pulled himself up and over onto his side. RN gave scheduled doses of morphine 4 mg SC and haldol 2 mg SC. Changed chux pad under patient and applied moisture barrier to sacrum. Patient's wife and sons refused bath for patient this evening and would prefer him to get a bath tomorrow during the day. Patient positioned on his left side at this time. Oxygen reapplied via nasal cannula at this point. It had been removed previously as he kept pulling it off.    0116 Patient resting quietly in bed with eyes closed, respirations unlabored but irregular. FLACC 0/10, no signs of distress or discomfort. Patient has shifted back onto his back. 2333 Patient resting quietly in bed with eyes closed but he opens them to voice, respirations unlabored but irregular. FLACC 0/10, no signs of distress or discomfort. 5271 Tab alarm going off, patient pulling himself up and turning left again.   Not attempting to get out of bed, but shifting in bed as if uncomfortably positioned. He does not respond verbally but appears fully alert and tracking. RN offered him a mouth swab with water but he showed no interest, did not suck on it. Mouth care provided instead. Gave scheduled doses of haldol 4 mg SC and morphine 4 mg SC.    0431 Patient resting quietly in bed with eyes closed, respirations unlabored but irregular. FLACC 0/10, no signs of distress or discomfort. 9904 Patient resting quietly in bed with eyes closed, respirations unlabored but irregular. FLACC 0/10, no signs of distress or discomfort. Report given to Marcus Akhtar RN.

## 2020-03-19 NOTE — PROGRESS NOTES
Progress Note    Patient: Jeannie Robert. MRN: [id number]  SSN: [id number]    YOB: 1945  Age: 75 y.o. Sex: male      Admit Date: 3/14/2020    LOS: 4 days     Subjective:     Unresponsive. NPO. Has required morphine SQ x 5 for pain/dyspnea, haldol x 4   sq and ativan x 2 SQ for agitation. Review of Systems:  Review of systems not obtained due to patient factors. Objective:     Vitals:    03/17/20 0807 03/17/20 1702 03/18/20 0419 03/18/20 1640   BP: 90/50 104/58 135/63 138/72   Pulse: 100 80 80 79   Resp: 16 16 20 13   Temp: 97.8 °F (36.6 °C) 97.5 °F (36.4 °C) 97.9 °F (36.6 °C) 98.1 °F (36.7 °C)        Intake and Output:  Current Shift: No intake/output data recorded. Last three shifts: 03/17 0701 - 03/18 1900  In: -   Out: 700 [Urine:700]    Physical Exam:   GENERAL: fatigued, mild distress, appears older than stated age, responds to stimuli with agitation  LUNG: Coarse, diminished breath sounds with labored respirations. Irregular rate with 20-25 second periods of apnea.   HEART: regular rate and rhythm  ABDOMEN: soft, non-tender. Bowel sounds hypoactive.   : Rubio catheter with farrah urine.   EXTREMITIES:  extremities with no cyanosis or edema. + pulses.   SKIN: Pale. Warm to touch. Skin intact.   NEUROLOGIC: Unresponsive with no lateralizing deficits noted. Bedbound.   PSYCHIATRIC: agitated    Lab/Data Review:  No new labs resulted in the last 24 hours.     Assessment:     Principal Problem:    Acute on chronic systolic heart failure ([facility] [address] (2/20/2020)      Overview: Echo 2/20/20:  EF 20-25%      Echo 4/15: EF 30-35%, mild to mod MR      Echo 2011: EF 30-35%    Active Problems:    Bilateral carotid artery disease ([address] (8/7/2017)      Chronic systolic CHF (congestive heart failure) ([address] (3/14/2020)      Hospice care (3/16/2020)        Plan:     Current Facility-Administered Medications   Medication Dose Route Frequency    morphine injection 4 mg  4 mg SubCUTAneous Q4H    haloperidol lactate (HALDOL) injection 4 mg  4 mg SubCUTAneous Q4H    LORazepam (ATIVAN) injection 1 mg  1 mg IntraMUSCular Q2H PRN    morphine injection 4 mg  4 mg SubCUTAneous Q20MIN PRN    Or    morphine injection 4 mg  4 mg IntraVENous Q20MIN PRN    acetaminophen (TYLENOL) suppository 650 mg  650 mg Rectal Q3H PRN    haloperidol lactate (HALDOL) injection 2 mg  2 mg SubCUTAneous Q1H PRN    Or    haloperidol lactate (HALDOL) injection 2 mg  2 mg IntraVENous Q1H PRN    glycopyrrolate (ROBINUL) injection 0.2 mg  0.2 mg SubCUTAneous Q4H PRN    albuterol-ipratropium (DUO-NEB) 2.5 MG-0.5 MG/3 ML  3 mL Nebulization Q4H PRN     3/14: ([facility]) Admitted [facility] with Acute on chronic systolic heart failure for management of pain, dyspnea and agitation.      1. Pain: Morphine 4mg IV/SQ Q4 and Q20 minutes as needed.     2. Dyspnea: Morphine 4mg IV/SQ Q4 and Q20 minutes as needed. Duonebs q4 prn. Glycopyrrolate 0.2mg q4 prn secretions. Oxygen prn.     3. Agitation: Haloperidol 2mg IV/SQ Q4 and Q1 hour prn and Lorazepam 1mg IV/IM q2 hours prn.     4. Family/Pt Support: Family at bedside during exam. Medications and plan of care discussed with nursing staff and family. Will continue to monitor for symptoms and adjust medications as needed to maintain patient comfort.  PPS 10%. Case discussed with Dr. Francesca Springer.     Increase scheduled haldol to q4.      Signed By: Sonya Da Silva NP     March 18, 2020

## 2020-03-19 NOTE — PROGRESS NOTES
Report received. Pt round. Pt identified by name. In bed with eyes closed. Pt with cheyne-hu pattern of respirations, but appears comfortable at this time with a FLACC of 0. Bed low and SR up with tab alerts on. Wife at bedside. 5495 Scheduled haldol and morphine given SQ    1030 Pt resting with no s/s of distress. Wife remains at bedside. 1145 Scheduled morphine and haldol given SQ. No s/s of pain, agitation or dyspnea  this time    1300 Pt repositioned for comfort. No s/s of distress. FLACC 0. Pt still with cheyne-hu respirations.      1622 Scheduled haldol and morphine given SQ     1829 Morphine 4 mg SQ for pain    1845 Report given to oncoming RN

## 2020-03-19 NOTE — PROGRESS NOTES
2100 Report received from Nolan Voss RN with walking rounds. Patient identified by name and . Patient resting quietly in bed with eyes closed. No signs or symptoms of distress noted at present. Patient displays Cheyne-Winston respiratory pattern which is a new development. Wife at bedside educated about this sign of approaching end of life. She is appropriate and tearful. Bed in low and locked position, side rails up x2, call bell within reach, tab alarm in place.  Gave scheduled doses of haldol 4 mg and morphine 4 mg SC. Patient beginning to show signs of restlessness with movement of his upper extremities. Wife and son Will present at bedside. Shift assessment completed at this time. Feet are cool without palpable pulses. Lung sounds clear, patient does display occasional cough. Rubio draining. 2233 Patient resting quietly in bed with eyes closed, respirations unlabored but irregular. FLACC 0/10, no signs of distress or discomfort. Wife and 2 sons present at bedside. Son Darlin Alejandro asks if we have scopolamine patch if gurgling gets worse. RN explained we use glycopyrrolate instead. Patient at this time not gurgling and able to clear secretions with cough. 2358 Gave scheduled doses of haldol 4 mg and morphine 4 mg SC. Patient with occasional gurgling and cough. Gave PRN dose of glycopyrrolate 0.2 mg SC for secretions. Showed wife how to perform mouth care using swabs, moisturizer, and mouthwash.    0206 Patient with more frequent cough with associated labored breathing. Gave PRN dose of morphine 4 mg SC for dyspnea. Wife asleep at bedside.    0302 Cough less frequent, Cheyne Winston respirations remain. 6066 Gave scheduled doses of morphine 4 mg SC and haldol 4 mg SC as well as glycopyrrolate 0.2 mg SC for gurgling. Patient restless and shifting around in bed. Wife asleep at bedside.     0522 Patient resting quietly in bed with eyes closed, respirations unlabored but irregular. FLACC 0/10, no signs of distress or discomfort. 3949 Patient resting quietly in bed with eyes closed, respirations unlabored but irregular. FLACC 0/10, no signs of distress or discomfort. Report given to Janes Petty RN.

## 2020-03-20 NOTE — PROGRESS NOTES
Problem: Potential for Skin Breakdown  Goal: Demonstrate ability to care for skin, monitor areas of breakdown and demonstrate methods to prevent breakdown  Description: Patient/family/caregiver will demonstrate ability to care for patient's skin, monitor for areas of breakdown, and demonstrate methods to prevent breakdown during hospice care. Outcome: Progressing Towards Goal     Problem: Pain  Goal: Verbalize satisfaction of level of comfort and symptom control  Description: Pain will be less than 3/10 while at [facility] [place]  Morphine 4 mg q 30 minutes IV SQ prn   Scheduled Morphine 4 mg SQ q 4 hours   Outcome: Progressing Towards Goal     Problem: Anxiety/Agitation  Goal: Verbalize and demonstrate ability to manage anxiety  Description: The patient/family/caregiver will verbalize and demonstrate ability to manage the patient's anxiety throughout hospice care. Pt will have relaxed facial features and body language  Haldol 2 mg q 1 hr prn agitation   Ativan 1 mg IM q 2 hours PRN agitation  Scheduled Haldol 4 mg SQ q 4 hours     Outcome: Progressing Towards Goal  Note: Agitation well managed with scheduled doses of haldol Q4h.

## 2020-03-20 NOTE — [facility] IDG MASTER NOTE
[facility]  Date: 03/20/20  Time: 4:41 PM    ___________________    Patient: Ashley Leung. Coverage Information:     Payor: [place] [employer]     Plan: [place] MED[place]ARE PART A AND B     Subscriber ID: [id number]     Phone Number:   MRN: [id number]  Current Benefit Period: Benefit Period 1  Start Date: 3/14/2020  End Date: 6/11/2020      Medical Director: Dr. Meg Bliss Attending Provider: May Vargas MD  [address] [place] [address]  [id number]  Phone: [phone]  Fax: [phone]    Level of Care: General Inpatient Care      ___________________    Diagnoses:  Diagnoses of Acute on chronic systolic heart failure [facility]), Bilateral carotid artery stenosis, Abdominal aortic aneurysm (AAA) without rupture ([facility] [address] and Typical atrial flutter ([facility] [address] were pertinent to this visit.     Current Medications:    Current Facility-Administered Medications:     morphine injection 4 mg, 4 mg, SubCUTAneous, Q4H, Caroline Barth, NP, 4 mg at 03/20/20 1624    haloperidol lactate (HALDOL) injection 4 mg, 4 mg, SubCUTAneous, Q4H, Michael Gamboa NP, 4 mg at 03/20/20 1624    LORazepam (ATIVAN) injection 1 mg, 1 mg, IntraMUSCular, Q2H PRN, Michael Gamboa NP, 1 mg at 03/17/20 1454    morphine injection 4 mg, 4 mg, SubCUTAneous, Q20MIN PRN, 4 mg at 03/19/20 1829 **OR** morphine injection 4 mg, 4 mg, IntraVENous, Q20MIN PRN, Rogerio Coppola MD, 4 mg at 03/16/20 0701    acetaminophen (TYLENOL) suppository 650 mg, 650 mg, Rectal, Q3H PRN, Rogerio Coppola MD    haloperidol lactate (HALDOL) injection 2 mg, 2 mg, SubCUTAneous, Q1H PRN, 2 mg at 03/18/20 0921 **OR** haloperidol lactate (HALDOL) injection 2 mg, 2 mg, IntraVENous, Q1H PRN, Rogerio Coppola MD, 2 mg at 03/17/20 1253    glycopyrrolate (ROBINUL) injection 0.2 mg, 0.2 mg, SubCUTAneous, Q4H PRN, Rogerio Coppola MD, 0.2 mg at 03/19/20 0415    albuterol-ipratropium (DUO-NEB) 2.5 MG-0.5 MG/3 ML, 3 mL, Nebulization, Q4H PRN, Gold, Azalia Galeas MD    Orders:  Orders Placed This Encounter    IP CONSULT TO SPIRITUAL CARE     Standing Status:   Standing     Number of Occurrences:   1     Order Specific Question:   Reason for Consult: Answer: Once on week one, then PRN. For [facility] Only. For contracted patients, their primary hospice will continue to manage spiritual care needs.  DIET PLEASURE     Standing Status:   Standing     Number of Occurrences:   1     Order Specific Question:   Likes/Dislikes/Preferences     Answer:   Hold tray    VITAL SIGNS     Standing Status:   Standing     Number of Occurrences:   41313    NURSING-MISCELLANEOUS: Comfort Care Measures CONTINUOUS     Standing Status:   Standing     Number of Occurrences:   1     Order Specific Question:   Description of Order:     Answer:   Comfort Care Measures    BLADDER CHECKS     May scan bladder PRN for urinary retention and or patient discomfort     Standing Status:   Standing     Number of Occurrences:   90155    PAIN ASSESSMENT Pain and Symptoms: Assess ever 4 hours and PRN, for GIP level of care. PRN Routine     Standing Status:   Standing     Number of Occurrences:   90605     Order Specific Question:   Please describe the test or procedure you would like to order. Answer:   Pain and Symptoms: Assess ever 4 hours and PRN, for GIP level of care.  BEDREST, COMPLETE     Standing Status:   Standing     Number of Occurrences:   1    NURSING-MISCELLANEOUS: admit 3/14: (SFD) Admitted GIP with Acute on chronic systolic heart failure for management of pain, dyspnea and agitation. Associated dx Acute pulmonary edema (HCC) (J81.0) Mitral valve insufficiency, unspecified etiology (I... 3/14: (SFD) Admitted GIP with Acute on chronic systolic heart failure for management of pain, dyspnea and agitation.       Associated dx     Acute pulmonary edema (HCC) (J81.0)     Mitral valve insufficiency, unspecified etiology (I34.0)     Ventricular tachycardia ([facility] [address] (A38.7)     Metabolic encephalopathy (T28.19)     Hypotension, unspecified hypotension type (I95.9)     Pain (R52)     Dyspnea, unspecified type (R06.00)     Benefit Period 1  Start Date: 3/14/2020  End Date: 0/73/1826     I abdoulaye Robert. has a prognosis for a life expectancy of 6 months or less if the terminal illness runs its normal course.     As evidenced by 41-year-old former [employer] Who experienced flash pulmonary edema requiring intubation and mechanical ventilatory support On 2/20/2020. Sybil Kahn demonstrated acute on chronic worsening of his LVEF to 20-25% with marked increased severity of mitral regurgitation.  ProBNP was 5600( prior value January 2020 of 675.)   Patient was found to have multiple episodes of nonsustained ventricular tachycardia and insufficient duration and rate to trigger his implanted defibrillator. Liliam Maravilla had undergoneAV [place] ablation 2/11/2020 for control of  paroxysmal supraventricular tachycardia.  While he was able to be extubated after careful diuresis, he subsequently developed increasingly severe metabolic encephalopathy with  disorientation to place and circumstances. 2 adult sons with advancedpractice RN credential are making decisions on his behalf and they have opted to stop complex life extending intervention into instead limit further care to comfort measures.  Increasingly severe hypotension and need for parenteral morphine to control somatic pain and dyspnea necessitate hospice care be provided in an inpatient setting. Following this and it was decided to transfer the patient to a rehabilitation facility. He did well for several days but then became more  and ultimately be required emergency hospital admission for recurrence hypoxic respiratory failure and congestive heart  Since then he has responded poorly to therapy, remains only partially responsive is still quite this the on moderate flow oxygen.  The family now desires to proceed with comfort measures only. He has received several parenteral doses of morphine and lorazepam since hospital [facility]. .        Standing Status:   Standing     Number of Occurrences:   1     Order Specific Question:   Description of Order:     Answer:   admit    DO NOT RESUSCITATE     Standing Status:   Standing     Number of Occurrences:   1     Order Specific Question:   Comfort Measures Only? Answer: Yes    OXYGEN CANNULA Liters per minute: 5; Indications for O2 therapy: HYPOXIA CONTINUOUS Routine     Standing Status:   Standing     Number of Occurrences:   1     Order Specific Question:   Liters per minute:      Answer:   5     Order Specific Question:   Indications for O2 therapy     Answer:   HYPOXIA    DISCONTD: sodium chloride (NS) flush 3 mL    DISCONTD: sodium chloride (NS) flush 3 mL    DISCONTD: morphine (ROXANOL) concentrated oral syringe 10 mg    DISCONTD: morphine (ROXANOL) concentrated oral syringe 10 mg    OR Linked Order Group     morphine injection 4 mg     morphine injection 4 mg    DISCONTD: acetaminophen (TYLENOL) tablet 650 mg    acetaminophen (TYLENOL) suppository 650 mg    DISCONTD: LORazepam (ATIVAN) injection 1 mg    DISCONTD: loperamide (IMODIUM) capsule 4 mg    DISCONTD: senna (SENOKOT) tablet 8.6 mg    OR Linked Order Group     haloperidol lactate (HALDOL) injection 2 mg     haloperidol lactate (HALDOL) injection 2 mg    DISCONTD: hyoscyamine SL (LEVSIN/SL) tablet 0.125 mg    glycopyrrolate (ROBINUL) injection 0.2 mg    albuterol-ipratropium (DUO-NEB) 2.5 MG-0.5 MG/3 ML     Order Specific Question:   MODE OF DELIVERY     Answer:   Nebulizer    DISCONTD: haloperidol lactate (HALDOL) injection 4 mg    DISCONTD: morphine injection 4 mg    LORazepam (ATIVAN) injection 1 mg    haloperidol lactate (HALDOL) injection 4 mg    morphine injection 4 mg    INITIAL PHYSICIAN ORDER: HOSPICE Level Of Care: General Inpatient; Reason for Admission: 75 male with Systolic CHF admitted for respiratory distress management and EOL care. Standing Status:   Standing     Number of Occurrences:   1     Order Specific Question:   Status     Answer:   Hospice     Order Specific Question:   Level Of Care     Answer:   General Inpatient     Order Specific Question:   Reason for Admission     Answer:   75 male with Systolic CHF admitted for respiratory distress management and EOL care. Order Specific Question:   Inpatient Hospitalization Certified Necessary for the Following Reasons     Answer:   3. Patient receiving treatment that can only be provided in an inpatient setting (further clarification in H&P documentation)     Order Specific Question:   Admitting Diagnosis     Answer:   Chronic systolic CHF (congestive heart failure) [facility]) [[id number]]     Order Specific Question:   Terminal Prognosis Diagnosis(es)     Answer:   Chronic systolic CHF (congestive heart failure) [facility]) [[id number]]     Order Specific Question:   Admitting Physician     Answer:   Rad Fried     Order Specific Question:   Attending Physician     Answer:   Siena Hawthorne [[id number]]    IP CONSULT TO SOCIAL WORK     Standing Status:   Standing     Number of Occurrences:   1     Order Specific Question:   Reason for Consult: Answer: For [facility] Patients Only. For contracted patients, their primary hospice will continue to manage social work needs. Allergies: Allergies   Allergen Reactions    Sulfur Other (comments)     BREAK OUT       Care Plan:  Multidisciplinary Problems (Active)     Problem: Anticipatory Grief     Dates: Start: 03/17/20       Description:     Disciplines: Interdisciplinary    Goal: Grief heard and acknowledged, anxiety reduced, patient coping identified, patient/family expressed gratitude     Dates: Start: 03/17/20   Expected End: 03/27/20       Description: Family will acknowledge feelings of  Grief and anxiety and utilize available  Support.       Disciplines: Interdisciplinary    Intervention: Assess grief responses     Dates: Start: 03/17/20   End: 03/27/20    Description: Reagan will assess for grief responses with each visit. Intervention: Support grieving process     Dates: Start: 03/17/20   End: 03/27/20    Description: [place] will support the grief process by providing a safe space for open expression of feelings. Problem: Anxiety/Agitation     Dates: Start: 03/15/20       Description:     Disciplines: Interdisciplinary    Goal: Verbalize and demonstrate ability to manage anxiety     Dates: Start: 03/15/20   Expected End: 03/22/20       Description: The patient/family/caregiver will verbalize and demonstrate ability to manage the patient's anxiety throughout hospice care. Pt will have relaxed facial features and body language  Haldol 2 mg q 1 hr prn agitation   Ativan 1 mg IM q 2 hours PRN agitation  Scheduled Haldol 4 mg SQ q 4 hours       Disciplines: Interdisciplinary    Intervention: Assess for anxiety/agitation     Dates: Start: 03/15/20       Description: Assess for signs and symptoms of anxiety and agitation. Intervention: Instruction strategies to reduce anxiety/agitation     Dates: Start: 03/15/20       Description: Instruct patient/caregiver on strategies to reduce anxiety/agitation. Problem: Coping and Emotional Distress     Dates: Start: 03/15/20       Description:     Disciplines: Interdisciplinary    Goal: Demonstrate acceptance of terminal illness and understanding of disease progression     Dates: Start: 03/15/20       Description: Patient/family/caregiver will demonstrate acceptance of terminal disease and understanding of disease progression while employing appropriate coping mechanisms.     Disciplines: Interdisciplinary    Intervention: Assess for alteration in coping     Dates: Start: 03/15/20       Description:           Intervention: Assess for signs/symptoms of emotional distress     Dates: Start: 03/15/20       Description:           Intervention: Instruct on effective coping skills     Dates: Start: 03/15/20       Description: Instruct patient/caregiver on effective coping skills. Intervention: Instruct on strategies to reduce emotional distress     Dates: Start: 03/15/20       Description: Instruct patient/caregiver on strategies to reduce emotional distress. Problem: Depression     Dates: Start: 03/15/20       Description:     Disciplines: Interdisciplinary    Goal: Verbalize and demonstrate ability to manage depression     Dates: Start: 03/15/20       Description: The patient/family/caregiver will verbalize and demonstrate ability to manage patient's depression throughout hospice care.     Disciplines: Interdisciplinary    Intervention: Assess for signs/symptoms of depression     Dates: Start: 03/15/20       Description:           Intervention: Adriana Maldonado on non-pharmacological strategies to reduce depression     Dates: Start: 03/15/20       Description:           Intervention: Instruct on safe managment of antidepressants     Dates: Start: 03/15/20       Description:                       Problem: Dyspnea Due to End of Life     Dates: Start: 03/15/20       Description:     Disciplines: Interdisciplinary    Goal: Demonstrate understanding of and ability to manage respiratory symptoms at end of life     Dates: Start: 03/15/20   Expected End: 03/22/20       Description: Rafia Moya will be less then 24/minute  Morphine 4 mg IV/SQ q 1 hr prn dyspnea     Disciplines: Interdisciplinary    Intervention: Assess for signs and symptoms of dyspnea     Dates: Start: 03/15/20       Description:           Intervention: Adriana Maldonado on causes/symptoms of dyspnea     Dates: Start: 03/15/20       Description:           Intervention: Instruct patient/caregiver/family on strategies to effectively manage dyspnea     Dates: Start: 03/15/20       Description: Problem: End of Life Process     Dates: Start: 03/15/20       Description:     Disciplines: Interdisciplinary    Goal: Demonstrate understanding of end of life processes     Dates: Start: 03/15/20       Description: Patient/caregiver will understand end of life processes. Disciplines: Interdisciplinary    Intervention: Assess for signs/symptoms of terminal restlessness     Dates: Start: 03/15/20       Description:           Intervention: Instruct on strategies to reduce terminal restlessness     Dates: Start: 03/15/20       Description:           Intervention: Instruct on the dying process     Dates: Start: 03/15/20       Description:           Intervention: Instruct: imminent death     Dates: Start: 03/15/20       Description:           Intervention: Instruct: process at end of life     Dates: Start: 03/15/20       Description:                       Problem: Falls - Risk of     Dates: Start: 03/15/20       Description:     Disciplines: Interdisciplinary    Goal: *Absence of Falls     Dates: Start: 03/15/20   Expected End: 03/22/20       Description: Document Robert Gomes Fall Risk and appropriate interventions in the flowsheet.     Disciplines: Interdisciplinary                Problem: Pain     Dates: Start: 03/15/20       Description:     Disciplines: Interdisciplinary    Goal: Verbalize satisfaction of level of comfort and symptom control     Dates: Start: 03/15/20   Expected End: 03/22/20       Description: Pain will be less than 3/10 while at [facility]  Morphine 4 mg q 30 minutes IV SQ prn   Scheduled Morphine 4 mg SQ q 4 hours     Disciplines: Interdisciplinary    Intervention: Assess effectiveness of pain management     Dates: Start: 03/15/20       Description:           Intervention: Assess for signs/symptoms of acute pain     Dates: Start: 03/15/20       Description:           Intervention: Assess for signs/symptoms of chronic pain     Dates: Start: 03/15/20       Description:           Intervention: Juno Holcomb on non-pharmacological pain management     Dates: Start: 03/15/20       Description:           Intervention: Instruct on pain scales     Dates: Start: 03/15/20       Description:           Intervention: Instruct on pharmacological pain management     Dates: Start: 03/15/20       Description:                       Problem: Potential for Skin Breakdown     Dates: Start: 03/15/20       Description:     Disciplines: Interdisciplinary    Goal: Demonstrate ability to care for skin, monitor areas of breakdown and demonstrate methods to prevent breakdown     Dates: Start: 03/15/20   Expected End: 03/22/20       Description: Patient/family/caregiver will demonstrate ability to care for patient's skin, monitor for areas of breakdown, and demonstrate methods to prevent breakdown during hospice care. Disciplines: Interdisciplinary    Intervention: Assess for skin breakdown     Dates: Start: 03/15/20       Description:           Intervention: Terressa Goodell on strategies to reduce risk of skin breakdown     Dates: Start: 03/15/20       Description:                       Problem: Pressure Injury - Risk of     Dates: Start: 03/15/20       Description:     Disciplines: Interdisciplinary    Goal: *Prevention of pressure injury     Dates: Start: 03/15/20   Expected End: 03/22/20       Description: Document Everardo Scale and appropriate interventions in the flowsheet.     Disciplines: Interdisciplinary                Problem: Psychosocial General     Dates: Start: 03/17/20       Disciplines: Interdisciplinary    Goal: Patient/family is receiving emotional support     Dates: Start: 03/17/20   Expected End: 03/22/20       Disciplines: Interdisciplinary                Problem: Spiritual Distress     Dates: Start: 03/15/20       Description:     Disciplines: Interdisciplinary    Goal: Distress heard, acknowledged, and addressed     Dates: Start: 03/15/20       Description: Patient/family/caregiver distress will be heard, acknowledged, and addressed throughout hospice care. Disciplines: Interdisciplinary    Intervention: Assess for signs/symptoms of spiritual distress     Dates: Start: 03/15/20       Description:           Intervention: Consult on spiritual care     Dates: Start: 03/15/20       Description: Consult to Spiritual Care          Intervention: Discuss strategies to reduce spiritual distress     Dates: Start: 03/15/20       Description: Discuss with patient/caregiver strategies to reduce spiritual distress.                         Care Plan Problems/Goals      Progressing Towards Goal (8)      Demonstrate ability to care for skin, monitor areas of breakdown and demonstrate methods to prevent breakdown (Potential for Skin Breakdown)    Disciplines:  Interdisciplinary Expected end:  03/22/20        Outcome: Progressing Towards Goal By Arina Watson RN on 03/20/20 0200            Verbalize satisfaction of level of comfort and symptom control (Pain)    Disciplines:  Interdisciplinary Expected end:  03/22/20        Outcome: Progressing Towards Goal By Arina Watson RN on 03/20/20 0200            Verbalize and demonstrate ability to manage anxiety (Anxiety/Agitation)    Disciplines:  Interdisciplinary Expected end:  03/22/20        Outcome: Progressing Towards Goal By Arina Watson RN on 03/20/20 0200            Demonstrate acceptance of terminal illness and understanding of disease progression (Coping and Emotional Distress)    Disciplines:  Interdisciplinary Expected end:  -        Outcome: Progressing Towards Goal By Jez Gomez RN on 03/17/20 2030            Demonstrate understanding of and ability to manage respiratory symptoms at end of life (Dyspnea Due to End of Life)    Disciplines:  Interdisciplinary Expected end:  03/22/20        Outcome: Progressing Towards Goal By Arina Watson RN on 03/19/20 0228            *Absence of Falls (Falls - Risk of)    Disciplines:  Interdisciplinary Expected end: 03/22/20        Outcome: Progressing Towards Goal By Eder Calvillo RN on 03/18/20 1610            *Prevention of pressure injury (Pressure Injury - Risk of)    Disciplines:  Interdisciplinary Expected end:  03/22/20        Outcome: Progressing Towards Goal By Eder Calvillo RN on 03/18/20 1610            Patient/family is receiving emotional support (Psychosocial General)    Disciplines:  Interdisciplinary Expected end:  03/22/20        Outcome: Progressing Towards Goal By Skylar Wynne on 03/20/20 1001                         No Outcome (4)      Verbalize and demonstrate ability to manage depression (Depression)    Disciplines:  Interdisciplinary Expected end:  -          Distress heard, acknowledged, and addressed (Spiritual Distress)    Disciplines:  Interdisciplinary Expected end:  -          Demonstrate understanding of end of life processes (End of Life Process)    Disciplines:  Interdisciplinary Expected end:  -          Grief heard and acknowledged, anxiety reduced, patient coping identified, patient/family expressed gratitude (Anticipatory Grief)    Disciplines:  Interdisciplinary Expected end:  03/27/20                       Resolved/Met (4)      Demonstrate understanding of hospice philosophy, plan of care, and home hospice program (Hospice Orientation)    Disciplines:  Interdisciplinary Expected end:  03/22/20        Outcome: Resolved/Met By Bernabe Underwood RN on 03/15/20 0130            Free of falls during episode of care (Risk for Falls)    Disciplines:  Interdisciplinary Expected end:  03/22/20        Outcome: Resolved/Met By Bernabe Underwood RN on 03/15/20 0130            Patient/Family Education (Patient Education: Go to Patient Education Activity)    Disciplines:  Interdisciplinary Expected end:  -        Outcome: Resolved/Met By Eder Calvillo RN on 03/18/20 1610            Patient/Family Education (Patient Education: Go to Patient Education Activity)    Disciplines: Interdisciplinary Expected end:  -        Outcome: Resolved/Met By Ron dOen RN on 03/18/20 1610                              ___________________    Care Team Notes          POC/IDG Notes      [facility] IDG Nurse Notes by Debra Ibarra RN at 03/20/20 1417  Version 1 of 1    Author:  Debra Ibarra RN Service:  Nirav Travis Author Type:  Registered Nurse    Filed:  03/20/20 4820 Date of Service:  03/20/20 1417 Status:  Signed    : Debra Ibarra RN (Registered Nurse)         Patient: Charis Manning. Date: 03/20/20  Time: 2:17 PM    IDG NOTE     Subjective: Eyes open, agitated. Nonverbal. Trying to sit up in bed.      Intake: NPO, tray on hold     Scheduled medications:         Current Facility-Administered Medications   Medication Dose Route Frequency    morphine injection 4 mg  4 mg SubCUTAneous Q4H    haloperidol lactate (HALDOL) injection 4 mg  4 mg SubCUTAneous Q4H         PRN medications: Has required Morphine 4mg IV x 1 for pain.      Wounds:  Sacrum is red but blanchable     Output: Rubio catheter 200 ml urine output     IV access:  None     Oxygen:  5 L/min via NC     Patient Vitals for the past 24 hrs:    Temp Pulse Resp BP   03/20/20 0348 98.1 °F (36.7 °C) 95 18 110/90         Changes in plan of care:  No change in plan of care.      Comprehensive plan of care reviewed. IDG and pt./family in agreement with plan of care. The IDG identifies through on-going assessment when a change is needed to the POC; the pt/family will receive care and services necessitated by changes in POC.  Medications reviewed by the pharmacist and Medical Director.           Katherine POOL, NP-C                Signed by: Ethan Quintanilla RN       [facility] IDG  Notes by Emerson Larsen at 03/20/20 1239  Version 1 of 1    Author:  Emerson Larsen Service:  Spiritual Care Author Type:  Pastoral Care    Filed:  03/20/20 1245 Date of Service:  03/20/20 1239 Status:  Signed    :  Emerson Larsen (Pastoral Care)       Patient: Delmi Mckinney. Date: 03/20/20  Time: 12:39 PM    [facility]  Notes    Intervention: Family support through meaningful conversation, active listening, and prayer. Outcome:  Family is realistic and appropriate. Tearful at times. They have good support. Family expressed appreciation for spiritual support. Plan: Continued support, spiritual rituals as appropriate, provide opportunity for open communication with focus on anticipatory grief. Signed by: Jenifer CORNEJO [facility] IDG  Notes by Katya Daniels at 03/20/20 1002  Version 1 of 1    Author:  Katya Daniels Service:  Licensed Clinical  Author Type:      Filed:  03/20/20 1243 Date of Service:  03/20/20 1002 Status:  Signed    :  Katya Daniels ()       Patient: Delmi Mckinney. Date: 03/20/20  Time: 10:02 AM    [facility]  Notes  LMSW will continue to provide emotional support to the pt and his family  The volunteer program has been suspended due to the Covid-19 virus. Problem: Psychosocial General  Goal: Patient/family is receiving emotional support  Outcome: Progressing Towards Goal     Signed by: Amauri Grove IDG Nurse Notes by Suma Gonzalez RN at 03/16/20 1233  Version 1 of 1    Author:  Suma Gonzalez RN Service:  Corine Hopkins Author Type:  Registered Nurse    Filed:  03/16/20 1233 Date of Service:  03/16/20 1233 Status:  Signed    :  Suma Gonzalez RN (Registered Nurse)       Patient: Delmi Mckinney. Date: 03/16/20  Time: 12:33 PM    [facility] Nurse Notes  1st IDG: Pt is a 69-year-old male with CHF who is here GIP level of care for management of dyspnea and anxiety. Ramiro with UOP of 650cc. IV access was lost this AM. VS stable. Wounds: none. PRN medications: Haldol 2 mg IV/SQ x 3, Ativan 1 mg IV/IM x 4, morphine 4 mg x 7 for pain. Scheduled meds:  none. Minimal PO intake x 9 days.    Plan: Add scheduled Haldol 4 mg SQ q 6 hours and Morphine 4 mg SQ q 6 hours. Discontinue all PO meds. D/C IV flushes. Comprehensive plan of care reviewed. IDG and pt./family in agreement with plan of care. The IDG identifies through on-going assessment when a change is needed to the POC; the pt/family will receive care and services necessitated by changes in POC. Medications reviewed by the pharmacist and Medical Director. Signed by: Taqueria Schmitt RN       [facility] IDG  Notes by Ro De La Rosa at 03/16/20 1153  Version 1 of 1    Author:  Ro De La Rosa Service:  Spiritual Care Author Type:  Pastoral Care    Filed:  03/16/20 1206 Date of Service:  03/16/20 1153 Status:  Signed    :  Ro De La Rosa (Pastoral Care)       Patient: Claudette Esparza. Date: 03/16/20  Time: 11:53 AM    [facility]  Notes  Assessments pending for spiritual and bereavement care. Signed by: Shante Lyles       [facility] IDG  Notes by Carleton Meigs at 03/16/20 1001  Version 1 of 1    Author:  Carleton Meigs Service:  Licensed Clinical  Author Type:      Filed:  03/16/20 1157 Date of Service:  03/16/20 1001 Status:  Signed    :  Carleton Meigs ()       Patient: Claudette Esparza. Date: 03/16/20  Time: 10:01 AM    [facility]  Notes  LMSW will visit with the pt and his family to complete the SW initial assessment. The volunteer program has been suspended due to the Corona Virus. LMSW will provide comfort bags to the pt. Signed by: Vera Gtz       [facility] IDG  Notes by Carleton Meigs at 03/16/20 2462  Version 1 of 1    Author:  Carleton Meigs Service:  Licensed Clinical  Author Type:      Filed:  03/16/20 0954 Date of Service:  03/16/20 0953 Status:  Signed    :  Carleton Meigs ()       Patient: Claudette Esparza.     Date: 03/16/20  Time: 9:53 AM    [facility] Case Manager Notes    LMSW has read and agrees with the RN initial assessment. LMSW will meet with pt and family to complete the SW assessment. Signed by: Trice Mckenzie       \[facility]\"" IDG  Notes by Vic Mo at 20 [id number]  Version 1 of 1    Author:  Vic Mo Service:  Spiritual Care Author Type:  Pastoral Care    Filed:  20 0918 Date of Service:  20 [id number] Status:  Signed    :  Vic Mo (Pastoral Care)       Patient: Ming Manriquez. Date: 20  Time: 9:17 AM    \[facility]\""  Notes  / Grief Counselor has reviewed  Initial Comprehensive Assessment and Initial Plan of Care for Rommel Leung and  is in agreement with the plans put in place and goals set thus far. / Grief Counselor will follow up with Spiritual and Bereavement Assessments. [address] [place] [address] Road Floyd Bobbi Counselor will provide care according to patient's/ family's desires. Signed by: Blane Barahona       [facility] IDG Nurse Notes by Melissa Blancas RN at 03/15/20 0120  Version 1 of 1    Author:  Melissa Blancas RN Service:  NURSING Author Type:  Registered Nurse    Filed:  03/15/20 0121 Date of Service:  03/15/20 0120 Status:  Signed    :  Melissa Blancas RN (Registered Nurse)       Patient: Ming Manriquez. Date:2020   Time: [address] [place] [address] Nurse Notes  Patient admitted to [facility] from [facility]. Patient is GIP level of care for hospice diagnosis of CHF. Symptoms to be managed include respiratory management and end of life care. Patient  is identified by name/. Patient is somnolent upon arrival as he was recently medicated for shortness of breath prior to transfer per report. Oxygen in use via nasal cannula at 5L/minute as per orders. Color acyanotic. Respirations non labored with diminished sounds and crackles heard in bases. Facial features flat,relaxed. FLACC 0/10. Skin is warm,dry and intact. No edema noted. Rubio catheter patent with clear yellow urine observed. No signs/symptoms of pain,anxiety,nausea or dyspnea at this time. Bed extender is use for comfort. Wife and patient's sons have arrived at bedside. Emotional support provided. Family given orientation to hospice house and opportunities for questions to be answered. Bed in low and locked position with side rails up x 2 with call light in reach. Admission complete and initial general hospice care plan initiated which includes spiritual,psychosocial and bereavement. No immediate needs at this time. IDG team,including MD, made aware of plan of care and immediate needs. Family is aware of volunteer services. Signed by: Dean Hamlin RN                Care Team Present:   Team Members Present: (see sign in sheet for attendees)  Physician Team Member: Mi Wilkes MD  Nurse Team Member: Rich Holt NP  Social Work Team Member: Rose Adams[address] Team Member: Zuhair Drivers.

## 2020-03-20 NOTE — [facility] IDG NURSE NOTES
Patient: Claudette Esparza. Date: 03/20/20  Time: 2:17 PM    IDG NOTE     Subjective: Eyes open, agitated. Nonverbal. Trying to sit up in bed.      Intake: NPO, tray on hold     Scheduled medications:         Current Facility-Administered Medications   Medication Dose Route Frequency    morphine injection 4 mg  4 mg SubCUTAneous Q4H    haloperidol lactate (HALDOL) injection 4 mg  4 mg SubCUTAneous Q4H         PRN medications: Has required Morphine 4mg IV x 1 for pain.      Wounds:  Sacrum is red but blanchable     Output: Rubio catheter 200 ml urine output     IV access:  None     Oxygen:  5 L/min via NC     Patient Vitals for the past 24 hrs:    Temp Pulse Resp BP   03/20/20 0348 98.1 °F (36.7 °C) 95 18 110/90         Changes in plan of care:  No change in plan of care.      Comprehensive plan of care reviewed. IDG and pt./family in agreement with plan of care. The IDG identifies through on-going assessment when a change is needed to the POC; the pt/family will receive care and services necessitated by changes in POC.  Medications reviewed by the pharmacist and Medical Director.           Nickie POOL, NP-C                Signed by: Jack Hester RN

## 2020-03-20 NOTE — ADVANCED PRACTICE NURSE
IDG NOTE    Subjective: Eyes open, agitated. Nonverbal. Trying to sit up in bed. Intake: NPO, tray on hold    Scheduled medications:  Current Facility-Administered Medications   Medication Dose Route Frequency    morphine injection 4 mg  4 mg SubCUTAneous Q4H    haloperidol lactate (HALDOL) injection 4 mg  4 mg SubCUTAneous Q4H       PRN medications: Has required Morphine 4mg IV x 1 for pain. Wounds:  Sacrum is red but blanchable    Output: Rubio catheter 200 ml urine output    IV access:  None    Oxygen:  5 L/min via NC    Patient Vitals for the past 24 hrs:   Temp Pulse Resp BP   03/20/20 0348 98.1 °F (36.7 °C) 95 18 110/90       Changes in plan of care:  No change in plan of care. Comprehensive plan of care reviewed. IDG and pt./family in agreement with plan of care. The IDG identifies through on-going assessment when a change is needed to the POC; the pt/family will receive care and services necessitated by changes in POC. Medications reviewed by the pharmacist and Medical Director.         Eron POOL, HANYC

## 2020-03-20 NOTE — [facility] IDG CHAPLAIN NOTES
Patient: Jonathan Puga. Date: 03/20/20  Time: 12:39 PM    [facility]  Notes    Intervention: Family support through meaningful conversation, active listening, and prayer. Outcome:  Family is realistic and appropriate. Tearful at times. They have good support. Family expressed appreciation for spiritual support. Plan: Continued support, spiritual rituals as appropriate, provide opportunity for open communication with focus on anticipatory grief.          Signed by: Ivonne Nayak

## 2020-03-20 NOTE — PROGRESS NOTES
Problem: Psychosocial General  Goal: Patient/family is receiving emotional support  Outcome: Progressing Towards Goal

## 2020-03-20 NOTE — PROGRESS NOTES
Progress Note    Patient: Pop Kincaid. MRN: [id number]  SSN: [id number]    YOB: 1945  Age: 75 y.o. Sex: male      Admit Date: 3/14/2020    LOS: 6 days     Subjective:     Eyes open, agitated. Nonverbal. Trying to sit up in bed. NPO, tray on hold. Has required Morphine 4mg IV x 1 for pain. Review of Systems:  Review of systems not obtained due to patient factors. Objective:     Vitals:    03/19/20 0416 03/19/20 1600 03/20/20 0348 03/20/20 1610   BP: 129/70 116/71 110/90 110/82   Pulse: 84 80 95 77   Resp: 18 16 18 20   Temp: 97 °F (36.1 °C) 96.6 °F (35.9 °C) 98.1 °F (36.7 °C) 97.2 °F (36.2 °C)        Intake and Output:  Current Shift: 03/20 0701 - 03/20 1900  In: -   Out: 400 [Urine:400]  Last three shifts: 03/18 1901 - 03/20 0700  In: -   Out: 350 [Urine:350]    Physical Exam:   GENERAL: fatigued, mild distress, appears older than stated age, responds to stimuli with agitation  LUNG: Coarse, diminished breath sounds with labored respirations. Irregular rate with 5-10 second periods of apnea.   HEART: regular rate and rhythm  ABDOMEN: soft, non-tender. Bowel sounds hypoactive.   : Rubio catheter with farrah urine.   EXTREMITIES:  extremities with no cyanosis or edema. + pulses.   SKIN: Pale. Warm to touch. Skin intact.   NEUROLOGIC: Unresponsive with no lateralizing deficits noted. Bedbound.   PSYCHIATRIC: agitated    Lab/Data Review:  No new labs resulted in the last 24 hours.     Assessment:     Principal Problem:    Acute on chronic systolic heart failure ([facility] [address] (2/20/2020)      Overview: Echo 2/20/20:  EF 20-25%      Echo 4/15: EF 30-35%, mild to mod MR      Echo 2011: EF 30-35%    Active Problems:    Bilateral carotid artery disease ([address] (8/7/2017)      Chronic systolic CHF (congestive heart failure) ([address] (3/14/2020)      Hospice care (3/16/2020)        Plan:     Current Facility-Administered Medications   Medication Dose Route Frequency    morphine injection 4 mg  4 mg SubCUTAneous Q4H    haloperidol lactate (HALDOL) injection 4 mg  4 mg SubCUTAneous Q4H    LORazepam (ATIVAN) injection 1 mg  1 mg IntraMUSCular Q2H PRN    morphine injection 4 mg  4 mg SubCUTAneous Q20MIN PRN    Or    morphine injection 4 mg  4 mg IntraVENous Q20MIN PRN    acetaminophen (TYLENOL) suppository 650 mg  650 mg Rectal Q3H PRN    haloperidol lactate (HALDOL) injection 2 mg  2 mg SubCUTAneous Q1H PRN    Or    haloperidol lactate (HALDOL) injection 2 mg  2 mg IntraVENous Q1H PRN    glycopyrrolate (ROBINUL) injection 0.2 mg  0.2 mg SubCUTAneous Q4H PRN    albuterol-ipratropium (DUO-NEB) 2.5 MG-0.5 MG/3 ML  3 mL Nebulization Q4H PRN     3/14: (SFD) Admitted GIP with Acute on chronic systolic heart failure for management of pain, dyspnea and agitation.      1. Pain: Morphine 4mg IV/SQ Q4 and Q20 minutes as needed.     2. Dyspnea: Morphine 4mg IV/SQ Q4 and Q20 minutes as needed. Duonebs q4 prn. Glycopyrrolate 0.2mg q4 prn secretions. Oxygen prn.     3. Agitation: Haloperidol 2mg IV/SQ Q4 and Q1 hour prn and Lorazepam 1mg IV/IM q2 hours prn.     4. Family/Pt Support: Family at bedside during exam. Medications and plan of care discussed with nursing staff and family. Will continue to monitor for symptoms and adjust medications as needed to maintain patient comfort.  PPS 10%. Case [facility] Dr. Darian Victoria [facility]      No change in plan of care.      Signed By: Pastor Haas NP     March 20, 2020

## 2020-03-20 NOTE — PROGRESS NOTES
0645: Report received from off going RN. Patient resting with eyes closed, resps even and regular. Patient will open eyes when spoken to but no verbal response. FLACC score 0/10. Patient GIP level of care with diagnosis of CHF for symptom management of dyspnea and agitation. Patient required one dose of Glycopyrrolate 0.2mg SC overnight for troublesome secretions otherwise has been fairly comfortable with scheduled Haldol and Morphine every 4 hours. 0840: Scheduled Haldol and Morphine administered SC. Patient opens eyes during administration for no s/sx of pain, agitation or dyspnea. FLACC score 0/10. CNA at bedside giving bath. Assisted to pull patient up in bed and reposition. Sacrum red but blanchable, moisture barrier applied for protection. 1040: Family at bedside. No voiced requests. Patient resting in bed with eyes closed, resps even and regular. No s/sx of pain or other discomfort observed. FLACC score remains 0/10.     1257: Scheduled medications administered. Patient responsive and grimaced upon injection. Sips of water given by wife and she states he was able to swallow without coughing. Patient has also been responsive to family an will state no to questions asked. FLACC score at this time is 0/10.     1505: Resting with eyes closed, resps even and regular. No s/sx of pain, agitation or dyspnea. FLACC score 0/10    1624: Spouse and sister in law at bedside. Scheduled medications administered to patient. Patient responsive to pain and verbal stimuli. Shakes head no to pain at this time. No dyspnea, agitation or pain observed. Spouse states she is pleased with patient's breathing and comfort level. He has been responsive to family this afternoon by shaking or nodding head with an occasional one word answer. FLACC score remains 0/10.     1845: Report given to oncoming RN.

## 2020-03-20 NOTE — [facility] IDG SOCIAL WORKER NOTES
Patient: Delmi Mckinney. Date: 03/20/20  Time: 10:02 AM    [facility]  Notes  LMSW will continue to provide emotional support to the pt and his family  The volunteer program has been suspended due to the Covid-19 virus.       Problem: Psychosocial General  Goal: Patient/family is receiving emotional support  Outcome: Progressing Towards Goal     Signed by: Jimy Bran

## 2020-03-21 NOTE — PROGRESS NOTES
0645: report received from off going RN. Patient resting in bed with eyes closed, resps even and regular. No s/sx of pain or other discomfort observed. FLACC score 0/10. Pt [facility] level of care with diagnosis of CHF and admitted for symptom management of dyspnea and agitation. 0730: Patient attempting to sit on side of bed, assisted to side of bed to allow legs to dangle. Offered and taken 3 bites of yogurt then patient refused. Sat on side of bed for 20 mins. Able to state no to pain or dyspnea at this time. Blinds opened to allow patient to look outside. FLACC score 0/10.     0815: Pt attempting to get out of bed. Scheduled Morphine and Haldol administered. Assisted to sit on side of bed to look outside X20 mins then assisted back to bed and repositioned. 02 remains on and infusing at 5L/min via NC. FLACC score 3/10.     0845: Patient resting with eyes closed, no s/sx of pain or other discomfort observed. FLACC score remains 0/10.     1030: Patient remains without change. FLACC score 0/10.     1155: NP at bedside for assessment. Spouse in room. Patient more alert, denies any pain or discomfort. NP discontinued scheduled injectable Haldol and Morphine. Orders changed to PO PRN at this time. Patient continues to deny any pain or discomfort. FLACC score 0/10.     1350: Spouse and son in room. Patient remains in bed, alert and verbal at times. Continues to deny any pain or discomfort. FLACC sore 0/10. Brother in law at desk and states that Alexia Mosher will remove the body once he passes but Trinity Morejon will be doing the service. Alexia Mosher to remove the body upon family request as they have family that work there. 1550: Pt remains in bed resting with eyes closed, responsive to questions and conversations in the room. Spouse and son in room. Thankful for care and patient's improvement in mentation today. Pt FLACC score remains 0/10.     1750: Multiple family members at bedside.  Pt napping at times but responsive to family. Denies any pain or shortness of breath. FLACC score 0/10.     1845: Report given to oncoming RN.

## 2020-03-21 NOTE — PROGRESS NOTES
Progress Note    Patient: Samantha No. MRN: [id number]  SSN: [id number]    YOB: 1945  Age: 75 y.o. Sex: male      Admit Date: 3/14/2020    LOS: 7 days     Subjective:     General rounding complete. Patient has been more alert this morning, requesting to sit up on side of bed. Review of Systems:  ROS negative for pain at present. GI: denies distress. Nurse noted some dysphagia with liquid    Objective:     Vitals:    03/19/20 1600 03/20/20 0348 03/20/20 1610 03/21/20 0431   BP: 116/71 110/90 110/82 155/73   Pulse: 80 95 77 80   Resp: 16 18 20 19   Temp: 96.6 °F (35.9 °C) 98.1 °F (36.7 °C) 97.2 °F (36.2 °C) 96.1 °F (35.6 °C)        Intake and Output:  Current Shift: No intake/output data recorded. Last three shifts: 03/19 1901 - 03/21 0700  In: -   Out: 675 [Urine:675]    Physical Exam:   GENERAL: alert, fatigued, cooperative, no distress, appears stated age  LUNG: clear to auscultation bilaterally  HEART: irregularly irregular rhythm  ABDOMEN: soft, non-tender. Bowel sounds normal. No masses,  no organomegaly  EXTREMITIES:  extremities normal, atraumatic, no cyanosis or edema  NEUROLOGIC: alert to self, speaks quietly, follows commands. Lab/Data Review:  No new labs resulted in the last 24 hours.         Assessment:     Principal Problem:    Acute on chronic systolic heart failure ([address] (2/20/2020)      Overview: Echo 2/20/20:  EF 20-25%      Echo 4/15: EF 30-35%, mild to mod MR      Echo 2011: EF 30-35%    Active Problems:    Bilateral carotid artery disease ([address] (8/7/2017)      Chronic systolic CHF (congestive heart failure) ([address] (3/14/2020)      Hospice care (3/16/2020)        Plan:     Current Facility-Administered Medications   Medication Dose Route Frequency    morphine (ROXANOL) concentrated oral syringe 10 mg  10 mg Oral Q2H PRN    LORazepam (ATIVAN) injection 1 mg  1 mg IntraMUSCular Q2H PRN    morphine injection 4 mg  4 mg SubCUTAneous Q20MIN PRN    Or    morphine injection 4 mg  4 mg IntraVENous Q20MIN PRN    acetaminophen (TYLENOL) suppository 650 mg  650 mg Rectal Q3H PRN    haloperidol lactate (HALDOL) injection 2 mg  2 mg SubCUTAneous Q1H PRN    Or    haloperidol lactate (HALDOL) injection 2 mg  2 mg IntraVENous Q1H PRN    glycopyrrolate (ROBINUL) injection 0.2 mg  0.2 mg SubCUTAneous Q4H PRN    albuterol-ipratropium (DUO-NEB) 2.5 MG-0.5 MG/3 ML  3 mL Nebulization Q4H PRN       3/14: (YENY) Admitted GIP with Acute on chronic systolic heart failure for management of pain, dyspnea and agitation.      1. Pain: Morphine 4mg IV/SQ Q4 and Q20 minutes as needed.     2. Dyspnea: Morphine 4mg IV/SQ  Q20 minutes as needed. Duonebs q4 prn. Glycopyrrolate 0.2mg q4 prn secretions. Oxygen prn.     3. Agitation: Haloperidol 2mg IV/SQ  Q1 hour prn and Lorazepam 1mg IV/IM q2 hours prn.    5. Roxanol 20mg/ml ordered po as patient tolerates.      4. Family/Pt Support: Family at bedside during exam. Medications and plan of care discussed with nursing staff and family. Will continue to monitor for symptoms and adjust medications as needed to maintain patient comfort.  PPS 10%. Pleased that he is more awake.  Scheduled medications for pain and agitation  discontinued.            Signed By: Varsha Grimm NP     March 21, 2020

## 2020-03-21 NOTE — PROGRESS NOTES
1845:  Pt report from [facility], RN. Patient ID by name and . Pt in bed with eyes open. Wife at the bedside. Pt appears comfortable. No s/sx of pain, dyspnea, or agitation observed. FLACC 0. Bed low and locked and all safety measures in place. :  Scheduled Morphine and Haldol SQ given as ordered. Pt repositioned at this time. Assessment complete. Pt with no s/sx of pain, dyspnea, or agitation observed or voiced. FLACC 0. Family at the bedside. :  Pt resting with no s/sx of pain, dyspnea, or agitation observed. FLACC 0.    2358:  Scheduled Morphine and Haldol SQ given as ordered. 0205:  Pt resting with no s/sx of pain, dyspnea, or agitation observed. FLACC 0.    0404:  Scheduled Morphine and Haldol SQ given as ordered. Pt repositioned in bed for comfort. Pt has not required any PRN medication this shift. Scheduled medications effective in symptom management. Report to VAZQUEZ Graham.

## 2020-03-21 NOTE — PROGRESS NOTES
-Report received from Alexandre Reinoso RN. Patient identified by name and . Patient resting quietly in bed with eyes opened. No signs or symptoms of distress, pain, agitation/anxiety, or SOB noted at present. Oxygen in use at 2L NC. Rubio catheter in place draining farrah colored urine. Bed locked and in lowest position, side rails up x 2, call bell within reach, tab alarm in place. Family present at bedside. No immediate needs voiced. -Patient admitted to [facility] on 3/14/2020 with a terminal diagnosis of CHF(EF of 20-25%). Patient is GIP level of care for symptom management of dyspnea and agitation. Patient was admitted to the ER and placed on ventilatory support after experiencing pulmonary edema on 2020. After being extubated after being given diuretics, patient developed severe metabolic encephalopathy. Patient was able to re-cooperate slightly and be placed in a rehab facility, but patient experienced a reoccurrence of hypoxic respiratory failure and CHF. Family has elected to stop all aggressive treatment and elected to have hospice services. Since admission to [facility], patient has became more alert at times, but remains very fatigue and need of rest periods in between takings sips of fluids. Patient is alert to self. Patient is dependenet upon all ADLs. Heart sounds present. Lung sounds clear bilaterally. Abdomen soft with active bowel sounds present x 4 quads. Appetite has remain poor with sips of fluid and family has brought baby pureed food for patient to have. Rubio catheter in place draining farrah colored urine. Patient incontinent of bowel at this time. Last bowel movement today. No edema noted in lower extremities bilaterally. Patient requirijng skilled nursing assessment, on going monitoring, and reevaluation of medication regimen to achieve optimum level of comfort. FLACC 0.    2330-Patient resting in bed with eyes opened. No needs voiced at this time.  No signs or symptoms of distress, pain, agitation, or discomfort noted. FLACC 0.    0146-Patient resting in bed with eyes opened. Patient request water to drink. Patient able to drink water, but requires rest periods in between. No signs or symptoms of distress, pain, agitation, or discomfort noted. FLACC 0.    0350-Patient resting in bed with eyes opened. Patient alert and oriented to person. No signs or symptoms of distress, pain, agitation, or discomfort noted. Patient denies pain or shortness of breath. 0515-Patient continues to rest in bed with eyes opened. Patient request shades be lifted so he can see outside. Informed that it's still dark outside, patient continued to request shades be lifted. FLACC 0.    0627-Patient continues to rest in bed with eyes closed. No signs or symptoms of distress, pain, agitation, or discomfort noted. FLACC 0.     Report given to Zita Wilson RN

## 2020-03-21 NOTE — PROGRESS NOTES
Problem: Potential for Skin Breakdown  Goal: Demonstrate ability to care for skin, monitor areas of breakdown and demonstrate methods to prevent breakdown  Description: Patient/family/caregiver will demonstrate ability to care for patient's skin, monitor for areas of breakdown, and demonstrate methods to prevent breakdown during hospice care. Outcome: Progressing Towards Goal     Problem: Pain  Goal: Verbalize satisfaction of level of comfort and symptom control  Description: Pain will be less than 3[place]10 [place] [facility]  Morphine 4 mg q 30 minutes IV SQ prn   Scheduled Morphine 4 mg SQ q 4 hours   Outcome: Progressing Towards Goal     Problem: Anxiety/Agitation  Goal: Verbalize and demonstrate ability to manage anxiety  Description: The patient/family/caregiver will verbalize and demonstrate ability to manage the patient's anxiety throughout hospice care. Pt will have relaxed facial features and body language  Haldol 2 mg q 1 hr prn agitation   Ativan 1 mg IM q 2 hours PRN agitation  Scheduled Haldol 4 mg SQ q 4 hours     Outcome: Progressing Towards Goal     Problem: Dyspnea Due to End of Life  Goal: Demonstrate understanding of and ability to manage respiratory symptoms at end of life  Description: Respirations will be less then 24/minute  Morphine 4 mg IV/SQ q 1 hr prn dyspnea   Outcome: Progressing Towards Goal     Problem: Falls - Risk of  Goal: *Absence of Falls  Description: Document Joyce Jones Fall Risk and appropriate interventions in the flowsheet.   Outcome: Progressing Towards Goal  Note: Fall Risk Interventions:  Mobility Interventions: Bed/chair exit alarm    Mentation Interventions: Adequate sleep, hydration, pain control, Bed/chair exit alarm, Door open when patient unattended, Reorient patient    Medication Interventions: Bed/chair exit alarm    Elimination Interventions: Bed/chair exit alarm    History of Falls Interventions: Bed/chair exit alarm, Door open when patient unattended         Problem: Pressure Injury - Risk of  Goal: *Prevention of pressure injury  Description: Document Everardo Scale and appropriate interventions in the flowsheet.   Outcome: Progressing Towards Goal  Note: Pressure Injury Interventions:  Sensory Interventions: Assess changes in LOC, Assess need for specialty bed, Float heels, Keep linens dry and wrinkle-free, Minimize linen layers    Moisture Interventions: Absorbent underpads, Apply protective barrier, creams and emollients, Internal/External urinary devices, Limit adult briefs, Maintain skin hydration (lotion/cream), Minimize layers, Moisture barrier    Activity Interventions: Assess need for specialty bed    Mobility Interventions: Assess need for specialty bed, Float heels, HOB 30 degrees or less    Nutrition Interventions: Document food/fluid/supplement intake    Friction and Shear Interventions: Apply protective barrier, creams and emollients, HOB 30 degrees or less, Lift sheet, Minimize layers

## 2020-03-21 NOTE — PROGRESS NOTES
Follow up during Evening Rounds:    Mr. Orville Mcwilliams was a little more awake today. He intermittently opened his eyes and during conversation with his wife he made a couple of sounds as if he was answering her.  provided a Care Note for wife and family. Dieter Francis ( wife ) appears to be coping well. She is tearful from time to time but very appropriate.

## 2020-03-22 NOTE — PROGRESS NOTES
-Report received from Feroz Caban RN. Patient identified by name and . Patient resting quietly in bed with eyes closed. No signs or symptoms of distress, pain, agitation/anxiety, or SOB noted at present. Oxygen in use at 5L [employer] NC. Rubio catheter in place draining farrah colored urine. Bed  locked and in lowest position, side rails up x 2, call bell within reach, tab alarm in place. Wife at bedside. No immediate needs voiced. -Patient admitted to [facility] on 3/14/2020 with a terminal diagnosis of CHF(EF of 20-25%). Patient is GIP level of care for symptom management of dyspnea and agitation. Patient was admitted to the ER and placed on ventilatory support after experiencing pulmonary edema on 2020. After being extubated after being given diuretics, patient developed severe metabolic encephalopathy. Patient was able to re-cooperate slightly and be placed in a rehab facility, but patient experienced a reoccurrence of hypoxic respiratory failure and CHF. Family has elected to stop all aggressive treatment and elected to have hospice services. Since admission to [facility], patient has became more alert at times, but remains very fatigue and need of rest periods in between takings sips of fluids. Patient is alert to self. Patient is dependenet upon all ADLs. Heart sounds present. Lung sounds clear bilaterally. Abdomen soft with active bowel sounds present x 4 quads. Appetite has remain poor with sips of fluid and family has brought baby pureed food for patient to have. Rubio catheter in place draining farrah colored urine. Patient incontinent of bowel at this time. Last bowel movement today. No edema noted in lower extremities bilaterally. Patient requirijng skilled nursing assessment, on going monitoring, and reevaluation of medication regimen to achieve optimum level of comfort. FLACC 0.     2155-Patient continues to rest in bed with eyes closed.  No signs or symptoms of distress, pain, agitation, or discomfort noted. FLACC 0.     2358-Patient continues to rest in bed with eyes closed. No signs or symptoms of distress, pain, agitation, or discomfort noted. FLACC 0.    0155-Patient continues to rest in bed with eyes closed. No signs or symptoms of distress, pain, agitation, or discomfort noted. FLACC 0.    0316-Patient resting in bed with eyes opened. Patient alert and oriented to person. No immediate needs voiced by patient. Patient denies pain at this time. 0536-Patient continues to rest in bed with eyes opened. No signs or symptoms of distress, pain, agitation, or discomfort noted. FLACC 0.    0631-Patient continues to rest in bed with eyes opened. No signs or symptoms of distress, pain, agitation, or discomfort noted. Patient denies pain at this time.      Report given to Jose Rabago RN

## 2020-03-22 NOTE — PROGRESS NOTES
0645: Report received from off going RN. Pt resting with eyes closed resps even and regular. FLACC score 0/10. No s/sx of pain or other discomfort observed. 02 continues to infuse at 5L/min via NC. Pt GIP level of care with diagnosis of CHF for management of dyspnea and agitation. No PRN medications needed for symptoms management over the past 24 hours. 0820: Patient sitting up in bed watching TV. Denies any pain, dyspnea or other discomfort. FLACC score 0/10. Asking where is his wife is. Explained she would be in later this morning. Patient voiced understanding. 1120: Spouse arrived to bedside. Patient answering questions asked by wife. Update given to wife. No s/sx of pain. Dyspnea or discomfort observed. Rated 0/10 per FLACC score. 1320: Family present at bedside. Pt napping on and off, conversing with family at times. Continues to deny pain, dyspnea, or other discomfort. FLACC score 0/10    1420: Family remains at bedside. No voiced requests. State pt has been sleeping on and off. Pt denies any pain or discomfort at this time. FLACC score 0/10.     1620: Mouth swabs given to spouse upon request. Pt alert and able to answer yes and no questions. Denies any pain or discomfort. FLACC score 0/10.     1820: Spouse remains at bedside. Pt remains without change. FLACC score 0/10.     1845: Report given to oncoming RN.

## 2020-03-22 NOTE — PROGRESS NOTES
Problem: Potential for Skin Breakdown  Goal: Demonstrate ability to care for skin, monitor areas of breakdown and demonstrate methods to prevent breakdown  Description: Patient/family/caregiver will demonstrate ability to care for patient's skin, monitor for areas of breakdown, and demonstrate methods to prevent breakdown during hospice care. Outcome: Progressing Towards Goal     Problem: Pain  Goal: Verbalize satisfaction of level of comfort and symptom control  Description: Pain will be less than 3/10 while at [facility]  Morphine 4 mg q 30 minutes IV SQ prn   Scheduled Morphine 4 mg SQ q 4 hours   Outcome: Progressing Towards Goal     Problem: Dyspnea Due to End of Life  Goal: Demonstrate understanding of and ability to manage respiratory symptoms at end of life  Description: Respirations will be less then 24/minute  Morphine 4 mg IV/SQ q 1 hr prn dyspnea   Outcome: Progressing Towards Goal     Problem: Falls - Risk of  Goal: *Absence of Falls  Description: Document Harley Carmona Fall Risk and appropriate interventions in the flowsheet. Outcome: Progressing Towards Goal  Note: Fall Risk Interventions:  Mobility Interventions: Bed/chair exit alarm    Mentation Interventions: Adequate sleep, hydration, pain control, Bed/chair exit alarm, Door open when patient unattended, Reorient patient    Medication Interventions: Bed/chair exit alarm    Elimination Interventions: Bed/chair exit alarm    History of Falls Interventions: Bed/chair exit alarm, Door open when patient unattended         Problem: Pressure Injury - Risk of  Goal: *Prevention of pressure injury  Description: Document Everardo Scale and appropriate interventions in the flowsheet.   Outcome: Progressing Towards Goal  Note: Pressure Injury Interventions:  Sensory Interventions: Assess changes in LOC, Assess need for specialty bed, Float heels, Keep linens dry and wrinkle-free, Minimize linen layers    Moisture Interventions: Absorbent underpads, Apply protective barrier, creams and emollients, Internal/External urinary devices, Limit adult briefs, Maintain skin hydration (lotion/cream), Minimize layers, Moisture barrier    Activity Interventions: Assess need for specialty bed    Mobility Interventions: Assess need for specialty bed, Float heels, HOB 30 degrees or less    Nutrition Interventions: Document food/fluid/supplement intake    Friction and Shear Interventions: Apply protective barrier, creams and emollients, HOB 30 degrees or less, Lift sheet, Minimize layers

## 2020-03-23 NOTE — [facility] IDG MASTER NOTE
[facility]  Date: 03/23/20  Time: 12:49 PM    ___________________    Patient: Pura Cabrera. Coverage Information:     Payor: [facility] MEDICARE     Plan: [facility] MEDICARE PART A AND B     Subscriber ID: [id number]     Phone Number:   MRN: [id number]    Current Benefit Period: Benefit Period 1  Start Date: 3/14/2020  End Date: 6/11/2020        Hospice Attending Provider: Tacho Quintero MD  [address] [place] [address]  [id number]  Phone: [phone]  Fax: [phone]    Level of Care: General Inpatient Care      ___________________    Diagnoses:  Diagnoses of Acute on chronic systolic heart failure ([facility] [address] Bilateral carotid artery stenosis, Abdominal aortic aneurysm (AAA) without rupture ([facility] [address] and Typical atrial flutter ([facility] [address] were pertinent to this visit.     Current Medications:    Current Facility-Administered Medications:     furosemide (LASIX) tablet 20 mg, 20 mg, Oral, ACB&D, Neto Arias NP    metoprolol tartrate (LOPRESSOR) tablet 12.5 mg, 12.5 mg, Oral, Q12H, Neto Arias NP, 12.5 mg at 03/23/20 1130    haloperidol (HALDOL) 2 mg/mL oral solution 2 mg, 2 mg, SubLINGual, Q1H PRN, Neto Arias NP    senna (SENOKOT) tablet 8.6 mg, 1 Tab, Oral, BID, Neto Arias NP    morphine (ROXANOL) concentrated oral syringe 10 mg, 10 mg, Oral, Q2H PRN, Jorge SPARKS NP    LORazepam (ATIVAN) injection 1 mg, 1 mg, IntraMUSCular, Q2H PRN, Neto Arias NP, 1 mg at 03/17/20 6814    morphine injection 4 mg, 4 mg, SubCUTAneous, Q20MIN PRN, 4 mg at 03/19/20 1829 **OR** morphine injection 4 mg, 4 mg, IntraVENous, Q20MIN PRN, Octavio Hahn MD, 4 mg at 03/16/20 0701    acetaminophen (TYLENOL) suppository 650 mg, 650 mg, Rectal, Q3H PRN, Octavio Hahn MD    haloperidol lactate (HALDOL) injection 2 mg, 2 mg, SubCUTAneous, Q1H PRN, 2 mg at 03/18/20 0921 **OR** haloperidol lactate (HALDOL) injection 2 mg, 2 mg, IntraVENous, Q1H PRN, Octavio Hahn MD, 2 mg at 03/17/20 1253    glycopyrrolate (ROBINUL) injection 0.2 mg, 0.2 mg, SubCUTAneous, Q4H PRN, Bernice Thompson MD, 0.2 mg at 03/19/20 0415    albuterol-ipratropium (DUO-NEB) 2.5 MG-0.5 MG/3 ML, 3 mL, Nebulization, Q4H PRN, Joaquina Larsen MD    Orders:  Orders Placed This Encounter    IP CONSULT TO SPIRITUAL CARE     Standing Status:   Standing     Number of Occurrences:   1     Order Specific Question:   Reason for Consult: Answer: Once on week one, then PRN. For [facility] Only. For contracted patients, their primary hospice will continue to manage spiritual care needs.  DIET PLEASURE     Standing Status:   Standing     Number of Occurrences:   1     Order Specific Question:   Likes/Dislikes/Preferences     Answer:   pleasure    VITAL SIGNS     Standing Status:   Standing     Number of Occurrences:   40701    NURSING-MISCELLANEOUS: Comfort Care Measures CONTINUOUS     Standing Status:   Standing     Number of Occurrences:   1     Order Specific Question:   Description of Order:     Answer:   Comfort Care Measures    BLADDER CHECKS     May scan bladder PRN for urinary retention and or patient discomfort     Standing Status:   Standing     Number of Occurrences:   [id number]    PAIN ASSESSMENT Pain and Symptoms: Assess ever 4 hours and PRN, for GIP level of care. PRN Routine     Standing Status:   Standing     Number of Occurrences:   [id number]     Order Specific Question:   Please describe the test or procedure you would like to order. Answer:   Pain and Symptoms: Assess ever 4 hours and PRN, for GIP level of care.  BEDREST, COMPLETE     Standing Status:   Standing     Number of Occurrences:   1    NURSING-MISCELLANEOUS: admit 3/14: ([facility]) Admitted GIP with Acute on chronic systolic heart failure for management of pain, dyspnea and agitation. Associated dx Acute pulmonary edema (HCC) (J81.0) Mitral valve insufficiency, unspecified etiology (I...      3/14: ([facility]FD) Admitted GIP with Acute on chronic systolic heart failure for management of pain, dyspnea and agitation. Associated dx     Acute pulmonary edema (HCC) (J81.0)     Mitral valve insufficiency, unspecified etiology (I34.0)     Ventricular tachycardia (HCC) (D26.3)     Metabolic encephalopathy (R71.75)     Hypotension, unspecified hypotension type (I95.9)     Pain (R52)     Dyspnea, unspecified type (R06.00)     Benefit Period 1  Start Date: 3/14/2020  End Date: 8/88/1289     I certifperla Mckeon has a prognosis for a life expectancy of 6 months or less if the terminal illness runs its normal course.     As evidenced by 67-year-old former [employer] Who experienced flash pulmonary edema requiring intubation and mechanical ventilatory support On 2/20/2020. Sammie Koo demonstrated acute on chronic worsening of his LVEF to 20-25% with marked increased severity of mitral regurgitation.  ProBNP was 5600( prior value January 2020 of 675.)   Patient was found to have multiple episodes of nonsustained ventricular tachycardia and insufficient duration and rate to trigger his implanted defibrillator. Alan Salvador had undergoneAV [place] ablation 2/11/2020 for control of  paroxysmal supraventricular tachycardia.  While he was able to be extubated after careful diuresis, he subsequently developed increasingly severe metabolic encephalopathy with  disorientation to place and circumstances. 2 adult sons with advancedpractice RN credential are making decisions on his behalf and they have opted to stop complex life extending intervention into instead limit further care to comfort measures.  Increasingly severe hypotension and need for parenteral morphine to control somatic pain and dyspnea necessitate hospice care be provided in an inpatient setting. Following this and it was decided to transfer the patient to a rehabilitation facility.  He did well for several days but then became more  and ultimately be required emergency hospital admission for recurrence hypoxic respiratory failure and congestive heart  Since then he has responded poorly to therapy, remains only partially responsive is still quite this the on moderate flow oxygen. The family now desires to proceed with comfort measures only. He has received several parenteral doses of morphine and lorazepam since hospital mission. .        Standing Status:   Standing     Number of Occurrences:   1     Order Specific Question:   Description of Order:     Answer:   admit    NURSING-MISCELLANEOUS: Check blood pressure while lying down, then assist patient to sit on side of bed and check blood pressure. Please record in chart. CONTINUOUS     Standing Status:   Standing     Number of Occurrences:   1     Order Specific Question:   Description of Order:     Answer:   Check blood pressure while lying down, then assist patient to sit on side of bed and check blood pressure. Please record in chart.  DO NOT RESUSCITATE     Standing Status:   Standing     Number of Occurrences:   1     Order Specific Question:   Comfort Measures Only? Answer: Yes    OXYGEN CANNULA Liters per minute: 5; Indications for O2 therapy: HYPOXIA CONTINUOUS Routine     Standing Status:   Standing     Number of Occurrences:   1     Order Specific Question:   Liters per minute:      Answer:   5     Order Specific Question:   Indications for O2 therapy     Answer:   HYPOXIA    DISCONTD: sodium chloride (NS) flush 3 mL    DISCONTD: sodium chloride (NS) flush 3 mL    DISCONTD: morphine (ROXANOL) concentrated oral syringe 10 mg    DISCONTD: morphine (ROXANOL) concentrated oral syringe 10 mg    OR Linked Order Group     morphine injection 4 mg     morphine injection 4 mg    DISCONTD: acetaminophen (TYLENOL) tablet 650 mg    acetaminophen (TYLENOL) suppository 650 mg    DISCONTD: LORazepam (ATIVAN) injection 1 mg    DISCONTD: loperamide (IMODIUM) capsule 4 mg    DISCONTD: senna (SENOKOT) tablet 8.6 mg    OR Linked Order Group    [place] [employer] haloperidol lactate (HALDOL) injection 2 mg     haloperidol lactate (HALDOL) injection 2 mg    DISCONTD: hyoscyamine SL (LEVSIN/SL) tablet 0.125 mg    glycopyrrolate (ROBINUL) injection 0.2 mg    albuterol-ipratropium (DUO-NEB) 2.5 MG-0.5 MG/3 ML     Order Specific Question:   MODE OF DELIVERY     Answer:   Nebulizer    DISCONTD: haloperidol lactate (HALDOL) injection 4 mg    DISCONTD: morphine injection 4 mg    LORazepam (ATIVAN) injection 1 mg    DISCONTD: haloperidol lactate (HALDOL) injection 4 mg    DISCONTD: morphine injection 4 mg    morphine (ROXANOL) concentrated oral syringe 10 mg    furosemide (LASIX) tablet 20 mg    metoprolol tartrate (LOPRESSOR) tablet 12.5 mg    haloperidol (HALDOL) 2 mg/mL oral solution 2 mg    senna (SENOKOT) tablet 8.6 mg    INITIAL PHYSICIAN ORDER: HOSPICE Level Of Care: General Inpatient; Reason for Admission: 75 male with Systolic CHF admitted for respiratory distress management and EOL care. Standing Status:   Standing     Number of Occurrences:   1     Order Specific Question:   Status     Answer:   Hospice     Order Specific Question:   Level Of Care     Answer:   General Inpatient     Order Specific Question:   Reason for Admission     Answer:   75 male with Systolic CHF admitted for respiratory distress management and EOL care. Order Specific Question:   Inpatient Hospitalization Certified Necessary for the Following Reasons     Answer:   3.  Patient receiving treatment that can only be provided in an inpatient setting (further clarification in H&P documentation)     Order Specific Question:   Admitting Diagnosis     Answer:   Chronic systolic CHF (congestive heart failure) [facility]) [[id number]]     Order Specific Question:   Terminal Prognosis Diagnosis(es)     Answer:   Chronic systolic CHF (congestive heart failure) [facility]) [[id number]]     Order Specific Question:   Admitting Physician     Answer:   Say Abdalla     Order Specific Question: Attending Physician     Answer:   Lena Schirmer [[id number]]    IP CONSULT TO SOCIAL WORK     Standing Status:   Standing     Number of Occurrences:   1     Order Specific Question:   Reason for Consult: Answer: For [facility] Only. For contracted patients, their primary hospice will continue to manage social work needs. Allergies: Allergies   Allergen Reactions    Sulfur Other (comments)     BREAK OUT       Care Plan:  Multidisciplinary Problems (Active)     Problem: Anticipatory Grief     Dates: Start: 03/17/20       Description:     Disciplines: Interdisciplinary    Goal: Grief heard and acknowledged, anxiety reduced, patient coping identified, patient/family expressed gratitude     Dates: Start: 03/17/20   Expected End: 03/27/20       Description: Family will acknowledge feelings of  Grief and anxiety and utilize available  Support. Disciplines: Interdisciplinary    Intervention: Assess grief responses     Dates: Start: 03/17/20   End: 03/27/20    Description: Nelda Carrillo will assess for grief responses with each visit. Intervention: Support grieving process     Dates: Start: 03/17/20   End: 03/27/20    Description: Nelda Carrillo will support the grief process by providing a safe space for open expression of feelings. Problem: Anxiety/Agitation     Dates: Start: 03/15/20       Description:     Disciplines: Interdisciplinary    Goal: Verbalize and demonstrate ability to manage anxiety     Dates: Start: 03/15/20   Expected End: 03/28/20       Description: The patient/family/caregiver will verbalize and demonstrate ability to manage the patient's anxiety throughout hospice care.  Pt will have relaxed facial features and body language  Haldol 2 mg q 1 hr prn agitation   Ativan 1 mg IM q 2 hours PRN agitation  Scheduled Haldol 4 mg SQ q 4 hours       Disciplines: Interdisciplinary    Intervention: Assess for anxiety/agitation     Dates: Start: 03/15/20 Description: Assess for signs and symptoms of anxiety and agitation. Intervention: Instruction strategies to reduce anxiety/agitation     Dates: Start: 03/15/20       Description: Instruct patient/caregiver on strategies to reduce anxiety/agitation. Problem: Coping and Emotional Distress     Dates: Start: 03/15/20       Description:     Disciplines: Interdisciplinary    Goal: Demonstrate acceptance of terminal illness and understanding of disease progression     Dates: Start: 03/15/20   Expected End: 03/28/20       Description: Patient/family/caregiver will demonstrate acceptance of terminal disease and understanding of disease progression while employing appropriate coping mechanisms. Disciplines: Interdisciplinary    Intervention: Assess for alteration in coping     Dates: Start: 03/15/20       Description:           Intervention: Assess for signs/symptoms of emotional distress     Dates: Start: 03/15/20       Description:           Intervention: Instruct on effective coping skills     Dates: Start: 03/15/20       Description: Instruct patient/caregiver on effective coping skills. Intervention: Instruct on strategies to reduce emotional distress     Dates: Start: 03/15/20       Description: Instruct patient/caregiver on strategies to reduce emotional distress. Problem: Depression     Dates: Start: 03/15/20       Description:     Disciplines: Interdisciplinary    Goal: Verbalize and demonstrate ability to manage depression     Dates: Start: 03/15/20   Expected End: 03/28/20       Description: The patient/family/caregiver will verbalize and demonstrate ability to manage patient's depression throughout hospice care.     Disciplines: Interdisciplinary    Intervention: Assess for signs/symptoms of depression     Dates: Start: 03/15/20       Description:           Intervention: Instruct on non-pharmacological strategies to reduce depression     Dates: Start: 03/15/20       Description:           Intervention: Instruct on safe managment of antidepressants     Dates: Start: 03/15/20       Description:                       Problem: Dyspnea Due to End of Life     Dates: Start: 03/15/20       Description:     Disciplines: Interdisciplinary    Goal: Demonstrate understanding of and ability to manage respiratory symptoms at end of life     Dates: Start: 03/15/20   Expected End: 03/28/20       Description: Popeye Rosaels will be less then 24/minute  Morphine 4 mg IV/SQ q 1 hr prn dyspnea     Disciplines: Interdisciplinary    Intervention: Assess for signs and symptoms of dyspnea     Dates: Start: 03/15/20       Description:           Intervention: Pola Bobo on causes/symptoms of dyspnea     Dates: Start: 03/15/20       Description:           Intervention: Pola Bobo patient/caregiver/family on strategies to effectively manage dyspnea     Dates: Start: 03/15/20       Description:                       Problem: End of Life Process     Dates: Start: 03/15/20       Description:     Disciplines: Interdisciplinary    Goal: Demonstrate understanding of end of life processes     Dates: Start: 03/15/20   Expected End: 03/28/20       Description: Siria Johnson will understand end of life processes.     Disciplines: Interdisciplinary    Intervention: Assess for signs/symptoms of terminal restlessness     Dates: Start: 03/15/20       Description:           Intervention: Instruct on strategies to reduce terminal restlessness     Dates: Start: 03/15/20       Description:           Intervention: Instruct on the dying process     Dates: Start: 03/15/20       Description:           Intervention: Instruct: imminent death     Dates: Start: 03/15/20       Description:           Intervention: Instruct: process at end of life     Dates: Start: 03/15/20       Description:                       Problem: Falls - Risk of     Dates: Start: 03/15/20       Description:     Disciplines: Interdisciplinary Goal: *Absence of Falls     Dates: Start: 03/15/20   Expected End: 03/28/20       Description: Document Rohit Cisneros Fall Risk and appropriate interventions in the flowsheet. Disciplines: Interdisciplinary                Problem: Pain     Dates: Start: 03/15/20       Description:     Disciplines: Interdisciplinary    Goal: Verbalize satisfaction of level of comfort and symptom control     Dates: Start: 03/15/20   Expected End: 03/28/20       Description: Pain will be less than 3/10 while at [facility]  Morphine 4 mg q 30 minutes IV SQ prn   Scheduled Morphine 4 mg SQ q 4 hours     Disciplines: Interdisciplinary    Intervention: Assess effectiveness of pain management     Dates: Start: 03/15/20       Description:           Intervention: Assess for signs/symptoms of acute pain     Dates: Start: 03/15/20       Description:           Intervention: Assess for signs/symptoms of chronic pain     Dates: Start: 03/15/20       Description:           Intervention: Julio C Box on non-pharmacological pain management     Dates: Start: 03/15/20       Description:           Intervention: Julio C Box on pain scales     Dates: Start: 03/15/20       Description:           Intervention: Instruct on pharmacological pain management     Dates: Start: 03/15/20       Description:                       Problem: Potential for Skin Breakdown     Dates: Start: 03/15/20       Description:     Disciplines: Interdisciplinary    Goal: Demonstrate ability to care for skin, monitor areas of breakdown and demonstrate methods to prevent breakdown     Dates: Start: 03/15/20   Expected End: 03/28/20       Description: Patient/family/caregiver will demonstrate ability to care for patient's skin, monitor for areas of breakdown, and demonstrate methods to prevent breakdown during hospice care.     Disciplines: Interdisciplinary    Intervention: Assess for skin breakdown     Dates: Start: 03/15/20       Description:           Intervention: Instruct on strategies to reduce risk of skin breakdown     Dates: Start: 03/15/20       Description:                       Problem: Pressure Injury - Risk of     Dates: Start: 03/15/20       Description:     Disciplines: Interdisciplinary    Goal: *Prevention of pressure injury     Dates: Start: 03/15/20   Expected End: 03/28/20       Description: Document Everardo Scale and appropriate interventions in the flowsheet. Disciplines: Interdisciplinary                Problem: Psychosocial General     Dates: Start: 03/17/20       Disciplines: Interdisciplinary    Goal: Patient/family is receiving emotional support     Dates: Start: 03/17/20   Expected End: 03/28/20       Disciplines: Interdisciplinary                Problem: Spiritual Distress     Dates: Start: 03/15/20       Description:     Disciplines: Interdisciplinary    Goal: Distress heard, acknowledged, and addressed     Dates: Start: 03/15/20   Expected End: 03/28/20       Description: Patient/family/caregiver distress will be heard, acknowledged, and addressed throughout hospice care. Disciplines: Interdisciplinary    Intervention: Assess for signs/symptoms of spiritual distress     Dates: Start: 03/15/20       Description:           Intervention: Consult on spiritual care     Dates: Start: 03/15/20       Description: Consult to Spiritual Care          Intervention: Discuss strategies to reduce spiritual distress     Dates: Start: 03/15/20       Description: Discuss with patient/caregiver strategies to reduce spiritual distress.                         Care Plan Problems/Goals      Progressing Towards Goal (8)      Demonstrate ability to care for skin, monitor areas of breakdown and demonstrate methods to prevent breakdown (Potential for Skin Breakdown)    Disciplines:  Interdisciplinary Expected end:  03/28/20        Outcome: Progressing Towards Goal By Yoko Arambula RN on 03/23/20 8988            Verbalize satisfaction of level of comfort and symptom control (Pain)    Disciplines: Interdisciplinary Expected end:  03/28/20        Outcome: Progressing Towards Goal By Cira Bautista RN on 03/22/20 0038            Verbalize and demonstrate ability to manage anxiety (Anxiety/Agitation)    Disciplines:  Interdisciplinary Expected end:  03/28/20        Outcome: Progressing Towards Goal By Tori Sahu RN on 03/21/20 2212            Demonstrate acceptance of terminal illness and understanding of disease progression (Coping and Emotional Distress)    Disciplines:  Interdisciplinary Expected end:  03/28/20        Outcome: Progressing Towards Goal By Shyla Donaldson RN on 03/17/20 2030            Demonstrate understanding of and ability to manage respiratory symptoms at end of life (Dyspnea Due to End of Life)    Disciplines:  Interdisciplinary Expected end:  03/28/20        Outcome: Progressing Towards Goal By Cira Bautista RN on 03/23/20 8065            *Absence of Falls (Falls - Risk of)    Disciplines:  Interdisciplinary Expected end:  03/28/20        Outcome: Progressing Towards Goal By Cira Bautista RN on 03/23/20 7524            *Prevention of pressure injury (Pressure Injury - Risk of)    Disciplines:  Interdisciplinary Expected end:  03/28/20        Outcome: Progressing Towards Goal By Cira Bautista RN on 03/22/20 1695            Patient/family is receiving emotional support (Psychosocial General)    Disciplines:  Interdisciplinary Expected end:  03/28/20        Outcome: Progressing Towards Goal By Tim Ramachandran on 03/20/20 1001                         No Outcome (4)      Verbalize and demonstrate ability to manage depression (Depression)    Disciplines:  Interdisciplinary Expected end:  03/28/20          Distress heard, acknowledged, and addressed (Spiritual Distress)    Disciplines:  Interdisciplinary Expected end:  03/28/20          Demonstrate understanding of end of life processes (End of Life Process)    Disciplines:  Interdisciplinary Expected end: 03/28/20          Grief heard and acknowledged, anxiety reduced, patient coping identified, patient/family expressed gratitude (Anticipatory Grief)    Disciplines:  Interdisciplinary Expected end:  03/27/20                       Resolved/Met (4)      Demonstrate understanding of hospice philosophy, plan of care, and home hospice program [facility] Orientation)    Disciplines:  Interdisciplinary Expected end:  03/22/20        Outcome: Resolved/Met By eMlissa Blancas RN on 03/15/20 0130            Free of falls during episode of care (Risk for Falls)    Disciplines:  Interdisciplinary Expected end:  03/22/20        Outcome: Resolved/Met By Melissa Blancas RN on 03/15/20 0130            Patient/Family Education (Patient Education: Go to Patient Education Activity)    Disciplines:  Interdisciplinary Expected end:  -        Outcome: Resolved/Met By Merari Charles RN on 03/18/20 1610            Patient/Family Education (Patient Education: Go to Patient Education Activity)    Disciplines:  Interdisciplinary Expected end:  -        Outcome: Resolved/Met By Merari Charles RN on 03/18/20 1610                              ___________________    Care Team Notes          POC/IDG Notes      [facility] IDG Nurse Notes by Merari Charles RN at 03/23/20 1249  Version 1 of 1    Author:  Merari Cahrles RN Service:  Adela Patrick Author Type:  Registered Nurse    Filed:  03/23/20 1249 Date of Service:  03/23/20 1249 Status:  Signed    :  Merari Charles RN (Registered Nurse)       Patient: Ming Manriquez. Date: 03/23/20  Time: 12:49 PM    [facility] Nurse Notes  F/U IDG: Pt is a 75year old male with CHF who is here GIP level of care for management of dyspnea and agitation. Rubio with UOP of 350 cc. Wounds: none. PRN medications: none. Scheduled meds:  none. Plan: Started Lopressor 12.5 mg bid and Lasix 20 mg bid. Add Senna PO and PO options for morphine and Haldol. Obtain a lying BP and a BP with pt seated on side of bed. Will progress to up in chair as pt is able. Comprehensive plan of care reviewed. IDG and pt./family in agreement with plan of care. The IDG identifies through on-going assessment when a change is needed to the POC; the pt/family will receive care and services necessitated by changes in POC. Medications reviewed by the pharmacist and Medical Director. Signed by: Viridiana Marquis RN       [facility] IDG  Notes by Wayne Mullins at 03/23/20 1122  Version 1 of 1    Author:  Wayne Mullins Service:  Licensed Clinical  Author Type:      Filed:  03/23/20 1216 Date of Service:  03/23/20 1122 Status:  Signed    :  Wayne Mullins ()       Patient: Singh Polo. Date: 03/23/20  Time: 11:22 AM    [facility]  Notes  LMSW will continue to provide emotional support to the pt and family. Pt is sitting up and eating some. Discussion of DC plan to take place. Signed by: Tate Chapman       [facility] [place] [facility] IDG  Notes by Wade Green at 03/23/20 1140  Version 1 of 1    Author:  Wade Green Service:  Spiritual Care Author Type:  Pastoral Care    Filed:  03/23/20 1143 Date of Service:  03/23/20 1140 Status:  Signed    :  Wade Green (Pastoral Care)       Patient: Singh Polo. Date: 03/23/20  Time: 11:40 AM    [facility] [place] [facility]  Notes  Intervention: Ministry of presence, active listening, prayer, literature on dying and anticipatory grief. Outcome:Family shared memories, tearful at times but appropriate. Expressed appreciation for spiritual support. Plan: Continue providing spiritual and emotional support. Focus on Anticipatory Grief.          Signed by: Mary Jo Marin       [facility] IDG Nurse Notes by Deena Helton RN at 03/20/20 1417  Version 1 of 1    Author:  Deena Helton RN Service:  Flora Arias Author Type:  Registered Nurse    Filed:  03/20/20 6119 Date of Service:  03/20/20 1417 Status:  Signed    : Jose Miguel Yee, RN (Registered Nurse)         Patient: Tito Joy. Date: 03/20/20  Time: 2:17 PM    IDG NOTE     Subjective: Eyes open, agitated. Nonverbal. Trying to sit up in bed.      Intake: NPO, tray on hold     Scheduled medications:         Current Facility-Administered Medications   Medication Dose Route Frequency    morphine injection 4 mg  4 mg SubCUTAneous Q4H    haloperidol lactate (HALDOL) injection 4 mg  4 mg SubCUTAneous Q4H         PRN medications: Has required Morphine 4mg IV x 1 for pain.      Wounds:  Sacrum is red but blanchable     Output: Rubio catheter 200 ml urine output     IV access:  None     Oxygen:  5 L/min via NC     Patient Vitals for the past 24 hrs:    Temp Pulse Resp BP   03/20/20 0348 98.1 °F (36.7 °C) 95 18 110/90         Changes in plan of care:  No change in plan of care.      Comprehensive plan of care reviewed. IDG and pt./family in agreement with plan of care. The IDG identifies through on-going assessment when a change is needed to the POC; the pt/family will receive care and services necessitated by changes in POC. Medications reviewed by the pharmacist and Medical Director.           Ashleigh POOL, NP-C                Signed by: Jean Pierre Medina RN       [facility] IDG  Notes by Sarah Suh at 03/20/20 1239  Version 1 of 1    Author:  Sarah Suh Service:  Spiritual Care Author Type:  Pastoral Care    Filed:  03/20/20 1245 Date of Service:  03/20/20 1239 Status:  Signed    :  Sarah Suh (Pastoral Care)       Patient: Tito Joy. Date: 03/20/20  Time: 12:39 PM    [facility]  Notes    Intervention: Family support through meaningful conversation, active listening, and prayer. Outcome:  Family is realistic and appropriate. Tearful at times. They have good support. Family expressed appreciation for spiritual support.     Plan: Continued support, spiritual rituals as appropriate, provide opportunity for open communication with focus on anticipatory grief. Signed by: Danilo CORNEJO [facility] IDG  Notes by Viktor Burkett at 03/20/20 1002  Version 1 of 1    Author:  Viktor Burkett Service:  Licensed Clinical  Author Type:      Filed:  03/20/20 1243 Date of Service:  03/20/20 1002 Status:  Signed    :  Viktor Burkett ()       Patient: Darin Pain. Date: 03/20/20  Time: 10:02 AM    [facility]  Notes  LMSW will continue to provide emotional support to the pt and his family  The volunteer program has been suspended due to the Covid-19 virus. Problem: Psychosocial General  Goal: Patient/family is receiving emotional support  Outcome: Progressing Towards Goal     Signed by: Bassam Barboza IDG Nurse Notes by Live Caballero RN at 03/16/20 1233  Version 1 of 1    Author:  Live Caballero RN Service:  Rishi Steel Author Type:  Registered Nurse    Filed:  03/16/20 1233 Date of Service:  03/16/20 1233 Status:  Signed    :  Live Caballero RN (Registered Nurse)       Patient: [place] Pain. Date: 03/16/20  Time: 12:33 PM    [facility] Nurse Notes  1st IDG: Pt is a 76-year-old male with CHF who is here GIP level of care for management of dyspnea and anxiety. Ramiro with UOP of 650cc. IV access was lost this AM. VS stable. Wounds: none. PRN medications: Haldol 2 mg IV/SQ x 3, Ativan 1 mg IV/IM x 4, morphine 4 mg x 7 for pain. Scheduled meds:  none. Minimal PO intake x 9 days. Plan: Add scheduled Haldol 4 mg SQ q 6 hours and Morphine 4 mg SQ q 6 hours. Discontinue all PO meds. D/C IV flushes. Comprehensive plan of care reviewed. IDG and pt./family in agreement with plan of care. The IDG identifies through on-going assessment when a change is needed to the POC; the pt/family will receive care and services necessitated by changes in POC. Medications reviewed by the pharmacist and Medical Director.         Signed by: Bina Nelson RN       [facility] IDG  Notes by Jason Brooks at 03/16/20 1153  Version 1 of 1    Author:  Jason Brooks Service:  Spiritual Care Author Type:  Pastoral Care    Filed:  03/16/20 1206 Date of Service:  03/16/20 1153 Status:  Signed    :  Jason Brooks (Pastoral Care)       Patient: Murphy Brownlee. Date: 03/16/20  Time: 11:53 AM    [facility]  Notes  Assessments pending for spiritual and bereavement care. Signed by: Reina Torres       [facility] IDG  Notes by Eric Will at 03/16/20 1001  Version 1 of 1    Author:  Eric Will Service:  Licensed Clinical  Author Type:      Filed:  03/16/20 1157 Date of Service:  03/16/20 1001 Status:  Signed    :  Eric Will ()       Patient: Murphy Brownlee. Date: 03/16/20  Time: 10:01 AM    [facility]  Notes  LMSW will visit with the pt and his family to complete the SW initial assessment. The volunteer program has been suspended due to the Corona Virus. LMSW will provide comfort bags to the pt. Signed by: Yuriy Damon       [facility] IDG  Notes by Eric Will at 03/16/20 7567  Version 1 of 1    Author:  Eric Will Service:  Licensed Clinical  Author Type:      Filed:  03/16/20 0954 Date of Service:  03/16/20 0953 Status:  Signed    :  Eric Will ()       Patient: Murphy Brownlee. Date: 03/16/20  Time: 9:53 AM    [facility]  Notes    LMSW has read and agrees with the RN initial assessment. LMSW will meet with pt and family to complete the SW assessment.           Signed by: Yuriy Damon       [facility] IDG  Notes by Jason Brooks at 03/16/20 4569  Version 1 of 1    Author:  Jason Brooks Service:  Spiritual Care Author Type:  Pastoral Care    Filed:  03/16/20 0918 Date of Service:  03/16/20 8514 Status:  Signed    :  Jason Brooks (Pastoral Care)       Patient: Robinson Darnell. Date: 20  Time: 9:17 AM    [facility]  Notes  / Grief Counselor has reviewed  Initial Comprehensive Assessment and Initial Plan of Care for Rommel Rosenberg and  is in agreement with the plans put in place and goals set thus far. / Grief Counselor will follow up with Spiritual and Bereavement Assessments. [facility] Counselor will provide care according to patient's/ family's desires. Signed by: Tutu Coronado       [facility] IDG Nurse Notes by Joyce Alfonso RN at 03/15/20 0120  Version 1 of 1    Author:  Joyce Alfonso RN Service:  NURSING Author Type:  Registered Nurse    Filed:  03/15/20 0121 Date of Service:  03/15/20 0120 Status:  Signed    :  Joyce Alfonso RN (Registered Nurse)       Patient: Robinson Darnell. Date:2020   Time: [address] [place] [address] Nurse Notes  Patient admitted to [facility] from [facility]. Patient is GIP level of care for hospice diagnosis of CHF. Symptoms to be managed include respiratory management and end of life care. Patient  is identified by name/. Patient is somnolent upon arrival as he was recently medicated for shortness of breath prior to transfer per report. Oxygen in use via nasal cannula at 5L/minute as per orders. Color acyanotic. Respirations non labored with diminished sounds and crackles heard in bases. Facial features flat,relaxed. FLACC 0/10. Skin is warm,dry and intact. No edema noted. Rubio catheter patent with clear yellow urine observed. No signs/symptoms of pain,anxiety,nausea or dyspnea at this time. Bed extender is use for comfort. Wife and patient's sons have arrived at bedside. Emotional support provided. Family given orientation to hospice house and opportunities for questions to be answered. Bed in low and locked position with side rails up x 2 with call light in reach.  Admission complete and initial general hospice care plan initiated which includes spiritual,psychosocial and bereavement. No immediate needs at this time. IDG team,including MD, made aware of plan of care and immediate needs. Family is aware of volunteer services. Signed by: Charma Prader, RN                Care Team Present:   Team Members Present: (see sign in sheet for attendees)  Physician Team Member: Leyla Summers MD  Nurse Team Member: Rema Nielson NP  Social Work Team Member: Fouzia Hare, [address] [facility],[address] Team Member: Ruthie Castro.

## 2020-03-23 NOTE — PROGRESS NOTES
Progress Note    Patient: Ashley Leung. MRN: [id number]  SSN: [id number]    YOB: 1945  Age: 75 y.o. Sex: male      Admit Date: 3/14/2020    LOS: 9 days     Subjective:     Alert, oriented to self and recognizes his family. Tolerating small amounts of po intake. Denies pain, nausea. Dyspnea at rest.     Review of Systems:  Pertinent items are noted in the History of Present Illness. Objective:     Vitals:    03/21/20 1656 03/22/20 0436 03/22/20 1607 03/23/20 0442   BP: 114/70 102/64 119/68 106/74   Pulse: 80 80 79 81   Resp: 18 18 18 16   Temp: 96.5 °F (35.8 °C) 96.1 °F (35.6 °C) 97.8 °F (36.6 °C) 96.6 °F (35.9 °C)        Intake and Output:  Current Shift: No intake/output data recorded. Last three shifts: 03/21 1901 - 03/23 0700  In: -   Out: 600 [Urine:600]    Physical Exam:   GENERAL: fatigued, mild distress, appears older than stated age, oriented to person  LUNG: Coarse, diminished breath sounds with labored respirations. HEART: regular rate and rhythm  ABDOMEN: soft, non-tender. Bowel sounds hypoactive.   : Rubio catheter with farrah urine.   EXTREMITIES:  extremities with no cyanosis or edema. + pulses.   SKIN: Pale. Warm to touch. Skin intact.   NEUROLOGIC: Fatigued. Oriented to person and recognizes his family. Generalized weakness. Lab/Data Review:  No new labs resulted in the last 24 hours.     Assessment:     Principal Problem:    Acute on chronic systolic heart failure ([address] (2/20/2020)      Overview: Echo 2/20/20:  EF 20-25%      Echo 4/15: EF 30-35%, mild to mod MR      Echo 2011: EF 30-35%    Active Problems:    Bilateral carotid artery disease ([address] (8/7/2017)      Chronic systolic CHF (congestive heart failure) ([address] (3/14/2020)      Hospice care (3/16/2020)        Plan:     Current Facility-Administered Medications   Medication Dose Route Frequency    furosemide (LASIX) tablet 20 mg  20 mg Oral ACB&D    metoprolol tartrate (LOPRESSOR) tablet 12.5 mg  12.5 mg Oral Q12H    haloperidol (HALDOL) 2 mg/mL oral solution 2 mg  2 mg SubLINGual Q1H PRN    senna (SENOKOT) tablet 8.6 mg  1 Tab Oral BID    morphine (ROXANOL) concentrated oral syringe 10 mg  10 mg Oral Q2H PRN    LORazepam (ATIVAN) injection 1 mg  1 mg IntraMUSCular Q2H PRN    morphine injection 4 mg  4 mg SubCUTAneous Q20MIN PRN    Or    morphine injection 4 mg  4 mg IntraVENous Q20MIN PRN    acetaminophen (TYLENOL) suppository 650 mg  650 mg Rectal Q3H PRN    haloperidol lactate (HALDOL) injection 2 mg  2 mg SubCUTAneous Q1H PRN    Or    haloperidol lactate (HALDOL) injection 2 mg  2 mg IntraVENous Q1H PRN    glycopyrrolate (ROBINUL) injection 0.2 mg  0.2 mg SubCUTAneous Q4H PRN    albuterol-ipratropium (DUO-NEB) 2.5 MG-0.5 MG/3 ML  3 mL Nebulization Q4H PRN       3/14: (SFD) Admitted GIP with Acute on chronic systolic heart failure for management of pain, dyspnea and agitation.      1. Pain: Morphine 10mg po [id number] prn or 4mg IV/SQ Q20 minutes as needed.     2. Dyspnea: Morphine 10mg po [id number] prn or 4mg IV/SQ Q20 minutes as needed. Duonebs q4 prn. Glycopyrrolate 0.2mg q4 prn secretions. Oxygen prn.     3. Agitation: Haloperidol 2mg PO/IV/SQ Q1 hour prn and Lorazepam 1mg IV/IM q2 hours prn.     4. Family/Pt Support: Family at bedside during exam. Medications and plan of care discussed with nursing staff and family. Will continue to monitor for symptoms and adjust medications as needed to maintain patient comfort. PPS 20%. Case [facility] Dr[facility] Ozzie Bence [facility]. Restart Metoprolol at 12.5mg po bid and lasix at 20mg po bid. Add senna 8.6mg bid for bowel regimen. Add Haldol po prn. Add wound care orders for stage 2 to the sacrum. Will continue to titrate home meds and assess his status.      Signed By: Dalia Grant NP     March 23, 2020

## 2020-03-23 NOTE — PROGRESS NOTES
Problem: Pain  Goal: Verbalize satisfaction of level of comfort and symptom control  Description: Pain will be less than 3/10 while at [facility]  Morphine 4 mg q 30 minutes IV SQ prn   Scheduled Morphine 4 mg SQ q 4 hours   Outcome: Progressing Towards Goal     Problem: Anxiety/Agitation  Goal: Verbalize and demonstrate ability to manage anxiety  Description: The patient/family/caregiver will verbalize and demonstrate ability to manage the patient's anxiety throughout hospice care. Pt will have relaxed facial features and body language  Haldol 2 mg q 1 hr prn agitation   Ativan 1 mg IM q 2 hours PRN agitation  Scheduled Haldol 4 mg SQ q 4 hours     Outcome: Progressing Towards Goal     Problem: Coping and Emotional Distress  Goal: Demonstrate acceptance of terminal illness and understanding of disease progression  Description: Patient/family/caregiver will demonstrate acceptance of terminal disease and understanding of disease progression while employing appropriate coping mechanisms. Outcome: Progressing Towards Goal     Problem: Dyspnea Due to End of Life  Goal: Demonstrate understanding of and ability to manage respiratory symptoms at end of life  Description: Respirations will be less then 24/minute  Morphine 4 mg IV/SQ q 1 hr prn dyspnea   Outcome: Progressing Towards Goal     Problem: Falls - Risk of  Goal: *Absence of Falls  Description: Document Inocenciacole Connellyhaydee Fall Risk and appropriate interventions in the flowsheet.   Outcome: Progressing Towards Goal  Note: Fall Risk Interventions:  Mobility Interventions: Bed/chair exit alarm    Mentation Interventions: Adequate sleep, hydration, pain control, Bed/chair exit alarm, Door open when patient unattended, Reorient patient    Medication Interventions: Bed/chair exit alarm    Elimination Interventions: Bed/chair exit alarm    History of Falls Interventions: Bed/chair exit alarm, Door open when patient unattended

## 2020-03-23 NOTE — [facility] IDG SOCIAL WORKER NOTES
Patient: Antwon Cortez. Date: 03/23/20  Time: 11:22 AM    [facility]  Notes  LMSW will continue to provide emotional support to the pt and family. Pt is sitting up and eating some. Discussion of DC plan to take place.           Signed by: Sammy Gomes

## 2020-03-23 NOTE — PROGRESS NOTES
Problem: Potential for Skin Breakdown  Goal: Demonstrate ability to care for skin, monitor areas of breakdown and demonstrate methods to prevent breakdown  Description: Patient/family/caregiver will demonstrate ability to care for patient's skin, monitor for areas of breakdown, and demonstrate methods to prevent breakdown during hospice care. Outcome: Progressing Towards Goal     Problem: Dyspnea Due to End of Life  Goal: Demonstrate understanding of and ability to manage respiratory symptoms at end of life  Description: Respirations will be less then 24/minute  Morphine 4 mg IV/SQ q 1 hr prn dyspnea   Outcome: Progressing Towards Goal     Problem: Falls - Risk of  Goal: *Absence of Falls  Description: Document Atilio Ortiz Fall Risk and appropriate interventions in the flowsheet.   Outcome: Progressing Towards Goal  Note: Fall Risk Interventions:  Mobility Interventions: Bed/chair exit alarm    Mentation Interventions: Adequate sleep, hydration, pain control, Bed/chair exit alarm, Door open when patient unattended, Reorient patient    Medication Interventions: Bed/chair exit alarm    Elimination Interventions: Bed/chair exit alarm    History of Falls Interventions: Bed/chair exit alarm, Door open when patient unattended

## 2020-03-23 NOTE — [facility] IDG CHAPLAIN NOTES
Patient: Tito Joy. Date: 03/23/20  Time: 11:40 AM    [facility]  Notes  Intervention: Ministry of presence, active listening, prayer, literature on dying and anticipatory grief. Outcome:Family shared memories, tearful at times but appropriate. Expressed appreciation for spiritual support. Plan: Continue providing spiritual and emotional support. Focus on Anticipatory Grief.          Signed by: Jason Kelsey

## 2020-03-23 NOTE — [facility] IDG NURSE NOTES
Patient: Pura Cabrera. Date: 03/23/20  Time: 12:49 PM    [facility] [place] [facility] Nurse Notes  F/U IDG: Pt is a 75year old male with CHF who is here GIP level of care for management of dyspnea and agitation. Rubio with UOP of 350 cc. Wounds: none. PRN medications: none. Scheduled meds:  none. Plan: Started Lopressor 12.5 mg bid and Lasix 20 mg bid. Add Senna PO and PO options for morphine and Haldol. Obtain a lying BP and a BP with pt seated on side of bed. Will progress to up in chair as pt is able. Comprehensive plan of care reviewed. IDG and pt./family in agreement with plan of care. The IDG identifies through on-going assessment when a change is needed to the POC; the pt/family will receive care and services necessitated by changes in POC. Medications reviewed by the pharmacist and Medical Director.           Signed by: Kolton Moscoso RN

## 2020-03-23 NOTE — PROGRESS NOTES
The patient report and walking rounds completed with Sowmya Pearson RN. The patient was identified by name and . The patient is resting quietly in the bed with no signs of distress observed. The bed is low and locked with two bed rails and tab alert in place. The patient is awake and alert to person and place. He has the call light within his reach. The door to his room is left open for close observation. Summary:   The patient remained alert to person and placed throughout the shift. He ate bites and sips of each meal. He took some medications crushed in yogurt. He was medicated one time for pain in his buttocks and for agitation. He was turned and repositioned in the bed at least 4 times this shift. He has a newly developed stage II to his sacrum area that is 0.5 cm in diameter. It was cleaned and creamed. His wife has been at the bedside since 1030 this morning.      Reported off to Lien Cabello RN

## 2020-03-24 NOTE — PROGRESS NOTES
Santi report from Jayna Do RN. Pt Id by name and .   Pt sitting up in bed. Calm. No distress. Denies pain at this time. Slow to respond. No agitation. No anxiety. Resp non labored on 5 L hi ismael. Lungs diminished. HR resg. BS hypo. No edema noted. SR up x 2. Bed low/locked. Call light with in reach. Family at the bedside.   Scheduled po medication given. Pt tolerated well. 2327  Pt resting comfortably with eyes closed. No facial grimace. Flacc =0-1. Resp non labored on 5 L hi ismael. SR up x 2. Bed low/locked. Call light with in reach. Door opened. 0112  Pt lying in bed with eyes closed. Resting comfortably. No distress. Resp non labored on 5 L hi ismael. SR up x 2. Bed low/locked. Call light with in reach. Door opened. 0317  Pt resting comfortably with eyes closed. No facial grimace. Flacc =0-1. Resp non labored on 5 L hi ismael. SR up x 2. Bed low/locked. Call light with in reach. Door opened. 0546  Pt with eyes opened. Denies pain at this time. Flacc =0-1. Resp non labored on 5 L hi ismael. Pt repositioned and checked for incontinence. Brief changed. No BM noted. SR up x 2. Bed low/locked. Call light with in reach. Door opened. Report given to Morro Rubin RN.

## 2020-03-24 NOTE — PROGRESS NOTES
Problem: Potential for Skin Breakdown  Goal: Demonstrate ability to care for skin, monitor areas of breakdown and demonstrate methods to prevent breakdown  Description: Patient/family/caregiver will demonstrate ability to care for patient's skin, monitor for areas of breakdown, and demonstrate methods to prevent breakdown during hospice care. Outcome: Progressing Towards Goal     Problem: Pain  Goal: Verbalize satisfaction of level of comfort and symptom control  Description: Pain will be less than 3/10 while at [facility]  Morphine 4 mg q 30 minutes IV SQ prn   Scheduled Morphine 4 mg SQ q 4 hours   Outcome: Progressing Towards Goal     Problem: Anxiety/Agitation  Goal: Verbalize and demonstrate ability to manage anxiety  Description: The patient/family/caregiver will verbalize and demonstrate ability to manage the patient's anxiety throughout hospice care. Pt will have relaxed facial features and body language  Haldol 2 mg q 1 hr prn agitation   Ativan 1 mg IM q 2 hours PRN agitation  Scheduled Haldol 4 mg SQ q 4 hours     Outcome: Progressing Towards Goal     Problem: Pressure Injury - Risk of  Goal: *Prevention of pressure injury  Description: Document Everardo Scale and appropriate interventions in the flowsheet.   Outcome: Progressing Towards Goal  Note: Pressure Injury Interventions:  Sensory Interventions: Assess changes in LOC, Check visual cues for pain, Float heels, Keep linens dry and wrinkle-free, Minimize linen layers    Moisture Interventions: Absorbent underpads, Internal/External urinary devices, Minimize layers, Apply protective barrier, creams and emollients, Limit adult briefs, Moisture barrier    Activity Interventions: Pressure redistribution bed/mattress(bed type)    Mobility Interventions: Pressure redistribution bed/mattress (bed type)    Nutrition Interventions: Document food/fluid/supplement intake    Friction and Shear Interventions: Apply protective barrier, creams and emollients, Lift sheet, Minimize layers

## 2020-03-24 NOTE — PROGRESS NOTES
Progress Note    Patient: Jluis Morocho MRN: [id number]  SSN: [id number]    YOB: 1945  Age: 75 y.o. Sex: male      Admit Date: 3/14/2020    LOS: 10 days     Subjective:     Alert, oriented to self and spouse. Tolerating small amounts of po intake. Denies pain, nausea. Dyspnea at rest.     Review of Systems:  Pertinent items are noted in the History of Present Illness. Objective:     Vitals:    03/23/20 1830 03/24/20 0555 03/24/20 0808 03/24/20 0810   BP: 107/64 118/69 123/83 115/67   Pulse: 80 80 81 81   Resp: 19 18 16    Temp: 98.3 °F (36.8 °C) 96.1 °F (35.6 °C) 97.6 °F (36.4 °C) 97.6 °F (36.4 °C)        Intake and Output:  Current Shift: No intake/output data recorded. Last three shifts: 03/22 1901 - 03/24 0700  In: -   Out: 550 [Urine:550]    Physical Exam:   GENERAL: fatigued, mild distress, appears older than stated age, oriented to person  LUNG: Coarse, diminished breath sounds with labored respirations. HEART: regular rate and rhythm  ABDOMEN: soft, non-tender. Bowel sounds hypoactive.   : Rubio catheter with farrah urine.   EXTREMITIES:  extremities with no cyanosis or edema. + pulses.   SKIN: Pale. Warm to touch. Skin intact.   NEUROLOGIC: Fatigued. Oriented to person and recognizes his family. Generalized weakness. Lab/Data Review:  No new labs resulted in the last 24 hours.     Assessment:     Principal Problem:    Acute on chronic systolic heart failure ([address] (2/20/2020)      Overview: Echo 2/20/20:  EF 20-25%      Echo 4/15: EF 30-35%, mild to mod MR      Echo 2011: EF 30-35%    Active Problems:    Bilateral carotid artery disease ([address] (8/7/2017)      Chronic systolic CHF (congestive heart failure) ([address] (3/14/2020)      Hospice care (3/16/2020)        Plan:     Current Facility-Administered Medications   Medication Dose Route Frequency    gabapentin (NEURONTIN) capsule 100 mg  100 mg Oral TID    furosemide (LASIX) tablet 20 mg  20 mg Oral ACB&D    metoprolol tartrate (LOPRESSOR) tablet 12.5 mg  12.5 mg Oral Q12H    haloperidol (HALDOL) 2 mg/mL oral solution 2 mg  2 mg SubLINGual Q1H PRN    senna (SENOKOT) tablet 8.6 mg  1 Tab Oral BID    morphine (ROXANOL) concentrated oral syringe 10 mg  10 mg Oral Q2H PRN    LORazepam (ATIVAN) injection 1 mg  1 mg IntraMUSCular Q2H PRN    morphine injection 4 mg  4 mg SubCUTAneous Q20MIN PRN    Or    morphine injection 4 mg  4 mg IntraVENous Q20MIN PRN    acetaminophen (TYLENOL) suppository 650 mg  650 mg Rectal Q3H PRN    haloperidol lactate (HALDOL) injection 2 mg  2 mg SubCUTAneous Q1H PRN    Or    haloperidol lactate (HALDOL) injection 2 mg  2 mg IntraVENous Q1H PRN    glycopyrrolate (ROBINUL) injection 0.2 mg  0.2 mg SubCUTAneous Q4H PRN    albuterol-ipratropium (DUO-NEB) 2.5 MG-0.5 MG/3 ML  3 mL Nebulization Q4H PRN       3/14: (SFD) Admitted GIP with Acute on chronic systolic heart failure for management of pain, dyspnea and agitation.      1. Pain: Morphine 10mg po [id number] prn or 4mg IV/SQ Q20 minutes as needed.     2. Dyspnea: Morphine 10mg po [id number] prn or 4mg IV/SQ Q20 minutes as needed. Duonebs q4 prn. Glycopyrrolate 0.2mg q4 prn secretions. Oxygen prn.     3. Agitation: Haloperidol 2mg PO/IV/SQ Q1 hour prn and Lorazepam 1mg IV/IM q2 hours prn.     4. Family/Pt Support: Family at bedside during exam. Medications and plan of care discussed with nursing staff and family. Will continue to monitor for symptoms and adjust medications as needed to maintain patient comfort. PPS 20%. Case [facility] Dr. Ashlee Ram[facility]rez [facility] Restart neurontin today at 100mg po tid. Was originally on 300mg bid. Will increase his dose if tolerated. Asked his wife to bring in his home medication for colitis. He has started to have loose bowel movements since he has started eating again.      Signed By: Nicolas Long NP     March 24, 2020

## 2020-03-24 NOTE — PROGRESS NOTES
1731 Visualized pt resting in supine position with elevated hob. NC in place with O2 @5L. No signs or symptoms of distress. Side rails up x2. Bed alarm on, call bell in reach. 0810 Pt being fed breakfast, following commands and able to respond with yes or no when asked if he would like a drink. Morning meds given with yogurt. 0900 Pt repositioned to his left side. Brief removed and cream applied to sacral area for skin breakdown. Warm blanket applied for comfort. Bed alarm on. Bed in low locked position. Side rails up 2. Catheter in place and positioned to gravity. 1020 Wife at bedside ask for assistance so pt can sit up on side of bed. Pt repositioned. Pt was able to help with repositioning. Bed low and locked. Call bell in reach and bed alarm on.     1115 Assisted tech with turning and repositioning after bath and bed linen change. Pt tolerated well. Pt had lg bm. Wife at bedside. Call bell in reach, bed alarm on.    1405 Pt previously medicated for pain and is now sleeping quietly, RR unlabored. Wife at bs, O2 in place via NC bed low/locked and side rails up x2.     1430 pt sitting up on side of bed with no signs of distress, wife at bs. Pt cooperative in taking scheduled meds. Bed in low locked position. Bed alarm on and call bell in reach. Tech also at bs. Rubio catheter with clear farrah urine. 0 Pt medicated for pain due to c/o \"hurts\" Wife at bs. Oral Morphine given for pain. Pt unable to rate on a 1-10 scale but is grimacing.

## 2020-03-24 NOTE — PROGRESS NOTES
Problem: Potential for Skin Breakdown  Goal: Demonstrate ability to care for skin, monitor areas of breakdown and demonstrate methods to prevent breakdown  Description: Patient/family/caregiver will demonstrate ability to care for patient's skin, monitor for areas of breakdown, and demonstrate methods to prevent breakdown during hospice care. 3/24/2020 0623 by Jaron Seweney RN  Outcome: Progressing Towards Goal  3/24/2020 0550 by Jaron Sweeney RN  Outcome: Progressing Towards Goal     Problem: Pain  Goal: Verbalize satisfaction of level of comfort and symptom control  Description: Pain will be less than 3/10 while at [facility]  Morphine 4 mg q 30 minutes IV SQ prn   Scheduled Morphine 4 mg SQ q 4 hours   3/24/2020 0623 by Jaron Sweeney RN  Outcome: Progressing Towards Goal  3/24/2020 0550 by Jaron Sweeney RN  Outcome: Progressing Towards Goal     Problem: Anxiety/Agitation  Goal: Verbalize and demonstrate ability to manage anxiety  Description: The patient/family/caregiver will verbalize and demonstrate ability to manage the patient's anxiety throughout hospice care. Pt will have relaxed facial features and body language  Haldol 2 mg q 1 hr prn agitation   Ativan 1 mg IM q 2 hours PRN agitation  Scheduled Haldol 4 mg SQ q 4 hours     3/24/2020 0844 by Jaron Sweeney RN  Outcome: Progressing Towards Goal  3/24/2020 0550 by Jaron Sweeney RN  Outcome: Progressing Towards Goal     Problem: Dyspnea Due to End of Life  Goal: Demonstrate understanding of and ability to manage respiratory symptoms at end of life  Description: Respirations will be less then 24/minute  Morphine 4 mg IV/SQ q 1 hr prn dyspnea   Outcome: Progressing Towards Goal     Problem: Falls - Risk of  Goal: *Absence of Falls  Description: Document Connerandrei Eaton Fall Risk and appropriate interventions in the flowsheet.   3/24/2020 0623 by Jaron Sweeney RN  Outcome: Progressing Towards Goal  Note: Fall Risk Interventions:  Mobility Interventions: Bed/chair exit alarm    Mentation Interventions: Bed/chair exit alarm, Evaluate medications/consider consulting pharmacy, Door open when patient unattended, Reorient patient    Medication Interventions: Bed/chair exit alarm, Evaluate medications/consider consulting pharmacy    Elimination Interventions: Call light in reach, Bed/chair exit alarm    History of Falls Interventions: Bed/chair exit alarm, Door open when patient unattended, Evaluate medications/consider consulting pharmacy      3/24/2020 0550 by Michelle Jasso RN  Outcome: Resolved/Not Met  Note: Fall Risk Interventions:  Mobility Interventions: Bed/chair exit alarm    Mentation Interventions: Bed/chair exit alarm, Evaluate medications/consider consulting pharmacy, Door open when patient unattended, Reorient patient    Medication Interventions: Bed/chair exit alarm, Evaluate medications/consider consulting pharmacy    Elimination Interventions: Call light in reach, Bed/chair exit alarm    History of Falls Interventions: Bed/chair exit alarm, Door open when patient unattended, Evaluate medications/consider consulting pharmacy         Problem: Pressure Injury - Risk of  Goal: *Prevention of pressure injury  Description: Document Everardo Scale and appropriate interventions in the flowsheet.   3/24/2020 9089 by Michelle Jasso RN  Outcome: Progressing Towards Goal  Note: Pressure Injury Interventions:  Sensory Interventions: Assess changes in LOC, Check visual cues for pain, Float heels, Keep linens dry and wrinkle-free, Minimize linen layers    Moisture Interventions: Absorbent underpads, Internal/External urinary devices, Minimize layers, Apply protective barrier, creams and emollients, Limit adult briefs, Moisture barrier    Activity Interventions: Pressure redistribution bed/mattress(bed type)    Mobility Interventions: Pressure redistribution bed/mattress (bed type)    Nutrition Interventions: Document food/fluid/supplement intake    Friction and Shear Interventions: Apply protective barrier, creams and emollients, Lift sheet, Minimize layers             3/24/2020 0550 by Ana Cristina Nugent RN  Outcome: Progressing Towards Goal  Note: Pressure Injury Interventions:  Sensory Interventions: Assess changes in LOC, Check visual cues for pain, Float heels, Keep linens dry and wrinkle-free, Minimize linen layers    Moisture Interventions: Absorbent underpads, Internal/External urinary devices, Minimize layers, Apply protective barrier, creams and emollients, Limit adult briefs, Moisture barrier    Activity Interventions: Pressure redistribution bed/mattress(bed type)    Mobility Interventions: Pressure redistribution bed/mattress (bed type)    Nutrition Interventions: Document food/fluid/supplement intake    Friction and Shear Interventions: Apply protective barrier, creams and emollients, Lift sheet, Minimize layers

## 2020-03-25 NOTE — PROGRESS NOTES
Santi report from Joanne Salomon RN. Pt Id by name and . 2100  Pt lying in bed. Alert. Denies pain at this time. Resp non labored on 5 L hi ismael. Lungs diminished. HR reg. BS hypo. No edema noted. Scheduled po medications given Pt tolerated well. Senna held due to multiple loose stools. SR up x 2. Bed low/locked. Call light with in reach. Wife leaving bedside. Door opened. 200  Pt with eyes closed. Resting comfortably. No distress. No facial grimace. Flacc =0-1. Resp non labored on 5 L hi ismael. SR up x 2. Bed low/llocked. Call light with in reach. Door opened. 0139  Pt sleeping. No distress. No facial grimace. Flacc =0-1. Resp non labored on 5 L hi simael. SR up x 2. Bed low/locked. Call light with in reach. Door opened. 4191  Pt with eyes closed. Resting comfortably. No distress. No facial grimace. Flacc =0-1. Resp non labored on 5 L hi ismael. SR up x 2. Bed low/llocked. Call light with in reach. Door opened. 0612  Pt sleeping. No distress. No facial grimace. Flacc =0-1. Resp non labored on 5 L hi ismael. SR up x 2. Bed low/locked. Call light with in reach. Door opened. Report given to Joanne Salomon RN.

## 2020-03-25 NOTE — PROGRESS NOTES
Follow up visit:     Provided opportunity for open communication with patient's wife. She is coping well. Mr. Sanchez Ates aroused periodically.  offered support with active listening, conversation and assurance of prayer.

## 2020-03-25 NOTE — PROGRESS NOTES
0700- Report received from RN. Pt visualized, supine with eyes open. Oxygen in place via NC @5L. Ruibo bag with clear farrah urine. Bed low/locked, call bell in reach, alarm on. Pt able to answer \"no\" when asked if he had pain. 0800 Pt restless, pulling off gown and attempting to get up. AM meds of Lasix 20mg, Neurontin 100mg, Lopressor 12.5mg  given with yogurt and Haldol 2mg oral. At times response is appropriate and other times not . O2 at 5L via NC. RR rate even and unlabored. Heart rate 74 and irregular. Hyperactive BS, no edema. Bed low/locked, bed alarm on, call bell in reach. 0900 Pt had bath and repositioned for comfort. Wife at bedside. Bed low/locked, O2 in place via NC @5L. Call bell in reach, bed alarm on.    1200 - Cassidy at bedside with pt and wife    4232 - Pt resting with his eyes open. Wife at [facility] states pt ate 3/4 mashed banana. When pt asked if he had pain he responded \"no\" Bed in low/lock with side rails up x2, bed alarm on, call bed in reach. O2 at 5L via NC in place. 1600- Pt repositioned for comfort. Bed changed due to leak from catheter. Wife at [facility]. O2 @2L in place via NC. Call light in reach, bed low/locked, bed alarm on. Pt does not show any signs or symptoms of  Distress at this time. 1730 - Pt sitting up in bed with wife at bedside feeding pt small amounts of pudding. Pt took PO meds without difficulty, cooperative. No signs or symptoms of distress. Call bell in reach. Bed alarm on. Rubio catheter is showing dark farrah urine.

## 2020-03-25 NOTE — PROGRESS NOTES
Progress Note    Patient: Jluis Morocho MRN: [id number]  SSN: [id number]    YOB: 1945  Age: 75 y.o. Sex: male      Admit Date: 3/14/2020    LOS: 11 days     Subjective:     Alert, oriented to self and spouse. Tolerating small amounts of po intake. Denies pain, nausea. Dyspnea at rest.     Review of Systems:  Pertinent items are noted in the History of Present Illness. Objective:     Vitals:    03/24/20 0555 03/24/20 0808 03/24/20 0810 03/25/20 0531   BP: 118/69 123/83 115/67 99/55   Pulse: 80 81 81 79   Resp: 18 16  16   Temp: 96.1 °F (35.6 °C) 97.6 °F (36.4 °C) 97.6 °F (36.4 °C) 97.6 °F (36.4 °C)        Intake and Output:  Current Shift: No intake/output data recorded. Last three shifts: 03/23 1901 - 03/25 0700  In: -   Out: 500 [Urine:500]    Physical Exam:   GENERAL: fatigued, mild distress, appears older than stated age, oriented to person  LUNG: Coarse, diminished breath sounds with labored respirations. HEART: regular rate and rhythm  ABDOMEN: soft, non-tender. Bowel sounds hypoactive.   : Rubio catheter with farrah urine.   EXTREMITIES:  extremities with no cyanosis or edema. + pulses.   SKIN: Pale. Warm to touch. Skin intact.   NEUROLOGIC: Fatigued. Oriented to person and recognizes his family. Generalized weakness. Lab/Data Review:  No new labs resulted in the last 24 hours.     Assessment:     Principal Problem:    Acute on chronic systolic heart failure ([address] (2/20/2020)      Overview: Echo 2/20/20:  EF 20-25%      Echo 4/15: EF 30-35%, mild to mod MR      Echo 2011: EF 30-35%    Active Problems:    Bilateral carotid artery disease ([address] (8/7/2017)      Chronic systolic CHF (congestive heart failure) ([address] (3/14/2020)      Hospice care (3/16/2020)        Plan:     Current Facility-Administered Medications   Medication Dose Route Frequency    LORazepam (ATIVAN) tablet 1 mg  1 mg Oral Q4H PRN    gabapentin (NEURONTIN) capsule 100 mg  100 mg Oral TID    furosemide (LASIX) tablet 20 mg  20 mg Oral ACB&D    metoprolol tartrate (LOPRESSOR) tablet 12.5 mg  12.5 mg Oral Q12H    haloperidol (HALDOL) 2 mg/mL oral solution 2 mg  2 mg SubLINGual Q1H PRN    senna (SENOKOT) tablet 8.6 mg  1 Tab Oral BID    morphine (ROXANOL) concentrated oral syringe 10 mg  10 mg Oral Q2H PRN    LORazepam (ATIVAN) injection 1 mg  1 mg IntraMUSCular Q2H PRN    morphine injection 4 mg  4 mg SubCUTAneous Q20MIN PRN    Or    morphine injection 4 mg  4 mg IntraVENous Q20MIN PRN    acetaminophen (TYLENOL) suppository 650 mg  650 mg Rectal Q3H PRN    haloperidol lactate (HALDOL) injection 2 mg  2 mg SubCUTAneous Q1H PRN    Or    haloperidol lactate (HALDOL) injection 2 mg  2 mg IntraVENous Q1H PRN    glycopyrrolate (ROBINUL) injection 0.2 mg  0.2 mg SubCUTAneous Q4H PRN    albuterol-ipratropium (DUO-NEB) 2.5 MG-0.5 MG/3 ML  3 mL Nebulization Q4H PRN       3/14: (SFD) Admitted GIP with Acute on chronic systolic heart failure for management of pain, dyspnea and agitation.      1. Pain: Morphine 10mg po [id number] prn or 4mg IV/SQ Q20 minutes as needed.     2. Dyspnea: Morphine 10mg po [id number] prn or 4mg IV/SQ Q20 minutes as needed. Duonebs q4 prn. Glycopyrrolate 0.2mg q4 prn secretions. Oxygen prn.     3. Agitation: Haloperidol 2mg PO/IV/SQ Q1 hour prn and Lorazepam 1mg IV/IM q2 hours prn.     4. Family/Pt Support: Family at bedside during exam. Medications and plan of care discussed with nursing staff and family. Will continue to monitor for symptoms and adjust medications as needed to maintain patient comfort. PPS 20%. Case discussed with Dr. Stanton Delaney. Restart ditropan 5mg tid and mesalamine daily.      Signed By: Ayse Verdin NP     March 25, 2020

## 2020-03-26 NOTE — PROGRESS NOTES
0700 - Report received from [address] [place] [address]. Patient identified. Patient GIP level of care with hospice diagnosis of CHF. Patient alert and resting quietly in bed. No s/sx/report anxiety, agitation, NVD or respiratory distress. FLACC 0/10. Wife at bedside and denies needs at this time. Bed in lowest, locked position, call light within reach, tab alert on, and 2 SR up for safety. 7374 - Scheduled medications given per orders. Crushed and given with pudding. Patient able to swallow safely. Assessment complete (see flowsheets). No s/sx/report anxiety, agitation, NVD or respiratory distress. FLACC 0/10. Wife remains at bedside. 1010 - Patient assisted to side of bed to sit up for a couple of minutes with help of CNA. Patient very week. Assisted back to lying position to be bathed per CNA. 1115 - Patient resting quietly awake in bed. Denies needs at this time. No s/sx/report anxiety, agitation, NVD, respiratory distress or pain. Wife remains at bedside. 1245 - Patient resting quietly awake in bed. Verbalizes desire to sit up on side of bed, but patient currently lethargic and RN does not feel this to be safe at this time. No s/sx/report anxiety, agitation, NVD, respiratory distress or pain. Wife remains at bedside. 1505 - Patient alert in bed. No s/sx/report anxiety, agitation, NVD or respiratory distress. FLACC 0/10. Wife inquires again about possibility of getting patient to side of bed. RN states will attempt around dinner time. Verbalizes understanding. 1712 - Patient assisted to side of bed with help of RN, CNA, wife, and friend. Patient able to sit for a couple of minutes before tiring out. Helped back to bed and pulled up and floated with help of CNA. Scheduled medications given per orders. Crushed and given in pudding. No s/sx/report anxiety, agitation, NVD or respiratory distress. Denies pain.     Report given to Winnie Jacques RN

## 2020-03-26 NOTE — PROGRESS NOTES
Problem: Anxiety/Agitation  Goal: Verbalize and demonstrate ability to manage anxiety  Description: The patient/family/caregiver will verbalize and demonstrate ability to manage the patient's anxiety throughout hospice care. Pt will have relaxed facial features and body language  Haldol 2 mg q 1 hr prn agitation   Ativan 1 mg IM q 2 hours PRN agitation  Scheduled Haldol 4 mg SQ q 4 hours     Outcome: Progressing Towards Goal     Problem: Coping and Emotional Distress  Goal: Demonstrate acceptance of terminal illness and understanding of disease progression  Description: Patient/family/caregiver will demonstrate acceptance of terminal disease and understanding of disease progression while employing appropriate coping mechanisms.   Outcome: Progressing Towards Goal

## 2020-03-26 NOTE — PROGRESS NOTES
Problem: Pain  Goal: Verbalize satisfaction of level of comfort and symptom control  Description: Pain will be less than 3/10 while at [facility]  Roxanol 10 mg q2 prn  Morphine 4 mg q20 prn   Outcome: Progressing Towards Goal     Problem: Anxiety/Agitation  Goal: Verbalize and demonstrate ability to manage anxiety  Description: The patient/family/caregiver will verbalize and demonstrate ability to manage the patient's anxiety throughout hospice care. Pt will have relaxed facial features and body language  Haldol 2 mg q 1 hr prn agitation   Ativan 1mg q 2 hours PRN agitation  Scheduled Haldol 4 mg SQ q 4 hours     Outcome: Progressing Towards Goal     Problem: Falls - Risk of  Goal: *Absence of Falls  Description: Document Edenilson Fall Risk and appropriate interventions in the flowsheet. Patient will not have any falls during shift.   Outcome: Progressing Towards Goal  Note: Fall Risk Interventions:  Mobility Interventions: Bed/chair exit alarm    Mentation Interventions: Bed/chair exit alarm, Evaluate medications/consider consulting pharmacy, Family/sitter at bedside, Reorient patient, Door open when patient unattended    Medication Interventions: Bed/chair exit alarm, Evaluate medications/consider consulting pharmacy    Elimination Interventions: Bed/chair exit alarm, Call light in reach    History of Falls Interventions: Door open when patient unattended, Bed/chair exit alarm, Evaluate medications/consider consulting pharmacy

## 2020-03-26 NOTE — PROGRESS NOTES
7027- report received. Patient is resting quietly at this time with no s/sx of pain or distress. Spouse is at bedside. Patient is awake but very drowsy and is unable to answer questions at this time. Bed is low and locked and tab alert in place. Door remains closed. 2109- scheduled medications given crushed in pudding. Patient correia leaking. 20 cc saline added to bulb. 2210- patient having some mild jerking. Spouse is concerned that it is from meds he received this morning. Assured wife that patient appears comfortable despite mild jerking, but if he requires medication, ativan is available. Explained that ativan could make him more drowsy than he already is. Spouse agreed with RN to wait to see how patient does. 80- CNA in to change brief as correia continues to leak. VAZQUEZ Mercado flushed correia with NS. No other needs at this time    0300- patient is awake and pulling at correia. It is continuing to leak    6158- 16 [place] silicone correia replaced. Patient tolerated poorly. 5- patient sleeping soundly. Correia remains patent. No leakage noted. 0508- patient awake in bed. No s/sx of pain or distress.

## 2020-03-26 NOTE — PROGRESS NOTES
I saw Mr. Sweetie Hawkins on morning rounds after reviewing as necessary interventions with his nurse at bedside. Patient spouse was also at bedside. Ms. Sweetie Hawkins was able to tolerate sitting up for less than a minute earlier this morning after when she was dyspneic and very fatigued for several minutes. He is still sleeping more than 18 hours/day. His urine output is less than 500 mL's per day despite resumption of furosemide. He has maximum blood pressure was 110/71 and minimum blood pressure 90/60. Later today we will obtain a blood pressure with the patient seated at bedside with legs dangling to determine if orthostatic blood pressure changes to severe to allow him to sit up in a chair for long time. At slight removed from the bedside I spoke privately with Mrs. Sweetie Hawkins about disposition in light of her 's expressed desire to return home. She will explore the possibility of getting paid caregivers with some financial support from veterans administration aid and assistance funding to determine if needing the patient's expressed wishes as possible.   He remains disoriented talking about bow hunting, dinners out and dancing after discharge from [facility]

## 2020-03-27 NOTE — PROGRESS NOTES
1845:  Report from Zari Krishna RN. Patient ID by name and . Pt in bed with eyes closed. No s/sx of pain, dyspnea, or agitation observed. FLACC 0. Bed low and locked and all safety measures in place. Wife at the bedside. Door closed. :  Scheduled HS PO medications given crushed in water via syringe at this time. Pt awake and able to tolerate. No choking noted. Pt denies any pain. No distress noted. FLACC 0. Assessment complete. Pt repositioned for comfort at this time. Wife remains at the bedside. 2215:  Pt resting with no s/sx of pain, dyspnea, or agitation. FLACC 0. Wife at the bedside. 0000:  Pt resting with no s/sx of pain, dyspnea, or agitation. FLACC 0.    0100:  Pt continues to rest with no s/sx of pain, dyspnea, or agitation noted. FLACC 0. No PRN medication given this shift. Wife remains at the bedside. Report to Saulo Soto RN.

## 2020-03-27 NOTE — PROGRESS NOTES
Follow up:       Provided an opportunity for wife Julian Bynum to  Talk about how her  asked to get up a couple of times today. She is grateful it was possible for him to sit up. Julian Bynum and family continue to take one day at a time. Mr. Ruben Sanchez woke up during visit. He spoke a few words to  and reached out to take 's hand.    offered a short [place] for a good night's sleep and rest.

## 2020-03-27 NOTE — PROGRESS NOTES
Brief encounter with Mathieu Franco. They are hoping to go home tomorrow.  offered prayer for a safe transition.

## 2020-03-27 NOTE — [facility] IDG MASTER NOTE
[facility] Collabor[facility]  Date: 03/27/20  Time: 5:39 PM    ___________________    Patient: Volodymyr Graham. Coverage Information:     Payor: [place] [employer]     Plan: [place] MED[place]ARE PART A AND B     Subscriber ID: [id number]     Phone Number:   MRN: [id number]    Current Benefit Period: Benefit Period 1  Start Date: 3/14/2020  End Date: 6/11/2020        Hospice Attending Provider: Yudy Alcantara [address] [place] [address]  [id number]  Phone: [phone]  Fax: [phone]    Level of Care: General Inpatient Care      ___________________    Diagnoses: The primary encounter diagnosis was Hospice care. Diagnoses of Acute on chronic systolic heart failure (HCC), Bilateral carotid artery stenosis, Abdominal aortic aneurysm (AAA) without rupture ([address] and Typical atrial flutter ([facility] [place] [address] were also pertinent to this visit.     Current Medications:    Current Facility-Administered Medications:     gabapentin (NEURONTIN) capsule 100 mg, 100 mg, Oral, BID, Zhou Mai NP    oxybutynin [facility]) tablet 5 mg, 5 mg, Oral, TID PRN, Zhou Mai NP    furosemide (LASIX) tablet 20 mg, 20 mg, Oral, ACB&D, Zhou Mai NP    mesalamine ER (APRISO) 24 hour capsule 0.375 g (Patient Supplied), 0.375 g, Oral, DAILY, Zhou Mai NP, Stopped at 03/27/20 0900    LORazepam (ATIVAN) injection 1 mg, 1 mg, IntraMUSCular, Q2H PRN, Zhou Mai NP    LORazepam (ATIVAN) tablet 1 mg, 1 mg, Oral, Q2H PRN, Zhou Mai NP    metoprolol tartrate (LOPRESSOR) tablet 12.5 mg, 12.5 mg, Oral, Q12H, Zhou Mai NP, Stopped at 03/27/20 0900    haloperidol (HALDOL) 2 mg/mL oral solution 2 mg, 2 mg, SubLINGual, Q1H PRN, Zhou Mai NP, 2 mg at 03/26/20 2109    senna (SENOKOT) tablet 8.6 mg, 1 Tab, Oral, BID, Zhou Mai NP, Stopped at 03/27/20 0800    morphine (ROXANOL) concentrated oral syringe 10 mg, 10 mg, Oral, Q2H PRN, Lianna SPARKS NP, 10 mg at 03/26/20 2110    morphine injection 4 mg, 4 mg, SubCUTAneous, Q20MIN PRN, 4 mg at 03/23/20 1253 **OR** morphine injection 4 mg, 4 mg, IntraVENous, Q20MIN PRN, Lisa Greer MD, 4 mg at 03/16/20 0701    acetaminophen (TYLENOL) suppository 650 mg, 650 mg, Rectal, Q3H PRN, Lisa Greer MD    haloperidol lactate (HALDOL) injection 2 mg, 2 mg, SubCUTAneous, Q1H PRN, 2 mg at 03/23/20 1252 **OR** haloperidol lactate (HALDOL) injection 2 mg, 2 mg, IntraVENous, Q1H PRN, Lisa Greer MD, 2 mg at 03/17/20 1253    glycopyrrolate (ROBINUL) injection 0.2 mg, 0.2 mg, SubCUTAneous, Q4H PRN, Lisa Greer MD, 0.2 mg at 03/19/20 0415    albuterol-ipratropium (DUO-NEB) 2.5 MG-0.5 MG/3 ML, 3 mL, Nebulization, Q4H PRN, Lisa Greer MD    Orders:  Orders Placed This Encounter    IP CONSULT TO SPIRITUAL CARE     Standing Status:   Standing     Number of Occurrences:   1     Order Specific Question:   Reason for Consult: Answer: Once on week one, then PRN. For [facility] Only. For contracted patients, their primary hospice will continue to manage spiritual care needs.  DIET PLEASURE     Standing Status:   Standing     Number of Occurrences:   1     Order Specific Question:   Likes/Dislikes/Preferences     Answer:   pleasure    VITAL SIGNS     Standing Status:   Standing     Number of Occurrences:   [id number]    NURSING-MISCELLANEOUS: Comfort Care Measures CONTINUOUS     Standing Status:   Standing     Number of Occurrences:   1     Order Specific Question:   Description of Order:     Answer:   Comfort Care Measures    BLADDER CHECKS     May scan bladder PRN for urinary retention and or patient discomfort     Standing Status:   Standing     Number of Occurrences:   [id number]    PAIN ASSESSMENT Pain and Symptoms: Assess ever 4 hours and PRN, for GIP level of care.  PRN Routine     Standing Status:   Standing     Number of Occurrences:   [id number]     Order Specific Question:   Please describe the test or procedure you would like to order.     Answer:   Pain and Symptoms: Assess ever 4 hours and PRN, for GIP level of care.  NURSING-MISCELLANEOUS: admit 3/14: (SFD) Admitted GIP with Acute on chronic systolic heart failure for management of pain, dyspnea and agitation. Associated dx Acute pulmonary edema (HCC) (J81.0) Mitral valve insufficiency, unspecified etiology (I... 3/14: (SFD) Admitted GIP with Acute on chronic systolic heart failure for management of pain, dyspnea and agitation. Associated dx     Acute pulmonary edema (HCC) (J81.0)     Mitral valve insufficiency, unspecified etiology (I34.0)     Ventricular tachycardia (HCC) (E73.9)     Metabolic encephalopathy (I92.01)     Hypotension, unspecified hypotension type (I95.9)     Pain (R52)     Dyspnea, unspecified type (R06.00)     Benefit Period 1  Start Date: 3/14/2020  End Date: 1/85/0937     I abdoulaye Fine. has a prognosis for a life expectancy of 6 months or less if the terminal illness runs its normal course.     As evidenced by 26-year-old former [employer] Who experienced flash pulmonary edema requiring intubation and mechanical ventilatory support On 2/20/2020. Saeid Carlos demonstrated acute on chronic worsening of his LVEF to 20-25% with marked increased severity of mitral regurgitation.  ProBNP was 5600( prior value January 2020 of 675.)   Patient was found to have multiple episodes of nonsustained ventricular tachycardia and insufficient duration and rate to trigger his implanted defibrillator. Porsche Lewis had undergoneAV [place] ablation 2/11/2020 for control of  paroxysmal supraventricular tachycardia.  While he was able to be extubated after careful diuresis, he subsequently developed increasingly severe metabolic encephalopathy with  disorientation to place and circumstances.  2 adult sons with advancedpractice RN credential are making decisions on his behalf and they have opted to stop complex life extending intervention into instead limit further care to comfort measures.  Increasingly severe hypotension and need for parenteral morphine to control somatic pain and dyspnea necessitate hospice care be provided in an inpatient setting. Following this and it was decided to transfer the patient to a rehabilitation facility. He did well for several days but then became more  and ultimately be required emergency hospital admission for recurrence hypoxic respiratory failure and congestive heart  Since then he has responded poorly to therapy, remains only partially responsive is still quite this the on moderate flow oxygen. The family now desires to proceed with comfort measures only. He has received several parenteral doses of morphine and lorazepam since hospital mission. .        Standing Status:   Standing     Number of Occurrences:   1     Order Specific Question:   Description of Order:     Answer:   admit    NURSING-MISCELLANEOUS: Check blood pressure while lying down, then assist patient to sit on side of bed and check blood pressure. Please record in chart. CONTINUOUS     Standing Status:   Standing     Number of Occurrences:   1     Order Specific Question:   Description of Order:     Answer:   Check blood pressure while lying down, then assist patient to sit on side of bed and check blood pressure. Please record in chart.  WOUND CARE, DRESSING CHANGE     Wound Care:  Location: sacrum  Decubitus Wound Stage II ([place]avamartinan)- Cleanse wound location with wound cleanser, pat dry and apply Cavilon Durable Barrier Cream every 12 hours and PRN if soiled. Turn every 2 hours. Assess with each nursing assessment visit.      Standing Status:   Standing     Number of Occurrences:   34    NURSING-MISCELLANEOUS: Please ask family to bring home med for ulcerative colitis (Mesalamine-Apriso) CONTINUOUS     Standing Status:   Standing     Number of Occurrences:   1     Order Specific Question:   Description of Order:     Answer:   Please ask family to bring home med for ulcerative colitis (Mesalamine-Apriso)    ACTIVITY AS TOLERATED W/ASSIST     Please assist patient to sit on side of the bed tid with meals. Standing Status:   Standing     Number of Occurrences:   1    DO NOT RESUSCITATE     Standing Status:   Standing     Number of Occurrences:   1     Order Specific Question:   Comfort Measures Only? Answer: Yes    OXYGEN CANNULA Liters per minute: 5; Indications for O2 therapy: HYPOXIA CONTINUOUS Routine     Standing Status:   Standing     Number of Occurrences:   1     Order Specific Question:   Liters per minute:      Answer:   5     Order Specific Question:   Indications for O2 therapy     Answer:   HYPOXIA    DISCHARGE PATIENT     Standing Status:   Standing     Number of Occurrences:   1    DISCONTD: sodium chloride (NS) flush 3 mL    DISCONTD: sodium chloride (NS) flush 3 mL    DISCONTD: morphine (ROXANOL) concentrated oral syringe 10 mg    DISCONTD: morphine (ROXANOL) concentrated oral syringe 10 mg    OR Linked Order Group     morphine injection 4 mg     morphine injection 4 mg    DISCONTD: acetaminophen (TYLENOL) tablet 650 mg    acetaminophen (TYLENOL) suppository 650 mg    DISCONTD: LORazepam (ATIVAN) injection 1 mg    DISCONTD: loperamide (IMODIUM) capsule 4 mg    DISCONTD: senna (SENOKOT) tablet 8.6 mg    OR Linked Order Group     haloperidol lactate (HALDOL) injection 2 mg     haloperidol lactate (HALDOL) injection 2 mg    DISCONTD: hyoscyamine SL (LEVSIN/SL) tablet 0.125 mg    glycopyrrolate (ROBINUL) injection 0.2 mg    albuterol-ipratropium (DUO-NEB) 2.5 MG-0.5 MG/3 ML     Order Specific Question:   MODE OF DELIVERY     Answer:   Nebulizer    DISCONTD: haloperidol lactate (HALDOL) injection 4 mg    DISCONTD: morphine injection 4 mg    DISCONTD: LORazepam (ATIVAN) injection 1 mg    DISCONTD: haloperidol lactate (HALDOL) injection 4 mg    DISCONTD: morphine injection 4 mg    morphine (ROXANOL) concentrated oral syringe 10 mg  DISCONTD: furosemide (LASIX) tablet 20 mg    metoprolol tartrate (LOPRESSOR) tablet 12.5 mg    haloperidol (HALDOL) 2 mg/mL oral solution 2 mg    senna (SENOKOT) tablet 8.6 mg    DISCONTD: mesalamine ER (APRISO) 24 hour capsule 0.375 g     OP SIG:Take 0.375 g by mouth daily. Indications: ulcerated colon      DISCONTD: gabapentin (NEURONTIN) capsule 100 mg     OP SIG:Take 300 mg by mouth two (2) times a day.  DISCONTD: LORazepam (ATIVAN) tablet 1 mg    DISCONTD: oxybutynin (DITROPAN) tablet 5 mg    DISCONTD: mesalamine ER (APRISO) 24 hour capsule 0.375 g (Patient Supplied)    mesalamine ER (APRISO) 24 hour capsule 0.375 g (Patient Supplied)    LORazepam (ATIVAN) injection 1 mg    DISCONTD: LORazepam (ATIVAN) tablet 1 mg    LORazepam (ATIVAN) tablet 1 mg    gabapentin (NEURONTIN) capsule 100 mg     OP SIG:Take 300 mg by mouth two (2) times a day.  oxybutynin (DITROPAN) tablet 5 mg    furosemide (LASIX) tablet 20 mg    nitroglycerin (Nitrostat) 0.4 mg SL tablet     Si Tab by SubLINGual route every five (5) minutes as needed for Chest Pain. Dispense:  25 Tab     Refill:  0    DISCONTD: acetaminophen (TYLENOL) 650 mg suppository     Sig: Insert 1 Suppository into rectum every three (3) hours as needed for Fever. Dispense:  5 Suppository     Refill:  0    haloperidol (HALDOL) 2 mg/mL oral concentrate     Sig: Take 1 mL by mouth every two (2) hours as needed (agitation, anxiety, or nausea. ). Dispense:  30 mL     Refill:  0    LORazepam (ATIVAN) 1 mg tablet     Sig: Take 1 Tab by mouth every two (2) hours as needed (agitation, anxiety or nausea unrelieved by Haldol. May give for myoclonic jerking. ). Max Daily Amount: 12 mg.  Indications: cindy associated with bipolar disorder, adjunct treatment     Dispense:  20 Tab     Refill:  0    morphine (ROXANOL) 100 mg/5 mL (20 mg/mL) concentrated solution     Sig: Take 0.5 mL by mouth every two (2) hours as needed for Pain (shortness of breath) for up to 30 days. Max Daily Amount: 120 mg. Dispense:  30 mL     Refill:  0    hyoscyamine (Levsin) 0.125 mg tablet     Si Tab by SubLINGual route every four (4) hours as needed (secretions). Dispense:  10 Tab     Refill:  0    bisacodyL (DULCOLAX) 10 mg suppository     Sig: Insert 10 mg into rectum every forty-eight (48) hours as needed for Constipation. Dispense:  5 Suppository     Refill:  0    acetaminophen (TYLENOL) 650 mg suppository     Sig: Insert 1 Suppository into rectum every three (3) hours as needed for Fever. Dispense:  5 Suppository     Refill:  0    INITIAL PHYSICIAN ORDER: HOSPICE Level Of Care: General Inpatient; Reason for Admission: 75 male with Systolic CHF admitted for respiratory distress management and EOL care. Standing Status:   Standing     Number of Occurrences:   1     Order Specific Question:   Status     Answer:   Hospice     Order Specific Question:   Level Of Care     Answer:   General Inpatient     Order Specific Question:   Reason for Admission     Answer:   75 male with Systolic CHF admitted for respiratory distress management and EOL care. Order Specific Question:   Inpatient Hospitalization Certified Necessary for the Following Reasons     Answer:   3.  Patient receiving treatment that can only be provided in an inpatient setting (further clarification in H&P documentation)     Order Specific Question:   Admitting Diagnosis     Answer:   Chronic systolic CHF (congestive heart failure) [facility]) [[id number]]     Order Specific Question:   Terminal Prognosis Diagnosis(es)     Answer:   Chronic systolic CHF (congestive heart failure) [facility]) [[id number]]     Order Specific Question:   Admitting Physician     Answer:   Saira Farrar     Order Specific Question:   Attending Physician     Answer:   Desean Osman [[id number]]    IP CONSULT TO SOCIAL WORK     Standing Status:   Standing     Number of Occurrences:   1     Order Specific Question:   Reason for Consult: Answer: For [facility] Only. For contracted patients, their primary hospice will continue to manage social work needs. Allergies: Allergies   Allergen Reactions    Sulfur Other (comments)     BREAK OUT       Care Plan:  Multidisciplinary Problems (Active)     Problem: Anticipatory Grief     Dates: Start: 03/17/20       Description:     Disciplines: Interdisciplinary    Goal: Grief heard and acknowledged, anxiety reduced, patient coping identified, patient/family expressed gratitude     Dates: Start: 03/17/20   Expected End: 04/03/20       Description: Family will acknowledge feelings of  Grief and anxiety and utilize available  Support. Disciplines: Interdisciplinary    Intervention: Assess grief responses     Dates: Start: 03/17/20   End: 04/03/20    Description: [employer] will assess for grief responses with each visit. Intervention: Support grieving process     Dates: Start: 03/17/20   End: 04/03/20    Description: [employer] will support the grief process by providing a safe space for open expression of feelings. Problem: Anxiety/Agitation     Dates: Start: 03/15/20       Description:     Disciplines: Interdisciplinary    Goal: Verbalize and demonstrate ability to manage anxiety     Dates: Start: 03/15/20   Expected End: 03/28/20       Description: The patient/family/caregiver will verbalize and demonstrate ability to manage the patient's anxiety throughout hospice care. Pt will have relaxed facial features and body language  Haldol 2 mg q 1 hr prn agitation   Ativan 1 mg IM q 2 hours PRN agitation  Scheduled Haldol 4 mg SQ q 4 hours       Disciplines: Interdisciplinary    Intervention: Assess for anxiety/agitation     Dates: Start: 03/15/20       Description: Assess for signs and symptoms of anxiety and agitation.           Intervention: Instruction strategies to reduce anxiety/agitation     Dates: Start: 03/15/20       Description: Instruct patient/caregiver on strategies to reduce anxiety/agitation. Problem: Coping and Emotional Distress     Dates: Start: 03/15/20       Description:     Disciplines: Interdisciplinary    Goal: Demonstrate acceptance of terminal illness and understanding of disease progression     Dates: Start: 03/15/20   Expected End: 03/28/20       Description: Patient/family/caregiver will demonstrate acceptance of terminal disease and understanding of disease progression while employing appropriate coping mechanisms. Disciplines: Interdisciplinary    Intervention: Assess for alteration in coping     Dates: Start: 03/15/20       Description:           Intervention: Assess for signs/symptoms of emotional distress     Dates: Start: 03/15/20       Description:           Intervention: Instruct on effective coping skills     Dates: Start: 03/15/20       Description: Instruct patient/caregiver on effective coping skills. Intervention: Instruct on strategies to reduce emotional distress     Dates: Start: 03/15/20       Description: Instruct patient/caregiver on strategies to reduce emotional distress.                       Problem: Dyspnea Due to End of Life     Dates: Start: 03/15/20       Description:     Disciplines: Interdisciplinary    Goal: Demonstrate understanding of and ability to manage respiratory symptoms at end of life     Dates: Start: 03/15/20   Expected End: 03/28/20       Description: Liudmila Collazo will be less then 24/minute  Morphine 4 mg IV/SQ q 1 hr prn dyspnea     Disciplines: Interdisciplinary    Intervention: Assess for signs and symptoms of dyspnea     Dates: Start: 03/15/20       Description:           Intervention: Troy Oconnor on causes/symptoms of dyspnea     Dates: Start: 03/15/20       Description:           Intervention: Instruct patient/caregiver/family on strategies to effectively manage dyspnea     Dates: Start: 03/15/20       Description:                       Problem: End of Life Process     Dates: Start: 03/15/20       Description:     Disciplines: Interdisciplinary    Goal: Demonstrate understanding of end of life processes     Dates: Start: 03/15/20   Expected End: 03/28/20       Description: Lui Bernal will understand end of life processes. Disciplines: Interdisciplinary    Intervention: Assess for signs/symptoms of terminal restlessness     Dates: Start: 03/15/20       Description:           Intervention: Instruct on strategies to reduce terminal restlessness     Dates: Start: 03/15/20       Description:           Intervention: Instruct on the dying process     Dates: Start: 03/15/20       Description:           Intervention: Instruct: imminent death     Dates: Start: 03/15/20       Description:           Intervention: Instruct: process at end of life     Dates: Start: 03/15/20       Description:                       Problem: Falls - Risk of     Dates: Start: 03/15/20       Description:     Disciplines: Interdisciplinary    Goal: *Absence of Falls     Dates: Start: 03/15/20   Expected End: 03/28/20       Description: Document Durenda Omar Fall Risk and appropriate interventions in the flowsheet.     Disciplines: Interdisciplinary                Problem: Infection - Risk of, Urinary Catheter-Associated Urinary Tract Infection     Dates: Start: 03/25/20       Description:     Disciplines: Interdisciplinary    Goal: *Absence of infection signs and symptoms     Dates: Start: 03/25/20   Expected End: 04/02/20       Description:     Disciplines: Interdisciplinary    Intervention: Urinary catheter needs assessment (eg: Impaired neurologic status; post void residual; spinal cord injury; urinary outflow obstruction; urinary retention; urinary incontinence; fluid imbalance)     Dates: Start: 03/25/20       Description:           Intervention: Urine assessment (eg: Clarity; color; odor; volume)     Dates: Start: 03/25/20       Description:           Intervention: Infection signs and symptoms monitoring - urinary tract (eg: Flank or suprapubic pain; burning; fever; dysuria; frequency; urgency; cloudy/bloody/foul odor urine; confusion/agitation; incontinence)     Dates: Start: 03/25/20       Description:           Intervention: Urinary catheter management (eg: Closed urinary catheter system maintenance; urinary catheter patency with free downhill flow; perineal care; monitor intake and output)     Dates: Start: 03/25/20       Description:                       Problem: Pain     Dates: Start: 03/15/20       Description:     Disciplines: Interdisciplinary    Goal: Verbalize satisfaction of level of comfort and symptom control     Dates: Start: 03/15/20   Expected End: 03/28/20       Description: Pain will be less than 3/10 while at [facility]  Morphine 4 mg q 30 minutes IV SQ prn   Scheduled Morphine 4 mg SQ q 4 hours     Disciplines: Interdisciplinary    Intervention: Assess effectiveness of pain management     Dates: Start: 03/15/20       Description:           Intervention: Assess for signs/symptoms of acute pain     Dates: Start: 03/15/20       Description:           Intervention: Assess for signs/symptoms of chronic pain     Dates: Start: 03/15/20       Description:           Intervention: Savanah Herron on non-pharmacological pain management     Dates: Start: 03/15/20       Description:           Intervention: Instruct on pain scales     Dates: Start: 03/15/20       Description:           Intervention: Instruct on pharmacological pain management     Dates: Start: 03/15/20       Description:                       Problem: Patient Education: Go to Patient Education Activity     Dates: Start: 03/25/20       Description:     Disciplines: Interdisciplinary    Goal: Patient/Family Education     Dates: Start: 03/25/20   Expected End: 04/02/20       Description:     Disciplines: Interdisciplinary                Problem: Pressure Injury - Risk of     Dates: Start: 03/15/20       Description: Pt will be free of further skin breakdown by 3/29/2020    Disciplines: Interdisciplinary    Goal: *Prevention of pressure injury     Dates: Start: 03/15/20   Expected End: 03/28/20       Description: Document Everardo Scale and appropriate interventions in the flowsheet. Disciplines: Interdisciplinary                Problem: Psychosocial General     Dates: Start: 03/17/20       Disciplines: Interdisciplinary    Goal: Patient/family is receiving emotional support     Dates: Start: 03/17/20   Expected End: 03/28/20       Disciplines: Interdisciplinary                Problem: Spiritual Distress     Dates: Start: 03/15/20       Description:     Disciplines: Interdisciplinary    Goal: Distress heard, acknowledged, and addressed     Dates: Start: 03/15/20   Expected End: 03/28/20       Description: Patient/family/caregiver distress will be heard, acknowledged, and addressed throughout hospice care. Disciplines: Interdisciplinary    Intervention: Assess for signs/symptoms of spiritual distress     Dates: Start: 03/15/20       Description:           Intervention: Consult on spiritual care     Dates: Start: 03/15/20       Description: Consult to Spiritual Care          Intervention: Discuss strategies to reduce spiritual distress     Dates: Start: 03/15/20       Description: Discuss with patient/caregiver strategies to reduce spiritual distress.                         Care Plan Problems/Goals      Progressing Towards Goal (10)      Verbalize satisfaction of level of comfort and symptom control (Pain)    Disciplines:  Interdisciplinary Expected end:  03/28/20        Outcome: Progressing Towards Goal By Flori Hanson RN on 03/27/20 0430            Verbalize and demonstrate ability to manage anxiety (Anxiety/Agitation)    Disciplines:  Interdisciplinary Expected end:  03/28/20        Outcome: Progressing Towards Goal By Flori Hanson RN on 03/27/20 0430            Demonstrate acceptance of terminal illness and understanding of disease progression (Coping and Emotional Distress)    Disciplines:  Interdisciplinary Expected end:  03/28/20        Outcome: Progressing Towards Goal By Ana Cristina Watson RN on 03/26/20 1508            Demonstrate understanding of end of life processes (End of Life Process)    Disciplines:  Interdisciplinary Expected end:  03/28/20        Outcome: Progressing Towards Goal By Ana Cristina Watson RN on 03/27/20 1519            Demonstrate understanding of and ability to manage respiratory symptoms at end of life (Dyspnea Due to End of Life)    Disciplines:  Interdisciplinary Expected end:  03/28/20        Outcome: Progressing Towards Goal By Chris Gonzales RN on 03/27/20 0430            *Absence of Falls (Falls - Risk of)    Disciplines:  Interdisciplinary Expected end:  03/28/20        Outcome: Progressing Towards Goal By Chris Gonzales RN on 03/27/20 0430            *Prevention of pressure injury (Pressure Injury - Risk of)    Disciplines:  Interdisciplinary Expected end:  03/28/20        Outcome: Progressing Towards Goal By Chris Gonzales RN on 03/27/20 0430            Patient/family is receiving emotional support (Psychosocial General)    Disciplines:  Interdisciplinary Expected end:  03/28/20        Outcome: Progressing Towards Goal By Willard Figueroa on 03/27/20 0927            *Absence of infection signs and symptoms (Infection - Risk of, Urinary Catheter-Associated Urinary Tract Infection)    Disciplines:  Interdisciplinary Expected end:  04/02/20        Outcome: Progressing Towards Goal By Chris Gonzales RN on 03/27/20 0430            Patient/Family Education (Patient Education: Go to Patient Education Activity)    Disciplines:  Interdisciplinary Expected end:  04/02/20        Outcome: Progressing Towards Goal By Julio Hassan on 03/25/20 1112                         No Outcome (2)      Distress heard, acknowledged, and addressed (Spiritual Distress)    Disciplines: Interdisciplinary Expected end:  03/28/20          Grief heard and acknowledged, anxiety reduced, patient coping identified, patient/family expressed gratitude (Anticipatory Grief)    Disciplines:  Interdisciplinary Expected end:  04/03/20                       Resolved/Met (4)      Demonstrate understanding of hospice philosophy, plan of care, and home hospice program [facility] Orientation)    Disciplines:  Interdisciplinary Expected end:  03/22/20        Outcome: Resolved/Met By Aurora Mc RN on 03/15/20 0130            Free of falls during episode of care (Risk for Falls)    Disciplines:  Interdisciplinary Expected end:  03/22/20        Outcome: Resolved/Met By Aurora Mc RN on 03/15/20 0130            Patient/Family Education (Patient Education: Go to Patient Education Activity)    Disciplines:  Interdisciplinary Expected end:  -        Outcome: Resolved/Met By Pilar Mitchell RN on 03/18/20 1610            Patient/Family Education (Patient Education: Go to Patient Education Activity)    Disciplines:  Interdisciplinary Expected end:  -        Outcome: Resolved/Met By Pilar Mitchell RN on 03/18/20 1610                              ___________________    Care Team Notes          POC/IDG Notes      [facility] IDG Nurse Notes by Pilar Mitchell RN at 03/27/20 1737  Version 1 of 1    Author:  Pilar Mitchell RN Service:  Elif Cartwright Author Type:  Registered Nurse    Filed:  03/27/20 1737 Date of Service:  03/27/20 1737 Status:  Signed    :  Pilar Mitchell RN (Registered Nurse)         Patient: Samson Wall. Date: 03/27/20  Time: 5:37 PM    [facility] Nurse Notes  IDG NOTE     Subjective:   Restless last night. Took one dose of morphine and haldol liquid at 9pm last night. Now minimally responsive. Was able to sit on the side of the bed yesterday, he is unable to sit up today.      Intake:  Bites and sips yesterday. NPO today.      Scheduled medications:  Unable to take po meds today. Current Facility-Administered Medications   Medication Dose Route Frequency    gabapentin (NEURONTIN) capsule 100 mg  100 mg Oral BID    furosemide (LASIX) tablet 20 mg  20 mg Oral ACB&D    mesalamine ER (APRISO) 24 hour capsule 0.375 g (Patient Supplied)  0.375 g Oral DAILY    metoprolol tartrate (LOPRESSOR) tablet 12.5 mg  12.5 mg Oral Q12H    senna (SENOKOT) tablet 8.6 mg  1 Tab Oral BID         PRN medications/symptoms:  Roxanol 10mg po x1 for pain and Haldol 2mg po x 1 for agitation.        Wounds:  Stage 2 to sacrum/coccyx       Wound Sacral/coccyx Posterior 0.5 diameter 03/23/20 (Active)   Dressing Type Moisture barrier 3/27/2020  9:18 AM   Pressure Injury Stage 2 3/26/2020  9:09 PM   Wound Length (cm) 0.5 cm 3/23/2020 10:30 AM   Wound Width (cm) 0.5 cm 3/23/2020 10:30 AM   Wound Surface Area (cm^2) 0.25 cm^2 3/23/2020 10:30 AM   Tissue Type Percent Pink 100 3/23/2020 10:30 AM   Drainage Amount Scant 3/27/2020  9:18 AM   Drainage Color Sanguinous 3/27/2020  9:18 AM   Wound Odor None 3/23/2020 10:30 AM   Tiesha-wound Assessment Other (Comment) 3/27/2020  9:18 AM   Cleansing and Cleansing Agents  Dermal wound cleanser 3/24/2020 11:25 AM   Dressing Changed Changed/New 3/27/2020  9:18 AM   Dressing Type Applied Moisture barrier 3/27/2020  9:18 AM   Number of days: 4         Output: Rubio catheter 350  ml urine output     IV access: None     Oxygen: 5  L/min via NC      Vitals:         Vitals:     03/25/20 2109 03/26/20 0411 03/26/20 1641 03/27/20 0552   BP: 93/63 90/60 93/65 (!) 76/53   Pulse: 81 77 78 82   Resp:   18 18 20   Temp:   98.2 °F (36.8 °C) 97.5 °F (36.4 °C) 98.4 °F (36.9 °C)            Changes in plan of care: Add hold parameters for po medications. Will plan to discharge home tomorrow per family request. Comfort medications will be provided.         Comprehensive plan of care reviewed. IDG and pt./family in agreement with plan of care.  The IDG identifies through on-going assessment when a change is needed to the POC; the pt/family will receive care and services necessitated by changes in POC. Medications reviewed by the pharmacist and Medical Director.           Sarwat POOL NP-C  03/27/20             Signed by: Lady Murillo RN       [address] [place] [address]  Notes by Veronica Dickinson at 03/27/20 1225  Version 1 of 1    Author:  Veronica Dickinson Service:  Spiritual Care Author Type:  Pastoral Care    Filed:  03/27/20 1228 Date of Service:  03/27/20 1225 Status:  Signed    :  Veronica Dickinson ([address])       Patient: Judi Gonzalez. Date: 03/27/20  Time: 12:25 PM    [facility]  Notes  Intervention: Ministry of presence, prayer, conversation around patient's current status. Outcome: Meaningful conversation. Plan:  will continue to provide spiritual and emotional support. Signed by: Jayce So       [address] [place] [address]  Notes by Hilton Ledesma at 03/27/20 0913  Version 1 of 1    Author:  Hilton Ledesma Service:  Licensed Clinical  Author Type:      Filed:  03/27/20 1226 Date of Service:  03/27/20 0913 Status:  Signed    :  Hilton Ledesma ()       Patient: Judi Gonzalez. Date: 03/27/20  Time: 9:13 AM    [facility]  Notes  LMSW will continue to provide support to the pt and his family. The volunteer program has been suspended due to the Covid-19 virus. LMSW will ensure the pt and families receive their comfort bags.       Problem: Psychosocial General  Goal: Patient/family is receiving emotional support  Outcome: Progressing Towards Goal       Signed by: Suhail Franz [facility] Nurse Notes by Johanna Lew RN at 03/23/20 1249  Version 1 of 1    Author:  Johanna Lew RN Service:  Beatris Pham Author Type:  Registered Nurse    Filed:  03/23/20 1249 Date of Service:  03/23/20 1249 Status:  Signed    :  Johanna Lew RN (Registered Nurse)       Patient: Sayda Mir Anderkinga Agee.    Date: 03/23/20  Time: 12:49 PM    [facility] Nurse Notes  F/U IDG: Pt is a 75year old male with CHF who is here GIP level of care for management of dyspnea and agitation. Rubio with UOP of 350 cc. Wounds: none. PRN medications: none. Scheduled meds:  none. Plan: Started Lopressor 12.5 mg bid and Lasix 20 mg bid. Add Senna PO and PO options for morphine and Haldol. Obtain a lying BP and a BP with pt seated on side of bed. Will progress to up in chair as pt is able. Comprehensive plan of care reviewed. IDG and pt./family in agreement with plan of care. The IDG identifies through on-going assessment when a change is needed to the POC; the pt/family will receive care and services necessitated by changes in POC. Medications reviewed by the pharmacist and Medical Director. Signed by: Nic Sylvester RN       [address] [place] [address] IDG  Notes by Vivian Rowland at 03/23/20 1122  Version 1 of 1    Author:  Vivian Rowland Service:  Licensed Clinical  Author Type:      Filed:  03/23/20 1216 Date of Service:  03/23/20 1122 Status:  Signed    :  Vivian Rowland ()       Patient: Noah Fine. Date: 03/23/20  Time: 11:22 AM    [facility]  Notes  LMSW will continue to provide emotional support to the pt and family. Pt is sitting up and eating some. Discussion of DC plan to take place. Signed by: Alex Armstrong       [facility] IDG  Notes by Evangelist Alva at 03/23/20 1140  Version 1 of 1    Author:  Evangelist Alva Service:  Spiritual Care Author Type:  Pastoral Care    Filed:  03/23/20 1143 Date of Service:  03/23/20 1140 Status:  Signed    :  Evangelist Alva (Pastoral Care)       Patient: Noah Fine. Date: 03/23/20  Time: 11:40 AM    [facility]  Notes  Intervention: Ministry of presence, active listening, prayer, literature on dying and anticipatory grief.     Outcome:Family shared memories, tearful at times but appropriate. Expressed appreciation for spiritual support. Plan: Continue providing spiritual and emotional support. Focus on Anticipatory Grief. Signed by: Torsten Wood       [facility] IDG Nurse Notes by Olivia Andre RN at 03/20/20 1417  Version 1 of 1    Author:  Olivia Andre RN Service:  Sasha Patel Author Type:  Registered Nurse    Filed:  03/20/20 1639 Date of Service:  03/20/20 1417 Status:  Signed    : Olivia Andre RN (Registered Nurse)         Patient: Chencho Rodgers. Date: 03/20/20  Time: 2:17 PM    IDG NOTE     Subjective: Eyes open, agitated. Nonverbal. Trying to sit up in bed.      Intake: NPO, tray on hold     Scheduled medications:         Current Facility-Administered Medications   Medication Dose Route Frequency    morphine injection 4 mg  4 mg SubCUTAneous Q4H    haloperidol lactate (HALDOL) injection 4 mg  4 mg SubCUTAneous Q4H         PRN medications: Has required Morphine 4mg IV x 1 for pain.      Wounds:  Sacrum is red but blanchable     Output: Rubio catheter 200 ml urine output     IV access:  None     Oxygen:  5 L/min via NC     Patient Vitals for the past 24 hrs:    Temp Pulse Resp BP   03/20/20 0348 98.1 °F (36.7 °C) 95 18 110/90         Changes in plan of care:  No change in plan of care.      Comprehensive plan of care reviewed. IDG and pt./family in agreement with plan of care. The IDG identifies through on-going assessment when a change is needed to the POC; the pt/family will receive care and services necessitated by changes in POC.  Medications reviewed by the pharmacist and Medical Director.           Alea POOL, NP-C                Signed by: Abbie Duenas RN       [facility] IDG  Notes by Candido Anguiano at 03/20/20 1239  Version 1 of 1    Author:  Candido Anguiano Service:  Spiritual Care Author Type:  Pastoral Care    Filed:  03/20/20 1245 Date of Service:  03/20/20 1239 Status:  Signed    :  Candido Anguiano (Pastoral Care) Patient: Delaney Perez. Date: 03/20/20  Time: 12:39 PM    [facility]  Notes    Intervention: Family support through meaningful conversation, active listening, and prayer. Outcome:  Family is realistic and appropriate. Tearful at times. They have good support. Family expressed appreciation for spiritual support. Plan: Continued support, spiritual rituals as appropriate, provide opportunity for open communication with focus on anticipatory grief. Signed by: Rodrigo Roper       [facility]  Notes by Tim Ramachandran at 03/20/20 1002  Version 1 of 1    Author:  Tim Ramachandran Service:  Licensed Clinical  Author Type:      Filed:  03/20/20 1243 Date of Service:  03/20/20 1002 Status:  Signed    :  Tim Ramachandran ()       Patient: Delaney Perez. Date: 03/20/20  Time: 10:02 AM    [facility]  Notes  LMSW will continue to provide emotional support to the pt and his family  The volunteer program has been suspended due to the Covid-19 virus. Problem: Psychosocial General  Goal: Patient/family is receiving emotional support  Outcome: Progressing Towards Goal     Signed by: Breann Izaguirre [facility] Nurse Notes by Tony Arango RN at 03/16/20 1233  Version 1 of 1    Author:  Tony Arango RN Service:  Lita Hawkins Author Type:  Registered Nurse    Filed:  03/16/20 1233 Date of Service:  03/16/20 1233 Status:  Signed    :  Tony Arango RN (Registered Nurse)       Patient: Delaney Perez. Date: 03/16/20  Time: 12:33 PM    [facility] Nurse Notes  1st IDG: Pt is a 58-year-old male with CHF who is here GIP level of care for management of dyspnea and anxiety. Ramiro with UOP of 650cc. IV access was lost this AM. VS stable. Wounds: none. PRN medications: Haldol 2 mg IV/SQ x 3, Ativan 1 mg IV/IM x 4, morphine 4 mg x 7 for pain. Scheduled meds:  none. Minimal PO intake x 9 days.    Plan: Add scheduled Haldol 4 mg SQ q 6 hours and Morphine 4 mg SQ q 6 hours. Discontinue all PO meds. D/C IV flushes. Comprehensive plan of care reviewed. IDG and pt./family in agreement with plan of care. The IDG identifies through on-going assessment when a change is needed to the POC; the pt/family will receive care and services necessitated by changes in POC. Medications reviewed by the pharmacist and Medical Director. Signed by: Minerva Ma RN       [facility] IDG  Notes by Queenie Guerin at 03/16/20 1153  Version 1 of 1    Author:  Queenie Guerin Service:  Spiritual Care Author Type:  Pastoral Care    Filed:  03/16/20 1206 Date of Service:  03/16/20 1153 Status:  Signed    :  Queenie Guerin (Pastoral Care)       Patient: Robinson Darnell. Date: 03/16/20  Time: 11:53 AM    [facility]  Notes  Assessments pending for spiritual and bereavement care. Signed by: Tutu Coronado       [facility] IDG  Notes by Babatunde Babcock at 03/16/20 1001  Version 1 of 1    Author:  Babatunde Babcock Service:  Licensed Clinical  Author Type:      Filed:  03/16/20 1157 Date of Service:  03/16/20 1001 Status:  Signed    :  Babatunde Babcock ()       Patient: Robinson Darnell. Date: 03/16/20  Time: 10:01 AM    [facility]  Notes  LMSW will visit with the pt and his family to complete the SW initial assessment. The volunteer program has been suspended due to the Corona Virus. LMSW will provide comfort bags to the pt. Signed by: Leonora Denise       [facility] IDG  Notes by Babatunde Babcock at 03/16/20 3795  Version 1 of 1    Author:  Babatunde Babcock Service:  Licensed Clinical  Author Type:      Filed:  03/16/20 0954 Date of Service:  03/16/20 0953 Status:  Signed    :  Babatunde Babcock ()       Patient: Robinson Darnell.     Date: 03/16/20  Time: 9:53 AM    [facility]  Notes    LMSW has read and agrees with the RN initial assessment. LMSW will meet with pt and family to complete the SW assessment. Signed by: Franky Shipman       [facility] IDG  Notes by Suhail Florence at 20 [id number]  Version 1 of 1    Author:  Suhail Florence Service:  Spiritual Care Author Type:  Pastoral Care    Filed:  20 0918 Date of Service:  20 6921 Status:  Signed    :  Suhail Florence (Pastoral Care)       Patient: Ashley Leung. Date: 20  Time: 9:17 AM    [facility]  Notes  / Grief Counselor has reviewed  Initial Comprehensive Assessment and Initial Plan of Care for [employer] PowerSissy Isaacs and  is in agreement with the plans put in place and goals set thus far. / Grief Counselor will follow up with Spiritual and Bereavement Assessments. [address] [place] [address] Counselor will provide care according to patient's/ family's desires. Signed by: Tristan Gandara       [facility] IDG Nurse Notes by Rick Nielsen RN at 03/15/20 0120  Version 1 of 1    Author:  Rick Nielsen RN Service:  NURSING Author Type:  Registered Nurse    Filed:  03/15/20 0121 Date of Service:  03/15/20 0120 Status:  Signed    :  Rick Nielsen RN (Registered Nurse)       Patient: Ashley Leung. Date:2020   Time: [address] [place] [address] Nurse Notes  [facility]. Patient is GIP level of care for hospice diagnosis of CHF. Symptoms to be managed include respiratory management and end of life care. Patient  is identified by name/. Patient is somnolent upon arrival as he was recently medicated for shortness of breath prior to transfer per report. Oxygen in use via nasal cannula at 5L/minute as per orders. Color acyanotic. Respirations non labored with diminished sounds and crackles heard in bases. Facial features flat,relaxed. FLACC 0/10. Skin is warm,dry and intact. No edema noted. Rubio catheter patent with clear yellow urine observed. No signs/symptoms of pain,anxiety,nausea or dyspnea at this time. Bed extender is use for comfort. Wife and patient's sons have arrived at bedside. Emotional support provided. Family given orientation to hospice house and opportunities for questions to be answered. Bed in low and locked position with side rails up x 2 with call light in reach. Admission complete and initial general hospice care plan initiated which includes spiritual,psychosocial and bereavement. No immediate needs at this time. IDG team,including MD, made aware of plan of care and immediate needs. Family is aware of volunteer services. Signed by: Miguel Carrillo RN                Care Team Present:   Team Members Present: (see sign in sheet for attendees)  Physician Team Member: Taylor Caban MD  Nurse Team Member: Adriane Husain NP  Social Work Team Member: Rusty Persaud, [address] [place] [address] Team Member: Hodan Plummer.

## 2020-03-27 NOTE — PROGRESS NOTES
Morning rounds:    Patient appears to be resting. Eyes closed, no movement at this time. No family present.

## 2020-03-27 NOTE — PROGRESS NOTES
Problem: End of Life Process  Goal: Demonstrate understanding of end of life processes  Description: Patient/caregiver will understand end of life processes.   Outcome: Progressing Towards Goal

## 2020-03-27 NOTE — [facility] IDG SOCIAL WORKER NOTES
Patient: Singh Polo. Date: 03/27/20  Time: 9:13 AM    [facility]  Notes  LMSW will continue to provide support to the pt and his family. The volunteer program has been suspended due to the Covid-19 virus. LMSW will ensure the pt and families receive their comfort bags.       Problem: Psychosocial General  Goal: Patient/family is receiving emotional support  Outcome: Progressing Towards Goal       Signed by: Tate Chapman

## 2020-03-27 NOTE — [facility] IDG NURSE NOTES
Patient: Patricia President. Date: 03/27/20  Time: 5:37 PM    [facility] Nurse Notes  IDG NOTE     Subjective:   Restless last night. Took one dose of morphine and haldol liquid at 9pm last night. Now minimally responsive. Was able to sit on the side of the bed yesterday, he is unable to sit up today.      Intake:  Bites and sips yesterday. NPO today.      Scheduled medications:  Unable to take po meds today.           Current Facility-Administered Medications   Medication Dose Route Frequency    gabapentin (NEURONTIN) capsule 100 mg  100 mg Oral BID    furosemide (LASIX) tablet 20 mg  20 mg Oral ACB&D    mesalamine ER (APRISO) 24 hour capsule 0.375 g (Patient Supplied)  0.375 g Oral DAILY    metoprolol tartrate (LOPRESSOR) tablet 12.5 mg  12.5 mg Oral Q12H    senna (SENOKOT) tablet 8.6 mg  1 Tab Oral BID         PRN medications/symptoms:  Roxanol 10mg po x1 for pain and Haldol 2mg po x 1 for agitation.        Wounds:  Stage 2 to sacrum/coccyx       Wound Sacral/coccyx Posterior 0.5 diameter 03/23/20 (Active)   Dressing Type Moisture barrier 3/27/2020  9:18 AM   Pressure Injury Stage 2 3/26/2020  9:09 PM   Wound Length (cm) 0.5 cm 3/23/2020 10:30 AM   Wound Width (cm) 0.5 cm 3/23/2020 10:30 AM   Wound Surface Area (cm^2) 0.25 cm^2 3/23/2020 10:30 AM   Tissue Type Percent Pink 100 3/23/2020 10:30 AM   Drainage Amount Scant 3/27/2020  9:18 AM   Drainage Color Sanguinous 3/27/2020  9:18 AM   Wound Odor None 3/23/2020 10:30 AM   Tiesha-wound Assessment Other (Comment) 3/27/2020  9:18 AM   Cleansing and Cleansing Agents  Dermal wound cleanser 3/24/2020 11:25 AM   Dressing Changed Changed/New 3/27/2020  9:18 AM   Dressing Type Applied Moisture barrier 3/27/2020  9:18 AM   Number of days: 4         Output: Rubio catheter 350  ml urine output     IV access: None     Oxygen: 5  L/min via NC      Vitals:         Vitals:     03/25/20 2109 03/26/20 0411 03/26/20 1641 03/27/20 0552   BP: 93/63 90/60 93/65 (!) 76/53   Pulse: 81 77 78 82   Resp:   18 18 20   Temp:   98.2 °F (36.8 °C) 97.5 °F (36.4 °C) 98.4 °F (36.9 °C)            Changes in plan of care: Add hold parameters for po medications. Will plan to discharge home tomorrow per family request. Comfort medications will be provided.         Comprehensive plan of care reviewed. IDG and pt./family in agreement with plan of care. The IDG identifies through on-going assessment when a change is needed to the POC; the pt/family will receive care and services necessitated by changes in POC.  Medications reviewed by the pharmacist and Medical Director.           Micheal POOL, NP-C  03/27/20             Signed by: Ciara Rebolledo RN

## 2020-03-27 NOTE — [facility] IDG CHAPLAIN NOTES
Patient: Claudette Esparza. Date: 03/27/20  Time: 12:25 PM    [facility]  Notes  Intervention: Ministry of presence, prayer, conversation around patient's current status. Outcome: Meaningful conversation. Plan:  will continue to provide spiritual and emotional support.         Signed by: Shante Lyles

## 2020-03-27 NOTE — PROGRESS NOTES
Problem: Pain  Goal: Verbalize satisfaction of level of comfort and symptom control  Description: Pain will be less than 3/10 while at [facility]  Morphine 4 mg q 30 minutes IV SQ prn   Scheduled Morphine 4 mg SQ q 4 hours   Outcome: Progressing Towards Goal     Problem: Anxiety/Agitation  Goal: Verbalize and demonstrate ability to manage anxiety  Description: The patient/family/caregiver will verbalize and demonstrate ability to manage the patient's anxiety throughout hospice care. Pt will have relaxed facial features and body language  Haldol 2 mg q 1 hr prn agitation   Ativan 1 mg IM q 2 hours PRN agitation  Scheduled Haldol 4 mg SQ q 4 hours     Outcome: Progressing Towards Goal     Problem: Dyspnea Due to End of Life  Goal: Demonstrate understanding of and ability to manage respiratory symptoms at end of life  Description: Respirations will be less then 24/minute  Morphine 4 mg IV/SQ q 1 hr prn dyspnea   Outcome: Progressing Towards Goal     Problem: Falls - Risk of  Goal: *Absence of Falls  Description: Document Chung Perlaes Fall Risk and appropriate interventions in the flowsheet. Outcome: Progressing Towards Goal  Note: Fall Risk Interventions:  Mobility Interventions: Bed/chair exit alarm    Mentation Interventions: Bed/chair exit alarm, Evaluate medications/consider consulting pharmacy, Family/sitter at bedside, Reorient patient, Door open when patient unattended    Medication Interventions: Bed/chair exit alarm, Evaluate medications/consider consulting pharmacy    Elimination Interventions: Bed/chair exit alarm, Call light in reach    History of Falls Interventions: Door open when patient unattended, Bed/chair exit alarm, Evaluate medications/consider consulting pharmacy         Problem: Pressure Injury - Risk of  Goal: *Prevention of pressure injury  Description: Document Everardo Scale and appropriate interventions in the flowsheet.   Outcome: Progressing Towards Goal  Note: Pressure Injury Interventions:  Sensory Interventions: Assess changes in LOC, Check visual cues for pain, Float heels, Keep linens dry and wrinkle-free, Minimize linen layers    Moisture Interventions: Absorbent underpads, Internal/External urinary devices, Minimize layers, Moisture barrier, Limit adult briefs, Apply protective barrier, creams and emollients    Activity Interventions: Pressure redistribution bed/mattress(bed type)    Mobility Interventions: Pressure redistribution bed/mattress (bed type), Float heels    Nutrition Interventions: Document food/fluid/supplement intake    Friction and Shear Interventions: Apply protective barrier, creams and emollients, Minimize layers, Lift sheet                Problem: Infection - Risk of, Urinary Catheter-Associated Urinary Tract Infection  Goal: *Absence of infection signs and symptoms  Outcome: Progressing Towards Goal

## 2020-03-27 NOTE — DISCHARGE SUMMARY
Discharge Summary     Patient: Jonathan Prieto MRN: [id number]  SSN: [id number]    YOB: 1945  Age: 75 y.o.   Sex: male       Admit Date: 3/14/2020    Discharge Date: 3/28/2020      Admission Diagnoses: Chronic systolic CHF (congestive heart failure) ([facility] [I50.22]    Discharge Diagnoses:   Problem List as of 3/27/2020 Date Reviewed: 3/14/2020          Codes Class Noted - Resolved        Hospice care ICD-10-CM: Z51.5  ICD-9-CM: V66.7  3/16/2020 - Present        Chronic systolic CHF (congestive heart failure) ([facility] ICD-10-CM: I50.22  ICD-9-CM: 428.22, 428.0  3/14/2020 - Present        Lactic acidosis ICD-10-CM: E87.2  ICD-9-CM: 276.2  3/13/2020 - Present        Mitral regurgitation ICD-10-CM: I34.0  ICD-9-CM: 424.0  3/13/2020 - Present        CAD (coronary artery disease) ICD-10-CM: I25.10  ICD-9-CM: 414.00  3/13/2020 - Present        Pulmonary infiltrate ICD-10-CM: R91.8  ICD-9-CM: 793.19  3/13/2020 - Present        Acute hypoxemic respiratory failure (HCC) ICD-10-CM: J96.01  ICD-9-CM: 518.81  3/13/2020 - Present        DNR (do not resuscitate) ICD-10-CM: Z66  ICD-9-CM: V49.86  2/25/2020 - Present        * (Principal) Acute on chronic systolic heart failure (HCC) ICD-10-CM: I50.23  ICD-9-CM: 428.23  2/20/2020 - Present    Overview Addendum 2/24/2020  9:21 AM by Montrell Olmos, NP     Echo 2/20/20:  EF 20-25%  Echo 4/15: EF 30-35%, mild to mod MR  Echo 2011: EF 30-35%             Acute respiratory failure with hypoxia [facility]) ICD-10-CM: J96.01  ICD-9-CM: 518.81  2/20/2020 - Present        Acute pulmonary edema ([facility] ICD-10-CM: J81.0  ICD-9-CM: 518.4  2/20/2020 - Present        Systolic CHF, chronic (HCC) ICD-10-CM: I50.22  ICD-9-CM: 428.22, 428.0  1/6/2020 - Present        LBBB (left bundle branch block) ICD-10-CM: I44.7  ICD-9-CM: 426.3  10/7/2019 - Present        Severe obesity ([facility] ICD-10-CM: E66.01  ICD-9-CM: 278.01  3/13/2019 - Present        Typical atrial flutter ([facility] ICD-10-CM: I48.3  ICD-9-CM: 427.32  2/21/2019 - Present            Dilated cardiomyopathy ([facility] ICD-10-CM: I42.0  ICD-9-CM: 425.4  2/7/2018 - Present        Bilateral carotid artery disease (HCC) ICD-10-CM: I73.9  ICD-9-CM: 447.9  8/7/2017 - Present            Hx of AICD-Implanted Cardiac Defibrillator ICD-10-CM: Z95.810  ICD-9-CM: V45.02  2/22/2016 - Present    Overview Signed 2/22/2016 11:07 AM by Madi Ferreira     Follow by Dr. Judy Watkins             Aortic Aneurysm/Dilation of the Aorta ICD-10-CM: I71.9  ICD-9-CM: 441.9  2/22/2016 - Present        ASCAD-of the Native Vessel ICD-10-CM: I25.10  ICD-9-CM: 414.01  2/22/2016 - Present        Hyperlipidemia ICD-10-CM: E78.5  ICD-9-CM: 272.4  2/22/2016 - Present        Cerebrovascular accident (stroke) [facility]) ICD-10-CM: I63.9  ICD-9-CM: 434.91  1/30/2016 - Present    Overview Signed 2/22/2016 11:07 AM by Madi Ferreira     Acute CVA 53/30/60, with embolic event to left MCA and expressive aphasia. Aortic ectasia, abdominal [facility]) ICD-10-CM: F37.061  ICD-9-CM: 447.72  5/9/2014 - Present                   Discharge Condition: R [place] [address] Course: Discharge from [address] [place] [address] stay at [facility] to home with [facility]. DC 3/28/20 via ambulance. Status at time of discharge is routine. Consults: None    Significant Diagnostic Studies: None    Disposition: home    Discharge Medications:   Current Discharge Medication List      START taking these medications    Details   acetaminophen (TYLENOL) 650 mg suppository Insert 1 Suppository into rectum every three (3) hours as needed for Fever. Qty: 5 Suppository, Refills: 0      haloperidol (HALDOL) 2 mg/mL oral concentrate Take 2mg=1 mL by mouth every two (2) hours as needed (agitation, anxiety, or nausea. ). Qty: 30 mL, Refills: 0      LORazepam (ATIVAN) 1 mg tablet Take 1 Tab by mouth every two (2) hours as needed (agitation, anxiety or nausea unrelieved by Haldol. May give for myoclonic jerking. ).    Qty: 20 Tab, Refills: 0    Associated Diagnoses: Hospice care      morphine (ROXANOL) 100 mg/5 mL (20 mg/mL) concentrated solution Take 10mg=0.5 mL by mouth every two (2) hours as needed for Pain (shortness of breath)   Qty: 30 mL, Refills: 0    Associated Diagnoses: Hospice care      hyoscyamine (Levsin) 0.125 mg tablet 1 Tab by SubLINGual route every four (4) hours as needed (secretions). Qty: 10 Tab, Refills: 0      bisacodyL (DULCOLAX) 10 mg suppository Insert 10 mg into rectum every forty-eight (48) hours as needed for Constipation. Qty: 5 Suppository, Refills: 0         CONTINUE these medications which have CHANGED    Details   nitroglycerin (Nitrostat) 0.4 mg SL tablet 1 Tab by SubLINGual route every five (5) minutes as needed for Chest Pain.   Qty: 25 Tab, Refills: 0         STOP taking these medications       spironolactone (ALDACTONE) 25 mg tablet Comments:   Reason for Stopping:         amiodarone (CORDARONE) 200 mg tablet Comments:   Reason for Stopping:         lisinopril (PRINIVIL, ZESTRIL) 5 mg tablet Comments:   Reason for Stopping:         metoprolol succinate (TOPROL-XL) 25 mg XL tablet Comments:   Reason for Stopping:         oxybutynin (DITROPAN) 5 mg tablet Comments:   Reason for Stopping:         loratadine (CLARITIN) 10 mg tablet Comments:   Reason for Stopping:         furosemide (LASIX) 40 mg tablet Comments:   Reason for Stopping:         mesalamine ER (APRISO) 0.375 gram 24 hour capsule Comments:   Reason for Stopping:         warfarin (COUMADIN) 5 mg tablet Comments:   Reason for Stopping:         atorvastatin (LIPITOR) 40 mg tablet Comments:   Reason for Stopping:         gabapentin (NEURONTIN) 300 mg capsule Comments:   Reason for Stopping:         fenofibric acid (TRILIPIX ER) 135 mg capsule Comments:   Reason for Stopping:         Aspirin, Buffered 81 mg tab Comments:   Reason for Stopping:               Activity: Bedrest  Diet: Comfort feeding    Wound Care: correia catheter care, inserted on 3/26/20  Wound Care:  Location: sacrum  Decubitus Wound Stage II (Ce)- Cleanse wound location with wound cleanser, pat dry and apply Cavilon Durable Barrier Cream every 12 hours and PRN if soiled. Oxygen:   Oxygen at 5L/min via NC as needed for shortness of breath  Equipment: Bed, bedside table, oxygen concentrator    Follow-up Appointments   Procedures    FOLLOW UP VISIT Appointment in: Other (Specify) Follow-up with home care RN on day of discharge. Follow-up with home care RN on day of discharge. Standing Status:   Standing     Number of Occurrences:   1     Order Specific Question:   Appointment in     Answer:    Other (Specify)       Signed By: Bernarda Powell NP     March 27, 2020

## 2020-03-27 NOTE — ADVANCED PRACTICE NURSE
IDG NOTE    Subjective:   Restless last night. Took one dose of morphine and haldol liquid at 9pm last night. Now minimally responsive. Was able to sit on the side of the bed yesterday, he is unable to sit up today. Intake:  Bites and sips yesterday. NPO today. Scheduled medications:  Unable to take po meds today. Current Facility-Administered Medications   Medication Dose Route Frequency    gabapentin (NEURONTIN) capsule 100 mg  100 mg Oral BID    furosemide (LASIX) tablet 20 mg  20 mg Oral ACB&D    mesalamine ER (APRISO) 24 hour capsule 0.375 g (Patient Supplied)  0.375 g Oral DAILY    metoprolol tartrate (LOPRESSOR) tablet 12.5 mg  12.5 mg Oral Q12H    senna (SENOKOT) tablet 8.6 mg  1 Tab Oral BID       PRN medications/symptoms:  Roxanol 10mg po x1 for pain and Haldol 2mg po x 1 for agitation.       Wounds:  Stage 2 to sacrum/coccyx  Wound Sacral/coccyx Posterior 0.5 diameter 03/23/20 (Active)   Dressing Type Moisture barrier 3/27/2020  9:18 AM   Pressure Injury Stage 2 3/26/2020  9:09 PM   Wound Length (cm) 0.5 cm 3/23/2020 10:30 AM   Wound Width (cm) 0.5 cm 3/23/2020 10:30 AM   Wound Surface Area (cm^2) 0.25 cm^2 3/23/2020 10:30 AM   Tissue Type Percent Pink 100 3/23/2020 10:30 AM   Drainage Amount Scant 3/27/2020  9:18 AM   Drainage Color Sanguinous 3/27/2020  9:18 AM   Wound Odor None 3/23/2020 10:30 AM   Tiesha-wound Assessment Other (Comment) 3/27/2020  9:18 AM   Cleansing and Cleansing Agents  Dermal wound cleanser 3/24/2020 11:25 AM   Dressing Changed Changed/New 3/27/2020  9:18 AM   Dressing Type Applied Moisture barrier 3/27/2020  9:18 AM   Number of days: 4        Output: Rubio catheter 350  ml urine output    IV access: None    Oxygen: 5  L/min via NC     Vitals:  Vitals:    03/25/20 2109 03/26/20 0411 03/26/20 1641 03/27/20 0552   BP: 93/63 90/60 93/65 (!) 76/53   Pulse: 81 77 78 82   Resp:  18 18 20   Temp:  98.2 °F (36.8 °C) 97.5 °F (36.4 °C) 98.4 °F (36.9 °C)         Changes in plan of care:  Add hold parameters for po medications. Will plan to discharge home tomorrow per family request. Comfort medications will be provided. Comprehensive plan of care reviewed. IDG and pt./family in agreement with plan of care. The IDG identifies through on-going assessment when a change is needed to the POC; the pt/family will receive care and services necessitated by changes in POC. Medications reviewed by the pharmacist and Medical Director.         Monse POOL, NP-C  03/27/20

## 2020-03-27 NOTE — PROGRESS NOTES
7459 - Report received from Leland Harris. Patient identified. Patient GIP level of care with hospice diagnosis of CHF. Patient resting quietly in bed, eyes closed. No s/sx/report anxiety, agitation, NVD or respiratory distress. FLACC 0/10. Bed in lowest, locked position, call light within reach, tab alert on, and 2 SR up for safety. 2667 - Assessment complete (see flowsheets). Patient resting quietly in bed. Unable to arouse enough to give medications safely at this time. No s/sx anxiety, agitation, NVD or respiratory distress. FLACC 0/10.    1120 - Patient resting quietly in bed, eyes closed. No s/sx anxiety, agitation, NVD or respiratory distress. FLACC 0/10. Patient recently bathed by RN. Wife at bedside and inquires as to why patient is so sleepy/less alert today. Educated wife that patient received roxanol and haldol around 2100 yesterday. Wife inquired if doses could possibly be decreased, etc to help with the lethargy the medications cause. Educated that RN made NP aware of effect of drugs on patient and this can be addressed while they are rounding on patient. Wife verbalized understanding. 1255 - Patient resting quietly in bed. No s/sx anxiety, agitation, NVD or respiratory distress. FLACC 0/10. Wife expresses need to talk with Joanne Lowryr,  again, and MD Gemini Lomeli in regards to trying to get patient transferred home if patient's level of care will change soon and patient will not be allowed visitors. Paola Mendenhall notified. RN will also notify MD Gemini Lomeli. 1450 - Patient resting quietly awake in bed. Able to shake head to deny needs at this time. No s/sx/report anxiety, agitation, NVD, respiratory distress or pain. 1725 - Patient resting quietly in bed. Alert but lethargic. No s/sx anxiety, agitation, NVD or respiratory distress. FLACC 0/10. Wife remains at bedside.   Evening medications held due to patient condition; patient not alert enough to safely swallow at this time.     Report given to Melvin Canada RN

## 2020-03-28 NOTE — PROGRESS NOTES
0045:  Report received from [address] [place] [address] Avenue, RN. Care assumed and pt identified by name and . Pt has a GIP level of care with an admitting dx of Chronic systolic CHF (congestive heart failure) ([facility] [I50.22] and the pt is here for sx mgmt of dyspnea and agitation. Pt resting in bed with eyes closed at this moment. His wife is at the bedside and his respirations are even and unlabored. No distress observed or voiced at this time and no s/sx of agitation, anxiety, pain, dyspnea, sob, n/v or seizures. FLACC score of 0/10. Bed low and locked, siderails x2, call light within pt's reach. Tab alert on and attached to pt. CNA operating under RN supervision    77 [id number] 003:  Pt resting in bed requesting water. Offered pt water, and he wanted to hold the cup. No distress visualized. No s/sx of agitation, anxiety, pain, dyspnea, sob, n/v or seizures. Respirations present. Call light in reach. Tab alert on. Bed remains lowered and locked. Current FLACC score 0/10.      0530:  No acute changes at this time. Pt remains sleeping with unlabored breathing. Call light remains in reach. Tab alarm remains connected to patient. No s/sx of agitation, anxiety, pain, dyspnea, sob, n/v or seizures. Bed in lowest locked position with siderails x2. Family at bedside and no needs or concerns voiced at this time.       7245:  Report given to [facility], RN

## 2020-03-28 NOTE — PROGRESS NOTES
Problem: Pain  Goal: Verbalize satisfaction of level of comfort and symptom control  Description: Pain will be less than 3/10 while at [facility]  Morphine 4 mg q 30 minutes IV SQ prn   Scheduled Morphine 4 mg SQ q 4 hours   Outcome: Progressing Towards Goal     Problem: Anxiety/Agitation  Goal: Verbalize and demonstrate ability to manage anxiety  Description: The patient/family/caregiver will verbalize and demonstrate ability to manage the patient's anxiety throughout hospice care. Pt will have relaxed facial features and body language  Haldol 2 mg q 1 hr prn agitation   Ativan 1 mg IM q 2 hours PRN agitation  Scheduled Haldol 4 mg SQ q 4 hours     Outcome: Progressing Towards Goal     Problem: Dyspnea Due to End of Life  Goal: Demonstrate understanding of and ability to manage respiratory symptoms at end of life  Description: Respirations will be less then 24/minute  Morphine 4 mg IV/SQ q 1 hr prn dyspnea   Outcome: Progressing Towards Goal     Problem: Falls - Risk of  Goal: *Absence of Falls  Description: Document Chung Perlaes Fall Risk and appropriate interventions in the flowsheet. Outcome: Progressing Towards Goal  Note: Fall Risk Interventions:  Mobility Interventions: Bed/chair exit alarm    Mentation Interventions: Bed/chair exit alarm, Evaluate medications/consider consulting pharmacy, Family/sitter at bedside, Reorient patient, Door open when patient unattended    Medication Interventions: Bed/chair exit alarm, Evaluate medications/consider consulting pharmacy    Elimination Interventions: Bed/chair exit alarm, Call light in reach    History of Falls Interventions: Door open when patient unattended, Bed/chair exit alarm, Evaluate medications/consider consulting pharmacy         Problem: Pressure Injury - Risk of  Goal: *Prevention of pressure injury  Description: Document Everardo Scale and appropriate interventions in the flowsheet.   Outcome: Progressing Towards Goal  Note: Pressure Injury Interventions:  Sensory Interventions: Assess changes in LOC, Check visual cues for pain, Float heels, Keep linens dry and wrinkle-free, Minimize linen layers    Moisture Interventions: Absorbent underpads, Internal/External urinary devices, Minimize layers, Moisture barrier, Limit adult briefs, Apply protective barrier, creams and emollients    Activity Interventions: Pressure redistribution bed/mattress(bed type)    Mobility Interventions: Pressure redistribution bed/mattress (bed type), Float heels    Nutrition Interventions: Document food/fluid/supplement intake    Friction and Shear Interventions: Apply protective barrier, creams and emollients, Minimize layers, Lift sheet                Problem: Infection - Risk of, Urinary Catheter-Associated Urinary Tract Infection  Goal: *Absence of infection signs and symptoms  Outcome: Progressing Towards Goal

## 2020-03-28 NOTE — PROGRESS NOTES
0700 - Report received from Heriberto Cooneyo 1257 identified.  Patient GIP level of care with hospice diagnosis of CHF.  Patient resting quietly in bed, alert.  No s/sx/report anxiety, agitation, NVD or respiratory distress.  FLACC 0/10. Bed in lowest, locked position, call light within reach, tab alert on, and 2 SR up for safety. Wife asleep at bedside. Patient to be dc'd home at 1130 today. 6173 - Discharge education completed with patient's wife. Educated plan of care, medications, patient's activity, AVS, etc. AVS electronically signed per RN and wife. Wife informed should have a visit from home RN today and home RN will go over home visiting schedule with wife. Wife verbalized understanding. Patient resting quietly in bed, eyes closed.  No s/sx/report anxiety, agitation, NVD or respiratory distress.  FLACC 0/10.    1044 - Patient resting quietly in bed. No s/sx anxiety, agitation, NVD or respiratory distress. FLACC 0/10. No needs identified. Wife and DIL at bedside. 1141 - Report given to Joanne Romero, in-home RN. Joanne Romero states she will give wife a call and will meet with them between 2 and 5 today. 1204 - PRN roxanol 10mg oral solution and PRN haldol 2mg oral solution given to pre-medicate for patient transport home. Spoke with wife and DIL regarding concern that with patient's status and recent decline, RN feels may be a possibility patient would not make it home alive. Wife states that even if this is the case, patient wished to die at home, so wanted to try to get him there. [facility] contacted to make sure if patient passes during transport they are able to transport body home. [facility] employee stated yes. Also stated that wife able to ride in bus with patient. Will dc patient upon arrival of transport. [facility] picked up patient. Wife to ride with patient. [facility] staff given [employer] paper work as well as DNR.   [facility]

## 2020-03-30 ENCOUNTER — HOME CARE VISIT (OUTPATIENT)
Dept: HOSPICE | Facility: HOSPICE | Age: 75
End: 2020-03-30
Payer: MEDICARE

## 2021-04-05 NOTE — PROGRESS NOTES
To see family at their request regarding hospice. Son and pt's wife at bedside. Discussed options and list given. They are interested in hospice house, as son does not think spouse can care for pt at home alone. They would like OA. Discussed if pt does not meet criteria that CM will follow to assist with d/c POC. Care Management Interventions PCP Verified by CM: Yes(confirms Dr. Ama Elizondo) Mode of Transport at Discharge: Other (see comment) Transition of Care Consult (CM Consult): Discharge Planning Discharge Durable Medical Equipment: (glucometer) Current Support Network: (lives with wife, Polly Pion -[phone] sons -2 [facility], one in [place]) Confirm Follow Up Transport: Self [employer] Information Provided?: (confirms KENNEDY/Kellie ) Discharge Location Discharge Placement: Unable to determine at this time [Consultation] : a consultation visit [FreeTextEntry1] : pilonidal cyst

## 2021-10-18 NOTE — PROGRESS NOTES
SPEECH PATHOLOGY NOTE: 
 
Palliative care at bedside at time of speech therapy attempt. Will follow up at later time as patient is available and as schedule permits.   
 
[address]ak[address] CCC-SLP 
 
 UNABLE TO ADVANCE WIRE VIA RADIAL APPROACH. ARTERIOGRAM PERFORMED. RIGHT FEMORAL ARTERY ASSESSED USING VASCULAR ULTRASOUND FOR POSSIBLE ACCESS.

## 2022-01-01 NOTE — PROGRESS NOTES
[facility]. Verbal/written post procedure instructions were given to patient/caregiver

## 2022-04-14 NOTE — PROGRESS NOTES
Advance Care Planning     Advance Care Planning Activator (Inpatient)  Conversation Note      Date of ACP Conversation: 4/14/2022     Conversation Conducted with: Patient with Decision Making Capacity    ACP Activator: Rubin Flower RN        Health Care Decision Maker:     Current Designated Health Care Decision Maker:     Primary Decision Maker: Vinay Cason - Child - [phone]  Click here to complete Healthcare Decision Makers including section of the Healthcare Decision Maker Relationship (ie \"Primary\")  Today we documented Decision Maker(s) consistent with Legal Next of Kin hierarchy. Care Preferences    Ventilation: \"If you were in your present state of health and suddenly became very ill and were unable to breathe on your own, what would your preference be about the use of a ventilator (breathing machine) if it were available to you? \"      Would the patient desire the use of ventilator (breathing machine)?: yes    \"If your health worsens and it becomes clear that your chance of recovery is unlikely, what would your preference be about the use of a ventilator (breathing machine) if it were available to you? \"     Would the patient desire the use of ventilator (breathing machine)?: Yes      Resuscitation  \"CPR works best to restart the heart when there is a sudden event, like a heart attack, in someone who is otherwise healthy. Unfortunately, CPR does not typically restart the heart for people who have serious health conditions or who are very sick. \"    \"In the event your heart stopped as a result of an underlying serious health condition, would you want attempts to be made to restart your heart (answer \"yes\" for attempt to resuscitate) or would you prefer a natural death (answer \"no\" for do not attempt to resuscitate)? \" yes       [x] Yes   [] No   Educated Patient / [employer] regarding differences between Advance Directives and portable DNR orders.     Length of ACP Conversation in [facility] CARDIOLOGY PROGRESS NOTE 
      
 
3/4/2020 8:41 AM 
 
Admit Date: 2/20/2020 Subjective:  
Some stable orthopnea, otherwise no other complaints. Motivated to improve his functional status. No chest pain, + cough rarely overnight. ROS: 
Cardiovascular:  As noted above Objective:  
  
Vitals:  
 03/04/20 0005 03/04/20 0330 03/04/20 6286 03/04/20 8121 BP: (!) 89/50 (!) 89/60  90/53 Pulse: 78 85  81 Resp: 20 20 19 Temp: 98.2 °F (36.8 °C) 98.3 °F (36.8 °C)  97.8 °F (36.6 °C) SpO2: 94% 94% 97% 94% Weight:      
Height:      
 
 
Physical Exam: 
General-No Acute Distress Neck- supple, no JVD 
CV- regular rate and rhythm no MRG Lung- clear bilaterally without wheezes Abd- soft, nontender, nondistended Ext- no edema bilaterally. Skin- warm and dry Data Review:  
Recent Labs 03/04/20 
6200 03/03/20 
9083  142  
K 4.8 4.1 MG 2.6* 2.3 BUN 40* 29* CREA 1.34 1.10 * 152* WBC 13.3* 12.7* HGB 11.0* 10.5* HCT 36.5* 34.2*  
 282 INR 1.6 1.7 Assessment/Plan:  
 
Principal Problem: 
  Dilated cardiomyopathy ([facility] [address] (2/7/2018) Acute pulmonary edema ([facility] [address] (2/20/2020) Acute on chronic systolic heart failure ([facility] [address] (2/20/2020) - appears euvolemic on exam today 
  - CHF teaching 
  - meds: amiodarone, lisinopril, spironolactone, lasix PO 
  - mild increase in Cr today, will need to follow when he goes to rehab and adjust diuretics as needed  , follow K  
 
CAD 
 - no chest pain 
  - on ASA, atorva Active Problems: Hx of AICD-Implanted Cardiac Defibrillator (2/22/2016) - stable NSVT (nonsustained ventricular tachycardia) ([facility] [address] (8/7/2017) 
  - amio Acute respiratory failure with hypoxia ([facility] [address] (2/20/2020) - off oxygen DNR (do not resuscitate) (2/25/2020) - per patient Plan to go to rehab soon.    
 
Ashley Laird DO 
3/4/2020 8:41 AM 
 
 minutes:      Conversation Outcomes:  [x] ACP discussion completed  [] Existing advance directive reviewed with patient; no changes to patient's previously recorded wishes  [] New Advance Directive completed  [] Portable Do Not Rescitate prepared for Provider review and signature  [] POLST/POST/MOLST/MOST prepared for Provider review and signature      Follow-up plan:    [] Schedule follow-up conversation to continue planning  [] Referred individual to Provider for additional questions/concerns   [] Advised patient/agent/surrogate to review completed ACP document and update if needed with changes in condition, patient preferences or care setting    [] This note routed to one or more involved healthcare providers        Spoke with son Aixa Herrera and he is medical POA

## 2022-06-08 NOTE — DISCHARGE INSTRUCTIONS
Activity: Bedrest  Diet: Comfort feeding     Wound Care: correia catheter care, inserted on 3/26/20  Wound Care:  Location: sacrum  Decubitus Wound Stage II (Cavalon)- Cleanse wound location with wound cleanser, pat dry and apply Cavilon Durable Barrier Cream every 12 hours and PRN if soiled.      Oxygen:   Oxygen at 5L/min via NC as needed for shortness of breath  Equipment: Bed, bedside table, oxygen concentrator details… detailed exam